# Patient Record
Sex: FEMALE | Race: WHITE | NOT HISPANIC OR LATINO | URBAN - METROPOLITAN AREA
[De-identification: names, ages, dates, MRNs, and addresses within clinical notes are randomized per-mention and may not be internally consistent; named-entity substitution may affect disease eponyms.]

---

## 2017-03-02 ENCOUNTER — INPATIENT (INPATIENT)
Facility: HOSPITAL | Age: 82
LOS: 4 days | Discharge: HOME | DRG: 814 | End: 2017-03-07
Attending: STUDENT IN AN ORGANIZED HEALTH CARE EDUCATION/TRAINING PROGRAM | Admitting: STUDENT IN AN ORGANIZED HEALTH CARE EDUCATION/TRAINING PROGRAM
Payer: MEDICARE

## 2017-03-02 VITALS
RESPIRATION RATE: 18 BRPM | HEART RATE: 79 BPM | SYSTOLIC BLOOD PRESSURE: 159 MMHG | DIASTOLIC BLOOD PRESSURE: 83 MMHG | TEMPERATURE: 98 F | OXYGEN SATURATION: 97 %

## 2017-03-02 DIAGNOSIS — G44.40 DRUG-INDUCED HEADACHE, NOT ELSEWHERE CLASSIFIED, NOT INTRACTABLE: ICD-10-CM

## 2017-03-02 DIAGNOSIS — N18.9 CHRONIC KIDNEY DISEASE, UNSPECIFIED: ICD-10-CM

## 2017-03-02 DIAGNOSIS — D72.1 EOSINOPHILIA: ICD-10-CM

## 2017-03-02 DIAGNOSIS — J43.9 EMPHYSEMA, UNSPECIFIED: ICD-10-CM

## 2017-03-02 DIAGNOSIS — Z79.1 LONG TERM (CURRENT) USE OF NON-STEROIDAL ANTI-INFLAMMATORIES (NSAID): ICD-10-CM

## 2017-03-02 DIAGNOSIS — E53.8 DEFICIENCY OF OTHER SPECIFIED B GROUP VITAMINS: ICD-10-CM

## 2017-03-02 DIAGNOSIS — Z87.891 PERSONAL HISTORY OF NICOTINE DEPENDENCE: ICD-10-CM

## 2017-03-02 DIAGNOSIS — I10 ESSENTIAL (PRIMARY) HYPERTENSION: ICD-10-CM

## 2017-03-02 DIAGNOSIS — T78.40XA ALLERGY, UNSPECIFIED, INITIAL ENCOUNTER: ICD-10-CM

## 2017-03-02 DIAGNOSIS — Z79.82 LONG TERM (CURRENT) USE OF ASPIRIN: ICD-10-CM

## 2017-03-02 DIAGNOSIS — J18.9 PNEUMONIA, UNSPECIFIED ORGANISM: ICD-10-CM

## 2017-03-02 DIAGNOSIS — J44.9 CHRONIC OBSTRUCTIVE PULMONARY DISEASE, UNSPECIFIED: ICD-10-CM

## 2017-03-02 DIAGNOSIS — E78.5 HYPERLIPIDEMIA, UNSPECIFIED: ICD-10-CM

## 2017-03-02 DIAGNOSIS — D68.59 OTHER PRIMARY THROMBOPHILIA: ICD-10-CM

## 2017-03-02 DIAGNOSIS — I12.9 HYPERTENSIVE CHRONIC KIDNEY DISEASE WITH STAGE 1 THROUGH STAGE 4 CHRONIC KIDNEY DISEASE, OR UNSPECIFIED CHRONIC KIDNEY DISEASE: ICD-10-CM

## 2017-03-02 DIAGNOSIS — R91.8 OTHER NONSPECIFIC ABNORMAL FINDING OF LUNG FIELD: ICD-10-CM

## 2017-03-02 LAB
ALBUMIN SERPL ELPH-MCNC: 3.5 G/DL — SIGNIFICANT CHANGE UP (ref 3.3–5)
ALP SERPL-CCNC: 169 U/L — HIGH (ref 40–120)
ALT FLD-CCNC: 16 U/L RC — SIGNIFICANT CHANGE UP (ref 10–45)
ANION GAP SERPL CALC-SCNC: 15 MMOL/L — SIGNIFICANT CHANGE UP (ref 5–17)
APTT BLD: 27 SEC — LOW (ref 27.5–37.4)
AST SERPL-CCNC: 15 U/L — SIGNIFICANT CHANGE UP (ref 10–40)
BASOPHILS # BLD AUTO: 0.1 K/UL — SIGNIFICANT CHANGE UP (ref 0–0.2)
BASOPHILS NFR BLD AUTO: 0.2 % — SIGNIFICANT CHANGE UP (ref 0–2)
BILIRUB SERPL-MCNC: 0.1 MG/DL — LOW (ref 0.2–1.2)
BUN SERPL-MCNC: 26 MG/DL — HIGH (ref 7–23)
CALCIUM SERPL-MCNC: 9.7 MG/DL — SIGNIFICANT CHANGE UP (ref 8.4–10.5)
CHLORIDE SERPL-SCNC: 106 MMOL/L — SIGNIFICANT CHANGE UP (ref 96–108)
CO2 SERPL-SCNC: 22 MMOL/L — SIGNIFICANT CHANGE UP (ref 22–31)
CREAT SERPL-MCNC: 1.59 MG/DL — HIGH (ref 0.5–1.3)
EOSINOPHIL # BLD AUTO: 29.9 K/UL — HIGH (ref 0–0.5)
EOSINOPHIL NFR BLD AUTO: 79.6 % — HIGH (ref 0–6)
GAS PNL BLDV: SIGNIFICANT CHANGE UP
GLUCOSE SERPL-MCNC: 101 MG/DL — HIGH (ref 70–99)
HCT VFR BLD CALC: 34.8 % — SIGNIFICANT CHANGE UP (ref 34.5–45)
HGB BLD-MCNC: 11.6 G/DL — SIGNIFICANT CHANGE UP (ref 11.5–15.5)
INR BLD: 0.98 RATIO — SIGNIFICANT CHANGE UP (ref 0.88–1.16)
LYMPHOCYTES # BLD AUTO: 1.7 K/UL — SIGNIFICANT CHANGE UP (ref 1–3.3)
LYMPHOCYTES # BLD AUTO: 4.4 % — LOW (ref 13–44)
MCHC RBC-ENTMCNC: 30.3 PG — SIGNIFICANT CHANGE UP (ref 27–34)
MCHC RBC-ENTMCNC: 33.2 GM/DL — SIGNIFICANT CHANGE UP (ref 32–36)
MCV RBC AUTO: 91.2 FL — SIGNIFICANT CHANGE UP (ref 80–100)
MONOCYTES # BLD AUTO: 0.8 K/UL — SIGNIFICANT CHANGE UP (ref 0–0.9)
MONOCYTES NFR BLD AUTO: 2.2 % — SIGNIFICANT CHANGE UP (ref 2–14)
NEUTROPHILS # BLD AUTO: 5.1 K/UL — SIGNIFICANT CHANGE UP (ref 1.8–7.4)
NEUTROPHILS NFR BLD AUTO: 13.6 % — LOW (ref 43–77)
PLATELET # BLD AUTO: 462 K/UL — HIGH (ref 150–400)
POTASSIUM SERPL-MCNC: 3.9 MMOL/L — SIGNIFICANT CHANGE UP (ref 3.5–5.3)
POTASSIUM SERPL-SCNC: 3.9 MMOL/L — SIGNIFICANT CHANGE UP (ref 3.5–5.3)
PROT SERPL-MCNC: 6.7 G/DL — SIGNIFICANT CHANGE UP (ref 6–8.3)
PROTHROM AB SERPL-ACNC: 10.7 SEC — SIGNIFICANT CHANGE UP (ref 10–13.1)
RBC # BLD: 3.82 M/UL — SIGNIFICANT CHANGE UP (ref 3.8–5.2)
RBC # FLD: 13.1 % — SIGNIFICANT CHANGE UP (ref 10.3–14.5)
SODIUM SERPL-SCNC: 143 MMOL/L — SIGNIFICANT CHANGE UP (ref 135–145)
WBC # BLD: 37.6 K/UL — HIGH (ref 3.8–10.5)
WBC # FLD AUTO: 37.6 K/UL — HIGH (ref 3.8–10.5)

## 2017-03-02 PROCEDURE — 93010 ELECTROCARDIOGRAM REPORT: CPT

## 2017-03-02 PROCEDURE — 99223 1ST HOSP IP/OBS HIGH 75: CPT

## 2017-03-02 PROCEDURE — 71020: CPT | Mod: 26

## 2017-03-02 PROCEDURE — 99285 EMERGENCY DEPT VISIT HI MDM: CPT | Mod: 25

## 2017-03-02 PROCEDURE — 88189 FLOWCYTOMETRY/READ 16 & >: CPT

## 2017-03-02 RX ORDER — AMLODIPINE BESYLATE 2.5 MG/1
10 TABLET ORAL DAILY
Qty: 0 | Refills: 0 | Status: DISCONTINUED | OUTPATIENT
Start: 2017-03-02 | End: 2017-03-07

## 2017-03-02 RX ORDER — ASPIRIN/CALCIUM CARB/MAGNESIUM 324 MG
0 TABLET ORAL
Qty: 0 | Refills: 0 | COMMUNITY

## 2017-03-02 RX ORDER — BUDESONIDE AND FORMOTEROL FUMARATE DIHYDRATE 160; 4.5 UG/1; UG/1
0 AEROSOL RESPIRATORY (INHALATION)
Qty: 0 | Refills: 0 | COMMUNITY

## 2017-03-02 RX ORDER — SODIUM CHLORIDE 9 MG/ML
1000 INJECTION INTRAMUSCULAR; INTRAVENOUS; SUBCUTANEOUS
Qty: 0 | Refills: 0 | Status: DISCONTINUED | OUTPATIENT
Start: 2017-03-02 | End: 2017-03-06

## 2017-03-02 RX ORDER — LOSARTAN POTASSIUM 100 MG/1
0 TABLET, FILM COATED ORAL
Qty: 0 | Refills: 0 | COMMUNITY

## 2017-03-02 RX ORDER — ASPIRIN/CALCIUM CARB/MAGNESIUM 324 MG
81 TABLET ORAL DAILY
Qty: 0 | Refills: 0 | Status: DISCONTINUED | OUTPATIENT
Start: 2017-03-02 | End: 2017-03-07

## 2017-03-02 RX ORDER — ATORVASTATIN CALCIUM 80 MG/1
20 TABLET, FILM COATED ORAL AT BEDTIME
Qty: 0 | Refills: 0 | Status: DISCONTINUED | OUTPATIENT
Start: 2017-03-02 | End: 2017-03-07

## 2017-03-02 RX ORDER — METOCLOPRAMIDE HCL 10 MG
10 TABLET ORAL EVERY 8 HOURS
Qty: 0 | Refills: 0 | Status: DISCONTINUED | OUTPATIENT
Start: 2017-03-02 | End: 2017-03-07

## 2017-03-02 RX ORDER — FLUTICASONE PROPIONATE AND SALMETEROL 50; 250 UG/1; UG/1
1 POWDER ORAL; RESPIRATORY (INHALATION)
Qty: 0 | Refills: 0 | Status: DISCONTINUED | OUTPATIENT
Start: 2017-03-02 | End: 2017-03-07

## 2017-03-02 RX ORDER — LOSARTAN POTASSIUM 100 MG/1
100 TABLET, FILM COATED ORAL DAILY
Qty: 0 | Refills: 0 | Status: DISCONTINUED | OUTPATIENT
Start: 2017-03-02 | End: 2017-03-07

## 2017-03-02 RX ORDER — HEPARIN SODIUM 5000 [USP'U]/ML
5000 INJECTION INTRAVENOUS; SUBCUTANEOUS EVERY 8 HOURS
Qty: 0 | Refills: 0 | Status: DISCONTINUED | OUTPATIENT
Start: 2017-03-02 | End: 2017-03-07

## 2017-03-02 NOTE — H&P ADULT. - PROBLEM SELECTOR PLAN 5
Appears to be at baseline and improved vs 2/22 labs where creatinine had been elevated.  No signs of TIARRA @ this time  Renally dose medications.

## 2017-03-02 NOTE — ED PROVIDER NOTE - PROGRESS NOTE DETAILS
spoke to dr henry for Dr. Margarita Taylor with WBC 23 with 56% eosinophils plt count of 595;  and spoke to Dr. 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 pt to be admitted to [facility] hospitalist -BEHZAD Bryant

## 2017-03-02 NOTE — ED ADULT NURSE NOTE - OBJECTIVE STATEMENT
82yr old female coming into ED sent in by MD for elevated WBC's. Patient denies having any symptoms; no chest pain, sob, n/v/d, fevers, chills, dizziness, abnormal bleeding or bruising. No recent travel or sick contacts. Patient states she went to her primary for an annual check up. Pt also states "I wouldn't come in if I wasn't told by my doctor, I feel fine". Patient awake and alert; moving all extremities; ambulatory without assistance. IV 20G right forearm. Family at bedside.

## 2017-03-02 NOTE — H&P ADULT. - PROBLEM SELECTOR PLAN 4
Pt with severe rebound headaches when not taking significant amounts of ibuprofen (takes 1600mg per day).  However, given CKD should attempt to discontinue or significantly reduce use of NSAIDs.  Will start on reglan PRN for when rebound headaches present.  Will also start on low dose naproxen PRN if reglan not able to control headaches-longer acting NSAID may help patient reduce and discontinue use of ibuprofen.  If headaches are absolutely intractable and not able to be controlled by other means may still need ibuprofen.  As creatinine is near baseline this can be ordered if absolutely necessary.

## 2017-03-02 NOTE — H&P ADULT. - NEGATIVE GASTROINTESTINAL SYMPTOMS
no melena/no diarrhea/no change in bowel habits/no hematochezia/no steatorrhea/no constipation/no vomiting/no nausea

## 2017-03-02 NOTE — H&P ADULT. - RS GEN PE MLT RESP DETAILS PC
clear to auscultation bilaterally/airway patent/breath sounds equal/good air movement/respirations non-labored

## 2017-03-02 NOTE — H&P ADULT. - LAB RESULTS AND INTERPRETATION
Labs from outpatient records obtained from Dr. Heath @ [facility]  On 2/22 Pt w/creatinine 1.8 and WBC 23K w/56% eosinophils  Previous labs with reportedly normal white count w/creatinine 1.6 during July 2016 checkup and also creatinine 1.6 previous to that.  Pt therefore with CKD and this creatinine elevation does not represent TIARRA and therefore very unlikely to representative of end organ damage related to eosinophilia. Labs here reviewed-severe eosinophilia.  Mildly elevated Alk phos.  Creatinine is elevated to 1.59, however labs from outpatient records obtained from Dr. Heath @ [facility] and these showed:  On 2/22 Pt w/creatinine 1.8 and WBC 23K w/56% eosinophils  Previous labs with reportedly normal white count w/creatinine 1.6 during July 2016 checkup and also creatinine 1.6 previous to that.  Pt therefore with CKD and this creatinine elevation does not represent TIARRA and therefore very unlikely to representative of end organ damage related to eosinophilia.

## 2017-03-02 NOTE — H&P ADULT. - NEUROLOGICAL COMMENTS
Chronic headaches for which patient has been taking 400mg of advil every 4 hours for 4 times per day for multiple years.  Patient is aware that these may be rebound headaches and has seen a neurologist in the past but could not tolerate bridging medications so went back to taking ibuprofen at high doses.

## 2017-03-02 NOTE — H&P ADULT. - SOURCE OF INFORMATION, PROFILE
Spoke with physician on call for pt's PMD Dr. Heath who provided outpatient labwork results./physician office/patient/family

## 2017-03-02 NOTE — H&P ADULT. - ASSESSMENT
82F w/pmhx of rebound headaches unable to quit ibuprofen, CKD, HTN, HLD, COPD now presents with severe eosinophilia of unclear cause in setting of her dog recently being diagnosed with severe leukocytosis.

## 2017-03-02 NOTE — H&P ADULT. - HISTORY OF PRESENT ILLNESS
82F w/pmhx of rebound headaches unable to quit ibuprofen, CKD, HTN, HLD, COPD now presents with 82F w/pmhx of rebound headaches unable to quit ibuprofen, CKD, HTN, HLD, COPD now presents with severe eosinophilia.  Patient has been asymptomatic-patient reports that she went for her normal annual checkup 1 week ago (per PMD's office this was on 2/22) and had labs drawn.  Today her PMD called her and was unable to reach her so her PMD called her daughter and told her to come to the [facility] ED immediately as she was very concerned about her "high white count". Of interest, patient's dog was diagnosed 5 days ago with a "very high white count" and has been undergoing treatment with antibiotics as well as prednisone without a clear diagnosis from their vet (who had been concerned about malignancy vs infection in the dog). The patient denies any abdominal pain, denies fevers/chills, denies constipation or diarrhea. She does report that one week ago she felt very itchy after buying a new brand of dryer sheets and had taken allegra and use hydrocortisone cream for relief but that this had resolved after rewashing her clothing and using a different brand of dryer sheets. No travel outside of the country over the last year, no travel to [place], no intake of undercooked/raw food.  No gum bleeding, no bruising.  Per patient and daughter their dog has not had any rashes or bleeding of gums or obvious bruising either which their vet had asked them to look out for.  No chest pain or SOB.    Further collateral was obtained from Dr. Heath who is the on-call physician for her PMD Dr. Margarita Taylor, who confirmed that her elevated creatinine is not an acute finding (patient was not previously aware that she had CKD or an elevated creatinine though she did recall possible proteinuria at some point).

## 2017-03-02 NOTE — H&P ADULT. - SKIN/BREAST COMMENTS
itching that was associated with redness where she scratched but no persistent rash, improved with use of OTC allegra and hydrocortisone cream as well as rewashing of clothes

## 2017-03-02 NOTE — ED PROVIDER NOTE - INTERPRETATION
normal sinus rhythm, Normal axis, Normal MA interval and QRS complex. There are no acute ischemic ST or T-wave changes.

## 2017-03-02 NOTE — H&P ADULT. - PROBLEM SELECTOR PLAN 1
Severe eosinophilia of unclear cause.  No signs of acute end organ damage-elevated creatinine is chronic based on outpatient labwork and most likely due to NSAID induced kidney damage/CKD.  Patient is also asymptomatic although did have bout of itchiness last week.  Eosinophilia is also worsening versus 2/22 labwork.  Discussed lab results with on call hematology fellow and no need for acute intervention overnight so can be seen by [facility] hematology in AM.  Will check:  Troponin and TTE to r/o cardiac involvement (though asymptomatic)  CXR to evaluate lungs  Flow cytometry  Strongyloides serologies  RUQ ultrasound given mild alk phos elevation though this is mild and w/o other LFT abnormalities  Serum tryptase, B12, IgE   Stool ova and parasites  UA though pt reports known proteinuria and labwork from outpatient office shows longstanding CKD.  Will need hematology consult ([facility]) in AM and review of peripheral smear by hematology. Severe eosinophilia of unclear cause.  No signs of acute end organ damage-elevated creatinine is chronic based on outpatient labwork and most likely due to NSAID induced kidney damage/CKD.  Patient is also asymptomatic although did have bout of itchiness last week.  Eosinophilia is also worsening versus 2/22 labwork.  Discussed lab results with on call hematology fellow and no need for acute intervention overnight so can be seen by [facility] hematology in AM.  Will check:  Troponin and TTE to r/o cardiac involvement (though asymptomatic)  CXR to evaluate lungs  Flow cytometry  Strongyloides serologies  RUQ ultrasound given mild alk phos elevation though this is mild and w/o other LFT abnormalities  Serum tryptase, B12, IgE   Stool ova and parasites  UA though pt reports known proteinuria and labwork from outpatient office shows longstanding CKD.  Will need hematology consult ([facility]) in AM and review of peripheral smear by hematology.  If no clear findings will likely also need CT Chest and CT A/P though will defer these for now pending evaluation by hematology.  Obtain further information about testing results for her dog as there is possibility of co-infection by parasite. Severe eosinophilia of unclear cause.  No signs of acute end organ damage-elevated creatinine is chronic based on outpatient labwork and most likely due to NSAID induced kidney damage/CKD.  Patient is also asymptomatic although did have bout of itchiness last week.  Eosinophilia is also worsening versus 2/22 labwork.  Discussed lab results with on call hematology fellow and no need for acute intervention overnight so can be seen by [facility] hematology in AM.  Will check:  Troponin and TTE to r/o cardiac involvement (though asymptomatic)  CXR to evaluate lungs  Will need flow cytometry but as this requires pathologist approval for panel so unable to send overnight-will need review by pathologist Dr. Ramos so this needs to addressed in AM by day team.  Strongyloides serologies  RUQ ultrasound given mild alk phos elevation though this is mild and w/o other LFT abnormalities  Serum tryptase, B12, IgE   Stool ova and parasites  UA though pt reports known proteinuria and labwork from outpatient office shows longstanding CKD.  Will need hematology consult ([facility]) in AM and review of peripheral smear by hematology.  If no clear findings will likely also need CT Chest and CT A/P though will defer these for now pending evaluation by hematology.  Obtain further information about testing results for her dog as there is possibility of co-infection by parasite.

## 2017-03-02 NOTE — H&P ADULT. - GENERAL GENITOURINARY SYMPTOMS
baseline nocturia 1-2x/night unchanged for many years-pt reports drinking a lot of liquids throughout day

## 2017-03-02 NOTE — ED ADULT NURSE NOTE - CHIEF COMPLAINT
The patient is a 82y Female complaining of The patient is a 82y Female complaining of abnormal labs.

## 2017-03-02 NOTE — ED PROVIDER NOTE - OBJECTIVE STATEMENT
82yof pmhx of HTN COPD sent in from Dr. Margarita Taylor for elevated WBC. pt reports following up this week for routine annual workup and called to come in for eval. No fever or chills no weight loss, no night sweats

## 2017-03-02 NOTE — H&P ADULT. - PROBLEM SELECTOR PLAN 3
Continue losartan and HCTZ given that serum creatinine is stable and improved vs 2/22 outpatient check.

## 2017-03-02 NOTE — H&P ADULT. - PMH
Chronic kidney disease, unspecified stage    COPD (chronic obstructive pulmonary disease)    Hyperlipidemia, unspecified hyperlipidemia type    Hypertension    Rebound headache

## 2017-03-02 NOTE — ED PROVIDER NOTE - MEDICAL DECISION MAKING DETAILS
81 yo female with reportedly very high white blood cell count noted at primary care office.  Patient denies easy bleeding, bruising, or other symptoms.  Nontoxic on exam.  Will repeat CBC, discuss with PCP and reassess, rule out hematologic malignancy.

## 2017-03-03 LAB
ANION GAP SERPL CALC-SCNC: 15 MMOL/L — SIGNIFICANT CHANGE UP (ref 5–17)
APPEARANCE UR: ABNORMAL
BILIRUB UR-MCNC: NEGATIVE — SIGNIFICANT CHANGE UP
BUN SERPL-MCNC: 26 MG/DL — HIGH (ref 7–23)
CALCIUM SERPL-MCNC: 9 MG/DL — SIGNIFICANT CHANGE UP (ref 8.4–10.5)
CHLORIDE SERPL-SCNC: 106 MMOL/L — SIGNIFICANT CHANGE UP (ref 96–108)
CO2 SERPL-SCNC: 22 MMOL/L — SIGNIFICANT CHANGE UP (ref 22–31)
COLOR SPEC: YELLOW — SIGNIFICANT CHANGE UP
CREAT SERPL-MCNC: 1.45 MG/DL — HIGH (ref 0.5–1.3)
DIFF PNL FLD: NEGATIVE — SIGNIFICANT CHANGE UP
GGT SERPL-CCNC: 13 U/L — SIGNIFICANT CHANGE UP (ref 8–40)
GLUCOSE SERPL-MCNC: 92 MG/DL — SIGNIFICANT CHANGE UP (ref 70–99)
GLUCOSE UR QL: NEGATIVE — SIGNIFICANT CHANGE UP
HCT VFR BLD CALC: 32.8 % — LOW (ref 34.5–45)
HGB BLD-MCNC: 11 G/DL — LOW (ref 11.5–15.5)
IGE SERPL-ACNC: 3239 IU/ML — HIGH (ref 0–100)
KETONES UR-MCNC: NEGATIVE — SIGNIFICANT CHANGE UP
LEUKOCYTE ESTERASE UR-ACNC: NEGATIVE — SIGNIFICANT CHANGE UP
MCHC RBC-ENTMCNC: 30.4 PG — SIGNIFICANT CHANGE UP (ref 27–34)
MCHC RBC-ENTMCNC: 33.4 GM/DL — SIGNIFICANT CHANGE UP (ref 32–36)
MCV RBC AUTO: 91 FL — SIGNIFICANT CHANGE UP (ref 80–100)
NITRITE UR-MCNC: NEGATIVE — SIGNIFICANT CHANGE UP
PH UR: 6 — SIGNIFICANT CHANGE UP (ref 4.8–8)
PLATELET # BLD AUTO: 435 K/UL — HIGH (ref 150–400)
POTASSIUM SERPL-MCNC: 3.3 MMOL/L — LOW (ref 3.5–5.3)
POTASSIUM SERPL-SCNC: 3.3 MMOL/L — LOW (ref 3.5–5.3)
PROT UR-MCNC: NEGATIVE — SIGNIFICANT CHANGE UP
RBC # BLD: 3.61 M/UL — LOW (ref 3.8–5.2)
RBC # FLD: 12.9 % — SIGNIFICANT CHANGE UP (ref 10.3–14.5)
RBC CASTS # UR COMP ASSIST: SIGNIFICANT CHANGE UP /HPF (ref 0–2)
SODIUM SERPL-SCNC: 143 MMOL/L — SIGNIFICANT CHANGE UP (ref 135–145)
SP GR SPEC: 1.02 — SIGNIFICANT CHANGE UP (ref 1.01–1.02)
TROPONIN T SERPL-MCNC: <0.01 NG/ML — SIGNIFICANT CHANGE UP (ref 0–0.06)
UROBILINOGEN FLD QL: NEGATIVE — SIGNIFICANT CHANGE UP
VIT B12 SERPL-MCNC: 207 PG/ML — LOW (ref 243–894)
WBC # BLD: 36.5 K/UL — HIGH (ref 3.8–10.5)
WBC # FLD AUTO: 36.5 K/UL — HIGH (ref 3.8–10.5)
WBC UR QL: SIGNIFICANT CHANGE UP /HPF (ref 0–5)

## 2017-03-03 PROCEDURE — 71250 CT THORAX DX C-: CPT | Mod: 26

## 2017-03-03 PROCEDURE — 74176 CT ABD & PELVIS W/O CONTRAST: CPT | Mod: 26

## 2017-03-03 PROCEDURE — 76705 ECHO EXAM OF ABDOMEN: CPT | Mod: 26,RT

## 2017-03-03 PROCEDURE — 99223 1ST HOSP IP/OBS HIGH 75: CPT

## 2017-03-03 RX ORDER — POTASSIUM CHLORIDE 20 MEQ
20 PACKET (EA) ORAL
Qty: 0 | Refills: 0 | Status: COMPLETED | OUTPATIENT
Start: 2017-03-03 | End: 2017-03-03

## 2017-03-03 RX ADMIN — ATORVASTATIN CALCIUM 20 MILLIGRAM(S): 80 TABLET, FILM COATED ORAL at 07:51

## 2017-03-03 RX ADMIN — HEPARIN SODIUM 5000 UNIT(S): 5000 INJECTION INTRAVENOUS; SUBCUTANEOUS at 14:51

## 2017-03-03 RX ADMIN — Medication 81 MILLIGRAM(S): at 12:24

## 2017-03-03 RX ADMIN — FLUTICASONE PROPIONATE AND SALMETEROL 1 DOSE(S): 50; 250 POWDER ORAL; RESPIRATORY (INHALATION) at 17:24

## 2017-03-03 RX ADMIN — FLUTICASONE PROPIONATE AND SALMETEROL 1 DOSE(S): 50; 250 POWDER ORAL; RESPIRATORY (INHALATION) at 07:54

## 2017-03-03 RX ADMIN — SODIUM CHLORIDE 50 MILLILITER(S): 9 INJECTION INTRAMUSCULAR; INTRAVENOUS; SUBCUTANEOUS at 22:48

## 2017-03-03 RX ADMIN — HEPARIN SODIUM 5000 UNIT(S): 5000 INJECTION INTRAVENOUS; SUBCUTANEOUS at 07:53

## 2017-03-03 RX ADMIN — Medication 20 MILLIEQUIVALENT(S): at 07:56

## 2017-03-03 RX ADMIN — Medication 20 MILLIEQUIVALENT(S): at 12:24

## 2017-03-03 RX ADMIN — LOSARTAN POTASSIUM 100 MILLIGRAM(S): 100 TABLET, FILM COATED ORAL at 07:48

## 2017-03-03 RX ADMIN — AMLODIPINE BESYLATE 10 MILLIGRAM(S): 2.5 TABLET ORAL at 07:48

## 2017-03-03 RX ADMIN — HEPARIN SODIUM 5000 UNIT(S): 5000 INJECTION INTRAVENOUS; SUBCUTANEOUS at 22:49

## 2017-03-03 RX ADMIN — Medication 20 MILLIEQUIVALENT(S): at 09:25

## 2017-03-04 LAB
ANION GAP SERPL CALC-SCNC: 14 MMOL/L — SIGNIFICANT CHANGE UP (ref 5–17)
BASOPHILS # BLD AUTO: 0 K/UL — SIGNIFICANT CHANGE UP (ref 0–0.2)
BASOPHILS NFR BLD AUTO: 0 % — SIGNIFICANT CHANGE UP (ref 0–2)
BUN SERPL-MCNC: 19 MG/DL — SIGNIFICANT CHANGE UP (ref 7–23)
CALCIUM SERPL-MCNC: 8.9 MG/DL — SIGNIFICANT CHANGE UP (ref 8.4–10.5)
CHLORIDE SERPL-SCNC: 106 MMOL/L — SIGNIFICANT CHANGE UP (ref 96–108)
CO2 SERPL-SCNC: 20 MMOL/L — LOW (ref 22–31)
CREAT SERPL-MCNC: 1.37 MG/DL — HIGH (ref 0.5–1.3)
EOSINOPHIL # BLD AUTO: 17.55 K/UL — HIGH (ref 0–0.5)
EOSINOPHIL NFR BLD AUTO: 72.7 % — HIGH (ref 0–6)
GLUCOSE SERPL-MCNC: 76 MG/DL — SIGNIFICANT CHANGE UP (ref 70–99)
HCT VFR BLD CALC: 30.6 % — LOW (ref 34.5–45)
HGB BLD-MCNC: 9.8 G/DL — LOW (ref 11.5–15.5)
HIV 1+2 AB+HIV1 P24 AG SERPL QL IA: SIGNIFICANT CHANGE UP
IGA FLD-MCNC: 291 MG/DL — SIGNIFICANT CHANGE UP (ref 68–378)
IGG FLD-MCNC: 595 MG/DL — LOW (ref 694–1618)
IGM SERPL-MCNC: 92 MG/DL — SIGNIFICANT CHANGE UP (ref 40–230)
LYMPHOCYTES # BLD AUTO: 1.06 K/UL — SIGNIFICANT CHANGE UP (ref 1–3.3)
LYMPHOCYTES # BLD AUTO: 4.4 % — LOW (ref 13–44)
MCHC RBC-ENTMCNC: 29.1 PG — SIGNIFICANT CHANGE UP (ref 27–34)
MCHC RBC-ENTMCNC: 32 GM/DL — SIGNIFICANT CHANGE UP (ref 32–36)
MCV RBC AUTO: 90.8 FL — SIGNIFICANT CHANGE UP (ref 80–100)
MONOCYTES # BLD AUTO: 0.43 K/UL — SIGNIFICANT CHANGE UP (ref 0–0.9)
MONOCYTES NFR BLD AUTO: 1.8 % — LOW (ref 2–14)
NEUTROPHILS # BLD AUTO: 4.88 K/UL — SIGNIFICANT CHANGE UP (ref 1.8–7.4)
NEUTROPHILS NFR BLD AUTO: 20.2 % — LOW (ref 43–77)
PLATELET # BLD AUTO: 371 K/UL — SIGNIFICANT CHANGE UP (ref 150–400)
POTASSIUM SERPL-MCNC: 3.7 MMOL/L — SIGNIFICANT CHANGE UP (ref 3.5–5.3)
POTASSIUM SERPL-SCNC: 3.7 MMOL/L — SIGNIFICANT CHANGE UP (ref 3.5–5.3)
RBC # BLD: 3.37 M/UL — LOW (ref 3.8–5.2)
RBC # FLD: 14.8 % — HIGH (ref 10.3–14.5)
SODIUM SERPL-SCNC: 140 MMOL/L — SIGNIFICANT CHANGE UP (ref 135–145)
TRYPTASE SERPL-MCNC: 11.3 NG/ML — SIGNIFICANT CHANGE UP
WBC # BLD: 24.14 K/UL — HIGH (ref 3.8–10.5)
WBC # FLD AUTO: 24.14 K/UL — HIGH (ref 3.8–10.5)

## 2017-03-04 PROCEDURE — 93306 TTE W/DOPPLER COMPLETE: CPT | Mod: 26

## 2017-03-04 PROCEDURE — 99233 SBSQ HOSP IP/OBS HIGH 50: CPT

## 2017-03-04 RX ORDER — PREGABALIN 225 MG/1
1000 CAPSULE ORAL DAILY
Qty: 0 | Refills: 0 | Status: DISCONTINUED | OUTPATIENT
Start: 2017-03-04 | End: 2017-03-07

## 2017-03-04 RX ADMIN — Medication 81 MILLIGRAM(S): at 12:12

## 2017-03-04 RX ADMIN — HEPARIN SODIUM 5000 UNIT(S): 5000 INJECTION INTRAVENOUS; SUBCUTANEOUS at 13:19

## 2017-03-04 RX ADMIN — AMLODIPINE BESYLATE 10 MILLIGRAM(S): 2.5 TABLET ORAL at 06:06

## 2017-03-04 RX ADMIN — LOSARTAN POTASSIUM 100 MILLIGRAM(S): 100 TABLET, FILM COATED ORAL at 06:06

## 2017-03-04 RX ADMIN — PREGABALIN 1000 MICROGRAM(S): 225 CAPSULE ORAL at 18:09

## 2017-03-04 RX ADMIN — HEPARIN SODIUM 5000 UNIT(S): 5000 INJECTION INTRAVENOUS; SUBCUTANEOUS at 21:18

## 2017-03-04 RX ADMIN — ATORVASTATIN CALCIUM 20 MILLIGRAM(S): 80 TABLET, FILM COATED ORAL at 21:18

## 2017-03-04 RX ADMIN — FLUTICASONE PROPIONATE AND SALMETEROL 1 DOSE(S): 50; 250 POWDER ORAL; RESPIRATORY (INHALATION) at 06:06

## 2017-03-04 RX ADMIN — HEPARIN SODIUM 5000 UNIT(S): 5000 INJECTION INTRAVENOUS; SUBCUTANEOUS at 06:06

## 2017-03-04 RX ADMIN — FLUTICASONE PROPIONATE AND SALMETEROL 1 DOSE(S): 50; 250 POWDER ORAL; RESPIRATORY (INHALATION) at 17:19

## 2017-03-05 LAB
ANION GAP SERPL CALC-SCNC: 15 MMOL/L — SIGNIFICANT CHANGE UP (ref 5–17)
BUN SERPL-MCNC: 21 MG/DL — SIGNIFICANT CHANGE UP (ref 7–23)
CALCIUM SERPL-MCNC: 9.1 MG/DL — SIGNIFICANT CHANGE UP (ref 8.4–10.5)
CHLORIDE SERPL-SCNC: 108 MMOL/L — SIGNIFICANT CHANGE UP (ref 96–108)
CO2 SERPL-SCNC: 19 MMOL/L — LOW (ref 22–31)
CREAT SERPL-MCNC: 1.38 MG/DL — HIGH (ref 0.5–1.3)
GLUCOSE SERPL-MCNC: 98 MG/DL — SIGNIFICANT CHANGE UP (ref 70–99)
HCT VFR BLD CALC: 30.5 % — LOW (ref 34.5–45)
HGB BLD-MCNC: 9.8 G/DL — LOW (ref 11.5–15.5)
MCHC RBC-ENTMCNC: 29.2 PG — SIGNIFICANT CHANGE UP (ref 27–34)
MCHC RBC-ENTMCNC: 32.1 GM/DL — SIGNIFICANT CHANGE UP (ref 32–36)
MCV RBC AUTO: 90.8 FL — SIGNIFICANT CHANGE UP (ref 80–100)
PLATELET # BLD AUTO: 384 K/UL — SIGNIFICANT CHANGE UP (ref 150–400)
POTASSIUM SERPL-MCNC: 3.8 MMOL/L — SIGNIFICANT CHANGE UP (ref 3.5–5.3)
POTASSIUM SERPL-SCNC: 3.8 MMOL/L — SIGNIFICANT CHANGE UP (ref 3.5–5.3)
RBC # BLD: 3.36 M/UL — LOW (ref 3.8–5.2)
RBC # FLD: 14.9 % — HIGH (ref 10.3–14.5)
SODIUM SERPL-SCNC: 142 MMOL/L — SIGNIFICANT CHANGE UP (ref 135–145)
WBC # BLD: 21.16 K/UL — HIGH (ref 3.8–10.5)
WBC # FLD AUTO: 21.16 K/UL — HIGH (ref 3.8–10.5)

## 2017-03-05 RX ADMIN — FLUTICASONE PROPIONATE AND SALMETEROL 1 DOSE(S): 50; 250 POWDER ORAL; RESPIRATORY (INHALATION) at 05:49

## 2017-03-05 RX ADMIN — LOSARTAN POTASSIUM 100 MILLIGRAM(S): 100 TABLET, FILM COATED ORAL at 05:48

## 2017-03-05 RX ADMIN — PREGABALIN 1000 MICROGRAM(S): 225 CAPSULE ORAL at 11:34

## 2017-03-05 RX ADMIN — Medication 250 MILLIGRAM(S): at 12:30

## 2017-03-05 RX ADMIN — SODIUM CHLORIDE 50 MILLILITER(S): 9 INJECTION INTRAMUSCULAR; INTRAVENOUS; SUBCUTANEOUS at 05:49

## 2017-03-05 RX ADMIN — HEPARIN SODIUM 5000 UNIT(S): 5000 INJECTION INTRAVENOUS; SUBCUTANEOUS at 21:07

## 2017-03-05 RX ADMIN — HEPARIN SODIUM 5000 UNIT(S): 5000 INJECTION INTRAVENOUS; SUBCUTANEOUS at 05:48

## 2017-03-05 RX ADMIN — AMLODIPINE BESYLATE 10 MILLIGRAM(S): 2.5 TABLET ORAL at 05:48

## 2017-03-05 RX ADMIN — Medication 250 MILLIGRAM(S): at 12:02

## 2017-03-05 RX ADMIN — HEPARIN SODIUM 5000 UNIT(S): 5000 INJECTION INTRAVENOUS; SUBCUTANEOUS at 14:44

## 2017-03-05 RX ADMIN — ATORVASTATIN CALCIUM 20 MILLIGRAM(S): 80 TABLET, FILM COATED ORAL at 21:07

## 2017-03-05 RX ADMIN — FLUTICASONE PROPIONATE AND SALMETEROL 1 DOSE(S): 50; 250 POWDER ORAL; RESPIRATORY (INHALATION) at 17:32

## 2017-03-05 RX ADMIN — Medication 81 MILLIGRAM(S): at 11:34

## 2017-03-06 LAB
ANION GAP SERPL CALC-SCNC: 13 MMOL/L — SIGNIFICANT CHANGE UP (ref 5–17)
BUN SERPL-MCNC: 20 MG/DL — SIGNIFICANT CHANGE UP (ref 7–23)
CALCIUM SERPL-MCNC: 8.7 MG/DL — SIGNIFICANT CHANGE UP (ref 8.4–10.5)
CHLORIDE SERPL-SCNC: 107 MMOL/L — SIGNIFICANT CHANGE UP (ref 96–108)
CO2 SERPL-SCNC: 19 MMOL/L — LOW (ref 22–31)
CREAT SERPL-MCNC: 1.27 MG/DL — SIGNIFICANT CHANGE UP (ref 0.5–1.3)
GLUCOSE SERPL-MCNC: 99 MG/DL — SIGNIFICANT CHANGE UP (ref 70–99)
HCT VFR BLD CALC: 31.6 % — LOW (ref 34.5–45)
HGB BLD-MCNC: 10.1 G/DL — LOW (ref 11.5–15.5)
MCHC RBC-ENTMCNC: 29.3 PG — SIGNIFICANT CHANGE UP (ref 27–34)
MCHC RBC-ENTMCNC: 32 GM/DL — SIGNIFICANT CHANGE UP (ref 32–36)
MCV RBC AUTO: 91.6 FL — SIGNIFICANT CHANGE UP (ref 80–100)
PLATELET # BLD AUTO: 269 K/UL — SIGNIFICANT CHANGE UP (ref 150–400)
POTASSIUM SERPL-MCNC: 4 MMOL/L — SIGNIFICANT CHANGE UP (ref 3.5–5.3)
POTASSIUM SERPL-SCNC: 4 MMOL/L — SIGNIFICANT CHANGE UP (ref 3.5–5.3)
RBC # BLD: 3.45 M/UL — LOW (ref 3.8–5.2)
RBC # FLD: 15.2 % — HIGH (ref 10.3–14.5)
SODIUM SERPL-SCNC: 139 MMOL/L — SIGNIFICANT CHANGE UP (ref 135–145)
STRONGYLOIDES AB SER-ACNC: NEGATIVE — SIGNIFICANT CHANGE UP
STRONGYLOIDES AB SER-ACNC: NEGATIVE — SIGNIFICANT CHANGE UP
WBC # BLD: 19.49 K/UL — HIGH (ref 3.8–10.5)
WBC # FLD AUTO: 19.49 K/UL — HIGH (ref 3.8–10.5)

## 2017-03-06 PROCEDURE — G0452: CPT | Mod: 26

## 2017-03-06 RX ADMIN — Medication 10 MILLIGRAM(S): at 19:13

## 2017-03-06 RX ADMIN — FLUTICASONE PROPIONATE AND SALMETEROL 1 DOSE(S): 50; 250 POWDER ORAL; RESPIRATORY (INHALATION) at 18:04

## 2017-03-06 RX ADMIN — HEPARIN SODIUM 5000 UNIT(S): 5000 INJECTION INTRAVENOUS; SUBCUTANEOUS at 05:07

## 2017-03-06 RX ADMIN — LOSARTAN POTASSIUM 100 MILLIGRAM(S): 100 TABLET, FILM COATED ORAL at 05:07

## 2017-03-06 RX ADMIN — ATORVASTATIN CALCIUM 20 MILLIGRAM(S): 80 TABLET, FILM COATED ORAL at 21:33

## 2017-03-06 RX ADMIN — HEPARIN SODIUM 5000 UNIT(S): 5000 INJECTION INTRAVENOUS; SUBCUTANEOUS at 16:00

## 2017-03-06 RX ADMIN — AMLODIPINE BESYLATE 10 MILLIGRAM(S): 2.5 TABLET ORAL at 05:07

## 2017-03-06 RX ADMIN — FLUTICASONE PROPIONATE AND SALMETEROL 1 DOSE(S): 50; 250 POWDER ORAL; RESPIRATORY (INHALATION) at 05:07

## 2017-03-06 RX ADMIN — HEPARIN SODIUM 5000 UNIT(S): 5000 INJECTION INTRAVENOUS; SUBCUTANEOUS at 21:33

## 2017-03-06 RX ADMIN — PREGABALIN 1000 MICROGRAM(S): 225 CAPSULE ORAL at 11:10

## 2017-03-06 RX ADMIN — Medication 81 MILLIGRAM(S): at 11:10

## 2017-03-06 NOTE — DISCHARGE NOTE ADULT - CARE PROVIDER_API CALL
Margarita Taylor), Internal Medicine  [address] [place] [address]  Phone: [phone]  Fax: [phone]    Ag Alvarado), Critical Care Medicine; Internal Medicine; [place] [address]  Phone: [phone]  Fax: [phone]

## 2017-03-06 NOTE — DISCHARGE NOTE ADULT - ADDITIONAL INSTRUCTIONS
You will need to follow up with your primary medical doctor, Dr. Taylor [phone], within one week of discharge - at this time, you will need to have your CBC checked to monitor your WBC, as well as your Vitamin B12 level.    You are being discharged on antibiotics, which you will take for 9 more days (stop after dose on 3/16/17).  You are also being discharged on a steroid taper, which you will need to take until completed.  You will need to follow up with your pulmonologist, Dr. Alvarado [phone], within 2 weeks of discharge.  At this time, he will evaluate the lung mass seen on your previous CT scan.

## 2017-03-06 NOTE — DISCHARGE NOTE ADULT - PATIENT PORTAL LINK FT
“You can access the [facility] Patient Portal, offered by [facility], by registering with the following website: [email or web address]”

## 2017-03-06 NOTE — DISCHARGE NOTE ADULT - CARE PLAN
Goal:	Eosinophilia Principal Discharge DX:	Eosinophilic leukocytosis  Goal:	WBC improved  Instructions for follow-up, activity and diet:	You will need to follow up with your primary medical doctor, Dr. Taylor [phone], within one week of discharge - at this time, you will need to have your CBC checked to monitor your WBC.  Secondary Diagnosis:	Pneumonia  Instructions for follow-up, activity and diet:	You are being discharged on antibiotics, which you will take for 9 more days (stop after dose on 3/16/17).  You are also being discharged on a steroid taper, which you will need to take until completed.  You will need to follow up with your pulmonologist, Dr. Alvarado [phone], within 2 weeks of discharge.  At this time, he will evaluate the lung mass seen on your previous CT scan.  Secondary Diagnosis:	Chronic kidney disease, unspecified stage  Instructions for follow-up, activity and diet:	Avoid taking (NSAIDs) - (ex: Ibuprofen, Advil, Celebrex, Naprosyn)  Avoid taking any nephrotoxic agents (can harm kidneys) - Intravenous contrast for diagnostic testing, combination cold medications.  Have all medications adjusted for your renal function by your Health Care Provider.  Blood pressure control is important.  Take all medication as prescribed.  Secondary Diagnosis:	Chronic obstructive pulmonary disease, unspecified COPD type  Instructions for follow-up, activity and diet:	Call your Health Care provider upon arrival home to make a follow up appointment within one week.  Take all inhalers as prescribed by your Health Care Provider.  Take steroids as prescribed by your Health Care Provider.  If your cough increases infrequency and severity and/or you have shortness of breath or increased shortness of breath call your Health Care Provider.  If you develop fever, chills, night sweats, malaise, and/or change in mental status call your Health care Provider.  Nutrition is very important.  Eat small frequent meals.  Increase your activity as tolerated.  Do not stay in bed all day  Secondary Diagnosis:	Essential hypertension  Instructions for follow-up, activity and diet:	Low salt diet  Activity as tolerated.  Take all medication as prescribed.  Follow up with your medical doctor for routine blood pressure monitoring at your next visit.  Notify your doctor if you have any of the following symptoms:   Dizziness, Lightheadedness, Blurry vision, Headache, Chest pain, Shortness of breath  Secondary Diagnosis:	Rebound headache  Instructions for follow-up, activity and diet:	You should take Tylenol as needed for headaches.  You SHOULD NOT take any NSAIDS (including Motrin, Advil, Ibuprofen).  Secondary Diagnosis:	Vitamin B12 deficiency  Instructions for follow-up, activity and diet:	You are being discharged on a multivitamin and you should take an over-the-counter B-12 vitamin daily.  Follow up with Dr. Taylor to have your B-12 level checked. Principal Discharge DX:	Eosinophilic leukocytosis  Goal:	WBC improved  Instructions for follow-up, activity and diet:	You will need to follow up with your primary medical doctor, Dr. Taylor [phone], within one week of discharge - at this time, you will need to have your CBC checked to monitor your WBC.  Secondary Diagnosis:	Pneumonia  Instructions for follow-up, activity and diet:	You are being discharged on antibiotics, which you will take for 9 more days (stop after dose on 3/16/17).  You are also being discharged on a steroid taper, which you will need to take until completed.  You will need to follow up with your pulmonologist, Dr. Alvarado [phone], within 2 weeks of discharge.  At this time, he will evaluate the lung mass seen on your previous CT scan.  Secondary Diagnosis:	Chronic kidney disease, unspecified stage  Instructions for follow-up, activity and diet:	Avoid taking (NSAIDs) - (ex: Ibuprofen, Advil, Celebrex, Naprosyn)  Avoid taking any nephrotoxic agents (can harm kidneys) - Intravenous contrast for diagnostic testing, combination cold medications.  Have all medications adjusted for your renal function by your Health Care Provider.  Blood pressure control is important.  Take all medication as prescribed.  Secondary Diagnosis:	Chronic obstructive pulmonary disease, unspecified COPD type  Instructions for follow-up, activity and diet:	Call your Health Care provider upon arrival home to make a follow up appointment within one week.  Take all inhalers as prescribed by your Health Care Provider.  Take steroids as prescribed by your Health Care Provider.  If your cough increases infrequency and severity and/or you have shortness of breath or increased shortness of breath call your Health Care Provider.  If you develop fever, chills, night sweats, malaise, and/or change in mental status call your Health care Provider.  Nutrition is very important.  Eat small frequent meals.  Increase your activity as tolerated.  Do not stay in bed all day  Secondary Diagnosis:	Essential hypertension  Instructions for follow-up, activity and diet:	Low salt diet  Activity as tolerated.  Take all medication as prescribed.  Follow up with your medical doctor for routine blood pressure monitoring at your next visit.  Notify your doctor if you have any of the following symptoms:   Dizziness, Lightheadedness, Blurry vision, Headache, Chest pain, Shortness of breath  Secondary Diagnosis:	Rebound headache  Instructions for follow-up, activity and diet:	You should take Tylenol as needed for headaches.  You SHOULD NOT take any NSAIDS (including Motrin, Advil, Ibuprofen).  Secondary Diagnosis:	Vitamin B12 deficiency  Instructions for follow-up, activity and diet:	You are being discharged on a multivitamin and you should take an over-the-counter B-12 vitamin daily.  Follow up with Dr. Taylor to have your B-12 level checked. Principal Discharge DX:	Eosinophilic leukocytosis  Goal:	WBC improved  Instructions for follow-up, activity and diet:	You will need to follow up with your primary medical doctor, Dr. Taylor [phone], within one week of discharge - at this time, you will need to have your CBC checked to monitor your WBC.  Secondary Diagnosis:	Pneumonia  Instructions for follow-up, activity and diet:	You are being discharged on antibiotics, which you will take for 9 more days (stop after dose on 3/16/17).  You are also being discharged on a steroid taper, which you will need to take until completed.  You will need to follow up with your pulmonologist, Dr. Alvarado [phone], within 2 weeks of discharge.  At this time, he will evaluate the lung mass seen on your previous CT scan.  Secondary Diagnosis:	Chronic kidney disease, unspecified stage  Instructions for follow-up, activity and diet:	Avoid taking (NSAIDs) - (ex: Ibuprofen, Advil, Celebrex, Naprosyn)  Avoid taking any nephrotoxic agents (can harm kidneys) - Intravenous contrast for diagnostic testing, combination cold medications.  Have all medications adjusted for your renal function by your Health Care Provider.  Blood pressure control is important.  Take all medication as prescribed.  Secondary Diagnosis:	Chronic obstructive pulmonary disease, unspecified COPD type  Instructions for follow-up, activity and diet:	Call your Health Care provider upon arrival home to make a follow up appointment within one week.  Take all inhalers as prescribed by your Health Care Provider.  Take steroids as prescribed by your Health Care Provider.  If your cough increases infrequency and severity and/or you have shortness of breath or increased shortness of breath call your Health Care Provider.  If you develop fever, chills, night sweats, malaise, and/or change in mental status call your Health care Provider.  Nutrition is very important.  Eat small frequent meals.  Increase your activity as tolerated.  Do not stay in bed all day  Secondary Diagnosis:	Essential hypertension  Instructions for follow-up, activity and diet:	Low salt diet  Activity as tolerated.  Take all medication as prescribed.  Follow up with your medical doctor for routine blood pressure monitoring at your next visit.  Notify your doctor if you have any of the following symptoms:   Dizziness, Lightheadedness, Blurry vision, Headache, Chest pain, Shortness of breath  Secondary Diagnosis:	Rebound headache  Instructions for follow-up, activity and diet:	You should take Tylenol as needed for headaches.  You SHOULD NOT take any NSAIDS (including Motrin, Advil, Ibuprofen).  Secondary Diagnosis:	Vitamin B12 deficiency  Instructions for follow-up, activity and diet:	You are being discharged on a multivitamin and you should take an over-the-counter B-12 vitamin daily.  Follow up with Dr. Taylor to have your B-12 level checked.

## 2017-03-06 NOTE — DISCHARGE NOTE ADULT - PLAN OF CARE
Eosinophilia You will need to follow up with your primary medical doctor, Dr. Taylor [phone], within one week of discharge - at this time, you will need to have your CBC checked to monitor your WBC. You are being discharged on antibiotics, which you will take for 9 more days (stop after dose on 3/16/17).  You are also being discharged on a steroid taper, which you will need to take until completed.  You will need to follow up with your pulmonologist, Dr. Alvarado [phone], within 2 weeks of discharge.  At this time, he will evaluate the lung mass seen on your previous CT scan. Avoid taking (NSAIDs) - (ex: Ibuprofen, Advil, Celebrex, Naprosyn)  Avoid taking any nephrotoxic agents (can harm kidneys) - Intravenous contrast for diagnostic testing, combination cold medications.  Have all medications adjusted for your renal function by your Health Care Provider.  Blood pressure control is important.  Take all medication as prescribed. Call your Health Care provider upon arrival home to make a follow up appointment within one week.  Take all inhalers as prescribed by your Health Care Provider.  Take steroids as prescribed by your Health Care Provider.  If your cough increases infrequency and severity and/or you have shortness of breath or increased shortness of breath call your Health Care Provider.  If you develop fever, chills, night sweats, malaise, and/or change in mental status call your Health care Provider.  Nutrition is very important.  Eat small frequent meals.  Increase your activity as tolerated.  Do not stay in bed all day Low salt diet  Activity as tolerated.  Take all medication as prescribed.  Follow up with your medical doctor for routine blood pressure monitoring at your next visit.  Notify your doctor if you have any of the following symptoms:   Dizziness, Lightheadedness, Blurry vision, Headache, Chest pain, Shortness of breath You should take Tylenol as needed for headaches.  You SHOULD NOT take any NSAIDS (including Motrin, Advil, Ibuprofen). You are being discharged on a multivitamin and you should take an over-the-counter B-12 vitamin daily.  Follow up with Dr. Taylor to have your B-12 level checked. WBC improved

## 2017-03-06 NOTE — DISCHARGE NOTE ADULT - MEDICATION SUMMARY - MEDICATIONS TO TAKE
I will START or STAY ON the medications listed below when I get home from the hospital:    predniSONE 10 mg oral tablet  -- 4 tab(s) by mouth once a day x 7 days  3 tab(s) by mouth once a day x 7 days  2 tab(s) by mouth once a day x 7 days  1 tab(s) by mouth once a day x 7 days  -- Indication: For Eosinophilic leukocytosis    acetaminophen 500 mg oral tablet  -- 1 tab(s) by mouth every 6 hours as needed for headache  -- This product contains acetaminophen.  Do not use  with any other product containing acetaminophen to prevent possible liver damage.    -- Indication: For Rebound headache    aspirin 81 mg oral delayed release tablet  -- 1 tab(s) by mouth once a day  -- Indication: For COronary Artery Disease    atorvastatin 20 mg oral tablet  -- 1 tab(s) by mouth once a day (at bedtime)  -- Indication: For Hyperlipidemia, unspecified hyperlipidemia type    losartan-hydroCHLOROthiazide 100mg-25mg oral tablet  -- 1 tab(s) by mouth once a day  -- Indication: For Essential hypertension    Symbicort 160 mcg-4.5 mcg/inh inhalation aerosol  -- 2 puff(s) inhaled 2 times a day  -- Indication: For COPD (chronic obstructive pulmonary disease)    amLODIPine 10 mg oral tablet  -- 1 tab(s) by mouth once a day  -- Indication: For Hypertension    levoFLOXacin 250 mg oral tablet  -- 1 tab(s) by mouth every 24 hours  start 3/8/17  -- Indication: For Eosinophilic leukocytosis    Vitamin B12 1000 mcg oral tablet  -- 1 tab(s) by mouth once a day  -- Indication: For Need for prophylactic- Supplement I will START or STAY ON the medications listed below when I get home from the hospital:    predniSONE 10 mg oral tablet  -- 4 tab(s) by mouth once a day x 7 days  3 tab(s) by mouth once a day x 7 days  2 tab(s) by mouth once a day x 7 days  1 tab(s) by mouth once a day x 7 days  -- Indication: For Eosinophilic leukocytosis    acetaminophen 500 mg oral tablet  -- 1 tab(s) by mouth every 6 hours as needed for headache  -- This product contains acetaminophen.  Do not use  with any other product containing acetaminophen to prevent possible liver damage.    -- Indication: For Rebound headache    aspirin 81 mg oral delayed release tablet  -- 1 tab(s) by mouth once a day  -- Indication: For COronary Artery Disease    atorvastatin 20 mg oral tablet  -- 1 tab(s) by mouth once a day (at bedtime)  -- Indication: For Hyperlipidemia, unspecified hyperlipidemia type    losartan-hydroCHLOROthiazide 100mg-25mg oral tablet  -- 1 tab(s) by mouth once a day  -- Indication: For Essential hypertension    Symbicort 160 mcg-4.5 mcg/inh inhalation aerosol  -- 2 puff(s) inhaled 2 times a day  -- Indication: For COPD (chronic obstructive pulmonary disease)    amLODIPine 10 mg oral tablet  -- 1 tab(s) by mouth once a day  -- Indication: For Hypertension    levoFLOXacin 250 mg oral tablet  -- 1 tab(s) by mouth every 24 hours  start 3/8/17  -- Indication: For Eosinophilic leukocytosis    multivitamin  -- 1 tab(s) by mouth once a day  -- Indication: For Supplement    Vitamin B12 1000 mcg oral tablet  -- 1 tab(s) by mouth once a day  -- Indication: For Need for prophylactic- Supplement

## 2017-03-06 NOTE — DISCHARGE NOTE ADULT - MEDICATION SUMMARY - MEDICATIONS TO STOP TAKING
I will STOP taking the medications listed below when I get home from the hospital:    Advil 200 mg oral tablet  -- 2 tab(s) by mouth every 6 hours, As Needed

## 2017-03-06 NOTE — DISCHARGE NOTE ADULT - HOSPITAL COURSE
***To Be Completed By Attending*** 82F w/pmhx of rebound headaches unable to quit ibuprofen, CKD, HTN, HLD, COPD now presents with severe eosinophilia.  Patient has been asymptomatic-patient reports that she went for her normal annual checkup 1 week ago (per PMD's office this was on 2/22) and had labs drawn.  Today her PMD called her and was unable to reach her so her PMD called her daughter and told her to come to the [facility] ED immediately as she was very concerned about her "high white count". Of interest, patient's dog was diagnosed 5 days ago with a "very high white count" and has been undergoing treatment with antibiotics as well as prednisone without a clear diagnosis from their vet (who had been concerned about malignancy vs infection in the dog). The patient denies any abdominal pain, denies fevers/chills, denies constipation or diarrhea. She does report that one week ago she felt very itchy after buying a new brand of dryer sheets and had taken allegra and use hydrocortisone cream for relief but that this had resolved after rewashing her clothing and using a different brand of dryer sheets. No travel outside of the country over the last year, no travel to mountain [place], no intake of undercooked/raw food.  No gum bleeding, no bruising.  Per patient and daughter their dog has not had any rashes or bleeding of gums or obvious bruising either which their vet had asked them to look out for.  No chest pain or SOB.    Patient admitted with wbc of 36 and eosinophilia. Hematology consulted and ran full blood panel w/u. Vitamin b12 deficiency was found and patient started on subq vitamin b12. WBC trended down steadily on its own without any intervetion. Hematology recommended no further w/u as likely allergic reaction or vit b12 related. Patient also with upper lung mass that was evaluated by pulmonary and suggested to have repeat CT chest in 1 month. Patient instructed to stay off NSAIDs as well. Patient discharged in stable condition.

## 2017-03-07 VITALS
HEART RATE: 79 BPM | SYSTOLIC BLOOD PRESSURE: 146 MMHG | DIASTOLIC BLOOD PRESSURE: 74 MMHG | TEMPERATURE: 99 F | RESPIRATION RATE: 18 BRPM | OXYGEN SATURATION: 96 %

## 2017-03-07 LAB
ANION GAP SERPL CALC-SCNC: 14 MMOL/L — SIGNIFICANT CHANGE UP (ref 5–17)
BUN SERPL-MCNC: 18 MG/DL — SIGNIFICANT CHANGE UP (ref 7–23)
CALCIUM SERPL-MCNC: 9 MG/DL — SIGNIFICANT CHANGE UP (ref 8.4–10.5)
CHLORIDE SERPL-SCNC: 101 MMOL/L — SIGNIFICANT CHANGE UP (ref 96–108)
CHROM ANALY INTERPHASE BLD FISH-IMP: SIGNIFICANT CHANGE UP
CO2 SERPL-SCNC: 21 MMOL/L — LOW (ref 22–31)
CREAT SERPL-MCNC: 1.31 MG/DL — HIGH (ref 0.5–1.3)
GLUCOSE SERPL-MCNC: 93 MG/DL — SIGNIFICANT CHANGE UP (ref 70–99)
HCT VFR BLD CALC: 31.8 % — LOW (ref 34.5–45)
HGB BLD-MCNC: 9.9 G/DL — LOW (ref 11.5–15.5)
JAK2 P.V617F BLD/T QL: SIGNIFICANT CHANGE UP
MCHC RBC-ENTMCNC: 28.4 PG — SIGNIFICANT CHANGE UP (ref 27–34)
MCHC RBC-ENTMCNC: 31.1 GM/DL — LOW (ref 32–36)
MCV RBC AUTO: 91.1 FL — SIGNIFICANT CHANGE UP (ref 80–100)
PLATELET # BLD AUTO: 444 K/UL — HIGH (ref 150–400)
POTASSIUM SERPL-MCNC: 4.1 MMOL/L — SIGNIFICANT CHANGE UP (ref 3.5–5.3)
POTASSIUM SERPL-SCNC: 4.1 MMOL/L — SIGNIFICANT CHANGE UP (ref 3.5–5.3)
RBC # BLD: 3.49 M/UL — LOW (ref 3.8–5.2)
RBC # FLD: 15.1 % — HIGH (ref 10.3–14.5)
SODIUM SERPL-SCNC: 136 MMOL/L — SIGNIFICANT CHANGE UP (ref 135–145)
WBC # BLD: 18.28 K/UL — HIGH (ref 3.8–10.5)
WBC # FLD AUTO: 18.28 K/UL — HIGH (ref 3.8–10.5)

## 2017-03-07 PROCEDURE — 80053 COMPREHEN METABOLIC PANEL: CPT

## 2017-03-07 PROCEDURE — 93306 TTE W/DOPPLER COMPLETE: CPT

## 2017-03-07 PROCEDURE — 82803 BLOOD GASES ANY COMBINATION: CPT

## 2017-03-07 PROCEDURE — 84132 ASSAY OF SERUM POTASSIUM: CPT

## 2017-03-07 PROCEDURE — 99232 SBSQ HOSP IP/OBS MODERATE 35: CPT | Mod: GC

## 2017-03-07 PROCEDURE — 71250 CT THORAX DX C-: CPT

## 2017-03-07 PROCEDURE — 81001 URINALYSIS AUTO W/SCOPE: CPT

## 2017-03-07 PROCEDURE — 86682 HELMINTH ANTIBODY: CPT

## 2017-03-07 PROCEDURE — 71046 X-RAY EXAM CHEST 2 VIEWS: CPT

## 2017-03-07 PROCEDURE — 74176 CT ABD & PELVIS W/O CONTRAST: CPT

## 2017-03-07 PROCEDURE — 76705 ECHO EXAM OF ABDOMEN: CPT

## 2017-03-07 PROCEDURE — 94640 AIRWAY INHALATION TREATMENT: CPT

## 2017-03-07 PROCEDURE — 88275 CYTOGENETICS 100-300: CPT

## 2017-03-07 PROCEDURE — 82330 ASSAY OF CALCIUM: CPT

## 2017-03-07 PROCEDURE — 88185 FLOWCYTOMETRY/TC ADD-ON: CPT

## 2017-03-07 PROCEDURE — 82784 ASSAY IGA/IGD/IGG/IGM EACH: CPT

## 2017-03-07 PROCEDURE — 85027 COMPLETE CBC AUTOMATED: CPT

## 2017-03-07 PROCEDURE — 87389 HIV-1 AG W/HIV-1&-2 AB AG IA: CPT

## 2017-03-07 PROCEDURE — 93005 ELECTROCARDIOGRAM TRACING: CPT

## 2017-03-07 PROCEDURE — 88237 TISSUE CULTURE BONE MARROW: CPT

## 2017-03-07 PROCEDURE — 82607 VITAMIN B-12: CPT

## 2017-03-07 PROCEDURE — 99285 EMERGENCY DEPT VISIT HI MDM: CPT | Mod: 25

## 2017-03-07 PROCEDURE — 82785 ASSAY OF IGE: CPT

## 2017-03-07 PROCEDURE — 85014 HEMATOCRIT: CPT

## 2017-03-07 PROCEDURE — 88184 FLOWCYTOMETRY/ TC 1 MARKER: CPT

## 2017-03-07 PROCEDURE — 85610 PROTHROMBIN TIME: CPT

## 2017-03-07 PROCEDURE — 81270 JAK2 GENE: CPT

## 2017-03-07 PROCEDURE — 80048 BASIC METABOLIC PNL TOTAL CA: CPT

## 2017-03-07 PROCEDURE — 85730 THROMBOPLASTIN TIME PARTIAL: CPT

## 2017-03-07 PROCEDURE — 84484 ASSAY OF TROPONIN QUANT: CPT

## 2017-03-07 PROCEDURE — 88271 CYTOGENETICS DNA PROBE: CPT

## 2017-03-07 PROCEDURE — 83520 IMMUNOASSAY QUANT NOS NONAB: CPT

## 2017-03-07 PROCEDURE — 82977 ASSAY OF GGT: CPT

## 2017-03-07 PROCEDURE — 82435 ASSAY OF BLOOD CHLORIDE: CPT

## 2017-03-07 PROCEDURE — 83605 ASSAY OF LACTIC ACID: CPT

## 2017-03-07 PROCEDURE — 82947 ASSAY GLUCOSE BLOOD QUANT: CPT

## 2017-03-07 PROCEDURE — 84295 ASSAY OF SERUM SODIUM: CPT

## 2017-03-07 RX ORDER — ACETAMINOPHEN 500 MG
1 TABLET ORAL
Qty: 120 | Refills: 0 | OUTPATIENT
Start: 2017-03-07 | End: 2017-04-06

## 2017-03-07 RX ORDER — IBUPROFEN 200 MG
2 TABLET ORAL
Qty: 0 | Refills: 0 | COMMUNITY

## 2017-03-07 RX ORDER — ATORVASTATIN CALCIUM 80 MG/1
1 TABLET, FILM COATED ORAL
Qty: 0 | Refills: 0 | COMMUNITY

## 2017-03-07 RX ORDER — PREGABALIN 225 MG/1
1 CAPSULE ORAL
Qty: 30 | Refills: 0 | OUTPATIENT
Start: 2017-03-07 | End: 2017-04-06

## 2017-03-07 RX ADMIN — PREGABALIN 1000 MICROGRAM(S): 225 CAPSULE ORAL at 12:57

## 2017-03-07 RX ADMIN — HEPARIN SODIUM 5000 UNIT(S): 5000 INJECTION INTRAVENOUS; SUBCUTANEOUS at 14:46

## 2017-03-07 RX ADMIN — Medication 250 MILLIGRAM(S): at 13:25

## 2017-03-07 RX ADMIN — Medication 81 MILLIGRAM(S): at 12:56

## 2017-03-07 RX ADMIN — Medication 250 MILLIGRAM(S): at 12:57

## 2017-03-07 RX ADMIN — FLUTICASONE PROPIONATE AND SALMETEROL 1 DOSE(S): 50; 250 POWDER ORAL; RESPIRATORY (INHALATION) at 05:05

## 2017-03-07 RX ADMIN — LOSARTAN POTASSIUM 100 MILLIGRAM(S): 100 TABLET, FILM COATED ORAL at 05:04

## 2017-03-07 RX ADMIN — Medication 10 MILLIGRAM(S): at 09:05

## 2017-03-07 RX ADMIN — AMLODIPINE BESYLATE 10 MILLIGRAM(S): 2.5 TABLET ORAL at 05:04

## 2017-03-07 RX ADMIN — Medication 40 MILLIGRAM(S): at 16:55

## 2017-03-07 RX ADMIN — HEPARIN SODIUM 5000 UNIT(S): 5000 INJECTION INTRAVENOUS; SUBCUTANEOUS at 05:04

## 2017-03-08 PROBLEM — J44.9 CHRONIC OBSTRUCTIVE PULMONARY DISEASE, UNSPECIFIED: Chronic | Status: ACTIVE | Noted: 2017-03-02

## 2017-03-08 PROBLEM — I10 ESSENTIAL (PRIMARY) HYPERTENSION: Chronic | Status: ACTIVE | Noted: 2017-03-02

## 2017-03-08 LAB — TM INTERPRETATION: SIGNIFICANT CHANGE UP

## 2017-03-09 LAB — MISCELLANEOUS TEST NAME: SIGNIFICANT CHANGE UP

## 2017-03-16 LAB — MISCELLANEOUS TEST NAME: SIGNIFICANT CHANGE UP

## 2017-03-24 ENCOUNTER — APPOINTMENT (OUTPATIENT)
Dept: HEMATOLOGY ONCOLOGY | Facility: CLINIC | Age: 82
End: 2017-03-24

## 2017-03-24 VITALS
HEART RATE: 67 BPM | OXYGEN SATURATION: 97 % | RESPIRATION RATE: 16 BRPM | WEIGHT: 163.14 LBS | HEIGHT: 60.24 IN | SYSTOLIC BLOOD PRESSURE: 116 MMHG | DIASTOLIC BLOOD PRESSURE: 62 MMHG | BODY MASS INDEX: 31.61 KG/M2 | TEMPERATURE: 97.6 F

## 2017-03-24 DIAGNOSIS — Z87.891 PERSONAL HISTORY OF NICOTINE DEPENDENCE: ICD-10-CM

## 2017-03-24 DIAGNOSIS — D72.1 EOSINOPHILIA: ICD-10-CM

## 2017-03-28 PROBLEM — Z87.891 FORMER SMOKER: Status: ACTIVE | Noted: 2017-03-28

## 2017-03-28 PROBLEM — D72.1 PERIPHERAL EOSINOPHILIA: Status: ACTIVE | Noted: 2017-03-28

## 2017-05-20 ENCOUNTER — INPATIENT (INPATIENT)
Facility: HOSPITAL | Age: 82
LOS: 10 days | Discharge: HOME | DRG: 871 | End: 2017-05-31
Attending: INTERNAL MEDICINE | Admitting: INTERNAL MEDICINE
Payer: MEDICARE

## 2017-05-20 VITALS
SYSTOLIC BLOOD PRESSURE: 110 MMHG | HEIGHT: 61 IN | TEMPERATURE: 98 F | WEIGHT: 160.06 LBS | RESPIRATION RATE: 22 BRPM | DIASTOLIC BLOOD PRESSURE: 53 MMHG | OXYGEN SATURATION: 96 % | HEART RATE: 81 BPM

## 2017-05-20 DIAGNOSIS — J18.9 PNEUMONIA, UNSPECIFIED ORGANISM: ICD-10-CM

## 2017-05-20 DIAGNOSIS — R73.9 HYPERGLYCEMIA, UNSPECIFIED: ICD-10-CM

## 2017-05-20 DIAGNOSIS — J44.9 CHRONIC OBSTRUCTIVE PULMONARY DISEASE, UNSPECIFIED: ICD-10-CM

## 2017-05-20 DIAGNOSIS — N18.4 CHRONIC KIDNEY DISEASE, STAGE 4 (SEVERE): ICD-10-CM

## 2017-05-20 DIAGNOSIS — R91.8 OTHER NONSPECIFIC ABNORMAL FINDING OF LUNG FIELD: ICD-10-CM

## 2017-05-20 DIAGNOSIS — J15.9 UNSPECIFIED BACTERIAL PNEUMONIA: ICD-10-CM

## 2017-05-20 LAB
ALBUMIN SERPL ELPH-MCNC: 3.3 G/DL — SIGNIFICANT CHANGE UP (ref 3.3–5)
ALP SERPL-CCNC: 176 U/L — HIGH (ref 40–120)
ALT FLD-CCNC: 32 U/L RC — SIGNIFICANT CHANGE UP (ref 10–45)
ANION GAP SERPL CALC-SCNC: 23 MMOL/L — HIGH (ref 5–17)
AST SERPL-CCNC: 23 U/L — SIGNIFICANT CHANGE UP (ref 10–40)
BASOPHILS # BLD AUTO: 0 K/UL — SIGNIFICANT CHANGE UP (ref 0–0.2)
BASOPHILS NFR BLD AUTO: 0 % — SIGNIFICANT CHANGE UP (ref 0–2)
BILIRUB SERPL-MCNC: 0.4 MG/DL — SIGNIFICANT CHANGE UP (ref 0.2–1.2)
BUN SERPL-MCNC: 51 MG/DL — HIGH (ref 7–23)
CALCIUM SERPL-MCNC: 9.4 MG/DL — SIGNIFICANT CHANGE UP (ref 8.4–10.5)
CHLORIDE SERPL-SCNC: 98 MMOL/L — SIGNIFICANT CHANGE UP (ref 96–108)
CO2 SERPL-SCNC: 15 MMOL/L — LOW (ref 22–31)
CREAT SERPL-MCNC: 1.74 MG/DL — HIGH (ref 0.5–1.3)
EOSINOPHIL # BLD AUTO: 0 K/UL — SIGNIFICANT CHANGE UP (ref 0–0.5)
EOSINOPHIL NFR BLD AUTO: 0.1 % — SIGNIFICANT CHANGE UP (ref 0–6)
GAS PNL BLDV: SIGNIFICANT CHANGE UP
GLUCOSE SERPL-MCNC: 232 MG/DL — HIGH (ref 70–99)
HCT VFR BLD CALC: 36.9 % — SIGNIFICANT CHANGE UP (ref 34.5–45)
HGB BLD-MCNC: 12.5 G/DL — SIGNIFICANT CHANGE UP (ref 11.5–15.5)
LACTATE SERPL-SCNC: 1.4 MMOL/L — SIGNIFICANT CHANGE UP (ref 0.7–2)
LYMPHOCYTES # BLD AUTO: 0.8 K/UL — LOW (ref 1–3.3)
LYMPHOCYTES # BLD AUTO: 4.3 % — LOW (ref 13–44)
MAGNESIUM SERPL-MCNC: 1.6 MG/DL — SIGNIFICANT CHANGE UP (ref 1.6–2.6)
MCHC RBC-ENTMCNC: 32.8 PG — SIGNIFICANT CHANGE UP (ref 27–34)
MCHC RBC-ENTMCNC: 33.9 GM/DL — SIGNIFICANT CHANGE UP (ref 32–36)
MCV RBC AUTO: 96.9 FL — SIGNIFICANT CHANGE UP (ref 80–100)
MONOCYTES # BLD AUTO: 0.3 K/UL — SIGNIFICANT CHANGE UP (ref 0–0.9)
MONOCYTES NFR BLD AUTO: 1.9 % — LOW (ref 2–14)
NEUTROPHILS # BLD AUTO: 16.6 K/UL — HIGH (ref 1.8–7.4)
NEUTROPHILS NFR BLD AUTO: 93.7 % — HIGH (ref 43–77)
PHOSPHATE SERPL-MCNC: 3.1 MG/DL — SIGNIFICANT CHANGE UP (ref 2.5–4.5)
PLATELET # BLD AUTO: 578 K/UL — HIGH (ref 150–400)
POTASSIUM SERPL-MCNC: 3 MMOL/L — LOW (ref 3.5–5.3)
POTASSIUM SERPL-SCNC: 3 MMOL/L — LOW (ref 3.5–5.3)
PROT SERPL-MCNC: 6.9 G/DL — SIGNIFICANT CHANGE UP (ref 6–8.3)
RBC # BLD: 3.81 M/UL — SIGNIFICANT CHANGE UP (ref 3.8–5.2)
RBC # FLD: 17.6 % — HIGH (ref 10.3–14.5)
SODIUM SERPL-SCNC: 136 MMOL/L — SIGNIFICANT CHANGE UP (ref 135–145)
WBC # BLD: 17.7 K/UL — HIGH (ref 3.8–10.5)
WBC # FLD AUTO: 17.7 K/UL — HIGH (ref 3.8–10.5)

## 2017-05-20 PROCEDURE — 99223 1ST HOSP IP/OBS HIGH 75: CPT

## 2017-05-20 PROCEDURE — 71250 CT THORAX DX C-: CPT | Mod: 26

## 2017-05-20 PROCEDURE — 93010 ELECTROCARDIOGRAM REPORT: CPT | Mod: GC

## 2017-05-20 PROCEDURE — 71010: CPT | Mod: 26

## 2017-05-20 PROCEDURE — 99285 EMERGENCY DEPT VISIT HI MDM: CPT | Mod: 25,GC

## 2017-05-20 RX ORDER — POTASSIUM CHLORIDE 20 MEQ
40 PACKET (EA) ORAL ONCE
Qty: 0 | Refills: 0 | Status: COMPLETED | OUTPATIENT
Start: 2017-05-20 | End: 2017-05-20

## 2017-05-20 RX ORDER — ASPIRIN/CALCIUM CARB/MAGNESIUM 324 MG
81 TABLET ORAL DAILY
Qty: 0 | Refills: 0 | Status: DISCONTINUED | OUTPATIENT
Start: 2017-05-20 | End: 2017-05-25

## 2017-05-20 RX ORDER — AMLODIPINE BESYLATE 2.5 MG/1
10 TABLET ORAL DAILY
Qty: 0 | Refills: 0 | Status: DISCONTINUED | OUTPATIENT
Start: 2017-05-20 | End: 2017-05-25

## 2017-05-20 RX ORDER — DEXTROSE 50 % IN WATER 50 %
25 SYRINGE (ML) INTRAVENOUS ONCE
Qty: 0 | Refills: 0 | Status: DISCONTINUED | OUTPATIENT
Start: 2017-05-20 | End: 2017-05-31

## 2017-05-20 RX ORDER — SODIUM CHLORIDE 9 MG/ML
1000 INJECTION, SOLUTION INTRAVENOUS
Qty: 0 | Refills: 0 | Status: DISCONTINUED | OUTPATIENT
Start: 2017-05-20 | End: 2017-05-31

## 2017-05-20 RX ORDER — ACETAMINOPHEN 500 MG
650 TABLET ORAL EVERY 6 HOURS
Qty: 0 | Refills: 0 | Status: DISCONTINUED | OUTPATIENT
Start: 2017-05-20 | End: 2017-05-31

## 2017-05-20 RX ORDER — DEXTROSE 50 % IN WATER 50 %
12.5 SYRINGE (ML) INTRAVENOUS ONCE
Qty: 0 | Refills: 0 | Status: DISCONTINUED | OUTPATIENT
Start: 2017-05-20 | End: 2017-05-31

## 2017-05-20 RX ORDER — INSULIN LISPRO 100/ML
VIAL (ML) SUBCUTANEOUS
Qty: 0 | Refills: 0 | Status: DISCONTINUED | OUTPATIENT
Start: 2017-05-20 | End: 2017-05-31

## 2017-05-20 RX ORDER — SODIUM CHLORIDE 9 MG/ML
1000 INJECTION INTRAMUSCULAR; INTRAVENOUS; SUBCUTANEOUS
Qty: 0 | Refills: 0 | Status: DISCONTINUED | OUTPATIENT
Start: 2017-05-20 | End: 2017-05-25

## 2017-05-20 RX ORDER — INSULIN GLARGINE 100 [IU]/ML
14 INJECTION, SOLUTION SUBCUTANEOUS AT BEDTIME
Qty: 0 | Refills: 0 | Status: DISCONTINUED | OUTPATIENT
Start: 2017-05-20 | End: 2017-05-23

## 2017-05-20 RX ORDER — SODIUM CHLORIDE 9 MG/ML
1000 INJECTION INTRAMUSCULAR; INTRAVENOUS; SUBCUTANEOUS ONCE
Qty: 0 | Refills: 0 | Status: COMPLETED | OUTPATIENT
Start: 2017-05-20 | End: 2017-05-20

## 2017-05-20 RX ORDER — GLUCAGON INJECTION, SOLUTION 0.5 MG/.1ML
1 INJECTION, SOLUTION SUBCUTANEOUS ONCE
Qty: 0 | Refills: 0 | Status: DISCONTINUED | OUTPATIENT
Start: 2017-05-20 | End: 2017-05-31

## 2017-05-20 RX ORDER — ACETAMINOPHEN 500 MG
2 TABLET ORAL
Qty: 120 | Refills: 0 | COMMUNITY

## 2017-05-20 RX ORDER — ATORVASTATIN CALCIUM 80 MG/1
20 TABLET, FILM COATED ORAL AT BEDTIME
Qty: 0 | Refills: 0 | Status: DISCONTINUED | OUTPATIENT
Start: 2017-05-20 | End: 2017-05-31

## 2017-05-20 RX ORDER — PIPERACILLIN AND TAZOBACTAM 4; .5 G/20ML; G/20ML
3.38 INJECTION, POWDER, LYOPHILIZED, FOR SOLUTION INTRAVENOUS ONCE
Qty: 0 | Refills: 0 | Status: COMPLETED | OUTPATIENT
Start: 2017-05-20 | End: 2017-05-20

## 2017-05-20 RX ORDER — INSULIN LISPRO 100/ML
VIAL (ML) SUBCUTANEOUS AT BEDTIME
Qty: 0 | Refills: 0 | Status: DISCONTINUED | OUTPATIENT
Start: 2017-05-20 | End: 2017-05-31

## 2017-05-20 RX ORDER — VANCOMYCIN HCL 1 G
1000 VIAL (EA) INTRAVENOUS EVERY 24 HOURS
Qty: 0 | Refills: 0 | Status: DISCONTINUED | OUTPATIENT
Start: 2017-05-21 | End: 2017-05-22

## 2017-05-20 RX ORDER — DEXTROSE 50 % IN WATER 50 %
1 SYRINGE (ML) INTRAVENOUS ONCE
Qty: 0 | Refills: 0 | Status: DISCONTINUED | OUTPATIENT
Start: 2017-05-20 | End: 2017-05-31

## 2017-05-20 RX ORDER — PIPERACILLIN AND TAZOBACTAM 4; .5 G/20ML; G/20ML
3.38 INJECTION, POWDER, LYOPHILIZED, FOR SOLUTION INTRAVENOUS EVERY 12 HOURS
Qty: 0 | Refills: 0 | Status: DISCONTINUED | OUTPATIENT
Start: 2017-05-20 | End: 2017-05-22

## 2017-05-20 RX ORDER — IPRATROPIUM/ALBUTEROL SULFATE 18-103MCG
3 AEROSOL WITH ADAPTER (GRAM) INHALATION EVERY 4 HOURS
Qty: 0 | Refills: 0 | Status: DISCONTINUED | OUTPATIENT
Start: 2017-05-20 | End: 2017-05-22

## 2017-05-20 RX ORDER — BUDESONIDE AND FORMOTEROL FUMARATE DIHYDRATE 160; 4.5 UG/1; UG/1
2 AEROSOL RESPIRATORY (INHALATION)
Qty: 0 | Refills: 0 | COMMUNITY

## 2017-05-20 RX ORDER — VANCOMYCIN HCL 1 G
1000 VIAL (EA) INTRAVENOUS ONCE
Qty: 0 | Refills: 0 | Status: COMPLETED | OUTPATIENT
Start: 2017-05-20 | End: 2017-05-20

## 2017-05-20 RX ORDER — HEPARIN SODIUM 5000 [USP'U]/ML
5000 INJECTION INTRAVENOUS; SUBCUTANEOUS EVERY 8 HOURS
Qty: 0 | Refills: 0 | Status: DISCONTINUED | OUTPATIENT
Start: 2017-05-20 | End: 2017-05-22

## 2017-05-20 RX ORDER — VANCOMYCIN HCL 1 G
VIAL (EA) INTRAVENOUS
Qty: 0 | Refills: 0 | Status: DISCONTINUED | OUTPATIENT
Start: 2017-05-20 | End: 2017-05-22

## 2017-05-20 RX ORDER — DOCUSATE SODIUM 100 MG
100 CAPSULE ORAL
Qty: 0 | Refills: 0 | Status: DISCONTINUED | OUTPATIENT
Start: 2017-05-20 | End: 2017-05-31

## 2017-05-20 RX ADMIN — SODIUM CHLORIDE 2000 MILLILITER(S): 9 INJECTION INTRAMUSCULAR; INTRAVENOUS; SUBCUTANEOUS at 15:37

## 2017-05-20 RX ADMIN — Medication 40 MILLIEQUIVALENT(S): at 16:12

## 2017-05-20 RX ADMIN — PIPERACILLIN AND TAZOBACTAM 200 GRAM(S): 4; .5 INJECTION, POWDER, LYOPHILIZED, FOR SOLUTION INTRAVENOUS at 15:37

## 2017-05-20 RX ADMIN — Medication 250 MILLIGRAM(S): at 23:02

## 2017-05-20 NOTE — ED PROVIDER NOTE - MEDICAL DECISION MAKING DETAILS
Andrew: 82 year old female with difficulty breathing worsening over the past few days.  Patient has history of lung nodule that is being followed up by pulm.  will get labs, cxr, ekg, pulm consult, reassess

## 2017-05-20 NOTE — ED PROVIDER NOTE - ATTENDING CONTRIBUTION TO CARE
I have seen and evaluated this patient with the resident.   I agree with the findings  unless other wise stated.  After my face to face bedside evaluation, I am notin year old female with sob. PE: att exam: patient awake alert NAD. LUNGS CTAB no wheeze no crackle. CARD RRR no m/r/g.  Abdomen soft NT ND no rebound no guarding no CVA tenderness. EXT WWP no edema no calf tenderness CV 2+DP/PT bilaterally. neuro A&Ox3 gait normal.  skin warm and dry no rash

## 2017-05-20 NOTE — H&P ADULT. - EKG AND INTERPRETATION
EKG tracing reviewed with NSR at 78 with premature supraventricular complexes with non-specific T wave changes.

## 2017-05-20 NOTE — H&P ADULT. - PROBLEM SELECTOR PLAN 1
See above.  ON IV Zosyn and will add IV Vancomycin.  blood cultures sent.  Pulmonary aware and will see patient in the AM--patient will need a definitive tissue diagnosis--will continue with history of reported Pseudomonas isolate.

## 2017-05-20 NOTE — H&P ADULT. - CONSTITUTIONAL COMMENTS
Elderly, chronically ill appearing F, mild diffuse sarcopenia. Elderly, chronically ill appearing F, mild diffuse sarcopenia.  Cushingoid.

## 2017-05-20 NOTE — H&P ADULT. - LAB RESULTS AND INTERPRETATION
WBC 17.7 with 94% N.  hgb 12.5.  Platelets of 578K.  K+ 3.0>>supplemented.  Random glucose of 232.  Cr 1.3 >>1.7 consistent with stage 4 chronic kidney disease.  Alb 3.0.  NO u/A.

## 2017-05-20 NOTE — H&P ADULT. - ASSESSMENT
NIGHT HOSPITALIST:  Presentation of patient with persistent non-resolving suspected pneumonia in the setting of reported Pseudomonas isolate--will have the DAY HOSPITALIST clarify outpatient work-up for a tissue diagnosis.  Spoke with pulmonary who will see patient in the AM.  Patient with steroid dependent COPD will HAVE to continue with steroids but will titrate to Prednisone 40 mg daily for now>>will HOLD maintenance inhalers and will provide around the clock Duoneb therapy.  Will continue with IV Zosyn but will add IV Vancomycin.  Patient with now chronic kidney disease stage 4>>will temporarily HOLD ARB hydrochlorothiazide combination.  Mild IVF but will follow Creatinine.  Will place on conservative Lantus at 0.2 units/kg/24H at night given suspected steroid induced hyperglycemia to minimize catabolic effects of hyperglycemia.  Reviewed with patient/ daughter and pulmonary to follow on the need for a definitive tissue diagnosis to exclude underlying neoplasm.

## 2017-05-20 NOTE — ED ADULT NURSE REASSESSMENT NOTE - NS ED NURSE REASSESS COMMENT FT1
Report received from change of shift RN Smiley. Patient resting comfortably, eating dinner tray. Daughter at bedside. Plan of care explained. RTM has already been clicked and awaiting bed assignment. Comfort and safety provided.

## 2017-05-20 NOTE — H&P ADULT. - GASTROINTESTINAL DETAILS
no rigidity/nontender/no masses palpable/bowel sounds normal/no distention/no organomegaly/no bruit/no guarding/no rebound tenderness/soft

## 2017-05-20 NOTE — H&P ADULT. - RADIOLOGY RESULTS AND INTERPRETATION
Chest radiograph reviewed with RIGHT upper lobe markings.  CTT chest with mild interval decrease in size of the RIGHT upper lobe masslike consolidation and complete resolution of surrounding ground glass opacities compared to 3/3/17 study.  several nodules LEFT lung suspected infectious in nature.

## 2017-05-20 NOTE — H&P ADULT. - HISTORY OF PRESENT ILLNESS
NIGHT HOSPITALIST:  Patient UNKNOWN to me previously, assigned to me at this point via the ER and by Dr. Barbour to admit this 83 y/o F--patient seen with daughter in attendance--patient with a history of essential HTN, chronic kidney disease stage 3 (?), COPD with an admission in 3/2017 at [facility] for reported eosinophilic predominance pneumonia with a RIGHT upper lobe mass with a reported Pseudomonas isolate--unclear from patient/ family outpatient workup but spoke with Dr. Walker, pulmonary covering for Dr. Alvarado, and with a non-diagnostic bronchoscopy and not consistent with TB and followed since March, 2017, with the patient self referring with daughter following several days of persistent dyspnea, with productive cough with scant yellow to white sputum and generalized weakness for several days.  Patient/ daughter do report persistence of symptoms since March of this year, and express frustration with such.  No reported sick contacts.  Patient essentially steroid dependent COPD.  Patient with anorexia and unspecified weight loss.   No chest pain/pressure.  No hemoptysis.  No HA, no focal weakness.  No abdominal pain, no red blood per rectum or melena.  NO back pain, no tearing back pain.  No hematuria, no dysuria.  Remaining review of systems not contributory.

## 2017-05-20 NOTE — H&P ADULT. - PROBLEM SELECTOR PLAN 5
See above.  ARB and thiazide  temporarily HELD for now.  K+ supplemented.  Mild IVF for presumed mild prerenal azotemia.

## 2017-05-20 NOTE — ED PROVIDER NOTE - PROGRESS NOTE DETAILS
Spoke with outpt pulmonologist. States that lesion was 4cm on xray on May 8th. DDx cavitary pna vs. eosinophilc pna. Recommend Abx, CT chest, and admission.

## 2017-05-20 NOTE — ED PROVIDER NOTE - OBJECTIVE STATEMENT
This an 82F with a history of COPD (not on home O2), CKD, HLD, HTN, and recent admission for leukocystosis, PNA and lung mass on chronic steroids who pr/w with shortness of breath. Pt states that her sob has worsened in past few days but has had this issuses for months now. Pt was here in March for abnormal labs and found to have lung mass which still has not been biopsied. Per daughter at bedside, pt is very weak, has new bowel and bladder incontinence, and is generally sick. Pt currently on 30mg of Prednisone and has been on multiple tapers in past few days. Pt denies fevers, chills, cp, n/v/d, recent travle or sick contacts. Pt states that chest hurts when she coughs.     PMD Dr. Bijal David: Dr. Darnell.

## 2017-05-20 NOTE — ED ADULT NURSE NOTE - OBJECTIVE STATEMENT
82y f pt c/o diff breathing; pt 82y f pt c/o diff breathing; pt dx with pna; pt has been on multiple rounds of PO antibiotics; daughter states pt getting weaker and weaker; diff ambulating; becoming intermittently incontinent; pt has chewed on her cuticles and through her skin around 4 fingernails; pt aox3; no resp distress; clear breath sounds; no chest pain; no abd pain;+ sob; + whitt; no n/v; + diarrhea; pt has hx of copd; and dx with lung mass; no operation or treatment due to pt weakened condition; pt in hospital in march; pt denies fever/chills; + cough/congestion with production; daughter at bedside

## 2017-05-20 NOTE — H&P ADULT. - RS GEN PE MLT RESP DETAILS PC
airway patent/no rhonchi/no intercostal retractions/no subcutaneous emphysema/respirations non-labored/no rales/breath sounds equal/good air movement

## 2017-05-21 LAB
ANION GAP SERPL CALC-SCNC: 21 MMOL/L — HIGH (ref 5–17)
APTT BLD: 23.6 SEC — LOW (ref 27.5–37.4)
BASE EXCESS BLDA CALC-SCNC: -5.6 MMOL/L — LOW (ref -2–2)
BASOPHILS # BLD AUTO: 0.02 K/UL — SIGNIFICANT CHANGE UP (ref 0–0.2)
BASOPHILS NFR BLD AUTO: 0.1 % — SIGNIFICANT CHANGE UP (ref 0–2)
BUN SERPL-MCNC: 46 MG/DL — HIGH (ref 7–23)
CALCIUM SERPL-MCNC: 8.9 MG/DL — SIGNIFICANT CHANGE UP (ref 8.4–10.5)
CHLORIDE SERPL-SCNC: 102 MMOL/L — SIGNIFICANT CHANGE UP (ref 96–108)
CO2 BLDA-SCNC: 17 MMOL/L — LOW (ref 22–30)
CO2 SERPL-SCNC: 13 MMOL/L — LOW (ref 22–31)
CREAT SERPL-MCNC: 1.57 MG/DL — HIGH (ref 0.5–1.3)
EOSINOPHIL # BLD AUTO: 0.04 K/UL — SIGNIFICANT CHANGE UP (ref 0–0.5)
EOSINOPHIL NFR BLD AUTO: 0.2 % — SIGNIFICANT CHANGE UP (ref 0–6)
GAS PNL BLDA: SIGNIFICANT CHANGE UP
GLUCOSE SERPL-MCNC: 120 MG/DL — HIGH (ref 70–99)
HBA1C BLD-MCNC: 7.1 % — HIGH (ref 4–5.6)
HCO3 BLDA-SCNC: 16 MMOL/L — LOW (ref 21–29)
HCT VFR BLD CALC: 33.9 % — LOW (ref 34.5–45)
HGB BLD-MCNC: 10.9 G/DL — LOW (ref 11.5–15.5)
IMM GRANULOCYTES NFR BLD AUTO: 1.8 % — HIGH (ref 0–1.5)
INR BLD: 0.98 RATIO — SIGNIFICANT CHANGE UP (ref 0.88–1.16)
LACTATE SERPL-SCNC: 1.1 MMOL/L — SIGNIFICANT CHANGE UP (ref 0.7–2)
LYMPHOCYTES # BLD AUTO: 1.74 K/UL — SIGNIFICANT CHANGE UP (ref 1–3.3)
LYMPHOCYTES # BLD AUTO: 10.7 % — LOW (ref 13–44)
MCHC RBC-ENTMCNC: 29.6 PG — SIGNIFICANT CHANGE UP (ref 27–34)
MCHC RBC-ENTMCNC: 32.2 GM/DL — SIGNIFICANT CHANGE UP (ref 32–36)
MCV RBC AUTO: 92.1 FL — SIGNIFICANT CHANGE UP (ref 80–100)
MONOCYTES # BLD AUTO: 1.62 K/UL — HIGH (ref 0–0.9)
MONOCYTES NFR BLD AUTO: 9.9 % — SIGNIFICANT CHANGE UP (ref 2–14)
NEUTROPHILS # BLD AUTO: 12.61 K/UL — HIGH (ref 1.8–7.4)
NEUTROPHILS NFR BLD AUTO: 77.3 % — HIGH (ref 43–77)
NRBC # BLD: 0 /100 WBCS — SIGNIFICANT CHANGE UP (ref 0–0)
PCO2 BLDA: 21 MMHG — LOW (ref 32–46)
PH BLDA: 7.49 — HIGH (ref 7.35–7.45)
PLATELET # BLD AUTO: 524 K/UL — HIGH (ref 150–400)
PO2 BLDA: 167 MMHG — HIGH (ref 74–108)
POTASSIUM SERPL-MCNC: 3.4 MMOL/L — LOW (ref 3.5–5.3)
POTASSIUM SERPL-SCNC: 3.4 MMOL/L — LOW (ref 3.5–5.3)
PROTHROM AB SERPL-ACNC: 10.7 SEC — SIGNIFICANT CHANGE UP (ref 9.8–12.7)
RBC # BLD: 3.68 M/UL — LOW (ref 3.8–5.2)
RBC # FLD: 20.1 % — HIGH (ref 10.3–14.5)
SAO2 % BLDA: 100 % — HIGH (ref 92–96)
SODIUM SERPL-SCNC: 136 MMOL/L — SIGNIFICANT CHANGE UP (ref 135–145)
WBC # BLD: 16.33 K/UL — HIGH (ref 3.8–10.5)
WBC # FLD AUTO: 16.33 K/UL — HIGH (ref 3.8–10.5)

## 2017-05-21 RX ORDER — POTASSIUM CHLORIDE 20 MEQ
20 PACKET (EA) ORAL ONCE
Qty: 0 | Refills: 0 | Status: COMPLETED | OUTPATIENT
Start: 2017-05-21 | End: 2017-05-21

## 2017-05-21 RX ORDER — MAGNESIUM OXIDE 400 MG ORAL TABLET 241.3 MG
400 TABLET ORAL ONCE
Qty: 0 | Refills: 0 | Status: COMPLETED | OUTPATIENT
Start: 2017-05-21 | End: 2017-05-21

## 2017-05-21 RX ORDER — ACETAMINOPHEN 500 MG
650 TABLET ORAL ONCE
Qty: 0 | Refills: 0 | Status: COMPLETED | OUTPATIENT
Start: 2017-05-21 | End: 2017-05-21

## 2017-05-21 RX ADMIN — Medication 40 MILLIGRAM(S): at 05:08

## 2017-05-21 RX ADMIN — HEPARIN SODIUM 5000 UNIT(S): 5000 INJECTION INTRAVENOUS; SUBCUTANEOUS at 05:08

## 2017-05-21 RX ADMIN — AMLODIPINE BESYLATE 10 MILLIGRAM(S): 2.5 TABLET ORAL at 05:08

## 2017-05-21 RX ADMIN — SODIUM CHLORIDE 70 MILLILITER(S): 9 INJECTION INTRAMUSCULAR; INTRAVENOUS; SUBCUTANEOUS at 02:15

## 2017-05-21 RX ADMIN — Medication 81 MILLIGRAM(S): at 15:12

## 2017-05-21 RX ADMIN — Medication 3 MILLILITER(S): at 15:12

## 2017-05-21 RX ADMIN — Medication 3 MILLILITER(S): at 18:13

## 2017-05-21 RX ADMIN — HEPARIN SODIUM 5000 UNIT(S): 5000 INJECTION INTRAVENOUS; SUBCUTANEOUS at 21:53

## 2017-05-21 RX ADMIN — Medication 650 MILLIGRAM(S): at 00:45

## 2017-05-21 RX ADMIN — INSULIN GLARGINE 14 UNIT(S): 100 INJECTION, SOLUTION SUBCUTANEOUS at 22:07

## 2017-05-21 RX ADMIN — Medication 3 MILLILITER(S): at 05:08

## 2017-05-21 RX ADMIN — Medication 20 MILLIEQUIVALENT(S): at 18:17

## 2017-05-21 RX ADMIN — PIPERACILLIN AND TAZOBACTAM 25 GRAM(S): 4; .5 INJECTION, POWDER, LYOPHILIZED, FOR SOLUTION INTRAVENOUS at 18:13

## 2017-05-21 RX ADMIN — ATORVASTATIN CALCIUM 20 MILLIGRAM(S): 80 TABLET, FILM COATED ORAL at 21:53

## 2017-05-21 RX ADMIN — Medication 3 MILLILITER(S): at 00:28

## 2017-05-21 RX ADMIN — HEPARIN SODIUM 5000 UNIT(S): 5000 INJECTION INTRAVENOUS; SUBCUTANEOUS at 15:12

## 2017-05-21 RX ADMIN — Medication 100 MILLIGRAM(S): at 05:08

## 2017-05-21 RX ADMIN — Medication 1: at 18:20

## 2017-05-21 RX ADMIN — PIPERACILLIN AND TAZOBACTAM 25 GRAM(S): 4; .5 INJECTION, POWDER, LYOPHILIZED, FOR SOLUTION INTRAVENOUS at 02:14

## 2017-05-21 RX ADMIN — SODIUM CHLORIDE 70 MILLILITER(S): 9 INJECTION INTRAMUSCULAR; INTRAVENOUS; SUBCUTANEOUS at 15:12

## 2017-05-21 RX ADMIN — Medication 3 MILLILITER(S): at 21:53

## 2017-05-21 RX ADMIN — MAGNESIUM OXIDE 400 MG ORAL TABLET 400 MILLIGRAM(S): 241.3 TABLET ORAL at 18:17

## 2017-05-21 RX ADMIN — Medication 650 MILLIGRAM(S): at 01:15

## 2017-05-21 RX ADMIN — Medication 3 MILLILITER(S): at 10:15

## 2017-05-21 RX ADMIN — Medication 250 MILLIGRAM(S): at 22:07

## 2017-05-22 LAB
ANION GAP SERPL CALC-SCNC: 17 MMOL/L — SIGNIFICANT CHANGE UP (ref 5–17)
APPEARANCE UR: CLEAR — SIGNIFICANT CHANGE UP
APTT BLD: 131.1 SEC — CRITICAL HIGH (ref 27.5–37.4)
BILIRUB UR-MCNC: NEGATIVE — SIGNIFICANT CHANGE UP
BUN SERPL-MCNC: 28 MG/DL — HIGH (ref 7–23)
CALCIUM SERPL-MCNC: 8.5 MG/DL — SIGNIFICANT CHANGE UP (ref 8.4–10.5)
CHLORIDE SERPL-SCNC: 105 MMOL/L — SIGNIFICANT CHANGE UP (ref 96–108)
CO2 SERPL-SCNC: 15 MMOL/L — LOW (ref 22–31)
COLOR SPEC: YELLOW — SIGNIFICANT CHANGE UP
COMMENT - URINE: SIGNIFICANT CHANGE UP
CREAT SERPL-MCNC: 1.22 MG/DL — SIGNIFICANT CHANGE UP (ref 0.5–1.3)
DIFF PNL FLD: NEGATIVE — SIGNIFICANT CHANGE UP
EPI CELLS # UR: SIGNIFICANT CHANGE UP /HPF
GLUCOSE SERPL-MCNC: 77 MG/DL — SIGNIFICANT CHANGE UP (ref 70–99)
GLUCOSE UR QL: NEGATIVE — SIGNIFICANT CHANGE UP
HCT VFR BLD CALC: 30.9 % — LOW (ref 34.5–45)
HGB BLD-MCNC: 9.6 G/DL — LOW (ref 11.5–15.5)
KETONES UR-MCNC: NEGATIVE — SIGNIFICANT CHANGE UP
LEUKOCYTE ESTERASE UR-ACNC: NEGATIVE — SIGNIFICANT CHANGE UP
MAGNESIUM SERPL-MCNC: 1.4 MG/DL — LOW (ref 1.6–2.6)
MCHC RBC-ENTMCNC: 29.1 PG — SIGNIFICANT CHANGE UP (ref 27–34)
MCHC RBC-ENTMCNC: 31.1 GM/DL — LOW (ref 32–36)
MCV RBC AUTO: 93.6 FL — SIGNIFICANT CHANGE UP (ref 80–100)
NITRITE UR-MCNC: NEGATIVE — SIGNIFICANT CHANGE UP
PH UR: 6.5 — SIGNIFICANT CHANGE UP (ref 5–8)
PLATELET # BLD AUTO: 500 K/UL — HIGH (ref 150–400)
POTASSIUM SERPL-MCNC: 3.1 MMOL/L — LOW (ref 3.5–5.3)
POTASSIUM SERPL-SCNC: 3.1 MMOL/L — LOW (ref 3.5–5.3)
PROT UR-MCNC: 30 MG/DL
RBC # BLD: 3.3 M/UL — LOW (ref 3.8–5.2)
RBC # FLD: 20 % — HIGH (ref 10.3–14.5)
RBC CASTS # UR COMP ASSIST: SIGNIFICANT CHANGE UP /HPF (ref 0–2)
SODIUM SERPL-SCNC: 137 MMOL/L — SIGNIFICANT CHANGE UP (ref 135–145)
SP GR SPEC: 1.02 — SIGNIFICANT CHANGE UP (ref 1.01–1.02)
T4 FREE SERPL-MCNC: 1.4 NG/DL — SIGNIFICANT CHANGE UP (ref 0.9–1.8)
TSH SERPL-MCNC: 1.22 UIU/ML — SIGNIFICANT CHANGE UP (ref 0.27–4.2)
UROBILINOGEN FLD QL: NEGATIVE — SIGNIFICANT CHANGE UP
WBC # BLD: 14.22 K/UL — HIGH (ref 3.8–10.5)
WBC # FLD AUTO: 14.22 K/UL — HIGH (ref 3.8–10.5)
WBC UR QL: SIGNIFICANT CHANGE UP /HPF (ref 0–5)

## 2017-05-22 PROCEDURE — 71020: CPT | Mod: 26

## 2017-05-22 PROCEDURE — 78582 LUNG VENTILAT&PERFUS IMAGING: CPT | Mod: 26

## 2017-05-22 PROCEDURE — 93970 EXTREMITY STUDY: CPT | Mod: 26

## 2017-05-22 RX ORDER — HEPARIN SODIUM 5000 [USP'U]/ML
6000 INJECTION INTRAVENOUS; SUBCUTANEOUS EVERY 6 HOURS
Qty: 0 | Refills: 0 | Status: DISCONTINUED | OUTPATIENT
Start: 2017-05-22 | End: 2017-05-25

## 2017-05-22 RX ORDER — BUDESONIDE, MICRONIZED 100 %
0.5 POWDER (GRAM) MISCELLANEOUS EVERY 12 HOURS
Qty: 0 | Refills: 0 | Status: DISCONTINUED | OUTPATIENT
Start: 2017-05-22 | End: 2017-05-31

## 2017-05-22 RX ORDER — HEPARIN SODIUM 5000 [USP'U]/ML
3000 INJECTION INTRAVENOUS; SUBCUTANEOUS EVERY 6 HOURS
Qty: 0 | Refills: 0 | Status: DISCONTINUED | OUTPATIENT
Start: 2017-05-22 | End: 2017-05-25

## 2017-05-22 RX ORDER — POTASSIUM CHLORIDE 20 MEQ
40 PACKET (EA) ORAL ONCE
Qty: 0 | Refills: 0 | Status: COMPLETED | OUTPATIENT
Start: 2017-05-22 | End: 2017-05-22

## 2017-05-22 RX ORDER — IPRATROPIUM/ALBUTEROL SULFATE 18-103MCG
3 AEROSOL WITH ADAPTER (GRAM) INHALATION EVERY 6 HOURS
Qty: 0 | Refills: 0 | Status: DISCONTINUED | OUTPATIENT
Start: 2017-05-22 | End: 2017-05-31

## 2017-05-22 RX ORDER — MAGNESIUM OXIDE 400 MG ORAL TABLET 241.3 MG
400 TABLET ORAL
Qty: 0 | Refills: 0 | Status: COMPLETED | OUTPATIENT
Start: 2017-05-22 | End: 2017-05-23

## 2017-05-22 RX ORDER — HEPARIN SODIUM 5000 [USP'U]/ML
INJECTION INTRAVENOUS; SUBCUTANEOUS
Qty: 25000 | Refills: 0 | Status: DISCONTINUED | OUTPATIENT
Start: 2017-05-22 | End: 2017-05-23

## 2017-05-22 RX ORDER — MAGNESIUM SULFATE 500 MG/ML
1 VIAL (ML) INJECTION ONCE
Qty: 0 | Refills: 0 | Status: COMPLETED | OUTPATIENT
Start: 2017-05-22 | End: 2017-05-22

## 2017-05-22 RX ADMIN — Medication 3 MILLILITER(S): at 03:20

## 2017-05-22 RX ADMIN — Medication 81 MILLIGRAM(S): at 12:05

## 2017-05-22 RX ADMIN — Medication 0.5 MILLIGRAM(S): at 19:00

## 2017-05-22 RX ADMIN — ATORVASTATIN CALCIUM 20 MILLIGRAM(S): 80 TABLET, FILM COATED ORAL at 21:09

## 2017-05-22 RX ADMIN — HEPARIN SODIUM 0 UNIT(S)/HR: 5000 INJECTION INTRAVENOUS; SUBCUTANEOUS at 19:31

## 2017-05-22 RX ADMIN — Medication 3 MILLILITER(S): at 23:11

## 2017-05-22 RX ADMIN — HEPARIN SODIUM 5000 UNIT(S): 5000 INJECTION INTRAVENOUS; SUBCUTANEOUS at 06:09

## 2017-05-22 RX ADMIN — Medication 3 MILLILITER(S): at 12:06

## 2017-05-22 RX ADMIN — HEPARIN SODIUM 1100 UNIT(S)/HR: 5000 INJECTION INTRAVENOUS; SUBCUTANEOUS at 20:37

## 2017-05-22 RX ADMIN — Medication 3 MILLILITER(S): at 06:07

## 2017-05-22 RX ADMIN — INSULIN GLARGINE 14 UNIT(S): 100 INJECTION, SOLUTION SUBCUTANEOUS at 22:03

## 2017-05-22 RX ADMIN — Medication 40 MILLIEQUIVALENT(S): at 12:05

## 2017-05-22 RX ADMIN — Medication 40 MILLIGRAM(S): at 06:07

## 2017-05-22 RX ADMIN — Medication 2: at 19:00

## 2017-05-22 RX ADMIN — AMLODIPINE BESYLATE 10 MILLIGRAM(S): 2.5 TABLET ORAL at 06:15

## 2017-05-22 RX ADMIN — PIPERACILLIN AND TAZOBACTAM 25 GRAM(S): 4; .5 INJECTION, POWDER, LYOPHILIZED, FOR SOLUTION INTRAVENOUS at 06:09

## 2017-05-22 RX ADMIN — HEPARIN SODIUM 1300 UNIT(S)/HR: 5000 INJECTION INTRAVENOUS; SUBCUTANEOUS at 12:04

## 2017-05-22 RX ADMIN — Medication 100 MILLIGRAM(S): at 06:09

## 2017-05-22 RX ADMIN — Medication 100 GRAM(S): at 21:09

## 2017-05-22 RX ADMIN — Medication 3 MILLILITER(S): at 18:59

## 2017-05-22 NOTE — DISCHARGE NOTE ADULT - CARE PROVIDER_API CALL
Margarita Taylor), Internal Medicine  [address] [place] [address]  Phone: [phone]  Fax: [phone]    Ag Alvarado), Critical Care Medicine; Internal Medicine; [place] [address]  Phone: [phone]  Fax: [phone]

## 2017-05-22 NOTE — DIETITIAN INITIAL EVALUATION ADULT. - ENERGY NEEDS
Height: 61 inches, Weight: 160 pounds  BMI: 30 kg/m2 IBW: 105 pounds (+/-10%), %IBW:152%  Pertinent Info: Per chart, 81 y/o female with CKD 3, and COPD, admitted with sepsis and PNA, lung mass r/o malignancy, steroid induced hyperglycemia, anorexia(?) and wt loss. No edema, no pressure ulcers noted at this time.

## 2017-05-22 NOTE — DISCHARGE NOTE ADULT - PATIENT PORTAL LINK FT
“You can access the Follow[employer] Patient Portal, offered by [facility], by registering with the following website: [email or web address]”

## 2017-05-22 NOTE — DIETITIAN INITIAL EVALUATION ADULT. - OTHER INFO
Nutrition consult received for assessment. Pt reports UBW of 179 pounds, started losing wt due to decreased po intakes, current dosing wt is 160 pounds. Pt reports good appetite and po intakes of meals, reports feeling 'hungry' but early satiety. No GI distress reported, +BM yesterday. Pt denies chewing/swallowing difficulties. NKFA. PTA takes vitamin B12.

## 2017-05-22 NOTE — DISCHARGE NOTE ADULT - MEDICATION SUMMARY - MEDICATIONS TO TAKE
I will START or STAY ON the medications listed below when I get home from the hospital:    predniSONE 20 mg oral tablet  -- 1 tab(s) by mouth once a day x one day (6/1/17)  On 6/2/17, you will taper to 10mg by mouth once a day x 4days, then   On 6/6/17, you will taper to 5mg by mouth once a day x 4 days, then discontinue.  -- Indication: For COPD (chronic obstructive pulmonary disease)    budesonide 0.5 mg/2 mL inhalation suspension  -- 0.5 milligram(s) inhaled every 12 hours  -- Indication: For COPD (chronic obstructive pulmonary disease)    acetaminophen 325 mg oral tablet  -- 2 tab(s) by mouth every 6 hours, As needed, For Temp greater than 38 C (100.4 F)  -- Indication: For Fever    acetaminophen 325 mg oral tablet  -- 2 tab(s) by mouth every 6 hours, As needed, Moderate Pain (4 - 6)  -- Indication: For Pain    enoxaparin  -- 70 milligram(s) subcutaneously every 12 hours  Bridge with Coumadin  -- Indication: For Pulmonary emboli    warfarin 5 mg oral tablet  -- 1 tab(s) by mouth once (at bedtime)  Your INR will need to be checked tomorrow (6/1/17) and your Coumadin dose should be adjusted accordingly  -- Indication: For Pulmonary emboli    insulin lispro 100 units/mL subcutaneous solution  --  subcutaneous once a day (at bedtime); 0 Unit(s) if Glucose 0 - 250  1 Unit(s) if Glucose 251 - 300  2 Unit(s) if Glucose 301 - 350  3 Unit(s) if Glucose 351 - 400  4 Unit(s) if Glucose Greater Than 400  -- Indication: For While on steroids    insulin lispro 100 units/mL subcutaneous solution  --  subcutaneous 3 times a day (before meals); 1 Unit(s) if Glucose 151 - 200  2 Unit(s) if Glucose 201 - 250  3 Unit(s) if Glucose 251 - 300  4 Unit(s) if Glucose 301 - 350  5 Unit(s) if Glucose 351 - 400  6 Unit(s) if Glucose Greater Than 400  -- Indication: For While on steroids    atorvastatin 20 mg oral tablet  -- 1 tab(s) by mouth once a day (at bedtime)  -- Indication: For Hyperlipidemia    losartan-hydroCHLOROthiazide 100mg-25mg oral tablet  -- 1 tab(s) by mouth once a day  -- Indication: For Hypertension    temazepam 15 mg oral capsule  -- 1 cap(s) by mouth once a day (at bedtime)  -- Indication: For Anxiety    albuterol-ipratropium 2.5 mg-0.5 mg/3 mL inhalation solution  -- 3 milliliter(s) inhaled every 6 hours  -- Indication: For COPD (chronic obstructive pulmonary disease)    docusate sodium 100 mg oral capsule  -- 1 cap(s) by mouth 2 times a day  -- Indication: For COnstipation    amoxicillin-clavulanate 875 mg-125 mg oral tablet  -- 1 tab(s) by mouth 2 times a day   You will need to take for a total of 21 days (through [facility] 15, 2017), then discontinue  -- Indication: For Pneumonia    pantoprazole 40 mg oral delayed release tablet  -- 1 tab(s) by mouth 2 times a day (before meals)  -- Indication: For Preventative measure    levoFLOXacin 250 mg oral tablet  -- 1 tab(s) by mouth every 24 hours  You will need to take for a total of 21 days (through [facility] 15, 2017) then discontinue  -- Indication: For Pneumonia    Multiple Vitamins oral tablet  -- 1 tab(s) by mouth once a day  -- Indication: For Supplement I will START or STAY ON the medications listed below when I get home from the hospital:    predniSONE 20 mg oral tablet  -- 1 tab(s) by mouth once a day x one day (6/1/17)  On 6/2/17, you will taper to 10mg by mouth once a day x 4days, then   On 6/6/17, you will taper to 5mg by mouth once a day x 4 days, then discontinue.  -- Indication: For COPD (chronic obstructive pulmonary disease)    budesonide 0.5 mg/2 mL inhalation suspension  -- 0.5 milligram(s) inhaled every 12 hours  -- Indication: For COPD (chronic obstructive pulmonary disease)    acetaminophen 325 mg oral tablet  -- 2 tab(s) by mouth every 6 hours, As needed, For Temp greater than 38 C (100.4 F)  -- Indication: For Fever    acetaminophen 325 mg oral tablet  -- 2 tab(s) by mouth every 6 hours, As needed, Moderate Pain (4 - 6)  -- Indication: For Pain    enoxaparin  -- 70 milligram(s) subcutaneously every 12 hours  Bridge with Coumadin  -- Indication: For Pulmonary emboli    warfarin 5 mg oral tablet  -- 1 tab(s) by mouth once (at bedtime)  Your INR will need to be checked tomorrow (6/1/17) and your Coumadin dose should be adjusted accordingly  -- Indication: For Pulmonary emboli    insulin lispro 100 units/mL subcutaneous solution  --  subcutaneous once a day (at bedtime); 0 Unit(s) if Glucose 0 - 250  1 Unit(s) if Glucose 251 - 300  2 Unit(s) if Glucose 301 - 350  3 Unit(s) if Glucose 351 - 400  4 Unit(s) if Glucose Greater Than 400  -- Indication: For While on steroids    insulin lispro 100 units/mL subcutaneous solution  --  subcutaneous 3 times a day (before meals); 1 Unit(s) if Glucose 151 - 200  2 Unit(s) if Glucose 201 - 250  3 Unit(s) if Glucose 251 - 300  4 Unit(s) if Glucose 301 - 350  5 Unit(s) if Glucose 351 - 400  6 Unit(s) if Glucose Greater Than 400  -- Indication: For While on steroids    atorvastatin 20 mg oral tablet  -- 1 tab(s) by mouth once a day (at bedtime)  -- Indication: For Hyperlipidemia    losartan-hydroCHLOROthiazide 100mg-25mg oral tablet  -- 1 tab(s) by mouth once a day  -- Indication: For Hypertension    temazepam 15 mg oral capsule  -- 1 cap(s) by mouth once a day (at bedtime)  -- Indication: For Anxiety    albuterol-ipratropium 2.5 mg-0.5 mg/3 mL inhalation solution  -- 3 milliliter(s) inhaled every 6 hours  -- Indication: For COPD (chronic obstructive pulmonary disease)    ferrous sulfate 325 mg (65 mg elemental iron) oral delayed release tablet  -- 1 tab(s) by mouth once a day  -- Indication: For Supplement    docusate sodium 100 mg oral capsule  -- 1 cap(s) by mouth 2 times a day  -- Indication: For COnstipation    amoxicillin-clavulanate 875 mg-125 mg oral tablet  -- 1 tab(s) by mouth 2 times a day   You will need to take for a total of 21 days (through [facility] 15, 2017), then discontinue  -- Indication: For Pneumonia    pantoprazole 40 mg oral delayed release tablet  -- 1 tab(s) by mouth 2 times a day (before meals)  -- Indication: For Preventative measure    levoFLOXacin 250 mg oral tablet  -- 1 tab(s) by mouth every 24 hours  You will need to take for a total of 21 days (through [facility] 15, 2017) then discontinue  -- Indication: For Pneumonia    Multiple Vitamins oral tablet  -- 1 tab(s) by mouth once a day  -- Indication: For Supplement

## 2017-05-22 NOTE — DIETITIAN INITIAL EVALUATION ADULT. - ADHERENCE
Pt with HgbA1c 7.1% 2/2 chronic steroid usage(?) did not follow any modified diet PTA and unaware of elevated blood glucose

## 2017-05-22 NOTE — DISCHARGE NOTE ADULT - MEDICATION SUMMARY - MEDICATIONS TO CHANGE
I will SWITCH the dose or number of times a day I take the medications listed below when I get home from the hospital:    predniSONE 10 mg oral tablet  -- 30 milligram(s) by mouth once a day

## 2017-05-22 NOTE — DISCHARGE NOTE ADULT - MEDICATION SUMMARY - MEDICATIONS TO STOP TAKING
I will STOP taking the medications listed below when I get home from the hospital:    aspirin 81 mg oral delayed release tablet  -- 1 tab(s) by mouth once a day    amLODIPine 10 mg oral tablet  -- 1 tab(s) by mouth once a day    Xopenex HFA 45 mcg/inh inhalation aerosol  -- 2 puff(s) inhaled every 4 hours, As Needed

## 2017-05-22 NOTE — DISCHARGE NOTE ADULT - NS AS DC FOLLOWUP STROKE INST
Pneumonia/Influenza vaccination ([facility] Pub Date: August 19, 2014)/Smoking Cessation Pneumonia/Smoking Cessation/Influenza vaccination (VIS Pub Date: August 19, 2014)/Coumadin/Warfarin Coumadin/Warfarin/Smoking Cessation/Pneumonia Smoking Cessation/Pneumonia

## 2017-05-22 NOTE — DIETITIAN INITIAL EVALUATION ADULT. - ORAL INTAKE PTA
Pt reports some decrease in appetite since about 6 months ago, states 'I'm always hungry but I feel full quickly'. Pt reports consuming rice and pasta, lots of chicken, and some vegetables. Pt drinks water and tea with sugar./fair

## 2017-05-22 NOTE — DISCHARGE NOTE ADULT - ADDITIONAL INSTRUCTIONS
Follow-up with your primary care physician Follow-up with your primary care physician    You are being discharged on a steroid taper:  You will take Prednisone 20mg orally once a day x one day (6/1/17)  On 6/2/17, you will taper to 10mg orally once a day x 4days, then   On 6/6/17, you will taper to 5mg orally once a day x 4 days, then discontinue.    You will need to follow up with your pulmonologist, Dr. Alvarado [phone] within 1-2 weeks of discharge - you will need to schedule a CAT scan of the chest in 6 weeks of discharge. You will need to have a CBC checked in two days (6/2/17) to monitor your Hgb/Hct/ WBC.    Follow-up with your primary care physician    You are being discharged on a steroid taper:  You will take Prednisone 20mg orally once a day x one day (6/1/17)  On 6/2/17, you will taper to 10mg orally once a day x 4days, then   On 6/6/17, you will taper to 5mg orally once a day x 4 days, then discontinue.    You will need to follow up with your pulmonologist, Dr. Alvarado [phone] within 1-2 weeks of discharge - you will need to schedule a CAT scan of the chest in 6 weeks of discharge.

## 2017-05-22 NOTE — DISCHARGE NOTE ADULT - CARE PLAN
Principal Discharge DX:	Pneumonia, bacterial  Goal:	Improved symptoms  Instructions for follow-up, activity and diet:	Pneumonia is a lung infection that can cause a fever, cough, and trouble breathing.  Continue all antibiotics as ordered until complete.  Nutrition is important, eat small frequent meals.  Get lots of rest and drink fluids.  Call your health care provider upon arrival home from hospital and make a follow up appointment for one week.  If your cough worsens, you develop fever greater than 101', you have shaking chills, a fast heartbeat, trouble breathing and/or feel your are breathing much faster than usual, call your healthcare provider.  Make sure you wash your hands frequently.  Secondary Diagnosis:	Pulmonary emboli  Instructions for follow-up, activity and diet:	Take your anticoagulation (lovenox, coumadin) as directed.  Follow up with your health care provider within one week. Call for appointment.  If you develop shortness of breath or if your shortness of breath worsens call your Health Care Provider or go to the Emergency Department.  Secondary Diagnosis:	Chronic kidney disease (CKD), stage 4 (severe)  Instructions for follow-up, activity and diet:	Avoid taking (NSAIDs) - (ex: Ibuprofen, Advil, Celebrex, Naprosyn)  Avoid taking any nephrotoxic agents (can harm kidneys) - Intravenous contrast for diagnostic testing, combination cold medications.  Have all medications adjusted for your renal function by your Health Care Provider.  Blood pressure control is important.  Take all medication as prescribed.  Secondary Diagnosis:	Lung mass  Instructions for follow-up, activity and diet:	Follow-up with your primary care physician.  Secondary Diagnosis:	Hyperglycemia  Instructions for follow-up, activity and diet:	HgA1C this admission 7.1.  Make sure you get your HgA1c checked every three months.  If you take oral diabetes medications, check your blood glucose two times a day.  If you take insulin, check your blood glucose before meals and at bedtime.  It's important not to skip any meals.  Keep a log of your blood glucose results and always take it with you to your doctor appointments.  Keep a list of your current medications including injectables and over the counter medications and bring this medication list with you to all your doctor appointments.  If you have not seen your ophthalmologist this year call for appointment.  Check your feet daily for redness, sores, or openings. Do not self treat. If no improvement in two days call your primary care physician for an appointment.  Low blood sugar (hypoglycemia) is a blood sugar below 70mg/dl. Check your blood sugar if you feel signs/symptoms of hypoglycemia. If your blood sugar is below 70 take 15 grams of carbohydrates (ex 4 oz of apple juice, 3-4 glucose tablets, or 4-6 oz of regular soda) wait 15 minutes and repeat blood sugar to make sure it comes up above 70.  If your blood sugar is above 70 and you are due for a meal, have a meal.  If you are not due for a meal have a snack.  This snack helps keeps your blood sugar at a safe range.  Secondary Diagnosis:	Hypertension  Instructions for follow-up, activity and diet:	Low salt diet  Activity as tolerated.  Take all medication as prescribed.  Follow up with your medical doctor for routine blood pressure monitoring at your next visit.  Notify your doctor if you have any of the following symptoms:   Dizziness, Lightheadedness, Blurry vision, Headache, Chest pain, Shortness of breath Principal Discharge DX:	Pneumonia, bacterial  Goal:	Improved symptoms  Instructions for follow-up, activity and diet:	You are being discharged on antibiotics, which you will need to take for a total of 21 days (through 6/15/17), and then discontinue.  Pneumonia is a lung infection that can cause a fever, cough, and trouble breathing.  Continue all antibiotics as ordered until complete.  Nutrition is important, eat small frequent meals.  Get lots of rest and drink fluids.  Call your health care provider upon arrival home from hospital and make a follow up appointment for one week.  If your cough worsens, you develop fever greater than 101', you have shaking chills, a fast heartbeat, trouble breathing and/or feel your are breathing much faster than usual, call your healthcare provider.  Make sure you wash your hands frequently.  Secondary Diagnosis:	Pulmonary emboli  Instructions for follow-up, activity and diet:	You are being bridged with Lovenox and Coumadin.  Tonight, you will take Coumadin 5mg orally.  You will need your INR checked tomorrow, and your Coumadin dose adjusted accordingly.  Stop taking Lovenox, once the INR is therapeutic (2-3) x 2 values.  Take your anticoagulation (lovenox, coumadin) as directed.  Follow up with your health care provider within one week. Call for appointment.  If you develop shortness of breath or if your shortness of breath worsens call your Health Care Provider or go to the Emergency Department.  Secondary Diagnosis:	Chronic kidney disease (CKD), stage 4 (severe)  Instructions for follow-up, activity and diet:	Avoid taking (NSAIDs) - (ex: Ibuprofen, Advil, Celebrex, Naprosyn)  Avoid taking any nephrotoxic agents (can harm kidneys) - Intravenous contrast for diagnostic testing, combination cold medications.  Have all medications adjusted for your renal function by your Health Care Provider.  Blood pressure control is important.  Take all medication as prescribed.  Secondary Diagnosis:	Lung mass  Instructions for follow-up, activity and diet:	You are being discharged on a steroid taper:  You will take Prednisone 20mg orally once a day x one day (6/1/17)  On 6/2/17, you will taper to 10mg orally once a day x 4days, then   On 6/6/17, you will taper to 5mg orally once a day x 4 days, then discontinue.    You will need to follow up with your pulmonologist, Dr. Alvarado [phone] within 1-2 weeks of discharge - you will need to schedule a CAT scan of the chest in 6 weeks of discharge.  Follow-up with your primary care physician.  Secondary Diagnosis:	Hyperglycemia  Instructions for follow-up, activity and diet:	HgA1C this admission 7.1.  Make sure you get your HgA1c checked every three months.  If you take oral diabetes medications, check your blood glucose two times a day.  If you take insulin, check your blood glucose before meals and at bedtime.  It's important not to skip any meals.  Keep a log of your blood glucose results and always take it with you to your doctor appointments.  Keep a list of your current medications including injectables and over the counter medications and bring this medication list with you to all your doctor appointments.  If you have not seen your ophthalmologist this year call for appointment.  Check your feet daily for redness, sores, or openings. Do not self treat. If no improvement in two days call your primary care physician for an appointment.  Low blood sugar (hypoglycemia) is a blood sugar below 70mg/dl. Check your blood sugar if you feel signs/symptoms of hypoglycemia. If your blood sugar is below 70 take 15 grams of carbohydrates (ex 4 oz of apple juice, 3-4 glucose tablets, or 4-6 oz of regular soda) wait 15 minutes and repeat blood sugar to make sure it comes up above 70.  If your blood sugar is above 70 and you are due for a meal, have a meal.  If you are not due for a meal have a snack.  This snack helps keeps your blood sugar at a safe range.  Secondary Diagnosis:	Hypertension  Instructions for follow-up, activity and diet:	Low salt diet  Activity as tolerated.  Take all medication as prescribed.  Follow up with your medical doctor for routine blood pressure monitoring at your next visit.  Notify your doctor if you have any of the following symptoms:   Dizziness, Lightheadedness, Blurry vision, Headache, Chest pain, Shortness of breath Principal Discharge DX:	Pneumonia, bacterial  Goal:	Improved symptoms  Instructions for follow-up, activity and diet:	You are being discharged on antibiotics, which you will need to take for a total of 21 days (through 6/15/17), and then discontinue.  Pneumonia is a lung infection that can cause a fever, cough, and trouble breathing.  Continue all antibiotics as ordered until complete.  Nutrition is important, eat small frequent meals.  Get lots of rest and drink fluids.  Call your health care provider upon arrival home from hospital and make a follow up appointment for one week.  If your cough worsens, you develop fever greater than 101', you have shaking chills, a fast heartbeat, trouble breathing and/or feel your are breathing much faster than usual, call your healthcare provider.  Make sure you wash your hands frequently.  Secondary Diagnosis:	Pulmonary emboli  Instructions for follow-up, activity and diet:	You are being bridged with Lovenox and Coumadin.  Tonight, you will take Coumadin 5mg orally.  You will need your INR checked tomorrow, and your Coumadin dose adjusted accordingly.  Stop taking Lovenox, once the INR is therapeutic (2-3) x 2 values.  Take your anticoagulation (lovenox, coumadin) as directed.  Follow up with your health care provider within one week. Call for appointment.  If you develop shortness of breath or if your shortness of breath worsens call your Health Care Provider or go to the Emergency Department.  Secondary Diagnosis:	Chronic kidney disease (CKD), stage 4 (severe)  Instructions for follow-up, activity and diet:	Avoid taking (NSAIDs) - (ex: Ibuprofen, Advil, Celebrex, Naprosyn)  Avoid taking any nephrotoxic agents (can harm kidneys) - Intravenous contrast for diagnostic testing, combination cold medications.  Have all medications adjusted for your renal function by your Health Care Provider.  Blood pressure control is important.  Take all medication as prescribed.  Secondary Diagnosis:	Lung mass  Instructions for follow-up, activity and diet:	You are being discharged on a steroid taper:  You will take Prednisone 20mg orally once a day x one day (6/1/17)  On 6/2/17, you will taper to 10mg orally once a day x 4days, then   On 6/6/17, you will taper to 5mg orally once a day x 4 days, then discontinue.    You will need to follow up with your pulmonologist, Dr. Alvarado [phone] within 1-2 weeks of discharge - you will need to schedule a CAT scan of the chest in 6 weeks of discharge.  Follow-up with your primary care physician.  Secondary Diagnosis:	Hyperglycemia  Instructions for follow-up, activity and diet:	HgA1C this admission 7.1.  Make sure you get your HgA1c checked every three months.  If you take oral diabetes medications, check your blood glucose two times a day.  If you take insulin, check your blood glucose before meals and at bedtime.  It's important not to skip any meals.  Keep a log of your blood glucose results and always take it with you to your doctor appointments.  Keep a list of your current medications including injectables and over the counter medications and bring this medication list with you to all your doctor appointments.  If you have not seen your ophthalmologist this year call for appointment.  Check your feet daily for redness, sores, or openings. Do not self treat. If no improvement in two days call your primary care physician for an appointment.  Low blood sugar (hypoglycemia) is a blood sugar below 70mg/dl. Check your blood sugar if you feel signs/symptoms of hypoglycemia. If your blood sugar is below 70 take 15 grams of carbohydrates (ex 4 oz of apple juice, 3-4 glucose tablets, or 4-6 oz of regular soda) wait 15 minutes and repeat blood sugar to make sure it comes up above 70.  If your blood sugar is above 70 and you are due for a meal, have a meal.  If you are not due for a meal have a snack.  This snack helps keeps your blood sugar at a safe range.  Secondary Diagnosis:	Hypertension  Instructions for follow-up, activity and diet:	Low salt diet  Activity as tolerated.  Take all medication as prescribed.  Follow up with your medical doctor for routine blood pressure monitoring at your next visit.  Notify your doctor if you have any of the following symptoms:   Dizziness, Lightheadedness, Blurry vision, Headache, Chest pain, Shortness of breath  Secondary Diagnosis:	Anemia  Instructions for follow-up, activity and diet:	You were started on iron supplementation.  You will need to have a CBC checked in two days (6/2/17) to monitor your Hgb/Hct/ WBC. Principal Discharge DX:	Pneumonia, bacterial  Goal:	Improved symptoms  Instructions for follow-up, activity and diet:	You are being discharged on antibiotics, which you will need to take for a total of 21 days (through 6/15/17), and then discontinue.  Pneumonia is a lung infection that can cause a fever, cough, and trouble breathing.  Continue all antibiotics as ordered until complete.  Nutrition is important, eat small frequent meals.  Get lots of rest and drink fluids.  Call your health care provider upon arrival home from hospital and make a follow up appointment for one week.  If your cough worsens, you develop fever greater than 101', you have shaking chills, a fast heartbeat, trouble breathing and/or feel your are breathing much faster than usual, call your healthcare provider.  Make sure you wash your hands frequently.  Secondary Diagnosis:	Pulmonary emboli  Instructions for follow-up, activity and diet:	You are being bridged with Lovenox and Coumadin.  Tonight, you will take Coumadin 5mg orally.  You will need your INR checked tomorrow, and your Coumadin dose adjusted accordingly.  Stop taking Lovenox, once the INR is therapeutic (2-3) x 2 values.  Take your anticoagulation (lovenox, coumadin) as directed.  Follow up with your health care provider within one week. Call for appointment.  If you develop shortness of breath or if your shortness of breath worsens call your Health Care Provider or go to the Emergency Department.  Secondary Diagnosis:	Chronic kidney disease (CKD), stage 4 (severe)  Instructions for follow-up, activity and diet:	Avoid taking (NSAIDs) - (ex: Ibuprofen, Advil, Celebrex, Naprosyn)  Avoid taking any nephrotoxic agents (can harm kidneys) - Intravenous contrast for diagnostic testing, combination cold medications.  Have all medications adjusted for your renal function by your Health Care Provider.  Blood pressure control is important.  Take all medication as prescribed.  Secondary Diagnosis:	Lung mass  Instructions for follow-up, activity and diet:	You are being discharged on a steroid taper:  You will take Prednisone 20mg orally once a day x one day (6/1/17)  On 6/2/17, you will taper to 10mg orally once a day x 4days, then   On 6/6/17, you will taper to 5mg orally once a day x 4 days, then discontinue.    You will need to follow up with your pulmonologist, Dr. Alvarado [phone] within 1-2 weeks of discharge - you will need to schedule a CAT scan of the chest in 6 weeks of discharge.  Follow-up with your primary care physician.  Secondary Diagnosis:	Hyperglycemia  Instructions for follow-up, activity and diet:	HgA1C this admission 7.1.  Make sure you get your HgA1c checked every three months.  If you take oral diabetes medications, check your blood glucose two times a day.  If you take insulin, check your blood glucose before meals and at bedtime.  It's important not to skip any meals.  Keep a log of your blood glucose results and always take it with you to your doctor appointments.  Keep a list of your current medications including injectables and over the counter medications and bring this medication list with you to all your doctor appointments.  If you have not seen your ophthalmologist this year call for appointment.  Check your feet daily for redness, sores, or openings. Do not self treat. If no improvement in two days call your primary care physician for an appointment.  Low blood sugar (hypoglycemia) is a blood sugar below 70mg/dl. Check your blood sugar if you feel signs/symptoms of hypoglycemia. If your blood sugar is below 70 take 15 grams of carbohydrates (ex 4 oz of apple juice, 3-4 glucose tablets, or 4-6 oz of regular soda) wait 15 minutes and repeat blood sugar to make sure it comes up above 70.  If your blood sugar is above 70 and you are due for a meal, have a meal.  If you are not due for a meal have a snack.  This snack helps keeps your blood sugar at a safe range.  Secondary Diagnosis:	Hypertension  Instructions for follow-up, activity and diet:	Low salt diet  Activity as tolerated.  Take all medication as prescribed.  Follow up with your medical doctor for routine blood pressure monitoring at your next visit.  Notify your doctor if you have any of the following symptoms:   Dizziness, Lightheadedness, Blurry vision, Headache, Chest pain, Shortness of breath  Secondary Diagnosis:	Anemia  Instructions for follow-up, activity and diet:	You were started on iron supplementation.  You will need to have a CBC checked in two days (6/2/17) to monitor your Hgb/Hct/ WBC. Principal Discharge DX:	Pneumonia, bacterial  Goal:	Improved symptoms  Instructions for follow-up, activity and diet:	You are being discharged on antibiotics, which you will need to take for a total of 21 days (through 6/15/17), and then discontinue.  Pneumonia is a lung infection that can cause a fever, cough, and trouble breathing.  Continue all antibiotics as ordered until complete.  Nutrition is important, eat small frequent meals.  Get lots of rest and drink fluids.  Call your health care provider upon arrival home from hospital and make a follow up appointment for one week.  If your cough worsens, you develop fever greater than 101', you have shaking chills, a fast heartbeat, trouble breathing and/or feel your are breathing much faster than usual, call your healthcare provider.  Make sure you wash your hands frequently.  Secondary Diagnosis:	Pulmonary emboli  Instructions for follow-up, activity and diet:	You are being bridged with Lovenox and Coumadin.  Tonight, you will take Coumadin 5mg orally.  You will need your INR checked tomorrow, and your Coumadin dose adjusted accordingly.  Stop taking Lovenox, once the INR is therapeutic (2-3) x 2 values.  Take your anticoagulation (lovenox, coumadin) as directed.  Follow up with your health care provider within one week. Call for appointment.  If you develop shortness of breath or if your shortness of breath worsens call your Health Care Provider or go to the Emergency Department.  Secondary Diagnosis:	Chronic kidney disease (CKD), stage 4 (severe)  Instructions for follow-up, activity and diet:	Avoid taking (NSAIDs) - (ex: Ibuprofen, Advil, Celebrex, Naprosyn)  Avoid taking any nephrotoxic agents (can harm kidneys) - Intravenous contrast for diagnostic testing, combination cold medications.  Have all medications adjusted for your renal function by your Health Care Provider.  Blood pressure control is important.  Take all medication as prescribed.  Secondary Diagnosis:	Lung mass  Instructions for follow-up, activity and diet:	You are being discharged on a steroid taper:  You will take Prednisone 20mg orally once a day x one day (6/1/17)  On 6/2/17, you will taper to 10mg orally once a day x 4days, then   On 6/6/17, you will taper to 5mg orally once a day x 4 days, then discontinue.    You will need to follow up with your pulmonologist, Dr. Alvarado [phone] within 1-2 weeks of discharge - you will need to schedule a CAT scan of the chest in 6 weeks of discharge.  Follow-up with your primary care physician.  Secondary Diagnosis:	Hyperglycemia  Instructions for follow-up, activity and diet:	HgA1C this admission 7.1.  Make sure you get your HgA1c checked every three months.  If you take oral diabetes medications, check your blood glucose two times a day.  If you take insulin, check your blood glucose before meals and at bedtime.  It's important not to skip any meals.  Keep a log of your blood glucose results and always take it with you to your doctor appointments.  Keep a list of your current medications including injectables and over the counter medications and bring this medication list with you to all your doctor appointments.  If you have not seen your ophthalmologist this year call for appointment.  Check your feet daily for redness, sores, or openings. Do not self treat. If no improvement in two days call your primary care physician for an appointment.  Low blood sugar (hypoglycemia) is a blood sugar below 70mg/dl. Check your blood sugar if you feel signs/symptoms of hypoglycemia. If your blood sugar is below 70 take 15 grams of carbohydrates (ex 4 oz of apple juice, 3-4 glucose tablets, or 4-6 oz of regular soda) wait 15 minutes and repeat blood sugar to make sure it comes up above 70.  If your blood sugar is above 70 and you are due for a meal, have a meal.  If you are not due for a meal have a snack.  This snack helps keeps your blood sugar at a safe range.  Secondary Diagnosis:	Hypertension  Instructions for follow-up, activity and diet:	Low salt diet  Activity as tolerated.  Take all medication as prescribed.  Follow up with your medical doctor for routine blood pressure monitoring at your next visit.  Notify your doctor if you have any of the following symptoms:   Dizziness, Lightheadedness, Blurry vision, Headache, Chest pain, Shortness of breath  Secondary Diagnosis:	Anemia  Instructions for follow-up, activity and diet:	You were started on iron supplementation.  You will need to have a CBC checked in two days (6/2/17) to monitor your Hgb/Hct/ WBC.

## 2017-05-22 NOTE — DIETITIAN INITIAL EVALUATION ADULT. - NS AS NUTRI INTERV ED CONTENT
Pt denies having diabetes and she was not aware of steroid induced hyperglycemia, discussed with NP. Discussed steroid affect on blood glucose, identified food sources of carbohydrates, encouraged pt to monitor portion sizes, consuming carbohydrates with protein and avoiding concentrated sweets.

## 2017-05-22 NOTE — DISCHARGE NOTE ADULT - SECONDARY DIAGNOSIS.
Pulmonary emboli Chronic kidney disease (CKD), stage 4 (severe) Lung mass Hyperglycemia Hypertension Anemia

## 2017-05-22 NOTE — DISCHARGE NOTE ADULT - PLAN OF CARE
Improved symptoms Pneumonia is a lung infection that can cause a fever, cough, and trouble breathing.  Continue all antibiotics as ordered until complete.  Nutrition is important, eat small frequent meals.  Get lots of rest and drink fluids.  Call your health care provider upon arrival home from hospital and make a follow up appointment for one week.  If your cough worsens, you develop fever greater than 101', you have shaking chills, a fast heartbeat, trouble breathing and/or feel your are breathing much faster than usual, call your healthcare provider.  Make sure you wash your hands frequently. Take your anticoagulation (lovenox, coumadin) as directed.  Follow up with your health care provider within one week. Call for appointment.  If you develop shortness of breath or if your shortness of breath worsens call your Health Care Provider or go to the Emergency Department. Avoid taking (NSAIDs) - (ex: Ibuprofen, Advil, Celebrex, Naprosyn)  Avoid taking any nephrotoxic agents (can harm kidneys) - Intravenous contrast for diagnostic testing, combination cold medications.  Have all medications adjusted for your renal function by your Health Care Provider.  Blood pressure control is important.  Take all medication as prescribed. Follow-up with your primary care physician. HgA1C this admission 7.1.  Make sure you get your HgA1c checked every three months.  If you take oral diabetes medications, check your blood glucose two times a day.  If you take insulin, check your blood glucose before meals and at bedtime.  It's important not to skip any meals.  Keep a log of your blood glucose results and always take it with you to your doctor appointments.  Keep a list of your current medications including injectables and over the counter medications and bring this medication list with you to all your doctor appointments.  If you have not seen your ophthalmologist this year call for appointment.  Check your feet daily for redness, sores, or openings. Do not self treat. If no improvement in two days call your primary care physician for an appointment.  Low blood sugar (hypoglycemia) is a blood sugar below 70mg/dl. Check your blood sugar if you feel signs/symptoms of hypoglycemia. If your blood sugar is below 70 take 15 grams of carbohydrates (ex 4 oz of apple juice, 3-4 glucose tablets, or 4-6 oz of regular soda) wait 15 minutes and repeat blood sugar to make sure it comes up above 70.  If your blood sugar is above 70 and you are due for a meal, have a meal.  If you are not due for a meal have a snack.  This snack helps keeps your blood sugar at a safe range. Low salt diet  Activity as tolerated.  Take all medication as prescribed.  Follow up with your medical doctor for routine blood pressure monitoring at your next visit.  Notify your doctor if you have any of the following symptoms:   Dizziness, Lightheadedness, Blurry vision, Headache, Chest pain, Shortness of breath You are being discharged on antibiotics, which you will need to take for a total of 21 days (through 6/15/17), and then discontinue.  Pneumonia is a lung infection that can cause a fever, cough, and trouble breathing.  Continue all antibiotics as ordered until complete.  Nutrition is important, eat small frequent meals.  Get lots of rest and drink fluids.  Call your health care provider upon arrival home from hospital and make a follow up appointment for one week.  If your cough worsens, you develop fever greater than 101', you have shaking chills, a fast heartbeat, trouble breathing and/or feel your are breathing much faster than usual, call your healthcare provider.  Make sure you wash your hands frequently. You are being bridged with Lovenox and Coumadin.  Tonight, you will take Coumadin 5mg orally.  You will need your INR checked tomorrow, and your Coumadin dose adjusted accordingly.  Stop taking Lovenox, once the INR is therapeutic (2-3) x 2 values.  Take your anticoagulation (lovenox, coumadin) as directed.  Follow up with your health care provider within one week. Call for appointment.  If you develop shortness of breath or if your shortness of breath worsens call your Health Care Provider or go to the Emergency Department. You are being discharged on a steroid taper:  You will take Prednisone 20mg orally once a day x one day (6/1/17)  On 6/2/17, you will taper to 10mg orally once a day x 4days, then   On 6/6/17, you will taper to 5mg orally once a day x 4 days, then discontinue.    You will need to follow up with your pulmonologist, Dr. Alvarado [phone] within 1-2 weeks of discharge - you will need to schedule a CAT scan of the chest in 6 weeks of discharge.  Follow-up with your primary care physician. You were started on iron supplementation.  You will need to have a CBC checked in two days (6/2/17) to monitor your Hgb/Hct/ WBC.

## 2017-05-22 NOTE — DISCHARGE NOTE ADULT - HOSPITAL COURSE
To be completed by attending. 5/25	Night np: s/p RRT for GIB. received 2 units prbc's protonix 80mg iv and follow 	up with protonix 40mg bid. Heparin drip dc'd patient will need IVC filter ct a/p. 	Received 150 meq of sodium bicarb ivp and bicarb drip  5/25	GI consult: clears, CBC q6, hold heparin, colonoscopy in am, no need for CT  5/26- pt npo for colonoscopy, repleted K IV, recheck post colonoscopy   colonoscopy showed diverticulosis  plan to resume heparin gtt at 0800 5/26 full ac and mon cbc q 12 (   )   5/28 - aptt has been elevated x 24 hours - discussed with Dr. Singh - therapeutic Lovenox 	started - f/u aptt at 0015 (  )  5/29 Coumadin bridging started .   5/30 C/w Coumadin bridge. Discharge planning to [facility] .Dischrge to rehab  If rehab can accept with  Lovenox bridge to coumadin.

## 2017-05-23 LAB
ANION GAP SERPL CALC-SCNC: 15 MMOL/L — SIGNIFICANT CHANGE UP (ref 5–17)
APTT BLD: 132.8 SEC — CRITICAL HIGH (ref 27.5–37.4)
APTT BLD: 135 SEC — CRITICAL HIGH (ref 27.5–37.4)
APTT BLD: 80.2 SEC — HIGH (ref 27.5–37.4)
BUN SERPL-MCNC: 26 MG/DL — HIGH (ref 7–23)
CALCIUM SERPL-MCNC: 8.7 MG/DL — SIGNIFICANT CHANGE UP (ref 8.4–10.5)
CHLORIDE SERPL-SCNC: 107 MMOL/L — SIGNIFICANT CHANGE UP (ref 96–108)
CO2 SERPL-SCNC: 19 MMOL/L — LOW (ref 22–31)
CREAT SERPL-MCNC: 1.05 MG/DL — SIGNIFICANT CHANGE UP (ref 0.5–1.3)
GLUCOSE SERPL-MCNC: 91 MG/DL — SIGNIFICANT CHANGE UP (ref 70–99)
GRAM STN FLD: SIGNIFICANT CHANGE UP
HCT VFR BLD CALC: 31.3 % — LOW (ref 34.5–45)
HGB BLD-MCNC: 10.3 G/DL — LOW (ref 11.5–15.5)
LACTATE SERPL-SCNC: <0.2 MMOL/L — LOW (ref 0.7–2)
LEGIONELLA AG UR QL: NEGATIVE — SIGNIFICANT CHANGE UP
MCHC RBC-ENTMCNC: 31.7 PG — SIGNIFICANT CHANGE UP (ref 27–34)
MCHC RBC-ENTMCNC: 32.9 GM/DL — SIGNIFICANT CHANGE UP (ref 32–36)
MCV RBC AUTO: 96.4 FL — SIGNIFICANT CHANGE UP (ref 80–100)
PLATELET # BLD AUTO: 467 K/UL — HIGH (ref 150–400)
POTASSIUM SERPL-MCNC: 4.1 MMOL/L — SIGNIFICANT CHANGE UP (ref 3.5–5.3)
POTASSIUM SERPL-SCNC: 4.1 MMOL/L — SIGNIFICANT CHANGE UP (ref 3.5–5.3)
RBC # BLD: 3.25 M/UL — LOW (ref 3.8–5.2)
RBC # FLD: 18.2 % — HIGH (ref 10.3–14.5)
SODIUM SERPL-SCNC: 141 MMOL/L — SIGNIFICANT CHANGE UP (ref 135–145)
SPECIMEN SOURCE: SIGNIFICANT CHANGE UP
WBC # BLD: 16.7 K/UL — HIGH (ref 3.8–10.5)
WBC # FLD AUTO: 16.7 K/UL — HIGH (ref 3.8–10.5)

## 2017-05-23 PROCEDURE — 71275 CT ANGIOGRAPHY CHEST: CPT | Mod: 26

## 2017-05-23 RX ORDER — ACETAMINOPHEN 500 MG
650 TABLET ORAL ONCE
Qty: 0 | Refills: 0 | Status: COMPLETED | OUTPATIENT
Start: 2017-05-23 | End: 2017-05-23

## 2017-05-23 RX ORDER — PIPERACILLIN AND TAZOBACTAM 4; .5 G/20ML; G/20ML
3.38 INJECTION, POWDER, LYOPHILIZED, FOR SOLUTION INTRAVENOUS EVERY 8 HOURS
Qty: 0 | Refills: 0 | Status: DISCONTINUED | OUTPATIENT
Start: 2017-05-23 | End: 2017-05-26

## 2017-05-23 RX ORDER — HEPARIN SODIUM 5000 [USP'U]/ML
700 INJECTION INTRAVENOUS; SUBCUTANEOUS
Qty: 25000 | Refills: 0 | Status: DISCONTINUED | OUTPATIENT
Start: 2017-05-23 | End: 2017-05-25

## 2017-05-23 RX ORDER — PIPERACILLIN AND TAZOBACTAM 4; .5 G/20ML; G/20ML
3.38 INJECTION, POWDER, LYOPHILIZED, FOR SOLUTION INTRAVENOUS ONCE
Qty: 0 | Refills: 0 | Status: COMPLETED | OUTPATIENT
Start: 2017-05-23 | End: 2017-05-23

## 2017-05-23 RX ORDER — INSULIN GLARGINE 100 [IU]/ML
10 INJECTION, SOLUTION SUBCUTANEOUS AT BEDTIME
Qty: 0 | Refills: 0 | Status: DISCONTINUED | OUTPATIENT
Start: 2017-05-23 | End: 2017-05-25

## 2017-05-23 RX ADMIN — Medication 3 MILLILITER(S): at 17:41

## 2017-05-23 RX ADMIN — PIPERACILLIN AND TAZOBACTAM 25 GRAM(S): 4; .5 INJECTION, POWDER, LYOPHILIZED, FOR SOLUTION INTRAVENOUS at 22:59

## 2017-05-23 RX ADMIN — Medication 0.5 MILLIGRAM(S): at 17:41

## 2017-05-23 RX ADMIN — Medication 3 MILLILITER(S): at 06:09

## 2017-05-23 RX ADMIN — Medication 100 MILLIGRAM(S): at 06:10

## 2017-05-23 RX ADMIN — HEPARIN SODIUM 900 UNIT(S)/HR: 5000 INJECTION INTRAVENOUS; SUBCUTANEOUS at 04:22

## 2017-05-23 RX ADMIN — Medication 1: at 17:40

## 2017-05-23 RX ADMIN — INSULIN GLARGINE 10 UNIT(S): 100 INJECTION, SOLUTION SUBCUTANEOUS at 23:00

## 2017-05-23 RX ADMIN — Medication 3 MILLILITER(S): at 23:00

## 2017-05-23 RX ADMIN — HEPARIN SODIUM 700 UNIT(S)/HR: 5000 INJECTION INTRAVENOUS; SUBCUTANEOUS at 13:28

## 2017-05-23 RX ADMIN — Medication 650 MILLIGRAM(S): at 23:21

## 2017-05-23 RX ADMIN — Medication 3 MILLILITER(S): at 13:31

## 2017-05-23 RX ADMIN — Medication 0.5 MILLIGRAM(S): at 06:09

## 2017-05-23 RX ADMIN — AMLODIPINE BESYLATE 10 MILLIGRAM(S): 2.5 TABLET ORAL at 06:10

## 2017-05-23 RX ADMIN — Medication 650 MILLIGRAM(S): at 23:50

## 2017-05-23 RX ADMIN — MAGNESIUM OXIDE 400 MG ORAL TABLET 400 MILLIGRAM(S): 241.3 TABLET ORAL at 13:30

## 2017-05-23 RX ADMIN — ATORVASTATIN CALCIUM 20 MILLIGRAM(S): 80 TABLET, FILM COATED ORAL at 23:00

## 2017-05-23 RX ADMIN — HEPARIN SODIUM 0 UNIT(S)/HR: 5000 INJECTION INTRAVENOUS; SUBCUTANEOUS at 03:03

## 2017-05-23 RX ADMIN — HEPARIN SODIUM 0 UNIT(S)/HR: 5000 INJECTION INTRAVENOUS; SUBCUTANEOUS at 12:18

## 2017-05-23 RX ADMIN — Medication 650 MILLIGRAM(S): at 03:54

## 2017-05-23 RX ADMIN — Medication 81 MILLIGRAM(S): at 13:31

## 2017-05-23 RX ADMIN — Medication 40 MILLIGRAM(S): at 06:10

## 2017-05-23 RX ADMIN — PIPERACILLIN AND TAZOBACTAM 200 GRAM(S): 4; .5 INJECTION, POWDER, LYOPHILIZED, FOR SOLUTION INTRAVENOUS at 13:31

## 2017-05-23 RX ADMIN — MAGNESIUM OXIDE 400 MG ORAL TABLET 400 MILLIGRAM(S): 241.3 TABLET ORAL at 17:41

## 2017-05-23 RX ADMIN — Medication 1: at 13:30

## 2017-05-23 RX ADMIN — HEPARIN SODIUM 700 UNIT(S)/HR: 5000 INJECTION INTRAVENOUS; SUBCUTANEOUS at 20:36

## 2017-05-23 RX ADMIN — Medication 650 MILLIGRAM(S): at 04:24

## 2017-05-24 LAB
ANION GAP SERPL CALC-SCNC: 16 MMOL/L — SIGNIFICANT CHANGE UP (ref 5–17)
APTT BLD: 75.6 SEC — HIGH (ref 27.5–37.4)
APTT BLD: 77.2 SEC — HIGH (ref 27.5–37.4)
BUN SERPL-MCNC: 19 MG/DL — SIGNIFICANT CHANGE UP (ref 7–23)
CALCIUM SERPL-MCNC: 8.4 MG/DL — SIGNIFICANT CHANGE UP (ref 8.4–10.5)
CHLORIDE SERPL-SCNC: 102 MMOL/L — SIGNIFICANT CHANGE UP (ref 96–108)
CO2 SERPL-SCNC: 16 MMOL/L — LOW (ref 22–31)
CREAT SERPL-MCNC: 1.04 MG/DL — SIGNIFICANT CHANGE UP (ref 0.5–1.3)
GLUCOSE SERPL-MCNC: 88 MG/DL — SIGNIFICANT CHANGE UP (ref 70–99)
HCT VFR BLD CALC: 28.2 % — LOW (ref 34.5–45)
HGB BLD-MCNC: 9.5 G/DL — LOW (ref 11.5–15.5)
INR BLD: 1 RATIO — SIGNIFICANT CHANGE UP (ref 0.88–1.16)
MCHC RBC-ENTMCNC: 32.3 PG — SIGNIFICANT CHANGE UP (ref 27–34)
MCHC RBC-ENTMCNC: 33.7 GM/DL — SIGNIFICANT CHANGE UP (ref 32–36)
MCV RBC AUTO: 95.9 FL — SIGNIFICANT CHANGE UP (ref 80–100)
PLATELET # BLD AUTO: 409 K/UL — HIGH (ref 150–400)
POTASSIUM SERPL-MCNC: 3.8 MMOL/L — SIGNIFICANT CHANGE UP (ref 3.5–5.3)
POTASSIUM SERPL-SCNC: 3.8 MMOL/L — SIGNIFICANT CHANGE UP (ref 3.5–5.3)
PROTHROM AB SERPL-ACNC: 10.8 SEC — SIGNIFICANT CHANGE UP (ref 9.8–12.7)
RBC # BLD: 2.94 M/UL — LOW (ref 3.8–5.2)
RBC # FLD: 18.3 % — HIGH (ref 10.3–14.5)
SODIUM SERPL-SCNC: 134 MMOL/L — LOW (ref 135–145)
WBC # BLD: 16.1 K/UL — HIGH (ref 3.8–10.5)
WBC # FLD AUTO: 16.1 K/UL — HIGH (ref 3.8–10.5)

## 2017-05-24 RX ORDER — TEMAZEPAM 15 MG/1
15 CAPSULE ORAL AT BEDTIME
Qty: 0 | Refills: 0 | Status: DISCONTINUED | OUTPATIENT
Start: 2017-05-24 | End: 2017-05-31

## 2017-05-24 RX ADMIN — Medication 3 MILLILITER(S): at 05:29

## 2017-05-24 RX ADMIN — PIPERACILLIN AND TAZOBACTAM 25 GRAM(S): 4; .5 INJECTION, POWDER, LYOPHILIZED, FOR SOLUTION INTRAVENOUS at 22:53

## 2017-05-24 RX ADMIN — Medication 3 MILLILITER(S): at 12:26

## 2017-05-24 RX ADMIN — PIPERACILLIN AND TAZOBACTAM 25 GRAM(S): 4; .5 INJECTION, POWDER, LYOPHILIZED, FOR SOLUTION INTRAVENOUS at 05:29

## 2017-05-24 RX ADMIN — ATORVASTATIN CALCIUM 20 MILLIGRAM(S): 80 TABLET, FILM COATED ORAL at 22:57

## 2017-05-24 RX ADMIN — INSULIN GLARGINE 10 UNIT(S): 100 INJECTION, SOLUTION SUBCUTANEOUS at 22:58

## 2017-05-24 RX ADMIN — HEPARIN SODIUM 700 UNIT(S)/HR: 5000 INJECTION INTRAVENOUS; SUBCUTANEOUS at 02:42

## 2017-05-24 RX ADMIN — Medication 81 MILLIGRAM(S): at 12:27

## 2017-05-24 RX ADMIN — Medication 3 MILLILITER(S): at 23:04

## 2017-05-24 RX ADMIN — AMLODIPINE BESYLATE 10 MILLIGRAM(S): 2.5 TABLET ORAL at 05:29

## 2017-05-24 RX ADMIN — HEPARIN SODIUM 700 UNIT(S)/HR: 5000 INJECTION INTRAVENOUS; SUBCUTANEOUS at 08:06

## 2017-05-24 RX ADMIN — Medication 0.5 MILLIGRAM(S): at 18:00

## 2017-05-24 RX ADMIN — Medication 0.5 MILLIGRAM(S): at 05:29

## 2017-05-24 RX ADMIN — Medication 100 MILLIGRAM(S): at 05:29

## 2017-05-24 RX ADMIN — Medication 40 MILLIGRAM(S): at 05:29

## 2017-05-24 RX ADMIN — PIPERACILLIN AND TAZOBACTAM 25 GRAM(S): 4; .5 INJECTION, POWDER, LYOPHILIZED, FOR SOLUTION INTRAVENOUS at 12:26

## 2017-05-24 RX ADMIN — TEMAZEPAM 15 MILLIGRAM(S): 15 CAPSULE ORAL at 22:57

## 2017-05-25 LAB
-  AMIKACIN: SIGNIFICANT CHANGE UP
-  AZTREONAM: SIGNIFICANT CHANGE UP
-  CEFEPIME: SIGNIFICANT CHANGE UP
-  CEFTAZIDIME: SIGNIFICANT CHANGE UP
-  CIPROFLOXACIN: SIGNIFICANT CHANGE UP
-  GENTAMICIN: SIGNIFICANT CHANGE UP
-  IMIPENEM: SIGNIFICANT CHANGE UP
-  LEVOFLOXACIN: SIGNIFICANT CHANGE UP
-  MEROPENEM: SIGNIFICANT CHANGE UP
-  PIPERACILLIN/TAZOBACTAM: SIGNIFICANT CHANGE UP
-  TOBRAMYCIN: SIGNIFICANT CHANGE UP
ALBUMIN SERPL ELPH-MCNC: 2.4 G/DL — LOW (ref 3.3–5)
ALBUMIN SERPL ELPH-MCNC: 2.5 G/DL — LOW (ref 3.3–5)
ALP SERPL-CCNC: 79 U/L — SIGNIFICANT CHANGE UP (ref 40–120)
ALP SERPL-CCNC: 80 U/L — SIGNIFICANT CHANGE UP (ref 40–120)
ALT FLD-CCNC: 41 U/L RC — SIGNIFICANT CHANGE UP (ref 10–45)
ALT FLD-CCNC: 42 U/L RC — SIGNIFICANT CHANGE UP (ref 10–45)
ANION GAP SERPL CALC-SCNC: 11 MMOL/L — SIGNIFICANT CHANGE UP (ref 5–17)
ANION GAP SERPL CALC-SCNC: 13 MMOL/L — SIGNIFICANT CHANGE UP (ref 5–17)
ANISOCYTOSIS BLD QL: SLIGHT — SIGNIFICANT CHANGE UP
APTT BLD: 85.7 SEC — HIGH (ref 27.5–37.4)
AST SERPL-CCNC: 31 U/L — SIGNIFICANT CHANGE UP (ref 10–40)
AST SERPL-CCNC: 33 U/L — SIGNIFICANT CHANGE UP (ref 10–40)
BASE EXCESS BLDA CALC-SCNC: -18.2 MMOL/L — LOW (ref -2–2)
BASE EXCESS BLDA CALC-SCNC: 5.1 MMOL/L — HIGH (ref -2–2)
BASE EXCESS BLDV CALC-SCNC: 6.3 MMOL/L — HIGH (ref -2–2)
BASOPHILS # BLD AUTO: 0 K/UL — SIGNIFICANT CHANGE UP (ref 0–0.2)
BASOPHILS NFR BLD AUTO: 0 % — SIGNIFICANT CHANGE UP (ref 0–2)
BILIRUB SERPL-MCNC: 0.2 MG/DL — SIGNIFICANT CHANGE UP (ref 0.2–1.2)
BILIRUB SERPL-MCNC: 0.6 MG/DL — SIGNIFICANT CHANGE UP (ref 0.2–1.2)
BUN SERPL-MCNC: 47 MG/DL — HIGH (ref 7–23)
BUN SERPL-MCNC: 47 MG/DL — HIGH (ref 7–23)
BURR CELLS BLD QL SMEAR: PRESENT — SIGNIFICANT CHANGE UP
CA-I SERPL-SCNC: 1.11 MMOL/L — LOW (ref 1.12–1.3)
CALCIUM SERPL-MCNC: 8.2 MG/DL — LOW (ref 8.4–10.5)
CALCIUM SERPL-MCNC: 8.3 MG/DL — LOW (ref 8.4–10.5)
CHLORIDE BLDV-SCNC: 104 MMOL/L — SIGNIFICANT CHANGE UP (ref 96–108)
CHLORIDE SERPL-SCNC: 104 MMOL/L — SIGNIFICANT CHANGE UP (ref 96–108)
CHLORIDE SERPL-SCNC: 104 MMOL/L — SIGNIFICANT CHANGE UP (ref 96–108)
CO2 BLDA-SCNC: 27 MMOL/L — SIGNIFICANT CHANGE UP (ref 22–30)
CO2 BLDA-SCNC: 8 MMOL/L — LOW (ref 22–30)
CO2 BLDV-SCNC: 30 MMOL/L — SIGNIFICANT CHANGE UP (ref 22–30)
CO2 SERPL-SCNC: 27 MMOL/L — SIGNIFICANT CHANGE UP (ref 22–31)
CO2 SERPL-SCNC: 30 MMOL/L — SIGNIFICANT CHANGE UP (ref 22–31)
CREAT SERPL-MCNC: 1.12 MG/DL — SIGNIFICANT CHANGE UP (ref 0.5–1.3)
CREAT SERPL-MCNC: 1.22 MG/DL — SIGNIFICANT CHANGE UP (ref 0.5–1.3)
CULTURE RESULTS: SIGNIFICANT CHANGE UP
DACRYOCYTES BLD QL SMEAR: SLIGHT — SIGNIFICANT CHANGE UP
EOSINOPHIL # BLD AUTO: 0 K/UL — SIGNIFICANT CHANGE UP (ref 0–0.5)
EOSINOPHIL NFR BLD AUTO: 0 % — SIGNIFICANT CHANGE UP (ref 0–6)
FUNGITELL: 192 PG/ML — HIGH (ref 0–59)
GAS PNL BLDA: SIGNIFICANT CHANGE UP
GAS PNL BLDA: SIGNIFICANT CHANGE UP
GAS PNL BLDV: 139 MMOL/L — SIGNIFICANT CHANGE UP (ref 136–145)
GAS PNL BLDV: SIGNIFICANT CHANGE UP
GAS PNL BLDV: SIGNIFICANT CHANGE UP
GLUCOSE BLDV-MCNC: 107 MG/DL — HIGH (ref 70–99)
GLUCOSE SERPL-MCNC: 111 MG/DL — HIGH (ref 70–99)
GLUCOSE SERPL-MCNC: 129 MG/DL — HIGH (ref 70–99)
HCO3 BLDA-SCNC: 26 MMOL/L — SIGNIFICANT CHANGE UP (ref 21–29)
HCO3 BLDA-SCNC: 7 MMOL/L — LOW (ref 21–29)
HCO3 BLDV-SCNC: 29 MMOL/L — SIGNIFICANT CHANGE UP (ref 21–29)
HCT VFR BLD CALC: 24.8 % — LOW (ref 34.5–45)
HCT VFR BLD CALC: 28 % — LOW (ref 34.5–45)
HCT VFR BLD CALC: 29.4 % — LOW (ref 34.5–45)
HCT VFR BLDA CALC: 27 % — LOW (ref 39–50)
HGB BLD CALC-MCNC: 8.6 G/DL — LOW (ref 11.5–15.5)
HGB BLD-MCNC: 7.9 G/DL — LOW (ref 11.5–15.5)
HGB BLD-MCNC: 9.6 G/DL — LOW (ref 11.5–15.5)
HGB BLD-MCNC: 9.9 G/DL — LOW (ref 11.5–15.5)
HOROWITZ INDEX BLDA+IHG-RTO: 32 — SIGNIFICANT CHANGE UP
HOROWITZ INDEX BLDA+IHG-RTO: 32 — SIGNIFICANT CHANGE UP
HYPOCHROMIA BLD QL: SLIGHT — SIGNIFICANT CHANGE UP
INR BLD: 1.03 RATIO — SIGNIFICANT CHANGE UP (ref 0.88–1.16)
LACTATE BLDV-MCNC: 3.7 MMOL/L — HIGH (ref 0.7–2)
LYMPHOCYTES # BLD AUTO: 1.3 K/UL — SIGNIFICANT CHANGE UP (ref 1–3.3)
LYMPHOCYTES # BLD AUTO: 3 % — LOW (ref 13–44)
MAGNESIUM SERPL-MCNC: 1.4 MG/DL — LOW (ref 1.6–2.6)
MCHC RBC-ENTMCNC: 31.1 PG — SIGNIFICANT CHANGE UP (ref 27–34)
MCHC RBC-ENTMCNC: 31.4 PG — SIGNIFICANT CHANGE UP (ref 27–34)
MCHC RBC-ENTMCNC: 31.4 PG — SIGNIFICANT CHANGE UP (ref 27–34)
MCHC RBC-ENTMCNC: 31.9 GM/DL — LOW (ref 32–36)
MCHC RBC-ENTMCNC: 33.6 GM/DL — SIGNIFICANT CHANGE UP (ref 32–36)
MCHC RBC-ENTMCNC: 34.3 GM/DL — SIGNIFICANT CHANGE UP (ref 32–36)
MCV RBC AUTO: 91.8 FL — SIGNIFICANT CHANGE UP (ref 80–100)
MCV RBC AUTO: 92.6 FL — SIGNIFICANT CHANGE UP (ref 80–100)
MCV RBC AUTO: 98.4 FL — SIGNIFICANT CHANGE UP (ref 80–100)
METHOD TYPE: SIGNIFICANT CHANGE UP
MONOCYTES # BLD AUTO: 1.9 K/UL — HIGH (ref 0–0.9)
MONOCYTES NFR BLD AUTO: 8 % — SIGNIFICANT CHANGE UP (ref 2–14)
MYELOCYTES NFR BLD: 1 % — HIGH (ref 0–0)
NEUTROPHILS # BLD AUTO: SIGNIFICANT CHANGE UP (ref 1.8–7.4)
NEUTROPHILS NFR BLD AUTO: 87 % — HIGH (ref 43–77)
NEUTS HYPERSEG # BLD: PRESENT — SIGNIFICANT CHANGE UP
NRBC # BLD: 1 /100 — HIGH (ref 0–0)
OB PNL STL: POSITIVE
ORGANISM # SPEC MICROSCOPIC CNT: SIGNIFICANT CHANGE UP
ORGANISM # SPEC MICROSCOPIC CNT: SIGNIFICANT CHANGE UP
OTHER CELLS CSF MANUAL: 11 ML/DL — LOW (ref 18–22)
OVALOCYTES BLD QL SMEAR: SLIGHT — SIGNIFICANT CHANGE UP
PCO2 BLDA: 16 MMHG — LOW (ref 32–46)
PCO2 BLDA: 28 MMHG — LOW (ref 32–46)
PCO2 BLDV: 31 MMHG — LOW (ref 35–50)
PH BLDA: 7.27 — LOW (ref 7.35–7.45)
PH BLDA: 7.58 — HIGH (ref 7.35–7.45)
PH BLDV: 7.57 — HIGH (ref 7.35–7.45)
PHOSPHATE SERPL-MCNC: 1.4 MG/DL — LOW (ref 2.5–4.5)
PLAT MORPH BLD: NORMAL — SIGNIFICANT CHANGE UP
PLATELET # BLD AUTO: 273 K/UL — SIGNIFICANT CHANGE UP (ref 150–400)
PLATELET # BLD AUTO: 277 K/UL — SIGNIFICANT CHANGE UP (ref 150–400)
PLATELET # BLD AUTO: 405 K/UL — HIGH (ref 150–400)
PO2 BLDA: 129 MMHG — HIGH (ref 74–108)
PO2 BLDA: 136 MMHG — HIGH (ref 74–108)
PO2 BLDV: 53 MMHG — HIGH (ref 25–45)
POIKILOCYTOSIS BLD QL AUTO: SLIGHT — SIGNIFICANT CHANGE UP
POLYCHROMASIA BLD QL SMEAR: SLIGHT — SIGNIFICANT CHANGE UP
POTASSIUM BLDV-SCNC: 3.5 MMOL/L — SIGNIFICANT CHANGE UP (ref 3.5–5)
POTASSIUM SERPL-MCNC: 3.5 MMOL/L — SIGNIFICANT CHANGE UP (ref 3.5–5.3)
POTASSIUM SERPL-MCNC: 3.8 MMOL/L — SIGNIFICANT CHANGE UP (ref 3.5–5.3)
POTASSIUM SERPL-SCNC: 3.5 MMOL/L — SIGNIFICANT CHANGE UP (ref 3.5–5.3)
POTASSIUM SERPL-SCNC: 3.8 MMOL/L — SIGNIFICANT CHANGE UP (ref 3.5–5.3)
PROMYELOCYTES # FLD: 1 % — HIGH (ref 0–0)
PROT SERPL-MCNC: 4.4 G/DL — LOW (ref 6–8.3)
PROT SERPL-MCNC: 4.5 G/DL — LOW (ref 6–8.3)
PROTHROM AB SERPL-ACNC: 11.2 SEC — SIGNIFICANT CHANGE UP (ref 9.8–12.7)
RBC # BLD: 2.52 M/UL — LOW (ref 3.8–5.2)
RBC # BLD: 3.05 M/UL — LOW (ref 3.8–5.2)
RBC # BLD: 3.17 M/UL — LOW (ref 3.8–5.2)
RBC # FLD: 17.8 % — HIGH (ref 10.3–14.5)
RBC # FLD: 17.8 % — HIGH (ref 10.3–14.5)
RBC # FLD: 17.9 % — HIGH (ref 10.3–14.5)
RBC BLD AUTO: ABNORMAL
SAO2 % BLDA: 100 % — HIGH (ref 92–96)
SAO2 % BLDA: 98 % — HIGH (ref 92–96)
SAO2 % BLDV: 91 % — HIGH (ref 67–88)
SCHISTOCYTES BLD QL AUTO: SLIGHT — SIGNIFICANT CHANGE UP
SODIUM SERPL-SCNC: 144 MMOL/L — SIGNIFICANT CHANGE UP (ref 135–145)
SODIUM SERPL-SCNC: 145 MMOL/L — SIGNIFICANT CHANGE UP (ref 135–145)
SPECIMEN SOURCE: SIGNIFICANT CHANGE UP
WBC # BLD: 15.4 K/UL — HIGH (ref 3.8–10.5)
WBC # BLD: 17.1 K/UL — HIGH (ref 3.8–10.5)
WBC # BLD: 23.3 K/UL — HIGH (ref 3.8–10.5)
WBC # FLD AUTO: 15.4 K/UL — HIGH (ref 3.8–10.5)
WBC # FLD AUTO: 17.1 K/UL — HIGH (ref 3.8–10.5)
WBC # FLD AUTO: 23.3 K/UL — HIGH (ref 3.8–10.5)

## 2017-05-25 PROCEDURE — 93010 ELECTROCARDIOGRAM REPORT: CPT

## 2017-05-25 PROCEDURE — 99222 1ST HOSP IP/OBS MODERATE 55: CPT | Mod: GC

## 2017-05-25 RX ORDER — VANCOMYCIN HCL 1 G
125 VIAL (EA) INTRAVENOUS EVERY 6 HOURS
Qty: 0 | Refills: 0 | Status: DISCONTINUED | OUTPATIENT
Start: 2017-05-25 | End: 2017-05-25

## 2017-05-25 RX ORDER — SODIUM BICARBONATE 1 MEQ/ML
0.16 SYRINGE (ML) INTRAVENOUS
Qty: 150 | Refills: 0 | Status: DISCONTINUED | OUTPATIENT
Start: 2017-05-25 | End: 2017-05-25

## 2017-05-25 RX ORDER — SODIUM CHLORIDE 9 MG/ML
1000 INJECTION INTRAMUSCULAR; INTRAVENOUS; SUBCUTANEOUS ONCE
Qty: 0 | Refills: 0 | Status: COMPLETED | OUTPATIENT
Start: 2017-05-25 | End: 2017-05-25

## 2017-05-25 RX ORDER — POTASSIUM PHOSPHATE, MONOBASIC POTASSIUM PHOSPHATE, DIBASIC 236; 224 MG/ML; MG/ML
15 INJECTION, SOLUTION INTRAVENOUS ONCE
Qty: 0 | Refills: 0 | Status: COMPLETED | OUTPATIENT
Start: 2017-05-25 | End: 2017-05-25

## 2017-05-25 RX ORDER — SODIUM BICARBONATE 1 MEQ/ML
50 SYRINGE (ML) INTRAVENOUS ONCE
Qty: 0 | Refills: 0 | Status: COMPLETED | OUTPATIENT
Start: 2017-05-25 | End: 2017-05-25

## 2017-05-25 RX ORDER — INSULIN GLARGINE 100 [IU]/ML
6 INJECTION, SOLUTION SUBCUTANEOUS AT BEDTIME
Qty: 0 | Refills: 0 | Status: DISCONTINUED | OUTPATIENT
Start: 2017-05-25 | End: 2017-05-27

## 2017-05-25 RX ORDER — PANTOPRAZOLE SODIUM 20 MG/1
80 TABLET, DELAYED RELEASE ORAL ONCE
Qty: 0 | Refills: 0 | Status: COMPLETED | OUTPATIENT
Start: 2017-05-25 | End: 2017-05-25

## 2017-05-25 RX ORDER — SOD SULF/SODIUM/NAHCO3/KCL/PEG
2000 SOLUTION, RECONSTITUTED, ORAL ORAL ONCE
Qty: 0 | Refills: 0 | Status: COMPLETED | OUTPATIENT
Start: 2017-05-25 | End: 2017-05-25

## 2017-05-25 RX ORDER — PANTOPRAZOLE SODIUM 20 MG/1
40 TABLET, DELAYED RELEASE ORAL
Qty: 0 | Refills: 0 | Status: DISCONTINUED | OUTPATIENT
Start: 2017-05-25 | End: 2017-05-30

## 2017-05-25 RX ORDER — MAGNESIUM SULFATE 500 MG/ML
1 VIAL (ML) INJECTION ONCE
Qty: 0 | Refills: 0 | Status: COMPLETED | OUTPATIENT
Start: 2017-05-25 | End: 2017-05-25

## 2017-05-25 RX ORDER — ONDANSETRON 8 MG/1
4 TABLET, FILM COATED ORAL ONCE
Qty: 0 | Refills: 0 | Status: COMPLETED | OUTPATIENT
Start: 2017-05-25 | End: 2017-05-25

## 2017-05-25 RX ORDER — SODIUM CHLORIDE 9 MG/ML
1000 INJECTION INTRAMUSCULAR; INTRAVENOUS; SUBCUTANEOUS
Qty: 0 | Refills: 0 | Status: DISCONTINUED | OUTPATIENT
Start: 2017-05-25 | End: 2017-05-31

## 2017-05-25 RX ADMIN — Medication 30 MILLIGRAM(S): at 12:51

## 2017-05-25 RX ADMIN — TEMAZEPAM 15 MILLIGRAM(S): 15 CAPSULE ORAL at 23:02

## 2017-05-25 RX ADMIN — SODIUM CHLORIDE 75 MILLILITER(S): 9 INJECTION INTRAMUSCULAR; INTRAVENOUS; SUBCUTANEOUS at 17:37

## 2017-05-25 RX ADMIN — Medication 3 MILLILITER(S): at 17:03

## 2017-05-25 RX ADMIN — Medication 3 MILLILITER(S): at 06:31

## 2017-05-25 RX ADMIN — Medication 100 GRAM(S): at 12:51

## 2017-05-25 RX ADMIN — Medication 3 MILLILITER(S): at 11:56

## 2017-05-25 RX ADMIN — PIPERACILLIN AND TAZOBACTAM 25 GRAM(S): 4; .5 INJECTION, POWDER, LYOPHILIZED, FOR SOLUTION INTRAVENOUS at 23:03

## 2017-05-25 RX ADMIN — Medication 0.5 MILLIGRAM(S): at 06:31

## 2017-05-25 RX ADMIN — Medication 0.5 MILLIGRAM(S): at 17:36

## 2017-05-25 RX ADMIN — PANTOPRAZOLE SODIUM 40 MILLIGRAM(S): 20 TABLET, DELAYED RELEASE ORAL at 17:12

## 2017-05-25 RX ADMIN — PIPERACILLIN AND TAZOBACTAM 25 GRAM(S): 4; .5 INJECTION, POWDER, LYOPHILIZED, FOR SOLUTION INTRAVENOUS at 08:35

## 2017-05-25 RX ADMIN — ATORVASTATIN CALCIUM 20 MILLIGRAM(S): 80 TABLET, FILM COATED ORAL at 23:02

## 2017-05-25 RX ADMIN — PANTOPRAZOLE SODIUM 80 MILLIGRAM(S): 20 TABLET, DELAYED RELEASE ORAL at 04:16

## 2017-05-25 RX ADMIN — INSULIN GLARGINE 6 UNIT(S): 100 INJECTION, SOLUTION SUBCUTANEOUS at 23:02

## 2017-05-25 RX ADMIN — Medication 50 MILLIEQUIVALENT(S): at 05:23

## 2017-05-25 RX ADMIN — Medication 3 MILLILITER(S): at 23:03

## 2017-05-25 RX ADMIN — SODIUM CHLORIDE 2000 MILLILITER(S): 9 INJECTION INTRAMUSCULAR; INTRAVENOUS; SUBCUTANEOUS at 03:40

## 2017-05-25 RX ADMIN — POTASSIUM PHOSPHATE, MONOBASIC POTASSIUM PHOSPHATE, DIBASIC 62.5 MILLIMOLE(S): 236; 224 INJECTION, SOLUTION INTRAVENOUS at 12:57

## 2017-05-25 RX ADMIN — Medication 2000 MILLILITER(S): at 19:15

## 2017-05-25 RX ADMIN — PIPERACILLIN AND TAZOBACTAM 25 GRAM(S): 4; .5 INJECTION, POWDER, LYOPHILIZED, FOR SOLUTION INTRAVENOUS at 16:59

## 2017-05-25 RX ADMIN — SODIUM CHLORIDE 75 MILLILITER(S): 9 INJECTION INTRAMUSCULAR; INTRAVENOUS; SUBCUTANEOUS at 08:36

## 2017-05-25 NOTE — PROVIDER CONTACT NOTE (OTHER) - SITUATION
pt vomited once .....denies any SOB or chest pain noted...np ordred zofran 4mg ivp ..pt refused ...pt stated that I am okay ..i dont want any medicine

## 2017-05-26 LAB
ANION GAP SERPL CALC-SCNC: 15 MMOL/L — SIGNIFICANT CHANGE UP (ref 5–17)
ANISOCYTOSIS BLD QL: SIGNIFICANT CHANGE UP
APTT BLD: 21.7 SEC — LOW (ref 27.5–37.4)
BASOPHILS # BLD AUTO: 0.02 K/UL — SIGNIFICANT CHANGE UP (ref 0–0.2)
BASOPHILS NFR BLD AUTO: 0.1 % — SIGNIFICANT CHANGE UP (ref 0–2)
BUN SERPL-MCNC: 26 MG/DL — HIGH (ref 7–23)
CALCIUM SERPL-MCNC: 7.7 MG/DL — LOW (ref 8.4–10.5)
CHLORIDE SERPL-SCNC: 108 MMOL/L — SIGNIFICANT CHANGE UP (ref 96–108)
CO2 SERPL-SCNC: 23 MMOL/L — SIGNIFICANT CHANGE UP (ref 22–31)
CREAT SERPL-MCNC: 1.09 MG/DL — SIGNIFICANT CHANGE UP (ref 0.5–1.3)
ELLIPTOCYTES BLD QL SMEAR: SLIGHT — SIGNIFICANT CHANGE UP
EOSINOPHIL # BLD AUTO: 0.01 K/UL — SIGNIFICANT CHANGE UP (ref 0–0.5)
EOSINOPHIL NFR BLD AUTO: 0.1 % — SIGNIFICANT CHANGE UP (ref 0–6)
GLUCOSE SERPL-MCNC: 89 MG/DL — SIGNIFICANT CHANGE UP (ref 70–99)
HCT VFR BLD CALC: 25.6 % — LOW (ref 34.5–45)
HCT VFR BLD CALC: 26.5 % — LOW (ref 34.5–45)
HCT VFR BLD CALC: 28.6 % — LOW (ref 34.5–45)
HGB BLD-MCNC: 8.3 G/DL — LOW (ref 11.5–15.5)
HGB BLD-MCNC: 9.1 G/DL — LOW (ref 11.5–15.5)
HGB BLD-MCNC: 9.7 G/DL — LOW (ref 11.5–15.5)
IMM GRANULOCYTES NFR BLD AUTO: 1.8 % — HIGH (ref 0–1.5)
INR BLD: 0.98 RATIO — SIGNIFICANT CHANGE UP (ref 0.88–1.16)
LACTATE SERPL-SCNC: 1.3 MMOL/L — SIGNIFICANT CHANGE UP (ref 0.7–2)
LYMPHOCYTES # BLD AUTO: 13.6 % — SIGNIFICANT CHANGE UP (ref 13–44)
LYMPHOCYTES # BLD AUTO: 2.17 K/UL — SIGNIFICANT CHANGE UP (ref 1–3.3)
MACROCYTES BLD QL: SLIGHT — SIGNIFICANT CHANGE UP
MAGNESIUM SERPL-MCNC: 1.6 MG/DL — SIGNIFICANT CHANGE UP (ref 1.6–2.6)
MANUAL SMEAR VERIFICATION: SIGNIFICANT CHANGE UP
MCHC RBC-ENTMCNC: 29.2 PG — SIGNIFICANT CHANGE UP (ref 27–34)
MCHC RBC-ENTMCNC: 31.4 PG — SIGNIFICANT CHANGE UP (ref 27–34)
MCHC RBC-ENTMCNC: 32.1 PG — SIGNIFICANT CHANGE UP (ref 27–34)
MCHC RBC-ENTMCNC: 32.4 GM/DL — SIGNIFICANT CHANGE UP (ref 32–36)
MCHC RBC-ENTMCNC: 33.8 GM/DL — SIGNIFICANT CHANGE UP (ref 32–36)
MCHC RBC-ENTMCNC: 34.2 GM/DL — SIGNIFICANT CHANGE UP (ref 32–36)
MCV RBC AUTO: 90.1 FL — SIGNIFICANT CHANGE UP (ref 80–100)
MCV RBC AUTO: 92.9 FL — SIGNIFICANT CHANGE UP (ref 80–100)
MCV RBC AUTO: 93.9 FL — SIGNIFICANT CHANGE UP (ref 80–100)
MICROCYTES BLD QL: SLIGHT — SIGNIFICANT CHANGE UP
MONOCYTES # BLD AUTO: 1.27 K/UL — HIGH (ref 0–0.9)
MONOCYTES NFR BLD AUTO: 8 % — SIGNIFICANT CHANGE UP (ref 2–14)
NEUTROPHILS # BLD AUTO: 12.17 K/UL — HIGH (ref 1.8–7.4)
NEUTROPHILS NFR BLD AUTO: 76.4 % — SIGNIFICANT CHANGE UP (ref 43–77)
PHOSPHATE SERPL-MCNC: 2.1 MG/DL — LOW (ref 2.5–4.5)
PLAT MORPH BLD: NORMAL — SIGNIFICANT CHANGE UP
PLATELET # BLD AUTO: 265 K/UL — SIGNIFICANT CHANGE UP (ref 150–400)
PLATELET # BLD AUTO: 274 K/UL — SIGNIFICANT CHANGE UP (ref 150–400)
PLATELET # BLD AUTO: 286 K/UL — SIGNIFICANT CHANGE UP (ref 150–400)
PLATELET COUNT - ESTIMATE: SIGNIFICANT CHANGE UP
POIKILOCYTOSIS BLD QL AUTO: SLIGHT — SIGNIFICANT CHANGE UP
POLYCHROMASIA BLD QL SMEAR: SLIGHT — SIGNIFICANT CHANGE UP
POTASSIUM SERPL-MCNC: 3.1 MMOL/L — LOW (ref 3.5–5.3)
POTASSIUM SERPL-SCNC: 3.1 MMOL/L — LOW (ref 3.5–5.3)
PROTHROM AB SERPL-ACNC: 11.1 SEC — SIGNIFICANT CHANGE UP (ref 10–13.1)
RBC # BLD: 2.82 M/UL — LOW (ref 3.8–5.2)
RBC # BLD: 2.84 M/UL — LOW (ref 3.8–5.2)
RBC # BLD: 3.08 M/UL — LOW (ref 3.8–5.2)
RBC # FLD: 18.2 % — HIGH (ref 10.3–14.5)
RBC # FLD: 18.2 % — HIGH (ref 10.3–14.5)
RBC # FLD: 21 % — HIGH (ref 10.3–14.5)
RBC BLD AUTO: ABNORMAL
SODIUM SERPL-SCNC: 146 MMOL/L — HIGH (ref 135–145)
WBC # BLD: 15.6 K/UL — HIGH (ref 3.8–10.5)
WBC # BLD: 15.92 K/UL — HIGH (ref 3.8–10.5)
WBC # BLD: 17.5 K/UL — HIGH (ref 3.8–10.5)
WBC # FLD AUTO: 15.6 K/UL — HIGH (ref 3.8–10.5)
WBC # FLD AUTO: 15.92 K/UL — HIGH (ref 3.8–10.5)
WBC # FLD AUTO: 17.5 K/UL — HIGH (ref 3.8–10.5)

## 2017-05-26 PROCEDURE — 93306 TTE W/DOPPLER COMPLETE: CPT | Mod: 26

## 2017-05-26 RX ORDER — ACETAMINOPHEN 500 MG
650 TABLET ORAL ONCE
Qty: 0 | Refills: 0 | Status: COMPLETED | OUTPATIENT
Start: 2017-05-26 | End: 2017-05-26

## 2017-05-26 RX ORDER — INSULIN GLARGINE 100 [IU]/ML
3 INJECTION, SOLUTION SUBCUTANEOUS ONCE
Qty: 0 | Refills: 0 | Status: COMPLETED | OUTPATIENT
Start: 2017-05-26 | End: 2017-05-26

## 2017-05-26 RX ORDER — POTASSIUM CHLORIDE 20 MEQ
10 PACKET (EA) ORAL ONCE
Qty: 0 | Refills: 0 | Status: COMPLETED | OUTPATIENT
Start: 2017-05-26 | End: 2017-05-26

## 2017-05-26 RX ADMIN — Medication 100 MILLIEQUIVALENT(S): at 10:22

## 2017-05-26 RX ADMIN — Medication 650 MILLIGRAM(S): at 20:55

## 2017-05-26 RX ADMIN — TEMAZEPAM 15 MILLIGRAM(S): 15 CAPSULE ORAL at 21:00

## 2017-05-26 RX ADMIN — Medication 0.5 MILLIGRAM(S): at 05:49

## 2017-05-26 RX ADMIN — Medication 3 MILLILITER(S): at 12:06

## 2017-05-26 RX ADMIN — PIPERACILLIN AND TAZOBACTAM 25 GRAM(S): 4; .5 INJECTION, POWDER, LYOPHILIZED, FOR SOLUTION INTRAVENOUS at 09:50

## 2017-05-26 RX ADMIN — PANTOPRAZOLE SODIUM 40 MILLIGRAM(S): 20 TABLET, DELAYED RELEASE ORAL at 05:49

## 2017-05-26 RX ADMIN — Medication 0.5 MILLIGRAM(S): at 19:06

## 2017-05-26 RX ADMIN — Medication 650 MILLIGRAM(S): at 21:55

## 2017-05-26 RX ADMIN — Medication 3 MILLILITER(S): at 23:42

## 2017-05-26 RX ADMIN — ATORVASTATIN CALCIUM 20 MILLIGRAM(S): 80 TABLET, FILM COATED ORAL at 21:00

## 2017-05-26 RX ADMIN — Medication 3 MILLILITER(S): at 05:49

## 2017-05-26 RX ADMIN — Medication 1 TABLET(S): at 19:07

## 2017-05-26 RX ADMIN — Medication 3 MILLILITER(S): at 19:06

## 2017-05-26 RX ADMIN — PANTOPRAZOLE SODIUM 40 MILLIGRAM(S): 20 TABLET, DELAYED RELEASE ORAL at 19:06

## 2017-05-26 RX ADMIN — Medication 1 TABLET(S): at 20:55

## 2017-05-26 RX ADMIN — INSULIN GLARGINE 3 UNIT(S): 100 INJECTION, SOLUTION SUBCUTANEOUS at 23:41

## 2017-05-26 NOTE — PHYSICAL THERAPY INITIAL EVALUATION ADULT - GAIT DEVIATIONS NOTED, PT EVAL
decreased stride length/decreased weight-shifting ability/decreased velocity of limb motion/decreased li

## 2017-05-26 NOTE — PHYSICAL THERAPY INITIAL EVALUATION ADULT - ADDITIONAL COMMENTS
PTA pt lived with daughter and grandkids in pvt home, 3 steps to enter, pt lives main floor only, independent ADLs, used rollator to ambulate household only (didn't go in community).

## 2017-05-26 NOTE — PHYSICAL THERAPY INITIAL EVALUATION ADULT - PERTINENT HX OF CURRENT PROBLEM, REHAB EVAL
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 5/22/17 started on heparin drip, +GI bleed and 5/25 code fusion d/t dizzy/diaphorectic/pale, received 2 uPRBCs. Heparin D/C and pt pend IVC filter and colonoscopy as per NP Braulio. 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 5/22/17 started on heparin drip, +GI bleed and 5/25 code fusion d/t dizzy/diaphorectic/pale, received 2 uPRBCs. Heparin D/C and pt pend IVC filter and colonoscopy as per NP Braulio 5/26/17. +colonoscopy diverticulosis, heparin restarted 5/27, PTT too high so changed to lovenox, CBC stable Hgb 7.3.

## 2017-05-26 NOTE — PHYSICAL THERAPY INITIAL EVALUATION ADULT - PRECAUTIONS/LIMITATIONS, REHAB EVAL
+CT chest Right upper lobe segmental and lower lobe subsegmental pulmonary  embolism. +cxray RUL opacity unchanged since 5/20 but dec since 3/2017. +TTE calcified mitral valve, minimal aortic regurg, EF 75%, stage 1 diastolic LV dysfunction. +pseudomonas

## 2017-05-27 LAB
APTT BLD: 22.8 SEC — LOW (ref 27.5–37.4)
APTT BLD: > 200 SEC (ref 27.5–37.4)
HCT VFR BLD CALC: 24.7 % — LOW (ref 34.5–45)
HCT VFR BLD CALC: 24.8 % — LOW (ref 34.5–45)
HGB BLD-MCNC: 7.8 G/DL — LOW (ref 11.5–15.5)
HGB BLD-MCNC: 8.3 G/DL — LOW (ref 11.5–15.5)
MANUAL SMEAR VERIFICATION: SIGNIFICANT CHANGE UP
MCHC RBC-ENTMCNC: 29.4 PG — SIGNIFICANT CHANGE UP (ref 27–34)
MCHC RBC-ENTMCNC: 31.6 GM/DL — LOW (ref 32–36)
MCHC RBC-ENTMCNC: 31.7 PG — SIGNIFICANT CHANGE UP (ref 27–34)
MCHC RBC-ENTMCNC: 33.5 GM/DL — SIGNIFICANT CHANGE UP (ref 32–36)
MCV RBC AUTO: 93.2 FL — SIGNIFICANT CHANGE UP (ref 80–100)
MCV RBC AUTO: 94.7 FL — SIGNIFICANT CHANGE UP (ref 80–100)
NRBC # BLD: 1 /100 WBCS — HIGH (ref 0–0)
PLAT MORPH BLD: SIGNIFICANT CHANGE UP
PLATELET # BLD AUTO: 238 K/UL — SIGNIFICANT CHANGE UP (ref 150–400)
PLATELET # BLD AUTO: 260 K/UL — SIGNIFICANT CHANGE UP (ref 150–400)
RBC # BLD: 2.62 M/UL — LOW (ref 3.8–5.2)
RBC # BLD: 2.65 M/UL — LOW (ref 3.8–5.2)
RBC # FLD: 18.2 % — HIGH (ref 10.3–14.5)
RBC # FLD: 21 % — HIGH (ref 10.3–14.5)
RBC BLD AUTO: SIGNIFICANT CHANGE UP
WBC # BLD: 11.9 K/UL — HIGH (ref 3.8–10.5)
WBC # BLD: 12.37 K/UL — HIGH (ref 3.8–10.5)
WBC # FLD AUTO: 11.9 K/UL — HIGH (ref 3.8–10.5)
WBC # FLD AUTO: 12.37 K/UL — HIGH (ref 3.8–10.5)

## 2017-05-27 RX ORDER — HEPARIN SODIUM 5000 [USP'U]/ML
INJECTION INTRAVENOUS; SUBCUTANEOUS
Qty: 25000 | Refills: 0 | Status: DISCONTINUED | OUTPATIENT
Start: 2017-05-27 | End: 2017-05-27

## 2017-05-27 RX ORDER — HEPARIN SODIUM 5000 [USP'U]/ML
6000 INJECTION INTRAVENOUS; SUBCUTANEOUS EVERY 6 HOURS
Qty: 0 | Refills: 0 | Status: DISCONTINUED | OUTPATIENT
Start: 2017-05-27 | End: 2017-05-28

## 2017-05-27 RX ORDER — HEPARIN SODIUM 5000 [USP'U]/ML
INJECTION INTRAVENOUS; SUBCUTANEOUS
Qty: 25000 | Refills: 0 | Status: DISCONTINUED | OUTPATIENT
Start: 2017-05-27 | End: 2017-05-28

## 2017-05-27 RX ORDER — ACETAMINOPHEN 500 MG
650 TABLET ORAL ONCE
Qty: 0 | Refills: 0 | Status: COMPLETED | OUTPATIENT
Start: 2017-05-27 | End: 2017-05-27

## 2017-05-27 RX ORDER — HEPARIN SODIUM 5000 [USP'U]/ML
6000 INJECTION INTRAVENOUS; SUBCUTANEOUS ONCE
Qty: 0 | Refills: 0 | Status: COMPLETED | OUTPATIENT
Start: 2017-05-27 | End: 2017-05-27

## 2017-05-27 RX ORDER — ACETAMINOPHEN 500 MG
650 TABLET ORAL EVERY 6 HOURS
Qty: 0 | Refills: 0 | Status: DISCONTINUED | OUTPATIENT
Start: 2017-05-27 | End: 2017-05-31

## 2017-05-27 RX ORDER — HEPARIN SODIUM 5000 [USP'U]/ML
6000 INJECTION INTRAVENOUS; SUBCUTANEOUS EVERY 6 HOURS
Qty: 0 | Refills: 0 | Status: DISCONTINUED | OUTPATIENT
Start: 2017-05-27 | End: 2017-05-27

## 2017-05-27 RX ORDER — HEPARIN SODIUM 5000 [USP'U]/ML
3000 INJECTION INTRAVENOUS; SUBCUTANEOUS EVERY 6 HOURS
Qty: 0 | Refills: 0 | Status: DISCONTINUED | OUTPATIENT
Start: 2017-05-27 | End: 2017-05-28

## 2017-05-27 RX ORDER — HEPARIN SODIUM 5000 [USP'U]/ML
3000 INJECTION INTRAVENOUS; SUBCUTANEOUS EVERY 6 HOURS
Qty: 0 | Refills: 0 | Status: DISCONTINUED | OUTPATIENT
Start: 2017-05-27 | End: 2017-05-27

## 2017-05-27 RX ADMIN — PANTOPRAZOLE SODIUM 40 MILLIGRAM(S): 20 TABLET, DELAYED RELEASE ORAL at 05:57

## 2017-05-27 RX ADMIN — Medication 30 MILLIGRAM(S): at 05:57

## 2017-05-27 RX ADMIN — Medication 3 MILLILITER(S): at 05:57

## 2017-05-27 RX ADMIN — Medication 650 MILLIGRAM(S): at 17:43

## 2017-05-27 RX ADMIN — HEPARIN SODIUM 6000 UNIT(S): 5000 INJECTION INTRAVENOUS; SUBCUTANEOUS at 09:42

## 2017-05-27 RX ADMIN — PANTOPRAZOLE SODIUM 40 MILLIGRAM(S): 20 TABLET, DELAYED RELEASE ORAL at 17:23

## 2017-05-27 RX ADMIN — Medication 100 MILLIGRAM(S): at 17:24

## 2017-05-27 RX ADMIN — Medication 1 TABLET(S): at 17:23

## 2017-05-27 RX ADMIN — HEPARIN SODIUM 1300 UNIT(S)/HR: 5000 INJECTION INTRAVENOUS; SUBCUTANEOUS at 09:30

## 2017-05-27 RX ADMIN — HEPARIN SODIUM 0 UNIT(S)/HR: 5000 INJECTION INTRAVENOUS; SUBCUTANEOUS at 16:16

## 2017-05-27 RX ADMIN — Medication 3 MILLILITER(S): at 12:03

## 2017-05-27 RX ADMIN — Medication 650 MILLIGRAM(S): at 18:13

## 2017-05-27 RX ADMIN — HEPARIN SODIUM 1300 UNIT(S)/HR: 5000 INJECTION INTRAVENOUS; SUBCUTANEOUS at 09:42

## 2017-05-27 RX ADMIN — Medication 650 MILLIGRAM(S): at 05:56

## 2017-05-27 RX ADMIN — Medication 650 MILLIGRAM(S): at 06:41

## 2017-05-27 RX ADMIN — HEPARIN SODIUM 1100 UNIT(S)/HR: 5000 INJECTION INTRAVENOUS; SUBCUTANEOUS at 17:18

## 2017-05-27 RX ADMIN — Medication 0.5 MILLIGRAM(S): at 17:23

## 2017-05-27 RX ADMIN — TEMAZEPAM 15 MILLIGRAM(S): 15 CAPSULE ORAL at 21:55

## 2017-05-27 RX ADMIN — Medication 1 TABLET(S): at 12:19

## 2017-05-27 RX ADMIN — Medication 1 TABLET(S): at 06:32

## 2017-05-27 RX ADMIN — ATORVASTATIN CALCIUM 20 MILLIGRAM(S): 80 TABLET, FILM COATED ORAL at 21:55

## 2017-05-27 RX ADMIN — Medication 3 MILLILITER(S): at 17:24

## 2017-05-27 RX ADMIN — Medication 0.5 MILLIGRAM(S): at 05:57

## 2017-05-28 LAB
ANION GAP SERPL CALC-SCNC: 16 MMOL/L — SIGNIFICANT CHANGE UP (ref 5–17)
APTT BLD: 100.5 SEC — HIGH (ref 27.5–37.4)
APTT BLD: 142.6 SEC — CRITICAL HIGH (ref 27.5–37.4)
APTT BLD: > 200 SEC (ref 27.5–37.4)
BUN SERPL-MCNC: 13 MG/DL — SIGNIFICANT CHANGE UP (ref 7–23)
CALCIUM SERPL-MCNC: 8.5 MG/DL — SIGNIFICANT CHANGE UP (ref 8.4–10.5)
CHLORIDE SERPL-SCNC: 100 MMOL/L — SIGNIFICANT CHANGE UP (ref 96–108)
CO2 SERPL-SCNC: 18 MMOL/L — LOW (ref 22–31)
CREAT SERPL-MCNC: 0.99 MG/DL — SIGNIFICANT CHANGE UP (ref 0.5–1.3)
GLUCOSE SERPL-MCNC: 120 MG/DL — HIGH (ref 70–99)
HCT VFR BLD CALC: 25.8 % — LOW (ref 34.5–45)
HGB BLD-MCNC: 8.3 G/DL — LOW (ref 11.5–15.5)
MCHC RBC-ENTMCNC: 29.5 PG — SIGNIFICANT CHANGE UP (ref 27–34)
MCHC RBC-ENTMCNC: 32.2 GM/DL — SIGNIFICANT CHANGE UP (ref 32–36)
MCV RBC AUTO: 91.8 FL — SIGNIFICANT CHANGE UP (ref 80–100)
PLATELET # BLD AUTO: 269 K/UL — SIGNIFICANT CHANGE UP (ref 150–400)
POTASSIUM SERPL-MCNC: 3.8 MMOL/L — SIGNIFICANT CHANGE UP (ref 3.5–5.3)
POTASSIUM SERPL-SCNC: 3.8 MMOL/L — SIGNIFICANT CHANGE UP (ref 3.5–5.3)
RBC # BLD: 2.81 M/UL — LOW (ref 3.8–5.2)
RBC # FLD: 21 % — HIGH (ref 10.3–14.5)
SODIUM SERPL-SCNC: 134 MMOL/L — LOW (ref 135–145)
WBC # BLD: 14.54 K/UL — HIGH (ref 3.8–10.5)
WBC # FLD AUTO: 14.54 K/UL — HIGH (ref 3.8–10.5)

## 2017-05-28 PROCEDURE — 93010 ELECTROCARDIOGRAM REPORT: CPT

## 2017-05-28 RX ORDER — ENOXAPARIN SODIUM 100 MG/ML
70 INJECTION SUBCUTANEOUS EVERY 12 HOURS
Qty: 0 | Refills: 0 | Status: DISCONTINUED | OUTPATIENT
Start: 2017-05-28 | End: 2017-05-31

## 2017-05-28 RX ADMIN — Medication 1 TABLET(S): at 05:54

## 2017-05-28 RX ADMIN — Medication 3 MILLILITER(S): at 05:54

## 2017-05-28 RX ADMIN — Medication 0.5 MILLIGRAM(S): at 05:54

## 2017-05-28 RX ADMIN — HEPARIN SODIUM 700 UNIT(S)/HR: 5000 INJECTION INTRAVENOUS; SUBCUTANEOUS at 10:15

## 2017-05-28 RX ADMIN — Medication 3 MILLILITER(S): at 23:57

## 2017-05-28 RX ADMIN — Medication 100 MILLIGRAM(S): at 05:54

## 2017-05-28 RX ADMIN — Medication 0.5 MILLIGRAM(S): at 17:19

## 2017-05-28 RX ADMIN — HEPARIN SODIUM 900 UNIT(S)/HR: 5000 INJECTION INTRAVENOUS; SUBCUTANEOUS at 03:04

## 2017-05-28 RX ADMIN — Medication 1: at 12:25

## 2017-05-28 RX ADMIN — PANTOPRAZOLE SODIUM 40 MILLIGRAM(S): 20 TABLET, DELAYED RELEASE ORAL at 17:19

## 2017-05-28 RX ADMIN — HEPARIN SODIUM 0 UNIT(S)/HR: 5000 INJECTION INTRAVENOUS; SUBCUTANEOUS at 01:56

## 2017-05-28 RX ADMIN — HEPARIN SODIUM 500 UNIT(S)/HR: 5000 INJECTION INTRAVENOUS; SUBCUTANEOUS at 17:55

## 2017-05-28 RX ADMIN — TEMAZEPAM 15 MILLIGRAM(S): 15 CAPSULE ORAL at 21:33

## 2017-05-28 RX ADMIN — ATORVASTATIN CALCIUM 20 MILLIGRAM(S): 80 TABLET, FILM COATED ORAL at 21:33

## 2017-05-28 RX ADMIN — HEPARIN SODIUM 0 UNIT(S)/HR: 5000 INJECTION INTRAVENOUS; SUBCUTANEOUS at 16:52

## 2017-05-28 RX ADMIN — PANTOPRAZOLE SODIUM 40 MILLIGRAM(S): 20 TABLET, DELAYED RELEASE ORAL at 05:54

## 2017-05-28 RX ADMIN — Medication 1 TABLET(S): at 17:19

## 2017-05-28 RX ADMIN — Medication 30 MILLIGRAM(S): at 05:54

## 2017-05-28 RX ADMIN — ENOXAPARIN SODIUM 70 MILLIGRAM(S): 100 INJECTION SUBCUTANEOUS at 19:38

## 2017-05-28 RX ADMIN — Medication 650 MILLIGRAM(S): at 07:54

## 2017-05-28 RX ADMIN — Medication 650 MILLIGRAM(S): at 08:24

## 2017-05-28 RX ADMIN — Medication 1 TABLET(S): at 12:07

## 2017-05-28 RX ADMIN — Medication 2: at 17:17

## 2017-05-28 RX ADMIN — Medication 3 MILLILITER(S): at 00:28

## 2017-05-28 RX ADMIN — Medication 3 MILLILITER(S): at 12:07

## 2017-05-28 RX ADMIN — Medication 650 MILLIGRAM(S): at 00:26

## 2017-05-28 RX ADMIN — Medication 3 MILLILITER(S): at 17:19

## 2017-05-28 RX ADMIN — Medication 650 MILLIGRAM(S): at 01:00

## 2017-05-28 NOTE — PROVIDER CONTACT NOTE (CRITICAL VALUE NOTIFICATION) - ACTION/TREATMENT ORDERED:
Follow heparin nomogram: stop heparin for 1 hour, resume at 9 cc/hr. No new orders at this time. Continue to monitor for bleeding.

## 2017-05-28 NOTE — PROVIDER CONTACT NOTE (CRITICAL VALUE NOTIFICATION) - PERSON GIVING RESULT:
Lab - Jay HEADLEY
lab roger villa
titi markham
tod wing
critical lab/ Dimitry Ortega
Lab-Jessica Gonzalez

## 2017-05-29 LAB
ANION GAP SERPL CALC-SCNC: 16 MMOL/L — SIGNIFICANT CHANGE UP (ref 5–17)
APTT BLD: 43.3 SEC — HIGH (ref 27.5–37.4)
BUN SERPL-MCNC: 13 MG/DL — SIGNIFICANT CHANGE UP (ref 7–23)
CALCIUM SERPL-MCNC: 8.3 MG/DL — LOW (ref 8.4–10.5)
CHLORIDE SERPL-SCNC: 105 MMOL/L — SIGNIFICANT CHANGE UP (ref 96–108)
CO2 SERPL-SCNC: 19 MMOL/L — LOW (ref 22–31)
CREAT SERPL-MCNC: 1.11 MG/DL — SIGNIFICANT CHANGE UP (ref 0.5–1.3)
GLUCOSE SERPL-MCNC: 74 MG/DL — SIGNIFICANT CHANGE UP (ref 70–99)
HCT VFR BLD CALC: 24.1 % — LOW (ref 34.5–45)
HGB BLD-MCNC: 7.6 G/DL — LOW (ref 11.5–15.5)
INR BLD: 1.21 RATIO — HIGH (ref 0.88–1.16)
MCHC RBC-ENTMCNC: 29.9 PG — SIGNIFICANT CHANGE UP (ref 27–34)
MCHC RBC-ENTMCNC: 31.5 GM/DL — LOW (ref 32–36)
MCV RBC AUTO: 94.9 FL — SIGNIFICANT CHANGE UP (ref 80–100)
PLATELET # BLD AUTO: 271 K/UL — SIGNIFICANT CHANGE UP (ref 150–400)
POTASSIUM SERPL-MCNC: 4.1 MMOL/L — SIGNIFICANT CHANGE UP (ref 3.5–5.3)
POTASSIUM SERPL-SCNC: 4.1 MMOL/L — SIGNIFICANT CHANGE UP (ref 3.5–5.3)
PROTHROM AB SERPL-ACNC: 13.1 SEC — HIGH (ref 9.8–12.7)
RBC # BLD: 2.54 M/UL — LOW (ref 3.8–5.2)
RBC # FLD: 21.1 % — HIGH (ref 10.3–14.5)
SODIUM SERPL-SCNC: 140 MMOL/L — SIGNIFICANT CHANGE UP (ref 135–145)
WBC # BLD: 10.77 K/UL — HIGH (ref 3.8–10.5)
WBC # FLD AUTO: 10.77 K/UL — HIGH (ref 3.8–10.5)

## 2017-05-29 RX ORDER — WARFARIN SODIUM 2.5 MG/1
5 TABLET ORAL ONCE
Qty: 0 | Refills: 0 | Status: COMPLETED | OUTPATIENT
Start: 2017-05-29 | End: 2017-05-29

## 2017-05-29 RX ADMIN — Medication 650 MILLIGRAM(S): at 00:40

## 2017-05-29 RX ADMIN — Medication 650 MILLIGRAM(S): at 06:29

## 2017-05-29 RX ADMIN — Medication 3 MILLILITER(S): at 23:02

## 2017-05-29 RX ADMIN — Medication 20 MILLIGRAM(S): at 17:49

## 2017-05-29 RX ADMIN — Medication 0.5 MILLIGRAM(S): at 06:29

## 2017-05-29 RX ADMIN — Medication 3 MILLILITER(S): at 17:49

## 2017-05-29 RX ADMIN — Medication 3 MILLILITER(S): at 06:29

## 2017-05-29 RX ADMIN — PANTOPRAZOLE SODIUM 40 MILLIGRAM(S): 20 TABLET, DELAYED RELEASE ORAL at 17:49

## 2017-05-29 RX ADMIN — Medication 0.5 MILLIGRAM(S): at 17:49

## 2017-05-29 RX ADMIN — PANTOPRAZOLE SODIUM 40 MILLIGRAM(S): 20 TABLET, DELAYED RELEASE ORAL at 06:30

## 2017-05-29 RX ADMIN — Medication 1 TABLET(S): at 17:49

## 2017-05-29 RX ADMIN — ATORVASTATIN CALCIUM 20 MILLIGRAM(S): 80 TABLET, FILM COATED ORAL at 22:20

## 2017-05-29 RX ADMIN — Medication 650 MILLIGRAM(S): at 20:30

## 2017-05-29 RX ADMIN — Medication 30 MILLIGRAM(S): at 06:28

## 2017-05-29 RX ADMIN — Medication 1: at 17:47

## 2017-05-29 RX ADMIN — ENOXAPARIN SODIUM 70 MILLIGRAM(S): 100 INJECTION SUBCUTANEOUS at 06:29

## 2017-05-29 RX ADMIN — Medication 1: at 12:52

## 2017-05-29 RX ADMIN — Medication 650 MILLIGRAM(S): at 06:59

## 2017-05-29 RX ADMIN — Medication 1 TABLET(S): at 06:28

## 2017-05-29 RX ADMIN — Medication 650 MILLIGRAM(S): at 20:00

## 2017-05-29 RX ADMIN — Medication 3 MILLILITER(S): at 11:23

## 2017-05-29 RX ADMIN — TEMAZEPAM 15 MILLIGRAM(S): 15 CAPSULE ORAL at 22:21

## 2017-05-29 RX ADMIN — Medication 1 TABLET(S): at 11:23

## 2017-05-29 RX ADMIN — WARFARIN SODIUM 5 MILLIGRAM(S): 2.5 TABLET ORAL at 22:21

## 2017-05-29 RX ADMIN — ENOXAPARIN SODIUM 70 MILLIGRAM(S): 100 INJECTION SUBCUTANEOUS at 17:53

## 2017-05-29 RX ADMIN — Medication 650 MILLIGRAM(S): at 01:10

## 2017-05-29 RX ADMIN — Medication 100 MILLIGRAM(S): at 06:28

## 2017-05-30 LAB
ANION GAP SERPL CALC-SCNC: 16 MMOL/L — SIGNIFICANT CHANGE UP (ref 5–17)
BUN SERPL-MCNC: 15 MG/DL — SIGNIFICANT CHANGE UP (ref 7–23)
CALCIUM SERPL-MCNC: 8.5 MG/DL — SIGNIFICANT CHANGE UP (ref 8.4–10.5)
CHLORIDE SERPL-SCNC: 104 MMOL/L — SIGNIFICANT CHANGE UP (ref 96–108)
CO2 SERPL-SCNC: 17 MMOL/L — LOW (ref 22–31)
CREAT SERPL-MCNC: 1.08 MG/DL — SIGNIFICANT CHANGE UP (ref 0.5–1.3)
GLUCOSE SERPL-MCNC: 131 MG/DL — HIGH (ref 70–99)
HCT VFR BLD CALC: 25.6 % — LOW (ref 34.5–45)
HGB BLD-MCNC: 8.1 G/DL — LOW (ref 11.5–15.5)
INR BLD: 1.07 RATIO — SIGNIFICANT CHANGE UP (ref 0.88–1.16)
MCHC RBC-ENTMCNC: 29.9 PG — SIGNIFICANT CHANGE UP (ref 27–34)
MCHC RBC-ENTMCNC: 31.6 GM/DL — LOW (ref 32–36)
MCV RBC AUTO: 94.5 FL — SIGNIFICANT CHANGE UP (ref 80–100)
PLATELET # BLD AUTO: 285 K/UL — SIGNIFICANT CHANGE UP (ref 150–400)
POTASSIUM SERPL-MCNC: 4.5 MMOL/L — SIGNIFICANT CHANGE UP (ref 3.5–5.3)
POTASSIUM SERPL-SCNC: 4.5 MMOL/L — SIGNIFICANT CHANGE UP (ref 3.5–5.3)
PROTHROM AB SERPL-ACNC: 12.1 SEC — SIGNIFICANT CHANGE UP (ref 10–13.1)
RBC # BLD: 2.71 M/UL — LOW (ref 3.8–5.2)
RBC # FLD: 21 % — HIGH (ref 10.3–14.5)
SODIUM SERPL-SCNC: 137 MMOL/L — SIGNIFICANT CHANGE UP (ref 135–145)
WBC # BLD: 11.35 K/UL — HIGH (ref 3.8–10.5)
WBC # FLD AUTO: 11.35 K/UL — HIGH (ref 3.8–10.5)

## 2017-05-30 RX ORDER — ACETAMINOPHEN 500 MG
2 TABLET ORAL
Qty: 0 | Refills: 0 | COMMUNITY
Start: 2017-05-30

## 2017-05-30 RX ORDER — WARFARIN SODIUM 2.5 MG/1
5 TABLET ORAL ONCE
Qty: 0 | Refills: 0 | Status: COMPLETED | OUTPATIENT
Start: 2017-05-30 | End: 2017-05-30

## 2017-05-30 RX ORDER — DOCUSATE SODIUM 100 MG
1 CAPSULE ORAL
Qty: 0 | Refills: 0 | COMMUNITY
Start: 2017-05-30

## 2017-05-30 RX ORDER — DOCUSATE SODIUM 100 MG
1 CAPSULE ORAL
Qty: 0 | Refills: 0 | COMMUNITY

## 2017-05-30 RX ORDER — PANTOPRAZOLE SODIUM 20 MG/1
40 TABLET, DELAYED RELEASE ORAL
Qty: 0 | Refills: 0 | Status: DISCONTINUED | OUTPATIENT
Start: 2017-05-30 | End: 2017-05-31

## 2017-05-30 RX ADMIN — PANTOPRAZOLE SODIUM 40 MILLIGRAM(S): 20 TABLET, DELAYED RELEASE ORAL at 17:08

## 2017-05-30 RX ADMIN — ENOXAPARIN SODIUM 70 MILLIGRAM(S): 100 INJECTION SUBCUTANEOUS at 06:32

## 2017-05-30 RX ADMIN — PANTOPRAZOLE SODIUM 40 MILLIGRAM(S): 20 TABLET, DELAYED RELEASE ORAL at 06:36

## 2017-05-30 RX ADMIN — Medication 3 MILLILITER(S): at 23:11

## 2017-05-30 RX ADMIN — Medication 0.5 MILLIGRAM(S): at 17:07

## 2017-05-30 RX ADMIN — TEMAZEPAM 15 MILLIGRAM(S): 15 CAPSULE ORAL at 21:42

## 2017-05-30 RX ADMIN — Medication 1 TABLET(S): at 12:24

## 2017-05-30 RX ADMIN — Medication 3 MILLILITER(S): at 17:05

## 2017-05-30 RX ADMIN — Medication 1 TABLET(S): at 06:33

## 2017-05-30 RX ADMIN — WARFARIN SODIUM 5 MILLIGRAM(S): 2.5 TABLET ORAL at 21:42

## 2017-05-30 RX ADMIN — ENOXAPARIN SODIUM 70 MILLIGRAM(S): 100 INJECTION SUBCUTANEOUS at 17:06

## 2017-05-30 RX ADMIN — Medication 20 MILLIGRAM(S): at 06:36

## 2017-05-30 RX ADMIN — Medication 0.5 MILLIGRAM(S): at 06:32

## 2017-05-30 RX ADMIN — ATORVASTATIN CALCIUM 20 MILLIGRAM(S): 80 TABLET, FILM COATED ORAL at 21:44

## 2017-05-30 RX ADMIN — Medication 3 MILLILITER(S): at 06:32

## 2017-05-30 RX ADMIN — Medication 3 MILLILITER(S): at 12:24

## 2017-05-30 RX ADMIN — Medication 1 TABLET(S): at 17:05

## 2017-05-30 RX ADMIN — Medication 650 MILLIGRAM(S): at 22:28

## 2017-05-30 RX ADMIN — Medication 100 MILLIGRAM(S): at 17:06

## 2017-05-30 RX ADMIN — Medication 650 MILLIGRAM(S): at 23:15

## 2017-05-31 VITALS
TEMPERATURE: 98 F | SYSTOLIC BLOOD PRESSURE: 133 MMHG | DIASTOLIC BLOOD PRESSURE: 78 MMHG | OXYGEN SATURATION: 97 % | HEART RATE: 96 BPM | RESPIRATION RATE: 17 BRPM

## 2017-05-31 LAB
ANION GAP SERPL CALC-SCNC: 11 MMOL/L — SIGNIFICANT CHANGE UP (ref 5–17)
BUN SERPL-MCNC: 16 MG/DL — SIGNIFICANT CHANGE UP (ref 7–23)
CALCIUM SERPL-MCNC: 8.2 MG/DL — LOW (ref 8.4–10.5)
CHLORIDE SERPL-SCNC: 104 MMOL/L — SIGNIFICANT CHANGE UP (ref 96–108)
CO2 SERPL-SCNC: 22 MMOL/L — SIGNIFICANT CHANGE UP (ref 22–31)
CREAT SERPL-MCNC: 1.1 MG/DL — SIGNIFICANT CHANGE UP (ref 0.5–1.3)
GLUCOSE SERPL-MCNC: 83 MG/DL — SIGNIFICANT CHANGE UP (ref 70–99)
HCT VFR BLD CALC: 24.2 % — LOW (ref 34.5–45)
HGB BLD-MCNC: 7.7 G/DL — LOW (ref 11.5–15.5)
INR BLD: 1.37 RATIO — HIGH (ref 0.88–1.16)
MCHC RBC-ENTMCNC: 29.8 PG — SIGNIFICANT CHANGE UP (ref 27–34)
MCHC RBC-ENTMCNC: 31.8 GM/DL — LOW (ref 32–36)
MCV RBC AUTO: 93.8 FL — SIGNIFICANT CHANGE UP (ref 80–100)
NRBC # BLD: 1 /100 WBCS — HIGH (ref 0–0)
PLATELET # BLD AUTO: 304 K/UL — SIGNIFICANT CHANGE UP (ref 150–400)
POTASSIUM SERPL-MCNC: 4 MMOL/L — SIGNIFICANT CHANGE UP (ref 3.5–5.3)
POTASSIUM SERPL-SCNC: 4 MMOL/L — SIGNIFICANT CHANGE UP (ref 3.5–5.3)
PROTHROM AB SERPL-ACNC: 15.6 SEC — HIGH (ref 10–13.1)
RBC # BLD: 2.58 M/UL — LOW (ref 3.8–5.2)
RBC # FLD: 21.2 % — HIGH (ref 10.3–14.5)
SODIUM SERPL-SCNC: 137 MMOL/L — SIGNIFICANT CHANGE UP (ref 135–145)
T CANIS AB FLD-ACNC: SIGNIFICANT CHANGE UP
WBC # BLD: 13.23 K/UL — HIGH (ref 3.8–10.5)
WBC # FLD AUTO: 13.23 K/UL — HIGH (ref 3.8–10.5)

## 2017-05-31 PROCEDURE — 81001 URINALYSIS AUTO W/SCOPE: CPT

## 2017-05-31 PROCEDURE — 85014 HEMATOCRIT: CPT

## 2017-05-31 PROCEDURE — 71275 CT ANGIOGRAPHY CHEST: CPT

## 2017-05-31 PROCEDURE — 97161 PT EVAL LOW COMPLEX 20 MIN: CPT

## 2017-05-31 PROCEDURE — 82330 ASSAY OF CALCIUM: CPT

## 2017-05-31 PROCEDURE — A9567: CPT

## 2017-05-31 PROCEDURE — 86682 HELMINTH ANTIBODY: CPT

## 2017-05-31 PROCEDURE — 78582 LUNG VENTILAT&PERFUS IMAGING: CPT

## 2017-05-31 PROCEDURE — 99285 EMERGENCY DEPT VISIT HI MDM: CPT | Mod: 25

## 2017-05-31 PROCEDURE — 93970 EXTREMITY STUDY: CPT

## 2017-05-31 PROCEDURE — 82947 ASSAY GLUCOSE BLOOD QUANT: CPT

## 2017-05-31 PROCEDURE — 86901 BLOOD TYPING SEROLOGIC RH(D): CPT

## 2017-05-31 PROCEDURE — 82803 BLOOD GASES ANY COMBINATION: CPT

## 2017-05-31 PROCEDURE — 83605 ASSAY OF LACTIC ACID: CPT

## 2017-05-31 PROCEDURE — C8929: CPT

## 2017-05-31 PROCEDURE — 71046 X-RAY EXAM CHEST 2 VIEWS: CPT

## 2017-05-31 PROCEDURE — 94640 AIRWAY INHALATION TREATMENT: CPT

## 2017-05-31 PROCEDURE — 96374 THER/PROPH/DIAG INJ IV PUSH: CPT

## 2017-05-31 PROCEDURE — 84132 ASSAY OF SERUM POTASSIUM: CPT

## 2017-05-31 PROCEDURE — 85027 COMPLETE CBC AUTOMATED: CPT

## 2017-05-31 PROCEDURE — 84439 ASSAY OF FREE THYROXINE: CPT

## 2017-05-31 PROCEDURE — 83036 HEMOGLOBIN GLYCOSYLATED A1C: CPT

## 2017-05-31 PROCEDURE — 82435 ASSAY OF BLOOD CHLORIDE: CPT

## 2017-05-31 PROCEDURE — 71250 CT THORAX DX C-: CPT

## 2017-05-31 PROCEDURE — 84100 ASSAY OF PHOSPHORUS: CPT

## 2017-05-31 PROCEDURE — 86900 BLOOD TYPING SEROLOGIC ABO: CPT

## 2017-05-31 PROCEDURE — 82565 ASSAY OF CREATININE: CPT

## 2017-05-31 PROCEDURE — 82272 OCCULT BLD FECES 1-3 TESTS: CPT

## 2017-05-31 PROCEDURE — P9037: CPT

## 2017-05-31 PROCEDURE — 83735 ASSAY OF MAGNESIUM: CPT

## 2017-05-31 PROCEDURE — 87040 BLOOD CULTURE FOR BACTERIA: CPT

## 2017-05-31 PROCEDURE — 85610 PROTHROMBIN TIME: CPT

## 2017-05-31 PROCEDURE — 87186 SC STD MICRODIL/AGAR DIL: CPT

## 2017-05-31 PROCEDURE — 84295 ASSAY OF SERUM SODIUM: CPT

## 2017-05-31 PROCEDURE — A9540: CPT

## 2017-05-31 PROCEDURE — 86850 RBC ANTIBODY SCREEN: CPT

## 2017-05-31 PROCEDURE — 80048 BASIC METABOLIC PNL TOTAL CA: CPT

## 2017-05-31 PROCEDURE — 80053 COMPREHEN METABOLIC PANEL: CPT

## 2017-05-31 PROCEDURE — 86923 COMPATIBILITY TEST ELECTRIC: CPT

## 2017-05-31 PROCEDURE — 87449 NOS EACH ORGANISM AG IA: CPT

## 2017-05-31 PROCEDURE — 71045 X-RAY EXAM CHEST 1 VIEW: CPT

## 2017-05-31 PROCEDURE — 93005 ELECTROCARDIOGRAM TRACING: CPT

## 2017-05-31 PROCEDURE — 87070 CULTURE OTHR SPECIMN AEROBIC: CPT

## 2017-05-31 PROCEDURE — 85730 THROMBOPLASTIN TIME PARTIAL: CPT

## 2017-05-31 PROCEDURE — 94660 CPAP INITIATION&MGMT: CPT

## 2017-05-31 PROCEDURE — 84443 ASSAY THYROID STIM HORMONE: CPT

## 2017-05-31 PROCEDURE — P9016: CPT

## 2017-05-31 PROCEDURE — P9017: CPT

## 2017-05-31 PROCEDURE — 78579 LUNG VENTILATION IMAGING: CPT

## 2017-05-31 PROCEDURE — 36600 WITHDRAWAL OF ARTERIAL BLOOD: CPT

## 2017-05-31 PROCEDURE — 36430 TRANSFUSION BLD/BLD COMPNT: CPT

## 2017-05-31 RX ORDER — IPRATROPIUM/ALBUTEROL SULFATE 18-103MCG
3 AEROSOL WITH ADAPTER (GRAM) INHALATION
Qty: 0 | Refills: 0 | COMMUNITY
Start: 2017-05-31

## 2017-05-31 RX ORDER — IPRATROPIUM/ALBUTEROL SULFATE 18-103MCG
3 AEROSOL WITH ADAPTER (GRAM) INHALATION
Qty: 0 | Refills: 0 | COMMUNITY

## 2017-05-31 RX ORDER — BUDESONIDE, MICRONIZED 100 %
0.5 POWDER (GRAM) MISCELLANEOUS
Qty: 0 | Refills: 0 | COMMUNITY
Start: 2017-05-31

## 2017-05-31 RX ORDER — WARFARIN SODIUM 2.5 MG/1
5 TABLET ORAL ONCE
Qty: 0 | Refills: 0 | Status: DISCONTINUED | OUTPATIENT
Start: 2017-05-31 | End: 2017-05-31

## 2017-05-31 RX ORDER — INSULIN LISPRO 100/ML
0 VIAL (ML) SUBCUTANEOUS
Qty: 0 | Refills: 0 | COMMUNITY
Start: 2017-05-31

## 2017-05-31 RX ORDER — AMLODIPINE BESYLATE 2.5 MG/1
1 TABLET ORAL
Qty: 0 | Refills: 0 | COMMUNITY

## 2017-05-31 RX ORDER — TEMAZEPAM 15 MG/1
1 CAPSULE ORAL
Qty: 0 | Refills: 0 | COMMUNITY
Start: 2017-05-31

## 2017-05-31 RX ORDER — ENOXAPARIN SODIUM 100 MG/ML
70 INJECTION SUBCUTANEOUS
Qty: 0 | Refills: 0 | COMMUNITY
Start: 2017-05-31

## 2017-05-31 RX ORDER — ASPIRIN/CALCIUM CARB/MAGNESIUM 324 MG
1 TABLET ORAL
Qty: 0 | Refills: 0 | COMMUNITY

## 2017-05-31 RX ORDER — LEVALBUTEROL 1.25 MG/.5ML
2 SOLUTION, CONCENTRATE RESPIRATORY (INHALATION)
Qty: 0 | Refills: 0 | COMMUNITY

## 2017-05-31 RX ORDER — WARFARIN SODIUM 2.5 MG/1
1 TABLET ORAL
Qty: 0 | Refills: 0 | COMMUNITY
Start: 2017-05-31

## 2017-05-31 RX ORDER — PANTOPRAZOLE SODIUM 20 MG/1
1 TABLET, DELAYED RELEASE ORAL
Qty: 0 | Refills: 0 | COMMUNITY
Start: 2017-05-31

## 2017-05-31 RX ADMIN — Medication 1 TABLET(S): at 11:27

## 2017-05-31 RX ADMIN — Medication 1 TABLET(S): at 05:43

## 2017-05-31 RX ADMIN — Medication 650 MILLIGRAM(S): at 06:12

## 2017-05-31 RX ADMIN — PANTOPRAZOLE SODIUM 40 MILLIGRAM(S): 20 TABLET, DELAYED RELEASE ORAL at 05:44

## 2017-05-31 RX ADMIN — Medication 20 MILLIGRAM(S): at 05:43

## 2017-05-31 RX ADMIN — Medication 3 MILLILITER(S): at 05:43

## 2017-05-31 RX ADMIN — Medication 0.5 MILLIGRAM(S): at 05:43

## 2017-05-31 RX ADMIN — Medication 650 MILLIGRAM(S): at 05:42

## 2017-05-31 RX ADMIN — ENOXAPARIN SODIUM 70 MILLIGRAM(S): 100 INJECTION SUBCUTANEOUS at 05:45

## 2017-06-01 LAB — GALACTOMANNAN AG SERPL-ACNC: <0.5 INDEX — SIGNIFICANT CHANGE UP

## 2017-06-04 ENCOUNTER — INPATIENT (INPATIENT)
Facility: HOSPITAL | Age: 82
LOS: 8 days | Discharge: NURSING, REHAB OR LONG-TERM CARE | DRG: 377 | End: 2017-06-13
Attending: STUDENT IN AN ORGANIZED HEALTH CARE EDUCATION/TRAINING PROGRAM | Admitting: STUDENT IN AN ORGANIZED HEALTH CARE EDUCATION/TRAINING PROGRAM
Payer: MEDICARE

## 2017-06-04 VITALS — RESPIRATION RATE: 16 BRPM | HEART RATE: 109 BPM | DIASTOLIC BLOOD PRESSURE: 45 MMHG | SYSTOLIC BLOOD PRESSURE: 86 MMHG

## 2017-06-04 DIAGNOSIS — I10 ESSENTIAL (PRIMARY) HYPERTENSION: ICD-10-CM

## 2017-06-04 DIAGNOSIS — K92.0 HEMATEMESIS: ICD-10-CM

## 2017-06-04 DIAGNOSIS — E78.5 HYPERLIPIDEMIA, UNSPECIFIED: ICD-10-CM

## 2017-06-04 DIAGNOSIS — J44.9 CHRONIC OBSTRUCTIVE PULMONARY DISEASE, UNSPECIFIED: ICD-10-CM

## 2017-06-04 DIAGNOSIS — E87.2 ACIDOSIS: ICD-10-CM

## 2017-06-04 DIAGNOSIS — R91.8 OTHER NONSPECIFIC ABNORMAL FINDING OF LUNG FIELD: ICD-10-CM

## 2017-06-04 DIAGNOSIS — N17.9 ACUTE KIDNEY FAILURE, UNSPECIFIED: ICD-10-CM

## 2017-06-04 DIAGNOSIS — K92.2 GASTROINTESTINAL HEMORRHAGE, UNSPECIFIED: ICD-10-CM

## 2017-06-04 DIAGNOSIS — I27.82 CHRONIC PULMONARY EMBOLISM: ICD-10-CM

## 2017-06-04 DIAGNOSIS — I26.99 OTHER PULMONARY EMBOLISM WITHOUT ACUTE COR PULMONALE: ICD-10-CM

## 2017-06-04 DIAGNOSIS — Z29.9 ENCOUNTER FOR PROPHYLACTIC MEASURES, UNSPECIFIED: ICD-10-CM

## 2017-06-04 LAB
ALBUMIN SERPL ELPH-MCNC: 2.5 G/DL — LOW (ref 3.3–5)
ALP SERPL-CCNC: 52 U/L — SIGNIFICANT CHANGE UP (ref 40–120)
ALT FLD-CCNC: 40 U/L RC — SIGNIFICANT CHANGE UP (ref 10–45)
ANION GAP SERPL CALC-SCNC: 17 MMOL/L — SIGNIFICANT CHANGE UP (ref 5–17)
APTT BLD: 23.5 SEC — LOW (ref 27.5–37.4)
APTT BLD: 30.7 SEC — SIGNIFICANT CHANGE UP (ref 27.5–37.4)
AST SERPL-CCNC: 25 U/L — SIGNIFICANT CHANGE UP (ref 10–40)
BASOPHILS # BLD AUTO: 0 K/UL — SIGNIFICANT CHANGE UP (ref 0–0.2)
BASOPHILS NFR BLD AUTO: 0 % — SIGNIFICANT CHANGE UP (ref 0–2)
BILIRUB SERPL-MCNC: 0.6 MG/DL — SIGNIFICANT CHANGE UP (ref 0.2–1.2)
BLD GP AB SCN SERPL QL: NEGATIVE — SIGNIFICANT CHANGE UP
BUN SERPL-MCNC: 33 MG/DL — HIGH (ref 7–23)
CALCIUM SERPL-MCNC: 7.2 MG/DL — LOW (ref 8.4–10.5)
CHLORIDE SERPL-SCNC: 108 MMOL/L — SIGNIFICANT CHANGE UP (ref 96–108)
CK SERPL-CCNC: 21 U/L — LOW (ref 25–170)
CO2 SERPL-SCNC: 12 MMOL/L — LOW (ref 22–31)
CREAT SERPL-MCNC: 1.09 MG/DL — SIGNIFICANT CHANGE UP (ref 0.5–1.3)
EOSINOPHIL # BLD AUTO: 0.2 K/UL — SIGNIFICANT CHANGE UP (ref 0–0.5)
EOSINOPHIL NFR BLD AUTO: 1.2 % — SIGNIFICANT CHANGE UP (ref 0–6)
GAS PNL BLDV: SIGNIFICANT CHANGE UP
GLUCOSE SERPL-MCNC: 90 MG/DL — SIGNIFICANT CHANGE UP (ref 70–99)
HCT VFR BLD CALC: 19.5 % — CRITICAL LOW (ref 34.5–45)
HCT VFR BLD CALC: 31.6 % — LOW (ref 34.5–45)
HGB BLD-MCNC: 10.9 G/DL — LOW (ref 11.5–15.5)
HGB BLD-MCNC: 6.6 G/DL — CRITICAL LOW (ref 11.5–15.5)
INR BLD: 1.11 RATIO — SIGNIFICANT CHANGE UP (ref 0.88–1.16)
INR BLD: 1.35 RATIO — HIGH (ref 0.88–1.16)
INR BLD: 2.99 RATIO — HIGH (ref 0.88–1.16)
LYMPHOCYTES # BLD AUTO: 1.8 K/UL — SIGNIFICANT CHANGE UP (ref 1–3.3)
LYMPHOCYTES # BLD AUTO: 12 % — LOW (ref 13–44)
MAGNESIUM SERPL-MCNC: 1.5 MG/DL — LOW (ref 1.6–2.6)
MCHC RBC-ENTMCNC: 30.8 PG — SIGNIFICANT CHANGE UP (ref 27–34)
MCHC RBC-ENTMCNC: 33.2 PG — SIGNIFICANT CHANGE UP (ref 27–34)
MCHC RBC-ENTMCNC: 33.7 GM/DL — SIGNIFICANT CHANGE UP (ref 32–36)
MCHC RBC-ENTMCNC: 34.4 GM/DL — SIGNIFICANT CHANGE UP (ref 32–36)
MCV RBC AUTO: 89.6 FL — SIGNIFICANT CHANGE UP (ref 80–100)
MCV RBC AUTO: 98.5 FL — SIGNIFICANT CHANGE UP (ref 80–100)
MONOCYTES # BLD AUTO: 1 K/UL — HIGH (ref 0–0.9)
MONOCYTES NFR BLD AUTO: 6.8 % — SIGNIFICANT CHANGE UP (ref 2–14)
NEUTROPHILS # BLD AUTO: 11.8 K/UL — HIGH (ref 1.8–7.4)
NEUTROPHILS NFR BLD AUTO: 80 % — HIGH (ref 43–77)
NT-PROBNP SERPL-SCNC: 533 PG/ML — HIGH (ref 0–300)
PHOSPHATE SERPL-MCNC: 2.9 MG/DL — SIGNIFICANT CHANGE UP (ref 2.5–4.5)
PLATELET # BLD AUTO: 211 K/UL — SIGNIFICANT CHANGE UP (ref 150–400)
PLATELET # BLD AUTO: 334 K/UL — SIGNIFICANT CHANGE UP (ref 150–400)
POTASSIUM SERPL-MCNC: 3.7 MMOL/L — SIGNIFICANT CHANGE UP (ref 3.5–5.3)
POTASSIUM SERPL-SCNC: 3.7 MMOL/L — SIGNIFICANT CHANGE UP (ref 3.5–5.3)
PROT SERPL-MCNC: 4.3 G/DL — LOW (ref 6–8.3)
PROTHROM AB SERPL-ACNC: 12.1 SEC — SIGNIFICANT CHANGE UP (ref 9.8–12.7)
PROTHROM AB SERPL-ACNC: 14.8 SEC — HIGH (ref 9.8–12.7)
PROTHROM AB SERPL-ACNC: 33.3 SEC — HIGH (ref 9.8–12.7)
RBC # BLD: 1.97 M/UL — LOW (ref 3.8–5.2)
RBC # BLD: 3.53 M/UL — LOW (ref 3.8–5.2)
RBC # FLD: 16.3 % — HIGH (ref 10.3–14.5)
RBC # FLD: 19 % — HIGH (ref 10.3–14.5)
RH IG SCN BLD-IMP: NEGATIVE — SIGNIFICANT CHANGE UP
SODIUM SERPL-SCNC: 137 MMOL/L — SIGNIFICANT CHANGE UP (ref 135–145)
TROPONIN T SERPL-MCNC: 0.04 NG/ML — SIGNIFICANT CHANGE UP (ref 0–0.06)
WBC # BLD: 14.7 K/UL — HIGH (ref 3.8–10.5)
WBC # BLD: 15.3 K/UL — HIGH (ref 3.8–10.5)
WBC # FLD AUTO: 14.7 K/UL — HIGH (ref 3.8–10.5)
WBC # FLD AUTO: 15.3 K/UL — HIGH (ref 3.8–10.5)

## 2017-06-04 PROCEDURE — 99291 CRITICAL CARE FIRST HOUR: CPT | Mod: GC

## 2017-06-04 PROCEDURE — 71010: CPT | Mod: 26

## 2017-06-04 PROCEDURE — 99291 CRITICAL CARE FIRST HOUR: CPT

## 2017-06-04 RX ORDER — INSULIN LISPRO 100/ML
VIAL (ML) SUBCUTANEOUS
Qty: 0 | Refills: 0 | Status: DISCONTINUED | OUTPATIENT
Start: 2017-06-04 | End: 2017-06-05

## 2017-06-04 RX ORDER — PANTOPRAZOLE SODIUM 20 MG/1
8 TABLET, DELAYED RELEASE ORAL
Qty: 80 | Refills: 0 | Status: DISCONTINUED | OUTPATIENT
Start: 2017-06-04 | End: 2017-06-11

## 2017-06-04 RX ORDER — BUDESONIDE, MICRONIZED 100 %
1 POWDER (GRAM) MISCELLANEOUS
Qty: 0 | Refills: 0 | Status: DISCONTINUED | OUTPATIENT
Start: 2017-06-04 | End: 2017-06-05

## 2017-06-04 RX ORDER — SODIUM CHLORIDE 9 MG/ML
1000 INJECTION INTRAMUSCULAR; INTRAVENOUS; SUBCUTANEOUS
Qty: 0 | Refills: 0 | Status: DISCONTINUED | OUTPATIENT
Start: 2017-06-04 | End: 2017-06-04

## 2017-06-04 RX ORDER — PANTOPRAZOLE SODIUM 20 MG/1
80 TABLET, DELAYED RELEASE ORAL ONCE
Qty: 0 | Refills: 0 | Status: COMPLETED | OUTPATIENT
Start: 2017-06-04 | End: 2017-06-04

## 2017-06-04 RX ORDER — IPRATROPIUM/ALBUTEROL SULFATE 18-103MCG
3 AEROSOL WITH ADAPTER (GRAM) INHALATION EVERY 6 HOURS
Qty: 0 | Refills: 0 | Status: DISCONTINUED | OUTPATIENT
Start: 2017-06-04 | End: 2017-06-06

## 2017-06-04 RX ORDER — SODIUM CHLORIDE 9 MG/ML
1000 INJECTION, SOLUTION INTRAVENOUS
Qty: 0 | Refills: 0 | Status: DISCONTINUED | OUTPATIENT
Start: 2017-06-04 | End: 2017-06-04

## 2017-06-04 RX ORDER — PHYTONADIONE (VIT K1) 5 MG
10 TABLET ORAL ONCE
Qty: 0 | Refills: 0 | Status: COMPLETED | OUTPATIENT
Start: 2017-06-04 | End: 2017-06-04

## 2017-06-04 RX ORDER — SODIUM CHLORIDE 9 MG/ML
1000 INJECTION, SOLUTION INTRAVENOUS
Qty: 0 | Refills: 0 | Status: DISCONTINUED | OUTPATIENT
Start: 2017-06-04 | End: 2017-06-05

## 2017-06-04 RX ORDER — HYDROMORPHONE HYDROCHLORIDE 2 MG/ML
0.5 INJECTION INTRAMUSCULAR; INTRAVENOUS; SUBCUTANEOUS EVERY 4 HOURS
Qty: 0 | Refills: 0 | Status: DISCONTINUED | OUTPATIENT
Start: 2017-06-04 | End: 2017-06-08

## 2017-06-04 RX ORDER — PROTHROMBIN COMPLEX CONCENTRATE (HUMAN) 25.5; 16.5; 24; 22; 22; 26 [IU]/ML; [IU]/ML; [IU]/ML; [IU]/ML; [IU]/ML; [IU]/ML
1500 POWDER, FOR SOLUTION INTRAVENOUS ONCE
Qty: 1500 | Refills: 0 | Status: COMPLETED | OUTPATIENT
Start: 2017-06-04 | End: 2017-06-04

## 2017-06-04 RX ADMIN — SODIUM CHLORIDE 100 MILLILITER(S): 9 INJECTION INTRAMUSCULAR; INTRAVENOUS; SUBCUTANEOUS at 21:24

## 2017-06-04 RX ADMIN — Medication 8 MILLIGRAM(S): at 20:25

## 2017-06-04 RX ADMIN — PROTHROMBIN COMPLEX CONCENTRATE (HUMAN) 400 INTERNATIONAL UNIT(S): 25.5; 16.5; 24; 22; 22; 26 POWDER, FOR SOLUTION INTRAVENOUS at 15:30

## 2017-06-04 RX ADMIN — SODIUM CHLORIDE 100 MILLILITER(S): 9 INJECTION, SOLUTION INTRAVENOUS at 22:31

## 2017-06-04 RX ADMIN — PANTOPRAZOLE SODIUM 10 MG/HR: 20 TABLET, DELAYED RELEASE ORAL at 20:28

## 2017-06-04 RX ADMIN — PANTOPRAZOLE SODIUM 80 MILLIGRAM(S): 20 TABLET, DELAYED RELEASE ORAL at 15:30

## 2017-06-04 RX ADMIN — Medication 102 MILLIGRAM(S): at 15:30

## 2017-06-04 RX ADMIN — PANTOPRAZOLE SODIUM 10 MG/HR: 20 TABLET, DELAYED RELEASE ORAL at 18:31

## 2017-06-04 NOTE — ED PROVIDER NOTE - ATTENDING CONTRIBUTION TO CARE
****ATTENDING**** 83yo f hx HTN, CKD, COPD, recent RUL opacity PNA vs mass, recent RUL segmental and RLL subsegmental PE on coumadin presents from [facility] from hemoptysis v hematemesis since last night. Patient BIB EMS hypotensive, tachycardic. Patient pale appearing, moderate to severe distress, initial BP SBP 60s. Lungs cta b/l, +S1S2 tachycardic, Abd soft nd/nt +BS.   Patient resuscitated with IVF, PRBC, Check labs, including TS, Xray chest, [facility] Pul[facility] and GI. Patient w recent GI bleed on AC during hospitalization. Closely monitor patient.

## 2017-06-04 NOTE — ED ADULT NURSE REASSESSMENT NOTE - NS ED NURSE REASSESS COMMENT FT1
Pt became hypotensive and c/o being cold. MDs aware and at bedside. MTP in place - PRBCs x2 - NS x2L infusing via pressure bags. MICU at bedside for eval. Pt denies pain/discomfort but c/o SOB - B/L lungs CTA, resp even, unlabored. No resp distress noted. Safety maintained. Will continue to monitor.

## 2017-06-04 NOTE — ED PROVIDER NOTE - PROGRESS NOTE DETAILS
repeat INR s/p Kcentra 1500 units is 1.35.  Pending repeat lactate     - JADE Mccoy MD BP holding at SBP 110s/70s.  Discussed with MICU, awaiting final decision from MICU attending.  GI evaluated patient with recommendations provided.    Alyx Mccoy MD Lactate downtrended to 1.2    J Nadine MAZARIEGOS

## 2017-06-04 NOTE — H&P ADULT. - RS GEN PE MLT RESP DETAILS PC
airway patent/no intercostal retractions/no rhonchi/no rales/breath sounds equal/respirations non-labored/no subcutaneous emphysema/good air movement

## 2017-06-04 NOTE — H&P ADULT. - PROBLEM SELECTOR PLAN 2
TIARRA likely prerenal, monitor UOP, c/u IVF, check AM BMP TIARRA likely prerenal, monitor UOP; IVF, check AM BMP

## 2017-06-04 NOTE — ED ADULT NURSE REASSESSMENT NOTE - NS ED NURSE REASSESS COMMENT FT1
Pt is resting comfortably in bed. NAD noted. VSS. Protonix infusing @ 8mg/hr as ordered, no adverse reaction noted. Safety maintained. Plan of care discussed w/ pt and family. Will continue to monitor.

## 2017-06-04 NOTE — H&P ADULT. - PROBLEM SELECTOR PLAN 1
UGIB 2/2 AC for PE  Q6H CBC  GI c/s, endscopy in AM, no bronch until stable  NPO  Avoid PO meds as able  Transfuse for hgb<7  Check type and screen every 3 days UGIB 2/2 AC for PE with associated acute posthemorrhagic anemia and hypotension related to blood loss:  Q6H CBC  GI c/s, endscopy in AM, no bronch until stable  NPO  Avoid PO meds as able  Transfuse for hgb<8 or for symptomatic bleeding   Check type and screen every 3 days

## 2017-06-04 NOTE — H&P ADULT. - HISTORY OF PRESENT ILLNESS
82F w/ hx HTN, CKD, COPD p/w UGIB.    Patient was here May 20 for PNA, received abx.  Course c/b PE, started on hep gtt and coumadin, c/b GIB.  Unable to complete colonoscopy 2/2 diverticulosis and no endoscopy done.  AC held, GIB resolved, dc'd on coumadin to physical rehab.    Patient had episode of hematemesis at rehab today, potentially associated with presyncope as she does not recall event well.  She returned by EMS.    VS 86/45 improved to 118/103, < RR 16, O2 sat 100, afebrile.    She received 4 units pRBC and bolused with IVF.    Labs w/ WBC 14.7, H/H 6.6/19.5, plt 33.  BMP w/ cr 1.48 from 1.1, INR 2.99.    Received Kcentra, phytonadione, massive transfusion protocol.  INR improved to 1.35.  No further hematemesis.    Patient admitted to MICU to monitor, endoscopy in AM. 82F w/ hx HTN, CKD, COPD p/w UGIB.    Patient was here May 20 for PNA, received abx.  Course c/b PE, started on hep gtt and coumadin, c/b GIB.  Unable to complete colonoscopy 2/2 diverticulosis and no endoscopy done.  AC held, GIB resolved, dc'd on coumadin to physical rehab.    Patient had episode of hematemesis at rehab today, potentially associated with presyncope as she does not recall event well.  She returned by EMS.    VS 86/45 improved to 118/103, < RR 16, O2 sat 100, afebrile.    She received 4 units pRBC and bolused with 4L IVF.    Labs w/ WBC 14.7, H/H 6.6/19.5, plt 33.  BMP w/ cr 1.48 from 1.1, INR 2.99.    Received Kcentra, phytonadione, blood and fluids as above.  INR improved to 1.35.  No further hematemesis.    Patient admitted to MICU to monitor, endoscopy in AM.

## 2017-06-04 NOTE — ED PROVIDER NOTE - OBJECTIVE STATEMENT
Patient UNKNOWN to me previously, assigned to me at this point via the ER and by Dr. 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 Walker, pulmonary covering for Dr. Alvarado, and with a non-diagnostic bronchoscopy and not consistent with TB and followed since March, 2017, with the patient self referring with daughter following several days of persistent dyspnea, with productive cough with scant yellow to white sputum and generalized weakness for several days.  Patient/ daughter do report persistence of symptoms since March of this year, and express frustration with such.  No reported sick contacts.  Patient essentially steroid dependent COPD.  Patient with anorexia and unspecified weight loss.   No chest pain/pressure.  No hemoptysis.  No HA, no focal weakness.  No abdominal pain, no red blood per rectum or melena.  NO back pain, no tearing back pain.  No hematuria, no dysuria.  Remaining review of systems not contributory. 82F hx HTN, CKD, COPD, prior PE currently on coumadin recent RUL PNA with mass v abscess? presents from [facility] from hemoptysis v hematemesis with hypotension, tachycardia.  Coffee-ground in appearance.  Nausea, now resolved.  Pt developed hematochezia during hospitalization s/p colonoscopy with non-bleeding diverticula.       Patient UNKNOWN to me previously, assigned to me at this point via the ER and by Dr. 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 Walker, pulmonary covering for Dr. Alvarado, and with a non-diagnostic bronchoscopy and not consistent with TB and followed since March, 2017, with the patient self referring with daughter following several days of persistent dyspnea, with productive cough with scant yellow to white sputum and generalized weakness for several days.  Patient/ daughter do report persistence of symptoms since March of this year, and express frustration with such.  No reported sick contacts.  Patient essentially steroid dependent COPD.  Patient with anorexia and unspecified weight loss.   No chest pain/pressure.  No hemoptysis.  No HA, no focal weakness.  No abdominal pain, no red blood per rectum or melena.  NO back pain, no tearing back pain.  No hematuria, no dysuria.  Remaining review of systems not contributory. 82F hx HTN, CKD, COPD, recent RUL PNA with mass v abscess? course c/b PE on coumadin presents from [facility] from hemoptysis v hematemesis with hypotension, tachycardia.  Coffee-ground in appearance.  Nausea, now resolved.  Pt developed hematochezia during hospitalization s/p colonoscopy with non-bleeding diverticula.       Patient UNKNOWN to me previously, assigned to me at this point via the ER and by Dr. 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 Walker, pulmonary covering for Dr. Alvarado, and with a non-diagnostic bronchoscopy and not consistent with TB and followed since March, 2017, with the patient self referring with daughter following several days of persistent dyspnea, with productive cough with scant yellow to white sputum and generalized weakness for several days.  Patient/ daughter do report persistence of symptoms since March of this year, and express frustration with such.  No reported sick contacts.  Patient essentially steroid dependent COPD.  Patient with anorexia and unspecified weight loss.   No chest pain/pressure.  No hemoptysis.  No HA, no focal weakness.  No abdominal pain, no red blood per rectum or melena.  NO back pain, no tearing back pain.  No hematuria, no dysuria.  Remaining review of systems not contributory. 82F hx HTN, CKD, COPD, recent RUL PNA with 2.6cm nodule course c/b RUL segmental and RLL subsegmental PE on coumadin presents from [facility] from hemoptysis v hematemesis with hypotension, tachycardia.  Coffee-ground in appearance.  Nausea, now resolved.  Pt developed hematochezia during hospitalization after initial A/C s/p colonoscopy with non-bleeding diverticula (evaluation prematurely discontinued due to inability to advance scope).  Denies chest pain, sob, melena/hematochezia.     [facility]  GI - [facility]

## 2017-06-04 NOTE — H&P ADULT. - PROBLEM SELECTOR PLAN 3
Hold AC 2/2 GIB, monitor for resp distress, full code  Check duplex of LE b/l  May need IVC filter if DVT of LE found

## 2017-06-04 NOTE — ED PROVIDER NOTE - CRITICAL CARE PROVIDED
conducted a detailed discussion of DNR status/consultation with other physicians/consult w/ pt's family directly relating to pts condition/documentation/additional history taking/interpretation of diagnostic studies/direct patient care (not related to procedure)

## 2017-06-04 NOTE — H&P ADULT. - GASTROINTESTINAL DETAILS
no rigidity/soft/no masses palpable/no distention/no rebound tenderness/nontender/no organomegaly/no guarding/bowel sounds normal/no bruit

## 2017-06-04 NOTE — ED PROVIDER NOTE - CONSTITUTIONAL, MLM
normal... awake, alert, oriented to person, place, time/situation and in no apparent distress ill-appearing with pallor, awake, alert, oriented to person, place, time/situation and in no apparent distress ill-appearing with pallor, awake, alert, oriented to person, place, time/situation and in moderate to severe distress

## 2017-06-04 NOTE — H&P ADULT. - FAMILY HISTORY
Father  Still living? Unknown  Family history of COPD (chronic obstructive pulmonary disease), Age at diagnosis: Age Unknown

## 2017-06-04 NOTE — ED ADULT NURSE NOTE - PMH
Chronic kidney disease, unspecified stage    COPD (chronic obstructive pulmonary disease)    Hyperlipidemia, unspecified hyperlipidemia type    Hypertension    Lung abscess    PE (pulmonary thromboembolism)    Rebound headache

## 2017-06-04 NOTE — H&P ADULT. - ASSESSMENT
82F w/ hx HTN, CKD, COPD p/w UGIB 2/2 AC for PE on last visit c/b ongoing PNA tx, TAIRRA on CKD, therapeutic INR s/p reversal, massive tranfusion protocol. 82F w/ hx HTN, CKD, COPD p/w UGIB 2/2 AC for PE on last visit c/b ongoing PNA tx and  TIARRA on CKD who has now undergone  INR reversal with Kcentra and large volume saline and blood product resuscitation.

## 2017-06-04 NOTE — ED ADULT NURSE NOTE - OBJECTIVE STATEMENT
83yo female pt AxOx3 BIBA from [facility] c/o hematemesis. Pt was at rehab for PE/R lung abscess and recently started on Coumadin. Pt denies CP/SOB/bloody stool. Pt c/o weakness and is pale in appearance, slightly lethargic. Pt is hypotensive and tachycardic. B/L lungs clear, diminished, unlabored, No resp distress noted. Abd soft/NT/ND/+BSx4. NICHOLSON. Gross Neuro intact. CM in place - SinusTach. MDs at bedside for eval. Blanchable sacral redness noted, skin intact. #18G RAC, #20G L hand, labs drawn and sent. Safety maintained. Family at bedside.

## 2017-06-04 NOTE — ED PROVIDER NOTE - SKIN, MLM
Skin normal color for race, warm, dry and intact. No evidence of rash. Pallor, warm, dry and intact. No evidence of rash.

## 2017-06-04 NOTE — ED ADULT NURSE REASSESSMENT NOTE - NS ED NURSE REASSESS COMMENT FT1
MTP ordered as per Filemon MAZARIEGOS - pt receiving PRBC x2 via Level I rapid infuser & pressure bags. No adverse reactions noted. Pt is AxOx3. Family at bedside. Safety maintained.

## 2017-06-04 NOTE — H&P ADULT. - PROBLEM SELECTOR PLAN 4
Lung mass believed 2/2 abscess 2/2 pseudomonas  Pulm c/s  Potential bronch in near future per bronch  Continue abx therapy Lung mass believed 2/2 abscess 2/2 pseudomonas  Appreciate pulm c/s  Potential bronch in near future per bronch  Continue abx therapy

## 2017-06-04 NOTE — ED PROVIDER NOTE - MEDICAL DECISION MAKING DETAILS
82F hx RUL PNA, nodule, recent PE on coumadin presents with hemoptysis v coffee-ground hematemesis presents from rehab with hypotension, tachycardia.  Protecting airway on initial eval.  2 units emergent uncrossed pRBC ordered immediately.  Pt responsive to fluid bolus under pressure back and initial 2 units.  will obtain stat cxr, basic blood including pt/ptt, type and screen, cx.  Kcentra for coumadin reversal, Vit K, Protonix.  MICU consult.  Will contact GI - [facility] and Pulm - Dr. Wil Quiroga.  reassess

## 2017-06-04 NOTE — ED ADULT NURSE REASSESSMENT NOTE - NS ED NURSE REASSESS COMMENT FT1
received report from REN helms. patient resting comfortably in bed in no acute distress. Protonix 80mg running at 10cc/hr infusing. patient denies pain. VSS. waiting for bed in MICU. patient AAOx4. diminished lung sounds bilaterally. cough present, no sputum noted.

## 2017-06-05 LAB
ALBUMIN SERPL ELPH-MCNC: 2.4 G/DL — LOW (ref 3.3–5)
ALP SERPL-CCNC: 49 U/L — SIGNIFICANT CHANGE UP (ref 40–120)
ALT FLD-CCNC: 38 U/L RC — SIGNIFICANT CHANGE UP (ref 10–45)
ANION GAP SERPL CALC-SCNC: 16 MMOL/L — SIGNIFICANT CHANGE UP (ref 5–17)
APTT BLD: 162.9 SEC — CRITICAL HIGH (ref 27.5–37.4)
AST SERPL-CCNC: 28 U/L — SIGNIFICANT CHANGE UP (ref 10–40)
BASE EXCESS BLDV CALC-SCNC: -7.7 MMOL/L — LOW (ref -2–2)
BASOPHILS # BLD AUTO: 0 K/UL — SIGNIFICANT CHANGE UP (ref 0–0.2)
BASOPHILS NFR BLD AUTO: 0 % — SIGNIFICANT CHANGE UP (ref 0–2)
BILIRUB SERPL-MCNC: 0.4 MG/DL — SIGNIFICANT CHANGE UP (ref 0.2–1.2)
BUN SERPL-MCNC: 32 MG/DL — HIGH (ref 7–23)
CA-I SERPL-SCNC: 1.12 MMOL/L — SIGNIFICANT CHANGE UP (ref 1.12–1.3)
CALCIUM SERPL-MCNC: 7.2 MG/DL — LOW (ref 8.4–10.5)
CHLORIDE BLDV-SCNC: 110 MMOL/L — HIGH (ref 96–108)
CHLORIDE SERPL-SCNC: 108 MMOL/L — SIGNIFICANT CHANGE UP (ref 96–108)
CO2 BLDV-SCNC: 15 MMOL/L — LOW (ref 22–30)
CO2 SERPL-SCNC: 12 MMOL/L — LOW (ref 22–31)
CREAT SERPL-MCNC: 1.04 MG/DL — SIGNIFICANT CHANGE UP (ref 0.5–1.3)
EOSINOPHIL # BLD AUTO: 0 K/UL — SIGNIFICANT CHANGE UP (ref 0–0.5)
EOSINOPHIL NFR BLD AUTO: 0.2 % — SIGNIFICANT CHANGE UP (ref 0–6)
GAS PNL BLDV: 130 MMOL/L — LOW (ref 136–145)
GAS PNL BLDV: SIGNIFICANT CHANGE UP
GAS PNL BLDV: SIGNIFICANT CHANGE UP
GLUCOSE BLDV-MCNC: 98 MG/DL — SIGNIFICANT CHANGE UP (ref 70–99)
GLUCOSE SERPL-MCNC: 77 MG/DL — SIGNIFICANT CHANGE UP (ref 70–99)
HCO3 BLDV-SCNC: 14 MMOL/L — LOW (ref 21–29)
HCT VFR BLD CALC: 29.1 % — LOW (ref 34.5–45)
HCT VFR BLD CALC: 29.4 % — LOW (ref 34.5–45)
HCT VFR BLD CALC: 30.2 % — LOW (ref 34.5–45)
HCT VFR BLDA CALC: 31 % — LOW (ref 39–50)
HGB BLD CALC-MCNC: 10 G/DL — LOW (ref 11.5–15.5)
HGB BLD-MCNC: 10 G/DL — LOW (ref 11.5–15.5)
HGB BLD-MCNC: 10.1 G/DL — LOW (ref 11.5–15.5)
HGB BLD-MCNC: 10.3 G/DL — LOW (ref 11.5–15.5)
INR BLD: 1.02 RATIO — SIGNIFICANT CHANGE UP (ref 0.88–1.16)
LACTATE BLDV-MCNC: 0.6 MMOL/L — LOW (ref 0.7–2)
LYMPHOCYTES # BLD AUTO: 0.8 K/UL — LOW (ref 1–3.3)
LYMPHOCYTES # BLD AUTO: 5.8 % — LOW (ref 13–44)
MAGNESIUM SERPL-MCNC: 1.4 MG/DL — LOW (ref 1.6–2.6)
MCHC RBC-ENTMCNC: 29.8 PG — SIGNIFICANT CHANGE UP (ref 27–34)
MCHC RBC-ENTMCNC: 30.3 PG — SIGNIFICANT CHANGE UP (ref 27–34)
MCHC RBC-ENTMCNC: 31.2 PG — SIGNIFICANT CHANGE UP (ref 27–34)
MCHC RBC-ENTMCNC: 33.9 GM/DL — SIGNIFICANT CHANGE UP (ref 32–36)
MCHC RBC-ENTMCNC: 34.1 GM/DL — SIGNIFICANT CHANGE UP (ref 32–36)
MCHC RBC-ENTMCNC: 34.8 GM/DL — SIGNIFICANT CHANGE UP (ref 32–36)
MCV RBC AUTO: 87.7 FL — SIGNIFICANT CHANGE UP (ref 80–100)
MCV RBC AUTO: 88.9 FL — SIGNIFICANT CHANGE UP (ref 80–100)
MCV RBC AUTO: 89.6 FL — SIGNIFICANT CHANGE UP (ref 80–100)
MONOCYTES # BLD AUTO: 0.8 K/UL — SIGNIFICANT CHANGE UP (ref 0–0.9)
MONOCYTES NFR BLD AUTO: 5.7 % — SIGNIFICANT CHANGE UP (ref 2–14)
NEUTROPHILS # BLD AUTO: 12.1 K/UL — HIGH (ref 1.8–7.4)
NEUTROPHILS NFR BLD AUTO: 88.3 % — HIGH (ref 43–77)
OTHER CELLS CSF MANUAL: 14 ML/DL — LOW (ref 18–22)
PCO2 BLDV: 21 MMHG — LOW (ref 35–50)
PH BLDV: 7.46 — HIGH (ref 7.35–7.45)
PHOSPHATE SERPL-MCNC: 2.8 MG/DL — SIGNIFICANT CHANGE UP (ref 2.5–4.5)
PLATELET # BLD AUTO: 181 K/UL — SIGNIFICANT CHANGE UP (ref 150–400)
PLATELET # BLD AUTO: 203 K/UL — SIGNIFICANT CHANGE UP (ref 150–400)
PLATELET # BLD AUTO: 220 K/UL — SIGNIFICANT CHANGE UP (ref 150–400)
PO2 BLDV: 118 MMHG — HIGH (ref 25–45)
POTASSIUM BLDV-SCNC: 2.9 MMOL/L — CRITICAL LOW (ref 3.5–5)
POTASSIUM SERPL-MCNC: 3.8 MMOL/L — SIGNIFICANT CHANGE UP (ref 3.5–5.3)
POTASSIUM SERPL-SCNC: 3.8 MMOL/L — SIGNIFICANT CHANGE UP (ref 3.5–5.3)
PROT SERPL-MCNC: 4.2 G/DL — LOW (ref 6–8.3)
PROTHROM AB SERPL-ACNC: 11.1 SEC — SIGNIFICANT CHANGE UP (ref 9.8–12.7)
RBC # BLD: 3.24 M/UL — LOW (ref 3.8–5.2)
RBC # BLD: 3.35 M/UL — LOW (ref 3.8–5.2)
RBC # BLD: 3.4 M/UL — LOW (ref 3.8–5.2)
RBC # FLD: 16.5 % — HIGH (ref 10.3–14.5)
RBC # FLD: 16.7 % — HIGH (ref 10.3–14.5)
RBC # FLD: 17.1 % — HIGH (ref 10.3–14.5)
SAO2 % BLDV: 100 % — HIGH (ref 67–88)
SODIUM SERPL-SCNC: 136 MMOL/L — SIGNIFICANT CHANGE UP (ref 135–145)
WBC # BLD: 12.4 K/UL — HIGH (ref 3.8–10.5)
WBC # BLD: 12.4 K/UL — HIGH (ref 3.8–10.5)
WBC # BLD: 13.7 K/UL — HIGH (ref 3.8–10.5)
WBC # FLD AUTO: 12.4 K/UL — HIGH (ref 3.8–10.5)
WBC # FLD AUTO: 12.4 K/UL — HIGH (ref 3.8–10.5)
WBC # FLD AUTO: 13.7 K/UL — HIGH (ref 3.8–10.5)

## 2017-06-05 PROCEDURE — 43255 EGD CONTROL BLEEDING ANY: CPT

## 2017-06-05 PROCEDURE — 99291 CRITICAL CARE FIRST HOUR: CPT

## 2017-06-05 RX ORDER — BUDESONIDE, MICRONIZED 100 %
2 POWDER (GRAM) MISCELLANEOUS
Qty: 0 | Refills: 0 | Status: DISCONTINUED | OUTPATIENT
Start: 2017-06-05 | End: 2017-06-06

## 2017-06-05 RX ORDER — ACETAMINOPHEN 500 MG
650 TABLET ORAL EVERY 6 HOURS
Qty: 0 | Refills: 0 | Status: COMPLETED | OUTPATIENT
Start: 2017-06-05 | End: 2017-06-05

## 2017-06-05 RX ORDER — MAGNESIUM SULFATE 500 MG/ML
2 VIAL (ML) INJECTION ONCE
Qty: 0 | Refills: 0 | Status: COMPLETED | OUTPATIENT
Start: 2017-06-05 | End: 2017-06-05

## 2017-06-05 RX ORDER — INSULIN LISPRO 100/ML
VIAL (ML) SUBCUTANEOUS
Qty: 0 | Refills: 0 | Status: DISCONTINUED | OUTPATIENT
Start: 2017-06-05 | End: 2017-06-07

## 2017-06-05 RX ORDER — PIPERACILLIN AND TAZOBACTAM 4; .5 G/20ML; G/20ML
3.38 INJECTION, POWDER, LYOPHILIZED, FOR SOLUTION INTRAVENOUS EVERY 8 HOURS
Qty: 0 | Refills: 0 | Status: DISCONTINUED | OUTPATIENT
Start: 2017-06-05 | End: 2017-06-13

## 2017-06-05 RX ORDER — HEPARIN SODIUM 5000 [USP'U]/ML
1000 INJECTION INTRAVENOUS; SUBCUTANEOUS
Qty: 25000 | Refills: 0 | Status: DISCONTINUED | OUTPATIENT
Start: 2017-06-05 | End: 2017-06-05

## 2017-06-05 RX ORDER — INSULIN LISPRO 100/ML
VIAL (ML) SUBCUTANEOUS AT BEDTIME
Qty: 0 | Refills: 0 | Status: DISCONTINUED | OUTPATIENT
Start: 2017-06-05 | End: 2017-06-13

## 2017-06-05 RX ORDER — HEPARIN SODIUM 5000 [USP'U]/ML
700 INJECTION INTRAVENOUS; SUBCUTANEOUS
Qty: 25000 | Refills: 0 | Status: DISCONTINUED | OUTPATIENT
Start: 2017-06-05 | End: 2017-06-06

## 2017-06-05 RX ORDER — ACETAMINOPHEN 500 MG
1000 TABLET ORAL ONCE
Qty: 0 | Refills: 0 | Status: COMPLETED | OUTPATIENT
Start: 2017-06-05 | End: 2017-06-05

## 2017-06-05 RX ORDER — POTASSIUM CHLORIDE 20 MEQ
10 PACKET (EA) ORAL DAILY
Qty: 0 | Refills: 0 | Status: DISCONTINUED | OUTPATIENT
Start: 2017-06-05 | End: 2017-06-05

## 2017-06-05 RX ORDER — ACETAMINOPHEN 500 MG
650 TABLET ORAL ONCE
Qty: 0 | Refills: 0 | Status: COMPLETED | OUTPATIENT
Start: 2017-06-05 | End: 2017-06-05

## 2017-06-05 RX ORDER — HEPARIN SODIUM 5000 [USP'U]/ML
7 INJECTION INTRAVENOUS; SUBCUTANEOUS
Qty: 25000 | Refills: 0 | Status: DISCONTINUED | OUTPATIENT
Start: 2017-06-05 | End: 2017-06-05

## 2017-06-05 RX ORDER — POTASSIUM CHLORIDE 20 MEQ
40 PACKET (EA) ORAL
Qty: 0 | Refills: 0 | Status: COMPLETED | OUTPATIENT
Start: 2017-06-05 | End: 2017-06-05

## 2017-06-05 RX ORDER — INSULIN LISPRO 100/ML
VIAL (ML) SUBCUTANEOUS EVERY 6 HOURS
Qty: 0 | Refills: 0 | Status: DISCONTINUED | OUTPATIENT
Start: 2017-06-05 | End: 2017-06-05

## 2017-06-05 RX ORDER — POTASSIUM CHLORIDE 20 MEQ
10 PACKET (EA) ORAL ONCE
Qty: 0 | Refills: 0 | Status: COMPLETED | OUTPATIENT
Start: 2017-06-05 | End: 2017-06-05

## 2017-06-05 RX ORDER — PIPERACILLIN AND TAZOBACTAM 4; .5 G/20ML; G/20ML
3.38 INJECTION, POWDER, LYOPHILIZED, FOR SOLUTION INTRAVENOUS ONCE
Qty: 0 | Refills: 0 | Status: COMPLETED | OUTPATIENT
Start: 2017-06-05 | End: 2017-06-05

## 2017-06-05 RX ORDER — SODIUM CHLORIDE 9 MG/ML
1000 INJECTION, SOLUTION INTRAVENOUS
Qty: 0 | Refills: 0 | Status: DISCONTINUED | OUTPATIENT
Start: 2017-06-05 | End: 2017-06-06

## 2017-06-05 RX ADMIN — HEPARIN SODIUM 10 UNIT(S)/HR: 5000 INJECTION INTRAVENOUS; SUBCUTANEOUS at 15:32

## 2017-06-05 RX ADMIN — Medication 50 GRAM(S): at 04:00

## 2017-06-05 RX ADMIN — Medication 400 MILLIGRAM(S): at 03:35

## 2017-06-05 RX ADMIN — PIPERACILLIN AND TAZOBACTAM 25 GRAM(S): 4; .5 INJECTION, POWDER, LYOPHILIZED, FOR SOLUTION INTRAVENOUS at 05:00

## 2017-06-05 RX ADMIN — Medication 400 MILLIGRAM(S): at 11:00

## 2017-06-05 RX ADMIN — Medication 650 MILLIGRAM(S): at 21:56

## 2017-06-05 RX ADMIN — PIPERACILLIN AND TAZOBACTAM 25 GRAM(S): 4; .5 INJECTION, POWDER, LYOPHILIZED, FOR SOLUTION INTRAVENOUS at 21:56

## 2017-06-05 RX ADMIN — Medication 2: at 17:15

## 2017-06-05 RX ADMIN — Medication 1000 MILLIGRAM(S): at 03:50

## 2017-06-05 RX ADMIN — PIPERACILLIN AND TAZOBACTAM 200 GRAM(S): 4; .5 INJECTION, POWDER, LYOPHILIZED, FOR SOLUTION INTRAVENOUS at 02:23

## 2017-06-05 RX ADMIN — Medication 40 MILLIEQUIVALENT(S): at 17:15

## 2017-06-05 RX ADMIN — SODIUM CHLORIDE 100 MILLILITER(S): 9 INJECTION, SOLUTION INTRAVENOUS at 03:41

## 2017-06-05 RX ADMIN — Medication 8 MILLIGRAM(S): at 05:00

## 2017-06-05 RX ADMIN — PANTOPRAZOLE SODIUM 10 MG/HR: 20 TABLET, DELAYED RELEASE ORAL at 14:25

## 2017-06-05 RX ADMIN — Medication 1000 MILLIGRAM(S): at 11:30

## 2017-06-05 RX ADMIN — Medication 650 MILLIGRAM(S): at 16:41

## 2017-06-05 RX ADMIN — Medication 1 PUFF(S): at 17:29

## 2017-06-05 RX ADMIN — Medication 650 MILLIGRAM(S): at 17:11

## 2017-06-05 RX ADMIN — Medication 650 MILLIGRAM(S): at 23:00

## 2017-06-05 RX ADMIN — Medication 100 MILLIEQUIVALENT(S): at 11:30

## 2017-06-05 RX ADMIN — Medication 1 PUFF(S): at 05:36

## 2017-06-05 RX ADMIN — PIPERACILLIN AND TAZOBACTAM 25 GRAM(S): 4; .5 INJECTION, POWDER, LYOPHILIZED, FOR SOLUTION INTRAVENOUS at 14:25

## 2017-06-05 RX ADMIN — Medication 40 MILLIEQUIVALENT(S): at 15:33

## 2017-06-05 NOTE — PROVIDER CONTACT NOTE (CRITICAL VALUE NOTIFICATION) - ACTION/TREATMENT ORDERED:
potassium 10meg/100cc started on the way to endoscopy   catarina ESPOSITO in endo aware of result as well. pt without cardiac arrythmias

## 2017-06-06 LAB
ALBUMIN SERPL ELPH-MCNC: 2.6 G/DL — LOW (ref 3.3–5)
ALP SERPL-CCNC: 57 U/L — SIGNIFICANT CHANGE UP (ref 40–120)
ALT FLD-CCNC: 38 U/L RC — SIGNIFICANT CHANGE UP (ref 10–45)
ANION GAP SERPL CALC-SCNC: 15 MMOL/L — SIGNIFICANT CHANGE UP (ref 5–17)
APTT BLD: 107.9 SEC — HIGH (ref 27.5–37.4)
APTT BLD: 136 SEC — CRITICAL HIGH (ref 27.5–37.4)
APTT BLD: 36 SEC — SIGNIFICANT CHANGE UP (ref 27.5–37.4)
AST SERPL-CCNC: 23 U/L — SIGNIFICANT CHANGE UP (ref 10–40)
BILIRUB SERPL-MCNC: 0.4 MG/DL — SIGNIFICANT CHANGE UP (ref 0.2–1.2)
BUN SERPL-MCNC: 18 MG/DL — SIGNIFICANT CHANGE UP (ref 7–23)
CALCIUM SERPL-MCNC: 7.9 MG/DL — LOW (ref 8.4–10.5)
CHLORIDE SERPL-SCNC: 106 MMOL/L — SIGNIFICANT CHANGE UP (ref 96–108)
CO2 SERPL-SCNC: 17 MMOL/L — LOW (ref 22–31)
CREAT SERPL-MCNC: 0.9 MG/DL — SIGNIFICANT CHANGE UP (ref 0.5–1.3)
GLUCOSE SERPL-MCNC: 94 MG/DL — SIGNIFICANT CHANGE UP (ref 70–99)
HCT VFR BLD CALC: 30.5 % — LOW (ref 34.5–45)
HCT VFR BLD CALC: 31.1 % — LOW (ref 34.5–45)
HGB BLD-MCNC: 10.1 G/DL — LOW (ref 11.5–15.5)
HGB BLD-MCNC: 10.3 G/DL — LOW (ref 11.5–15.5)
INR BLD: 1.1 RATIO — SIGNIFICANT CHANGE UP (ref 0.88–1.16)
INR BLD: 1.11 RATIO — SIGNIFICANT CHANGE UP (ref 0.88–1.16)
MAGNESIUM SERPL-MCNC: 1.6 MG/DL — SIGNIFICANT CHANGE UP (ref 1.6–2.6)
MCHC RBC-ENTMCNC: 29.5 PG — SIGNIFICANT CHANGE UP (ref 27–34)
MCHC RBC-ENTMCNC: 30.1 PG — SIGNIFICANT CHANGE UP (ref 27–34)
MCHC RBC-ENTMCNC: 33 GM/DL — SIGNIFICANT CHANGE UP (ref 32–36)
MCHC RBC-ENTMCNC: 33.1 GM/DL — SIGNIFICANT CHANGE UP (ref 32–36)
MCV RBC AUTO: 89.2 FL — SIGNIFICANT CHANGE UP (ref 80–100)
MCV RBC AUTO: 90.9 FL — SIGNIFICANT CHANGE UP (ref 80–100)
PHOSPHATE SERPL-MCNC: 1.3 MG/DL — LOW (ref 2.5–4.5)
PLATELET # BLD AUTO: 233 K/UL — SIGNIFICANT CHANGE UP (ref 150–400)
PLATELET # BLD AUTO: 256 K/UL — SIGNIFICANT CHANGE UP (ref 150–400)
POTASSIUM SERPL-MCNC: 3.7 MMOL/L — SIGNIFICANT CHANGE UP (ref 3.5–5.3)
POTASSIUM SERPL-SCNC: 3.7 MMOL/L — SIGNIFICANT CHANGE UP (ref 3.5–5.3)
PROT SERPL-MCNC: 4.5 G/DL — LOW (ref 6–8.3)
PROTHROM AB SERPL-ACNC: 11.9 SEC — SIGNIFICANT CHANGE UP (ref 9.8–12.7)
PROTHROM AB SERPL-ACNC: 12.1 SEC — SIGNIFICANT CHANGE UP (ref 9.8–12.7)
RBC # BLD: 3.42 M/UL — LOW (ref 3.8–5.2)
RBC # BLD: 3.43 M/UL — LOW (ref 3.8–5.2)
RBC # FLD: 17.2 % — HIGH (ref 10.3–14.5)
RBC # FLD: 17.7 % — HIGH (ref 10.3–14.5)
SODIUM SERPL-SCNC: 138 MMOL/L — SIGNIFICANT CHANGE UP (ref 135–145)
WBC # BLD: 10 K/UL — SIGNIFICANT CHANGE UP (ref 3.8–10.5)
WBC # BLD: 13.2 K/UL — HIGH (ref 3.8–10.5)
WBC # FLD AUTO: 10 K/UL — SIGNIFICANT CHANGE UP (ref 3.8–10.5)
WBC # FLD AUTO: 13.2 K/UL — HIGH (ref 3.8–10.5)

## 2017-06-06 PROCEDURE — 93970 EXTREMITY STUDY: CPT | Mod: 26

## 2017-06-06 PROCEDURE — 99233 SBSQ HOSP IP/OBS HIGH 50: CPT | Mod: GC

## 2017-06-06 RX ORDER — ACETAMINOPHEN 500 MG
650 TABLET ORAL ONCE
Qty: 0 | Refills: 0 | Status: COMPLETED | OUTPATIENT
Start: 2017-06-06 | End: 2017-06-06

## 2017-06-06 RX ORDER — HEPARIN SODIUM 5000 [USP'U]/ML
5000 INJECTION INTRAVENOUS; SUBCUTANEOUS EVERY 12 HOURS
Qty: 0 | Refills: 0 | Status: DISCONTINUED | OUTPATIENT
Start: 2017-06-06 | End: 2017-06-07

## 2017-06-06 RX ORDER — ACETAMINOPHEN 500 MG
650 TABLET ORAL EVERY 6 HOURS
Qty: 0 | Refills: 0 | Status: DISCONTINUED | OUTPATIENT
Start: 2017-06-06 | End: 2017-06-13

## 2017-06-06 RX ADMIN — PANTOPRAZOLE SODIUM 10 MG/HR: 20 TABLET, DELAYED RELEASE ORAL at 01:12

## 2017-06-06 RX ADMIN — Medication 2 PUFF(S): at 18:06

## 2017-06-06 RX ADMIN — Medication 650 MILLIGRAM(S): at 13:00

## 2017-06-06 RX ADMIN — Medication 2 PUFF(S): at 05:39

## 2017-06-06 RX ADMIN — PIPERACILLIN AND TAZOBACTAM 25 GRAM(S): 4; .5 INJECTION, POWDER, LYOPHILIZED, FOR SOLUTION INTRAVENOUS at 13:05

## 2017-06-06 RX ADMIN — PIPERACILLIN AND TAZOBACTAM 25 GRAM(S): 4; .5 INJECTION, POWDER, LYOPHILIZED, FOR SOLUTION INTRAVENOUS at 22:24

## 2017-06-06 RX ADMIN — HEPARIN SODIUM 4 UNIT(S)/HR: 5000 INJECTION INTRAVENOUS; SUBCUTANEOUS at 10:06

## 2017-06-06 RX ADMIN — Medication 650 MILLIGRAM(S): at 02:00

## 2017-06-06 RX ADMIN — Medication 650 MILLIGRAM(S): at 22:23

## 2017-06-06 RX ADMIN — Medication 650 MILLIGRAM(S): at 03:00

## 2017-06-06 RX ADMIN — Medication 8 MILLIGRAM(S): at 05:34

## 2017-06-06 RX ADMIN — PANTOPRAZOLE SODIUM 10 MG/HR: 20 TABLET, DELAYED RELEASE ORAL at 10:50

## 2017-06-06 RX ADMIN — Medication 650 MILLIGRAM(S): at 23:23

## 2017-06-06 RX ADMIN — PIPERACILLIN AND TAZOBACTAM 25 GRAM(S): 4; .5 INJECTION, POWDER, LYOPHILIZED, FOR SOLUTION INTRAVENOUS at 05:37

## 2017-06-06 RX ADMIN — HEPARIN SODIUM 7 UNIT(S)/HR: 5000 INJECTION INTRAVENOUS; SUBCUTANEOUS at 01:10

## 2017-06-07 ENCOUNTER — RESULT REVIEW (OUTPATIENT)
Age: 82
End: 2017-06-07

## 2017-06-07 DIAGNOSIS — I26.99 OTHER PULMONARY EMBOLISM WITHOUT ACUTE COR PULMONALE: ICD-10-CM

## 2017-06-07 LAB
ALBUMIN SERPL ELPH-MCNC: 2.4 G/DL — LOW (ref 3.3–5)
ALP SERPL-CCNC: 51 U/L — SIGNIFICANT CHANGE UP (ref 40–120)
ALT FLD-CCNC: 35 U/L RC — SIGNIFICANT CHANGE UP (ref 10–45)
ANION GAP SERPL CALC-SCNC: 12 MMOL/L — SIGNIFICANT CHANGE UP (ref 5–17)
ANION GAP SERPL CALC-SCNC: 13 MMOL/L — SIGNIFICANT CHANGE UP (ref 5–17)
ANION GAP SERPL CALC-SCNC: 14 MMOL/L — SIGNIFICANT CHANGE UP (ref 5–17)
APTT BLD: 25.6 SEC — LOW (ref 27.5–37.4)
AST SERPL-CCNC: 18 U/L — SIGNIFICANT CHANGE UP (ref 10–40)
BILIRUB SERPL-MCNC: 0.3 MG/DL — SIGNIFICANT CHANGE UP (ref 0.2–1.2)
BLD GP AB SCN SERPL QL: NEGATIVE — SIGNIFICANT CHANGE UP
BUN SERPL-MCNC: 27 MG/DL — HIGH (ref 7–23)
BUN SERPL-MCNC: 32 MG/DL — HIGH (ref 7–23)
BUN SERPL-MCNC: 34 MG/DL — HIGH (ref 7–23)
CA-I BLDA-SCNC: 1.15 MMOL/L — SIGNIFICANT CHANGE UP (ref 1.12–1.3)
CALCIUM SERPL-MCNC: 7.3 MG/DL — LOW (ref 8.4–10.5)
CALCIUM SERPL-MCNC: 7.4 MG/DL — LOW (ref 8.4–10.5)
CALCIUM SERPL-MCNC: 7.8 MG/DL — LOW (ref 8.4–10.5)
CHLORIDE SERPL-SCNC: 109 MMOL/L — HIGH (ref 96–108)
CHLORIDE SERPL-SCNC: 110 MMOL/L — HIGH (ref 96–108)
CHLORIDE SERPL-SCNC: 111 MMOL/L — HIGH (ref 96–108)
CO2 SERPL-SCNC: 13 MMOL/L — LOW (ref 22–31)
CO2 SERPL-SCNC: 15 MMOL/L — LOW (ref 22–31)
CO2 SERPL-SCNC: 16 MMOL/L — LOW (ref 22–31)
CREAT SERPL-MCNC: 0.88 MG/DL — SIGNIFICANT CHANGE UP (ref 0.5–1.3)
CREAT SERPL-MCNC: 0.9 MG/DL — SIGNIFICANT CHANGE UP (ref 0.5–1.3)
CREAT SERPL-MCNC: 0.97 MG/DL — SIGNIFICANT CHANGE UP (ref 0.5–1.3)
GLUCOSE SERPL-MCNC: 104 MG/DL — HIGH (ref 70–99)
GLUCOSE SERPL-MCNC: 117 MG/DL — HIGH (ref 70–99)
GLUCOSE SERPL-MCNC: 83 MG/DL — SIGNIFICANT CHANGE UP (ref 70–99)
HCT VFR BLD CALC: 24.9 % — LOW (ref 34.5–45)
HCT VFR BLD CALC: 28.1 % — LOW (ref 34.5–45)
HCT VFR BLD CALC: 28.7 % — LOW (ref 34.5–45)
HGB BLD-MCNC: 10.2 G/DL — LOW (ref 11.5–15.5)
HGB BLD-MCNC: 8.2 G/DL — LOW (ref 11.5–15.5)
HGB BLD-MCNC: 9.4 G/DL — LOW (ref 11.5–15.5)
INR BLD: 1.23 RATIO — HIGH (ref 0.88–1.16)
MAGNESIUM SERPL-MCNC: 1.3 MG/DL — LOW (ref 1.6–2.6)
MAGNESIUM SERPL-MCNC: 1.4 MG/DL — LOW (ref 1.6–2.6)
MAGNESIUM SERPL-MCNC: 1.6 MG/DL — SIGNIFICANT CHANGE UP (ref 1.6–2.6)
MCHC RBC-ENTMCNC: 30 PG — SIGNIFICANT CHANGE UP (ref 27–34)
MCHC RBC-ENTMCNC: 30.7 PG — SIGNIFICANT CHANGE UP (ref 27–34)
MCHC RBC-ENTMCNC: 31.4 PG — SIGNIFICANT CHANGE UP (ref 27–34)
MCHC RBC-ENTMCNC: 32.9 GM/DL — SIGNIFICANT CHANGE UP (ref 32–36)
MCHC RBC-ENTMCNC: 33.6 GM/DL — SIGNIFICANT CHANGE UP (ref 32–36)
MCHC RBC-ENTMCNC: 35.5 GM/DL — SIGNIFICANT CHANGE UP (ref 32–36)
MCV RBC AUTO: 88.4 FL — SIGNIFICANT CHANGE UP (ref 80–100)
MCV RBC AUTO: 91.2 FL — SIGNIFICANT CHANGE UP (ref 80–100)
MCV RBC AUTO: 91.4 FL — SIGNIFICANT CHANGE UP (ref 80–100)
OB PNL STL: POSITIVE
PHOSPHATE SERPL-MCNC: 1.6 MG/DL — LOW (ref 2.5–4.5)
PHOSPHATE SERPL-MCNC: 1.9 MG/DL — LOW (ref 2.5–4.5)
PHOSPHATE SERPL-MCNC: 2.2 MG/DL — LOW (ref 2.5–4.5)
PLATELET # BLD AUTO: 176 K/UL — SIGNIFICANT CHANGE UP (ref 150–400)
PLATELET # BLD AUTO: 198 K/UL — SIGNIFICANT CHANGE UP (ref 150–400)
PLATELET # BLD AUTO: 257 K/UL — SIGNIFICANT CHANGE UP (ref 150–400)
POTASSIUM SERPL-MCNC: 3.7 MMOL/L — SIGNIFICANT CHANGE UP (ref 3.5–5.3)
POTASSIUM SERPL-MCNC: 3.8 MMOL/L — SIGNIFICANT CHANGE UP (ref 3.5–5.3)
POTASSIUM SERPL-MCNC: 4.1 MMOL/L — SIGNIFICANT CHANGE UP (ref 3.5–5.3)
POTASSIUM SERPL-SCNC: 3.7 MMOL/L — SIGNIFICANT CHANGE UP (ref 3.5–5.3)
POTASSIUM SERPL-SCNC: 3.8 MMOL/L — SIGNIFICANT CHANGE UP (ref 3.5–5.3)
POTASSIUM SERPL-SCNC: 4.1 MMOL/L — SIGNIFICANT CHANGE UP (ref 3.5–5.3)
PROT SERPL-MCNC: 4 G/DL — LOW (ref 6–8.3)
PROTHROM AB SERPL-ACNC: 14 SEC — HIGH (ref 10–13.1)
RBC # BLD: 2.73 M/UL — LOW (ref 3.8–5.2)
RBC # BLD: 3.07 M/UL — LOW (ref 3.8–5.2)
RBC # BLD: 3.24 M/UL — LOW (ref 3.8–5.2)
RBC # FLD: 15.9 % — HIGH (ref 10.3–14.5)
RBC # FLD: 15.9 % — HIGH (ref 10.3–14.5)
RBC # FLD: 17.5 % — HIGH (ref 10.3–14.5)
RH IG SCN BLD-IMP: NEGATIVE — SIGNIFICANT CHANGE UP
SODIUM SERPL-SCNC: 137 MMOL/L — SIGNIFICANT CHANGE UP (ref 135–145)
SODIUM SERPL-SCNC: 138 MMOL/L — SIGNIFICANT CHANGE UP (ref 135–145)
SODIUM SERPL-SCNC: 138 MMOL/L — SIGNIFICANT CHANGE UP (ref 135–145)
WBC # BLD: 10.3 K/UL — SIGNIFICANT CHANGE UP (ref 3.8–10.5)
WBC # BLD: 12.3 K/UL — HIGH (ref 3.8–10.5)
WBC # BLD: 12.9 K/UL — HIGH (ref 3.8–10.5)
WBC # FLD AUTO: 10.3 K/UL — SIGNIFICANT CHANGE UP (ref 3.8–10.5)
WBC # FLD AUTO: 12.3 K/UL — HIGH (ref 3.8–10.5)
WBC # FLD AUTO: 12.9 K/UL — HIGH (ref 3.8–10.5)

## 2017-06-07 PROCEDURE — 93010 ELECTROCARDIOGRAM REPORT: CPT | Mod: 77

## 2017-06-07 PROCEDURE — 93010 ELECTROCARDIOGRAM REPORT: CPT

## 2017-06-07 PROCEDURE — 99291 CRITICAL CARE FIRST HOUR: CPT

## 2017-06-07 RX ORDER — MAGNESIUM SULFATE 500 MG/ML
2 VIAL (ML) INJECTION ONCE
Qty: 0 | Refills: 0 | Status: COMPLETED | OUTPATIENT
Start: 2017-06-07 | End: 2017-06-07

## 2017-06-07 RX ORDER — SODIUM CHLORIDE 9 MG/ML
1000 INJECTION INTRAMUSCULAR; INTRAVENOUS; SUBCUTANEOUS ONCE
Qty: 0 | Refills: 0 | Status: COMPLETED | OUTPATIENT
Start: 2017-06-07 | End: 2017-06-07

## 2017-06-07 RX ORDER — POTASSIUM PHOSPHATE, MONOBASIC POTASSIUM PHOSPHATE, DIBASIC 236; 224 MG/ML; MG/ML
15 INJECTION, SOLUTION INTRAVENOUS ONCE
Qty: 0 | Refills: 0 | Status: COMPLETED | OUTPATIENT
Start: 2017-06-07 | End: 2017-06-07

## 2017-06-07 RX ORDER — CALCIUM GLUCONATE 100 MG/ML
1 VIAL (ML) INTRAVENOUS ONCE
Qty: 0 | Refills: 0 | Status: DISCONTINUED | OUTPATIENT
Start: 2017-06-07 | End: 2017-06-07

## 2017-06-07 RX ORDER — MAGNESIUM SULFATE 500 MG/ML
1 VIAL (ML) INJECTION ONCE
Qty: 0 | Refills: 0 | Status: COMPLETED | OUTPATIENT
Start: 2017-06-07 | End: 2017-06-07

## 2017-06-07 RX ORDER — MAGNESIUM SULFATE 500 MG/ML
2 VIAL (ML) INJECTION ONCE
Qty: 0 | Refills: 0 | Status: DISCONTINUED | OUTPATIENT
Start: 2017-06-07 | End: 2017-06-07

## 2017-06-07 RX ORDER — INSULIN LISPRO 100/ML
VIAL (ML) SUBCUTANEOUS EVERY 6 HOURS
Qty: 0 | Refills: 0 | Status: DISCONTINUED | OUTPATIENT
Start: 2017-06-07 | End: 2017-06-08

## 2017-06-07 RX ORDER — ONDANSETRON 8 MG/1
8 TABLET, FILM COATED ORAL EVERY 8 HOURS
Qty: 0 | Refills: 0 | Status: COMPLETED | OUTPATIENT
Start: 2017-06-07 | End: 2017-06-07

## 2017-06-07 RX ADMIN — PIPERACILLIN AND TAZOBACTAM 25 GRAM(S): 4; .5 INJECTION, POWDER, LYOPHILIZED, FOR SOLUTION INTRAVENOUS at 21:30

## 2017-06-07 RX ADMIN — SODIUM CHLORIDE 2000 MILLILITER(S): 9 INJECTION INTRAMUSCULAR; INTRAVENOUS; SUBCUTANEOUS at 06:20

## 2017-06-07 RX ADMIN — ONDANSETRON 8 MILLIGRAM(S): 8 TABLET, FILM COATED ORAL at 07:59

## 2017-06-07 RX ADMIN — Medication 100 GRAM(S): at 13:30

## 2017-06-07 RX ADMIN — PIPERACILLIN AND TAZOBACTAM 25 GRAM(S): 4; .5 INJECTION, POWDER, LYOPHILIZED, FOR SOLUTION INTRAVENOUS at 16:16

## 2017-06-07 RX ADMIN — PANTOPRAZOLE SODIUM 10 MG/HR: 20 TABLET, DELAYED RELEASE ORAL at 21:30

## 2017-06-07 RX ADMIN — PIPERACILLIN AND TAZOBACTAM 25 GRAM(S): 4; .5 INJECTION, POWDER, LYOPHILIZED, FOR SOLUTION INTRAVENOUS at 05:54

## 2017-06-07 RX ADMIN — POTASSIUM PHOSPHATE, MONOBASIC POTASSIUM PHOSPHATE, DIBASIC 62.5 MILLIMOLE(S): 236; 224 INJECTION, SOLUTION INTRAVENOUS at 21:32

## 2017-06-07 RX ADMIN — HYDROMORPHONE HYDROCHLORIDE 0.5 MILLIGRAM(S): 2 INJECTION INTRAMUSCULAR; INTRAVENOUS; SUBCUTANEOUS at 23:20

## 2017-06-07 RX ADMIN — Medication 50 GRAM(S): at 21:32

## 2017-06-07 RX ADMIN — Medication 650 MILLIGRAM(S): at 21:30

## 2017-06-07 RX ADMIN — HYDROMORPHONE HYDROCHLORIDE 0.5 MILLIGRAM(S): 2 INJECTION INTRAMUSCULAR; INTRAVENOUS; SUBCUTANEOUS at 23:35

## 2017-06-07 RX ADMIN — POTASSIUM PHOSPHATE, MONOBASIC POTASSIUM PHOSPHATE, DIBASIC 62.5 MILLIMOLE(S): 236; 224 INJECTION, SOLUTION INTRAVENOUS at 12:10

## 2017-06-07 NOTE — CONSULT NOTE ADULT - PROBLEM SELECTOR RECOMMENDATION 2
- Pt experienced episode of hemoptysis over night in the setting of AC  - Currently active bleeding, PTT w/in normal limits  - Pt to get 1u pRBC  - Pt hypotensive and requires monitoring in the MICU

## 2017-06-07 NOTE — CONSULT NOTE ADULT - PROBLEM SELECTOR RECOMMENDATION 9
- Anticoagulation contraindicated at this time, predominantly subsegmental PE per recent CHEST guidelines do not need AC for first time w/out known risk factors for procoaguable state. In addition no LE DVT  - Hemodynamically stable, no active bleeding, PTT w/in normal limits - Anticoagulation contraindicated at this time, predominantly subsegmental PE per recent CHEST guidelines do not need AC for first time w/out known risk factors for procoaguable state. In addition no LE DVT

## 2017-06-07 NOTE — PROGRESS NOTE ADULT - SUBJECTIVE AND OBJECTIVE BOX
CHIEF COMPLAINT: GI Bleeding    Interval Events: O/N pt developed sinus tachycardia with PACs (as per 12-lead EKGs). Electrolytes were repleted and she received 2U pRBCs. Pt remained asymptomatic despite tachycardia with no further episodes of bleeding following RRT. Daughter at bedside, all questions answered.     REVIEW OF SYSTEMS:  Constitutional: [ ] fevers [ ] chills [ ] weight loss [ ] weight gain  HEENT: [ ] dry eyes [ ] eye irritation [ ] postnasal drip [ ] nasal congestion  CV: [ ] chest pain [ ] orthopnea [ ] palpitations [ ] murmur  Resp: [ ] cough [ ] shortness of breath [ ] dyspnea [ ] wheezing [ ] sputum [ ] hemoptysis  GI: [ ] nausea [ ] vomiting [ ] diarrhea [ ] constipation [ ] abd pain [ ] dysphagia   : [ ] dysuria [ ] nocturia [ ] hematuria [ ] increased urinary frequency  Musculoskeletal: [ ] back pain [ ] myalgias [ ] arthralgias [ ] fracture  Skin: [ ] rash [ ] itch  Neurological: [ ] headache [ ] dizziness [ ] syncope [ ] weakness [ ] numbness  Psychiatric: [ ] anxiety [ ] depression  Endocrine: [ ] diabetes [ ] thyroid problem  Hematologic/Lymphatic: [ ] anemia [ ] bleeding problem  Allergic/Immunologic: [ ] itchy eyes [ ] nasal discharge [ ] hives [ ] angioedema  [x] All other systems negative  [ ] Unable to assess ROS because ________    OBJECTIVE:  ICU Vital Signs Last 24 Hrs  T(C): 36.6, Max: 36.6 (06-06 @ 16:00)  T(F): 97.9, Max: 97.9 (06-06 @ 16:00)  HR: 103 (78 - 135)  BP: 132/82 (109/49 - 153/67)  BP(mean): 103 (82 - 119)  ABP: --  ABP(mean): --  RR: 41 (18 - 55)  SpO2: 100% (98% - 100%)      I & Os for 24h ending 06-07 @ 07:00  =============================================  IN: 294 ml / OUT: 950 ml / NET: -656 ml    I & Os for current day (as of 06-07 @ 15:39)  =============================================  IN: 907.5 ml / OUT: 0 ml / NET: 907.5 ml    CAPILLARY BLOOD GLUCOSE  104 (07 Jun 2017 12:00)      PHYSICAL EXAM:  General: NAD  HEENT: NCAT, HANNAH, EOMI, MOM  Lymph Nodes: wnl  Neck: wnl  Respiratory: CTA b/l   Cardiovascular: Tachycardic. S1/S1, (-) murmurs/gallops/rubs  Abdomen: wnl  Extremities: wnl  Skin: wnl  Neurological: A&O x3, no new focal deficits  Psychiatry: wn    LINES: 2 PIV    [facility] MEDICATIONS:  MEDICATIONS  (STANDING):  pantoprazole Infusion 8mG/Hr IV Continuous <Continuous>  piperacillin/tazobactam IVPB. 3.375Gram(s) IV Intermittent every 8 hours  insulin lispro (HumaLOG) corrective regimen sliding scale  SubCutaneous at bedtime  heparin  Injectable 5000Unit(s) SubCutaneous every 12 hours  insulin lispro (HumaLOG) corrective regimen sliding scale  SubCutaneous every 6 hours  calcium gluconate IVPB 1Gram(s) IV Intermittent once    MEDICATIONS  (PRN):  HYDROmorphone  Injectable 0.5milliGRAM(s) IV Push every 4 hours PRN Severe Pain (7 - 10)  acetaminophen   Tablet 650milliGRAM(s) Oral every 6 hours PRN For Temp greater than 38 C (100.4 F)      LABS:                        9.4    12.3  )-----------( 198      ( 07 Jun 2017 12:11 )             28.1     Hgb Trend: 9.4<--, 8.2<--, 10.3<--, 10.1<--, 10.1<--  06-07    137  |  109<H>  |  34<H>  ----------------------------<  104<H>  3.8   |  15<L>  |  0.90    Ca    7.3<L>      07 Jun 2017 12:11  Phos  1.9     06-07  Mg     1.3     06-07    TPro  4.0<L>  /  Alb  2.4<L>  /  TBili  0.3  /  DBili  x   /  AST  18  /  ALT  35  /  AlkPhos  51  06-07    Creatinine Trend: 0.90<--, 0.97<--, 0.90<--, 1.04<--, 1.09<--, 1.48<--  PT/INR - ( 07 Jun 2017 07:18 )   PT: 14.0 sec;   INR: 1.23 ratio         PTT - ( 07 Jun 2017 07:18 )  PTT:25.6 sec      Venous Blood Gas:  06-05 @ 22:23  7.32/34/32/17/57  VBG Lactate: 3.1      MICROBIOLOGY:     RADIOLOGY:  [x] Reviewed and interpreted by me    EKG: Personally reviewed     Assessment and Recommendation:   · Assessment		  Pt is an 82F with PMHx severe COPD (c/b recurrent pseudomonas air space infection), CKD Stage III, sleep apnea, HTN, and recent admission 5/20-5/31 for SOB 2/2 newly diagnosed PE c/b hematochezia 2/2 GIB in setting of recent initiation of A/C presenting to [facility] on 6/4/17 for recurrent active UGIB. On admission pt was admitted to MICU on massive transfusion protocol and received 4 pRBCs. She was started on protonix drip. EGD performed in MICU, which showed a very large cratered gastric ulcer without stigmata of bleeding, friable gastric mucosa, and an oozing duodenal ulcer. She was treated with bipolar cautery and duodenal ulcer was clipped. Pt tolerated procedure well, full A/C with heparin drip was restarted on 6/5/17. She remained HD stable and was transferred to medicine floors. On 6/7/17 an RRT was called for repeat UGIB with ~50cc blood loss c/b sinus tachycardia with PACs. She was transfused 2U pRBC and transferred back to MICU for repeat EGD and careful monitoring.     1) Neuro: No new focal deficits. PT consult once medically stable. Encourage mobilization with OOB to chair.  2) CV: Pt with intermittent tachycardia likely 2/2 acute blood loss anemia. TTE performed last month with Stage I CHFpEF. Transfuse pRBCs and monitor on telemetry. CBC q4hr. HD monitoring as per ICU protocol. Aggressive electrolyte repletion, avoid BB or CCB. If pt develops AFib and reversible etiology r/o'ed, would recommend Digoxin as initial control agent.  3) Resp: Pt with severe COPD with previous concern for air space infection with Pseudomonas sputum colonization. Will c/w empiric Abx with Zosyn IV given high risk of aspiration. Aspiration precautions in place. O2 supplementation with NC for O2 sat >91%. Further c/b right upper lobe segmental and lower lobe subsegmental pulmonary embolism (5/23) started on heparin drip with life-threatening GIB x3 occasions. Will have to re-address A/C following medical stabilization with pt and daughter.   4) GI: Pt with recurrent hematochezia in the setting of known cratered gastric ulcer, friable gastric mucosa, and an oozing duodenal ulcer recently cauterized and clipped. Plan for repeat EGD today. c/w PPI drip. GI following.   5) Renal: wnl   6) Endo: wnl  7) ID/Heme: Plan as above  8) DVT ppx: SCDs

## 2017-06-07 NOTE — CONSULT NOTE ADULT - SUBJECTIVE AND OBJECTIVE BOX
CHIEF COMPLAINT: GI Bleeding    Interval Events: O/N pt developed sinus tachycardia with PACs (as per 12-lead EKGs). Electrolytes were repleted and she received 2U pRBCs. Pt remained asymptomatic despite tachycardia with no further episodes of bleeding following RRT. Daughter at bedside, all questions answered.     REVIEW OF SYSTEMS:  Constitutional: [ ] fevers [ ] chills [ ] weight loss [ ] weight gain  HEENT: [ ] dry eyes [ ] eye irritation [ ] postnasal drip [ ] nasal congestion  CV: [ ] chest pain [ ] orthopnea [ ] palpitations [ ] murmur  Resp: [ ] cough [ ] shortness of breath [ ] dyspnea [ ] wheezing [ ] sputum [ ] hemoptysis  GI: [ ] nausea [ ] vomiting [ ] diarrhea [ ] constipation [ ] abd pain [ ] dysphagia   : [ ] dysuria [ ] nocturia [ ] hematuria [ ] increased urinary frequency  Musculoskeletal: [ ] back pain [ ] myalgias [ ] arthralgias [ ] fracture  Skin: [ ] rash [ ] itch  Neurological: [ ] headache [ ] dizziness [ ] syncope [ ] weakness [ ] numbness  Psychiatric: [ ] anxiety [ ] depression  Endocrine: [ ] diabetes [ ] thyroid problem  Hematologic/Lymphatic: [ ] anemia [ ] bleeding problem  Allergic/Immunologic: [ ] itchy eyes [ ] nasal discharge [ ] hives [ ] angioedema  [x] All other systems negative  [ ] Unable to assess ROS because ________    OBJECTIVE:  ICU Vital Signs Last 24 Hrs  T(C): 36.6, Max: 36.6 (06-06 @ 16:00)  T(F): 97.9, Max: 97.9 (06-06 @ 16:00)  HR: 103 (78 - 135)  BP: 132/82 (109/49 - 153/67)  BP(mean): 103 (82 - 119)  ABP: --  ABP(mean): --  RR: 41 (18 - 55)  SpO2: 100% (98% - 100%)      I & Os for 24h ending 06-07 @ 07:00  =============================================  IN: 294 ml / OUT: 950 ml / NET: -656 ml    I & Os for current day (as of 06-07 @ 15:39)  =============================================  IN: 907.5 ml / OUT: 0 ml / NET: 907.5 ml    CAPILLARY BLOOD GLUCOSE  104 (07 Jun 2017 12:00)      PHYSICAL EXAM:  General: NAD  HEENT: NCAT, HANNAH, EOMI, MOM  Lymph Nodes: wnl  Neck: wnl  Respiratory: CTA b/l   Cardiovascular: Tachycardic. S1/S1, (-) murmurs/gallops/rubs  Abdomen: wnl  Extremities: wnl  Skin: wnl  Neurological: A&O x3, no new focal deficits  Psychiatry: wn    LINES: 2 PIV    [facility] MEDICATIONS:  MEDICATIONS  (STANDING):  pantoprazole Infusion 8mG/Hr IV Continuous <Continuous>  piperacillin/tazobactam IVPB. 3.375Gram(s) IV Intermittent every 8 hours  insulin lispro (HumaLOG) corrective regimen sliding scale  SubCutaneous at bedtime  heparin  Injectable 5000Unit(s) SubCutaneous every 12 hours  insulin lispro (HumaLOG) corrective regimen sliding scale  SubCutaneous every 6 hours  calcium gluconate IVPB 1Gram(s) IV Intermittent once    MEDICATIONS  (PRN):  HYDROmorphone  Injectable 0.5milliGRAM(s) IV Push every 4 hours PRN Severe Pain (7 - 10)  acetaminophen   Tablet 650milliGRAM(s) Oral every 6 hours PRN For Temp greater than 38 C (100.4 F)      LABS:                        9.4    12.3  )-----------( 198      ( 07 Jun 2017 12:11 )             28.1     Hgb Trend: 9.4<--, 8.2<--, 10.3<--, 10.1<--, 10.1<--  06-07    137  |  109<H>  |  34<H>  ----------------------------<  104<H>  3.8   |  15<L>  |  0.90    Ca    7.3<L>      07 Jun 2017 12:11  Phos  1.9     06-07  Mg     1.3     06-07    TPro  4.0<L>  /  Alb  2.4<L>  /  TBili  0.3  /  DBili  x   /  AST  18  /  ALT  35  /  AlkPhos  51  06-07    Creatinine Trend: 0.90<--, 0.97<--, 0.90<--, 1.04<--, 1.09<--, 1.48<--  PT/INR - ( 07 Jun 2017 07:18 )   PT: 14.0 sec;   INR: 1.23 ratio         PTT - ( 07 Jun 2017 07:18 )  PTT:25.6 sec      Venous Blood Gas:  06-05 @ 22:23  7.32/34/32/17/57  VBG Lactate: 3.1      MICROBIOLOGY:     RADIOLOGY:  [x] Reviewed and interpreted by me    EKG: Personally reviewed

## 2017-06-07 NOTE — CONSULT NOTE ADULT - SUBJECTIVE AND OBJECTIVE BOX
HPI: 82F w/ hx HTN, CKD, COPD p/w UGIB 2/2 AC for PE on last visit c/b ongoing PNA tx, TIARRA on CKD, therapeutic INR s/p reversal, massive tranfusion protocol. Pt had EGD which showed multiple ulcers with one oozing ulcer requiring cauterization and clip. Risks and benefits of anticoagulation discussed and pt restarted. Pt transferred to the medical floor for further management. Over night pt had episode of hematemesis with loss of approximately 50ccs. Pt maintained pressures with fluids. Labs sent     PAST MEDICAL & SURGICAL HISTORY:  Lung abscess  PE (pulmonary thromboembolism)  Rebound headache  Chronic kidney disease, unspecified stage  Hyperlipidemia, unspecified hyperlipidemia type  COPD (chronic obstructive pulmonary disease)  Hypertension  No significant past surgical history      FAMILY HISTORY:  Family history of COPD (chronic obstructive pulmonary disease) (Father)  No pertinent family history in first degree relatives      Allergies    No Known Allergies    Intolerances        HOME MEDICATIONS:    REVIEW OF SYSTEMS:  Constitutional: no lightheadedness, no fevers  HEENT: no blurry vision  CV: no CP, no palpitations  Resp: No SOB  GI: no nausea/vomiting  : no urinary changes  Skin: no rashes  Neurological: no HA or dizziness  Hematologic/Lymphatic: hematemesis overnight      OBJECTIVE:  ICU Vital Signs Last 24 Hrs  T(C): 36.4, Max: 36.6 (06-06 @ 16:00)  T(F): 97.5, Max: 97.9 (06-06 @ 16:00)  HR: 78 (67 - 100)  BP: 135/69 (110/55 - 153/67)  BP(mean): 96 (77 - 108)  ABP: --  ABP(mean): --  RR: 18 (15 - 32)  SpO2: 99% (98% - 100%)        I & Os for current day (as of 06-07 @ 07:25)  =============================================  IN: 294 ml / OUT: 950 ml / NET: -656 ml    CAPILLARY BLOOD GLUCOSE  84 (06 Jun 2017 22:07)      PHYSICAL EXAM:  General: NAD  HEENT: No conjunctival pallor  Neck:   Respiratory: CTAB  Cardiovascular: RRR  Abdomen: Soft, NT/ND, nl BS  Extremities: atraumatic  Skin: no rashes  Neurological: AOx3  Psychiatry: normal affect      HOSPITAL MEDICATIONS:  MEDICATIONS  (STANDING):  pantoprazole Infusion 8mG/Hr IV Continuous <Continuous>  piperacillin/tazobactam IVPB. 3.375Gram(s) IV Intermittent every 8 hours  insulin lispro (HumaLOG) corrective regimen sliding scale  SubCutaneous three times a day before meals  insulin lispro (HumaLOG) corrective regimen sliding scale  SubCutaneous at bedtime  heparin  Injectable 5000Unit(s) SubCutaneous every 12 hours  sodium chloride 0.9% Bolus 1000milliLiter(s) IV Bolus once    MEDICATIONS  (PRN):  HYDROmorphone  Injectable 0.5milliGRAM(s) IV Push every 4 hours PRN Severe Pain (7 - 10)  acetaminophen   Tablet 650milliGRAM(s) Oral every 6 hours PRN For Temp greater than 38 C (100.4 F)      LABS:                        8.2    10.3  )-----------( 257      ( 07 Jun 2017 06:27 )             24.9     Hgb Trend: 8.2<--, 10.3<--, 10.1<--, 10.1<--, 10.0<--  06-07    138  |  110<H>  |  27<H>  ----------------------------<  117<H>  4.1   |  16<L>  |  0.97    Ca    7.8<L>      07 Jun 2017 06:27  Phos  1.6     06-07  Mg     1.6     06-06    TPro  4.0<L>  /  Alb  2.4<L>  /  TBili  0.3  /  DBili  x   /  AST  18  /  ALT  35  /  AlkPhos  51  06-07    Creatinine Trend: 0.97<--, 0.90<--, 1.04<--, 1.09<--, 1.48<--, 1.10<--  PT/INR - ( 06 Jun 2017 16:43 )   PT: 12.1 sec;   INR: 1.11 ratio         PTT - ( 06 Jun 2017 19:27 )  PTT:36.0 sec      Venous Blood Gas:  06-05 @ 22:23  7.32/34/32/17/57  VBG Lactate: 3.1  Venous Blood Gas:  06-05 @ 11:02  7.46/21/118/14/100  VBG Lactate: 0.6      MICROBIOLOGY:     RADIOLOGY:  [X] Reviewed and interpreted by danna HOLLIDAY dopplers: No evidence of deep vein thrombosis of either lower extremity. HPI: 82F w/ hx HTN, CKD, COPD p/w UGIB 2/2 AC for PE on last visit c/b ongoing PNA tx, TIARRA on CKD, therapeutic INR s/p reversal, massive tranfusion protocol. Pt had EGD which showed multiple ulcers with one oozing ulcer requiring cauterization and clip. Risks and benefits of anticoagulation discussed and pt restarted. Pt transferred to the medical floor for further management. Over night pt had episode of hematemesis with loss of approximately 50ccs. Pt maintained pressures with fluids. Labs sent     PAST MEDICAL & SURGICAL HISTORY:  Lung abscess  PE (pulmonary thromboembolism)  Rebound headache  Chronic kidney disease, unspecified stage  Hyperlipidemia, unspecified hyperlipidemia type  COPD (chronic obstructive pulmonary disease)  Hypertension  No significant past surgical history      FAMILY HISTORY:  Family history of COPD (chronic obstructive pulmonary disease) (Father)  No pertinent family history in first degree relatives      Allergies    No Known Allergies    Intolerances        HOME MEDICATIONS:    REVIEW OF SYSTEMS:  Constitutional: no lightheadedness, no fevers  HEENT: no blurry vision  CV: no CP, no palpitations  Resp: No SOB  GI: no nausea/vomiting  : no urinary changes  Skin: no rashes  Neurological: no HA or dizziness  Hematologic/Lymphatic: hematemesis overnight      OBJECTIVE:  ICU Vital Signs Last 24 Hrs  T(C): 36.4, Max: 36.6 (06-06 @ 16:00)  T(F): 97.5, Max: 97.9 (06-06 @ 16:00)  HR: 78 (67 - 100)  BP: 135/69 (110/55 - 153/67)  BP(mean): 96 (77 - 108)  ABP: --  ABP(mean): --  RR: 18 (15 - 32)  SpO2: 99% (98% - 100%)        I & Os for current day (as of 06-07 @ 07:25)  =============================================  IN: 294 ml / OUT: 950 ml / NET: -656 ml    CAPILLARY BLOOD GLUCOSE  84 (06 Jun 2017 22:07)      PHYSICAL EXAM:  General: NAD  HEENT: conjunctival pallor  Respiratory: CTAB  Cardiovascular: RRR  Abdomen: Soft, NT/ND, nl BS  Extremities: atraumatic  Skin: no rashes  Neurological: AOx3  Psychiatry: normal affect      HOSPITAL MEDICATIONS:  MEDICATIONS  (STANDING):  pantoprazole Infusion 8mG/Hr IV Continuous <Continuous>  piperacillin/tazobactam IVPB. 3.375Gram(s) IV Intermittent every 8 hours  insulin lispro (HumaLOG) corrective regimen sliding scale  SubCutaneous three times a day before meals  insulin lispro (HumaLOG) corrective regimen sliding scale  SubCutaneous at bedtime  heparin  Injectable 5000Unit(s) SubCutaneous every 12 hours  sodium chloride 0.9% Bolus 1000milliLiter(s) IV Bolus once    MEDICATIONS  (PRN):  HYDROmorphone  Injectable 0.5milliGRAM(s) IV Push every 4 hours PRN Severe Pain (7 - 10)  acetaminophen   Tablet 650milliGRAM(s) Oral every 6 hours PRN For Temp greater than 38 C (100.4 F)      LABS:                        8.2    10.3  )-----------( 257      ( 07 Jun 2017 06:27 )             24.9     Hgb Trend: 8.2<--, 10.3<--, 10.1<--, 10.1<--, 10.0<--  06-07    138  |  110<H>  |  27<H>  ----------------------------<  117<H>  4.1   |  16<L>  |  0.97    Ca    7.8<L>      07 Jun 2017 06:27  Phos  1.6     06-07  Mg     1.6     06-06    TPro  4.0<L>  /  Alb  2.4<L>  /  TBili  0.3  /  DBili  x   /  AST  18  /  ALT  35  /  AlkPhos  51  06-07    Creatinine Trend: 0.97<--, 0.90<--, 1.04<--, 1.09<--, 1.48<--, 1.10<--  PT/INR - ( 06 Jun 2017 16:43 )   PT: 12.1 sec;   INR: 1.11 ratio         PTT - ( 06 Jun 2017 19:27 )  PTT:36.0 sec      Venous Blood Gas:  06-05 @ 22:23  7.32/34/32/17/57  VBG Lactate: 3.1  Venous Blood Gas:  06-05 @ 11:02  7.46/21/118/14/100  VBG Lactate: 0.6      MICROBIOLOGY:     RADIOLOGY:  [X] Reviewed and interpreted by danna HOLLIDAY dopplers: No evidence of deep vein thrombosis of either lower extremity.

## 2017-06-07 NOTE — PROGRESS NOTE ADULT - SUBJECTIVE AND OBJECTIVE BOX
[facility] - [facility]   PROGRESS NOTE    CHIEF COMPLAINT: Shock    INTERVAL HISTORY: Transferred to the floor - heparin gtt stopped by myself ~9pm - later, patient developed a small amount of blood tinged sputum after coughing; this AM had a bout of hematemesis associated with tachycardia without hypotension. Now with melena with some BRBPR. Weak and frail.     REVIEW OF SYSTEMS:  Constitutional: [ ] fevers [ ] chills [ ] weight loss [ ] weight gain  HEENT: [ ] dry eyes [ ] eye irritation [ ] postnasal drip [ ] nasal congestion  CV: [ ] chest pain [ ] orthopnea [ ] palpitations [ ] murmur  Resp: [ ] cough [ ] shortness of breath [ ] dyspnea [ ] wheezing [ ] sputum [ ] hemoptysis  GI: [ ] nausea [ ] vomiting [ ] diarrhea [ ] constipation [ ] abd pain [ ] dysphagia   : [ ] dysuria [ ] nocturia [ ] hematuria [ ] increased urinary frequency  Musculoskeletal: [ ] back pain [ ] myalgias [ ] arthralgias [ ] fracture  Skin: [ ] rash [ ] itch  Neurological: [ ] headache [ ] dizziness [ ] syncope [x ] weakness [ ] numbness  Psychiatric: [ ] anxiety [ ] depression  Endocrine: [ ] diabetes [ ] thyroid problem  Hematologic/Lymphatic: [ ] anemia [ ] bleeding problem  Allergic/Immunologic: [ ] itchy eyes [ ] nasal discharge [ ] hives [ ] angioedema  [ ] All other systems negative  [ ] Unable to assess ROS because ________      MEDICATIONS:     Pulmonary "      Anti-microbials:  piperacillin/tazobactam IVPB. 3.375Gram(s) IV Intermittent every 8 hours      Cardiovascular:      Other:  pantoprazole Infusion 8mG/Hr IV Continuous <Continuous>  HYDROmorphone  Injectable 0.5milliGRAM(s) IV Push every 4 hours PRN  insulin lispro (HumaLOG) corrective regimen sliding scale  SubCutaneous at bedtime  acetaminophen   Tablet 650milliGRAM(s) Oral every 6 hours PRN  heparin  Injectable 5000Unit(s) SubCutaneous every 12 hours  insulin lispro (HumaLOG) corrective regimen sliding scale  SubCutaneous every 6 hours  calcium gluconate IVPB 1Gram(s) IV Intermittent once        OBJECTIVE:      ICU Vital Signs Last 24 Hrs  T(C): 36.6, Max: 36.6 (06 @ 16:00)  T(F): 97.9, Max: 97.9 ( @ 16:00)  HR: 93 (77 - 135)  BP: 145/63 (109/49 - 153/67)  BP(mean): 91 (82 - 119)  ABP: --  ABP(mean): --  RR: 25 (17 - 55)  SpO2: 100% (98% - 100%)      I&O's Detail  I & Os for 24h ending 2017 07:00  =============================================  IN:    IV PiggyBack: 150 ml    pantoprazole Infusion: 110 ml    heparin Infusion: 34 ml    Total IN: 294 ml  ---------------------------------------------  OUT:    Voided: 950 ml    Total OUT: 950 ml  ---------------------------------------------  Total NET: -656 ml    I & Os for current day (as of 2017 14:40)  =============================================  IN:    Packed Red Blood Cells: 640 ml    IV PiggyBack: 62.5 ml    pantoprazole Infusion: 60 ml    Total IN: 762.5 ml  ---------------------------------------------  OUT:    Total OUT: 0 ml  ---------------------------------------------  Total NET: 762.5 ml      Daily     Daily Weight in k.2 (2017 09:20)    CAPILLARY BLOOD GLUCOSE  104 (2017 12:00)      PHYSICAL EXAM:         General: Weak and frail; 	  HEENT:   Atraumatic. Normocephalic. Anicteric. Normal oral mucosa, PERRL, EOMI  Neck: Supple, trachea midline; thyroid without enlargement/tenderness/nodules; no carotid bruit; no JVD	  Cardiovascular: Irregular rate and rhythm, S1 S2 tachycardic. No murmurs, rubs or gallops  Respiratory: Respirations unlabored; Clear to auscultation and percussion bilaterally  Abdomen: Soft, non-tender, non-distended. No organomegaly. No masses. Normal bowel sounds	  Extremities: Cool to touch. No clubbing or cyanosis. No pedal edema.  Pulses: Decreased peripheral pulses all extremities	  Skin: Ashen. No rashes or lesions. No ecchymoses, No cyanosis, warm to touch  Lymph Nodes: Cervical, supraclavicular and axillary nodes normal  Neurological: Motor and sensory examination equal and normal. A and O x 3  Psychiatry: Appropriate mood and affect.      LABS:                        9.4    12.3  )-----------( 198      ( 2017 12:11 )             28.1     06-07    137  |  109<H>  |  34<H>  ----------------------------<  104<H>  3.8   |  15<L>  |  0.90    Ca    7.3<L>      2017 12:11  Phos  1.9     06-  Mg     1.3     06-07    TPro  4.0<L>  /  Alb  2.4<L>  /  TBili  0.3  /  DBili  x   /  AST  18  /  ALT  35  /  AlkPhos  51  -07    PT/INR - ( 2017 07:18 )   PT: 14.0 sec;   INR: 1.23 ratio         PTT - ( 2017 07:18 )  PTT:25.6 sec    Venous Blood Gas:   @ 22:23  7.32/34/32/17/57  VBG Lactate: 3.1      Serum Pro-Bnp: Serum Pro-Brain Natriuretic Peptide: 533 pg/mL ( @ 15:00)    [facility]  ____________________________________________________________________________________________________  Patient Name: Mackenzie Herrmann                     MRN: [id number]  Account Number: [id number]                     YOB: 1935  Room: Endoscopy Room 1                           Gender: Female  Attending MD: Anrdee Enamorado,                        Procedure Date No Time: 2017  ____________________________________________________________________________________________________     Procedure:           Upper GI endoscopy  Indications:         Hematemesis  Providers:           Andree Enamorado MD  Medicines:           Monitored Anesthesia Care  Complications:       No immediate complications.  ____________________________________________________________________________________________________  Procedure:           After obtaining informed consent, the endoscope was passed under direct                        vision. Throughout the procedure, the patient's blood pressure, pulse, and                        oxygen saturations were monitored continuously. The Endoscope was introduced                        through the mouth, and advanced to the second part of duodenum. The upper GI                        endoscopy was accomplished without difficulty. The patient tolerated the                        procedure well.                                                                                                        Findings:       No gross lesions were noted in the entire esophagus.       The Z-line was regular and was found 40 cm from the incisors.       One very large non-bleeding cratered gastric ulcer with no stigmata of bleeding was found in        the gastric antrum. The lesion was atleast 20 mm in largest dimension.       Localized friable mucosa with contact bleeding was found in the gastric fundus. Coagulation        for hemostasis using bipolar probe was successful. Estimated blood loss was minimal.       One oozing cratered duodenal ulcer was found in the first part of the duodenum. Coagulation        for hemostasis using bipolar probe was successful. For hemostasis, one hemostatic clip was        successfully placed (MR conditional). There was no bleeding at the end of the procedure.                                                                                                        Impression:          - No gross lesions in esophagus.                       - Z-line regular, 40 cm from the incisors.                       - One very large cratered gastric ulcer with no stigmata of bleeding.                       - Friable gastric mucosa. Treated with bipolar cautery.                       - One oozing duodenal ulcer. Treated with bipolar cautery. Clip (MR                        conditional) was placed.                       - No specimens collected.  Recommendation:      - Return patient to ICU for ongoing care.                       - Clear liquid diet today                       - Continue PPI gtt                       - Will discuss risks vs. benefits anticoagulation with MICU team                                                                                                        Attending Participation:       I personally performed the entire procedure.                                                                                                          ____________  Andree Enamorado,   2017 12:36:38 PM  Number of Addenda: 0    Note Initiated On: 2017 11:47 AM      MICROBIOLOGY:     RADIOLOGY:  [ ] Reviewed and interpreted by me

## 2017-06-07 NOTE — PROGRESS NOTE ADULT - ASSESSMENT
h/o right upper lobe opacity felt to represent infected air space infection (severe COPD/emphysema) given decrease in size with antibiotic therapy and pseudomonas isolation from sputum on more than one occasion.    eosinophilia resolved on steroids    pulmonary embolism in the setting of prolonged immobility - heparin therapy complicated by life threatening UGI bleeding on two occasions - recent EGD as above    ---------------------------------------------------------------------------------------------------------  Oxygen supplementation to keep saturation greater than 92%  EGD today with intervention on ulcer disease as indicated  ICU monitoring - follow H/H q6h and hemodynamics continuously  PPI infusion  On empiric zosyn for possibility of aspiration pneumonia  DVT prophylaxis  NO FURTHER ANTI-COAGULATION -consider placement of an IVC filter although there is no evidence of lower extremity DVT at this time    d/w daughter last night at length last night by phone and today at bedside    Will follow with you.    Wil Quiroga MD, [facility] - [phone]

## 2017-06-07 NOTE — PROGRESS NOTE ADULT - SUBJECTIVE AND OBJECTIVE BOX
Referring Physician:    Procedure: EGD    Moderate sedation [ ]      Sedation by Anesthesia [X ]    Indication for Procedure: Gastrointestinal Bleed    Pertinent History: 83 yo female    PAST MEDICAL & SURGICAL HISTORY:  Lung abscess  PE (pulmonary thromboembolism)  Rebound headache  Chronic kidney disease, unspecified stage  Hyperlipidemia, unspecified hyperlipidemia type  COPD (chronic obstructive pulmonary disease)  Hypertension  No significant past surgical history      PMH of Gastroparesis [ ]  Gastric Surgery [ ]  Gastric Outlet Obstruction [ ] None    Allergies:    No Known Allergies    Intolerances: None        Latex allergy [ ] yes [ x] no    Medications:MEDICATIONS  (STANDING):  pantoprazole Infusion 8mG/Hr IV Continuous <Continuous>  piperacillin/tazobactam IVPB. 3.375Gram(s) IV Intermittent every 8 hours  insulin lispro (HumaLOG) corrective regimen sliding scale  SubCutaneous at bedtime  heparin  Injectable 5000Unit(s) SubCutaneous every 12 hours  insulin lispro (HumaLOG) corrective regimen sliding scale  SubCutaneous every 6 hours  calcium gluconate IVPB 1Gram(s) IV Intermittent once    MEDICATIONS  (PRN):  HYDROmorphone  Injectable 0.5milliGRAM(s) IV Push every 4 hours PRN Severe Pain (7 - 10)  acetaminophen   Tablet 650milliGRAM(s) Oral every 6 hours PRN For Temp greater than 38 C (100.4 F)      Smoking: [ ] yes  [ x] no    AICD/PPM: [ ] yes   [x ] no    Pertinent lab data:                        9.4    12.3  )-----------( 198      ( 2017 12:11 )             28.1     06-07    137  |  109<H>  |  34<H>  ----------------------------<  104<H>  3.8   |  15<L>  |  0.90    Ca    7.3<L>      2017 12:11  Phos  1.9     06-  Mg     1.3     06-07    TPro  4.0<L>  /  Alb  2.4<L>  /  TBili  0.3  /  DBili  x   /  AST  18  /  ALT  35  /  AlkPhos  51  06-07    PT/INR - ( 2017 07:18 )   PT: 14.0 sec;   INR: 1.23 ratio         PTT - ( 2017 07:18 )  PTT:25.6 sec          RADIOLOGY RESULTS:    Physical Examination:     Daily Weight in k.2 (2017 09:20)  Vital Signs Last 24 Hrs  T(C): 36.6, Max: 36.6 ( @ 16:00)  T(F): 97.9, Max: 97.9 ( @ 16:00)  HR: 93 (77 - 135)  BP: 145/63 (109/49 - 153/67)  BP(mean): 91 (82 - 119)  RR: 25 (17 - 55)  SpO2: 100% (98% - 100%)  BP:  132/82      HR:                  SPO2:               Temperature:    Constitutional: NAD  HEENT: PERRLA, EOMI,     Neck:  No JVD  Respiratory: CTAB/L  Cardiovascular: S1 and S2  Gastrointestinal: BS+, soft, NT/ND  Extremities: No peripheral edema  Neurological: A/O x 3, no focal deficits  Psychiatric: Normal mood, normal affect  : No Chavez  Skin: No rashes    Comments:    ASA Class: I [ ]  II [ ]  III [ ]  IV [ ]    The patient is a suitable candidate for the planned procedure unless box checked [ ]  No, explain: Referring Physician:    Procedure: EGD    Moderate sedation [ ]      Sedation by Anesthesia [X ]    Indication for Procedure: Gastrointestinal Bleed    Pertinent History: 83 yo female PMH below with pulmonary embolism was started on coumadin p/w hematemesis, pre-syncope    PAST MEDICAL & SURGICAL HISTORY:  Lung abscess  PE (pulmonary thromboembolism)  Rebound headache  Chronic kidney disease, unspecified stage  Hyperlipidemia, unspecified hyperlipidemia type  COPD (chronic obstructive pulmonary disease)  Hypertension  No significant past surgical history      PMH of Gastroparesis [ ]  Gastric Surgery [ ]  Gastric Outlet Obstruction [ ] None    Allergies:    No Known Allergies    Intolerances: None    Latex allergy [ ] yes [ x] no    Medications:MEDICATIONS  (STANDING):  pantoprazole Infusion 8mG/Hr IV Continuous <Continuous>  piperacillin/tazobactam IVPB. 3.375Gram(s) IV Intermittent every 8 hours  insulin lispro (HumaLOG) corrective regimen sliding scale  SubCutaneous at bedtime  heparin  Injectable 5000Unit(s) SubCutaneous every 12 hours  insulin lispro (HumaLOG) corrective regimen sliding scale  SubCutaneous every 6 hours  calcium gluconate IVPB 1Gram(s) IV Intermittent once    MEDICATIONS  (PRN):  HYDROmorphone  Injectable 0.5milliGRAM(s) IV Push every 4 hours PRN Severe Pain (7 - 10)  acetaminophen   Tablet 650milliGRAM(s) Oral every 6 hours PRN For Temp greater than 38 C (100.4 F)      Smoking: [ ] yes  [ x] no    AICD/PPM: [ ] yes   [x ] no    Pertinent lab data:                        9.4    12.3  )-----------( 198      ( 2017 12:11 )             28.1     -07    137  |  109<H>  |  34<H>  ----------------------------<  104<H>  3.8   |  15<L>  |  0.90    Ca    7.3<L>      2017 12:11  Phos  1.9     06-  Mg     1.3     -    TPro  4.0<L>  /  Alb  2.4<L>  /  TBili  0.3  /  DBili  x   /  AST  18  /  ALT  35  /  AlkPhos  51  06-07    PT/INR - ( 2017 07:18 )   PT: 14.0 sec;   INR: 1.23 ratio         PTT - ( 2017 07:18 )  PTT:25.6 sec          RADIOLOGY RESULTS:    Physical Examination:     Daily Weight in k.2 (2017 09:20)  Vital Signs Last 24 Hrs  T(C): 36.6, Max: 36.6 ( @ 16:00)  T(F): 97.9, Max: 97.9 ( @ 16:00)  HR: 93 (77 - 135)  BP: 145/63 (109/49 - 153/67)  BP(mean): 91 (82 - 119)  RR: 25 (17 - 55)  SpO2: 100% (98% - 100%)  BP:  132/82      HR:  90   SPO2:100%    Temperature:    Constitutional: NAD  HEENT: PERRLA, EOMI,     Neck:  No JVD  Respiratory: diminished breath sounds  Cardiovascular: S1 and S2  Gastrointestinal: BS+, soft, NT/ND  Extremities: No peripheral edema  Neurological: A/O x 3, no focal deficits  Psychiatric: Normal mood, normal affect  : No Chavez  Skin: No rashes    Comments:    ASA Class: I [ ]  II [ ]  III [ ]  IV [ ]    The patient is a suitable candidate for the planned procedure unless box checked [ ]  No, explain: Referring Physician:    Procedure: EGD    Moderate sedation [ ]      Sedation by Anesthesia [X ]    Indication for Procedure: Gastrointestinal Bleed    Pertinent History: 83 yo female PMH below with pulmonary embolism was started on coumadin p/w hematemesis, pre-syncope    PAST MEDICAL & SURGICAL HISTORY:  Lung abscess  PE (pulmonary thromboembolism)  Rebound headache  Chronic kidney disease, unspecified stage  Hyperlipidemia, unspecified hyperlipidemia type  COPD (chronic obstructive pulmonary disease)  Hypertension  No significant past surgical history      PMH of Gastroparesis [ ]  Gastric Surgery [ ]  Gastric Outlet Obstruction [ ] None    Allergies:    No Known Allergies    Intolerances: None    Latex allergy [ ] yes [ x] no    Medications:MEDICATIONS  (STANDING):  pantoprazole Infusion 8mG/Hr IV Continuous <Continuous>  piperacillin/tazobactam IVPB. 3.375Gram(s) IV Intermittent every 8 hours  insulin lispro (HumaLOG) corrective regimen sliding scale  SubCutaneous at bedtime  heparin  Injectable 5000Unit(s) SubCutaneous every 12 hours  insulin lispro (HumaLOG) corrective regimen sliding scale  SubCutaneous every 6 hours  calcium gluconate IVPB 1Gram(s) IV Intermittent once    MEDICATIONS  (PRN):  HYDROmorphone  Injectable 0.5milliGRAM(s) IV Push every 4 hours PRN Severe Pain (7 - 10)  acetaminophen   Tablet 650milliGRAM(s) Oral every 6 hours PRN For Temp greater than 38 C (100.4 F)      Smoking: [ ] yes  [ x] no    AICD/PPM: [ ] yes   [x ] no    Pertinent lab data:                        9.4    12.3  )-----------( 198      ( 2017 12:11 )             28.1     -07    137  |  109<H>  |  34<H>  ----------------------------<  104<H>  3.8   |  15<L>  |  0.90    Ca    7.3<L>      2017 12:11  Phos  1.9     06-  Mg     1.3     -    TPro  4.0<L>  /  Alb  2.4<L>  /  TBili  0.3  /  DBili  x   /  AST  18  /  ALT  35  /  AlkPhos  51  06-07    PT/INR - ( 2017 07:18 )   PT: 14.0 sec;   INR: 1.23 ratio         PTT - ( 2017 07:18 )  PTT:25.6 sec          RADIOLOGY RESULTS:    Physical Examination:     Daily Weight in k.2 (2017 09:20)  Vital Signs Last 24 Hrs  T(C): 36.6, Max: 36.6 ( @ 16:00)  T(F): 97.9, Max: 97.9 ( @ 16:00)  HR: 93 (77 - 135)  BP: 145/63 (109/49 - 153/67)  BP(mean): 91 (82 - 119)  RR: 25 (17 - 55)  SpO2: 100% (98% - 100%)  BP:  132/82      HR:  90   SPO2:100%    Temperature:    Constitutional: NAD  HEENT: PERRLA, EOMI,     Neck:  No JVD  Respiratory: diminished breath sounds  Cardiovascular: S1 and S2  Gastrointestinal: BS+, soft, NT/ND  Extremities: No peripheral edema  Neurological: A/O x 3, no focal deficits  Psychiatric: Normal mood, normal affect  : No Chavez  Skin: No rashes    Comments:    ASA Class: I [ ]  II [ ]  III [x ]  IV [ ]    The patient is a suitable candidate for the planned procedure unless box checked [ ]  No, explain:

## 2017-06-07 NOTE — CONSULT NOTE ADULT - ASSESSMENT
Pt is an 82F with PMHx severe COPD (c/b recurrent pseudomonas air space infection), CKD Stage III, sleep apnea, HTN, and recent admission 5/20-5/31 for SOB 2/2 newly diagnosed PE c/b hematochezia 2/2 GIB in setting of recent initiation of A/C presenting to [facility] on 6/4/17 for recurrent active UGIB. On admission pt was admitted to MICU on massive transfusion protocol and received 4 pRBCs. She was started on protonix drip. EGD performed in MICU, which showed a very large cratered gastric ulcer without stigmata of bleeding, friable gastric mucosa, and an oozing duodenal ulcer. She was treated with bipolar cautery and duodenal ulcer was clipped. Pt tolerated procedure well, full A/C with heparin drip was restarted on 6/5/17. She remained HD stable and was transferred to medicine floors. On 6/7/17 an RRT was called for repeat UGIB with ~50cc blood loss c/b sinus tachycardia with PACs. She was transfused 2U pRBC and transferred back to MICU for repeat EGD and careful monitoring.     1) Neuro: No new focal deficits. PT consult once medically stable. Encourage mobilization with OOB to chair.  2) CV: Pt with intermittent tachycardia likely 2/2 acute blood loss anemia. TTE performed last month with Stage I CHFpEF. Transfuse pRBCs and monitor on telemetry. CBC q4hr. HD monitoring as per ICU protocol. Aggressive electrolyte repletion, avoid BB or CCB. If pt develops AFib and reversible etiology r/o'ed, would recommend Digoxin as initial control agent.  3) Resp: Pt with severe COPD with previous concern for air space infection with Pseudomonas sputum colonization. Will c/w empiric Abx with Zosyn IV given high risk of aspiration. Aspiration precautions in place. O2 supplementation with NC for O2 sat >91%. Further c/b right upper lobe segmental and lower lobe subsegmental pulmonary embolism (5/23) started on heparin drip with life-threatening GIB x3 occasions. Will have to re-address A/C following medical stabilization with pt and daughter.   4) GI: Pt with recurrent hematochezia in the setting of known cratered gastric ulcer, friable gastric mucosa, and an oozing duodenal ulcer recently cauterized and clipped. Plan for repeat EGD today. c/w PPI drip. GI following.   5) Renal: wnl   6) Endo: wnl  7) ID/Heme: Plan as above  8) DVT ppx: SCDs

## 2017-06-07 NOTE — PROGRESS NOTE ADULT - ASSESSMENT
Patient with known gastric and duodenal ulcer which was treated with cautery and clip Monday 6/5. At that time, decision was made in conjunction with MICU team to proceed with anticoagulation given cardiopulmonary risk from untreated PE. Small volume hematemesis melena this AM while patient was off Hep gtt (since last evening)  Continue PPI gtt  NPO  Awaiting 2nd unit PRBC  Awaiting Cardiology input re: new onset A Fib  Possible EGD when patient clinically stable and cleared from cardiac standpoint.  Serial CBC q 6 hours

## 2017-06-07 NOTE — PROGRESS NOTE ADULT - SUBJECTIVE AND OBJECTIVE BOX
O/N event noted.  Heparin gtt was discontinued last evening. Patient continued on PPI gtt. Episode of small volume hematemesis this AM at 6AM. ~ 50 cc of blood.  +melena. Now s/p 1 U PRBC. Hemodynamically stable with the exception of tachycardia. Noted to be in new onset A Fib.      Review of Systems:    General:  No wt loss, fevers, chills, night sweats,fatigue,   CV:  No pain, palpitatioins, hypo/hypertension  Resp:  +dyspnea,no cough, , wheezing  GI:  No pain, No diarrhea, No constipatiion, No weight loss, No fever, No pruritis, No rectal bleeding, No tarry stools, No dysphagia,  :  No pain, bleeding, incontinence, nocturia  Muscle:  No pain,   Neuro:  No weakness, tingling, +memory problems  Endocrine:  No polyuria, polydypsia, cold/heat intolerance  Heme:  No petechiae, ecchymosis, easy bruisability  Skin:  No rash, tattoos, scars,       Vital Signs Last 24 Hrs  T(C): 36.6, Max: 36.6 (06-06 @ 16:00)  T(F): 97.9, Max: 97.9 (06-06 @ 16:00)  HR: 103 (75 - 135)  BP: 145/99 (109/49 - 153/67)  BP(mean): 119 (82 - 119)  RR: 48 (16 - 55)  SpO2: 100% (98% - 100%)    PHYSICAL EXAM:    Constitutional: NAD, well-developed, appears pale  Neck: No LAD, supple  Respiratory: +bs  Cardiovascular: S1 and S2, irregular  Gastrointestinal: BS+, soft, NT/ND, neg HSM,  Vascular: + peripheral pulses  Neurological: A/O x 3, no focal deficits  Psychiatric: Normal mood, normal affect  Skin: No rashes    MEDICATIONS  (STANDING):  pantoprazole Infusion 8mG/Hr IV Continuous <Continuous>  piperacillin/tazobactam IVPB. 3.375Gram(s) IV Intermittent every 8 hours  insulin lispro (HumaLOG) corrective regimen sliding scale  SubCutaneous three times a day before meals  insulin lispro (HumaLOG) corrective regimen sliding scale  SubCutaneous at bedtime  heparin  Injectable 5000Unit(s) SubCutaneous every 12 hours    MEDICATIONS  (PRN):  HYDROmorphone  Injectable 0.5milliGRAM(s) IV Push every 4 hours PRN Severe Pain (7 - 10)  acetaminophen   Tablet 650milliGRAM(s) Oral every 6 hours PRN For Temp greater than 38 C (100.4 F)      Allergies    No Known Allergies    Intolerances        LABS:                        8.2    10.3  )-----------( 257      ( 07 Jun 2017 06:27 )             24.9     06-07    138  |  110<H>  |  27<H>  ----------------------------<  117<H>  4.1   |  16<L>  |  0.97    Ca    7.8<L>      07 Jun 2017 06:27  Phos  1.6     06-07  Mg     1.6     06-06    TPro  4.0<L>  /  Alb  2.4<L>  /  TBili  0.3  /  DBili  x   /  AST  18  /  ALT  35  /  AlkPhos  51  06-07    PT/INR - ( 07 Jun 2017 07:18 )   PT: 14.0 sec;   INR: 1.23 ratio         PTT - ( 07 Jun 2017 07:18 )  PTT:25.6 sec    LIVER FUNCTIONS - ( 07 Jun 2017 06:27 )  Alb: 2.4 g/dL / Pro: 4.0 g/dL / ALK PHOS: 51 U/L / ALT: 35 U/L RC / AST: 18 U/L / GGT: x             RADIOLOGY & ADDITIONAL TESTS:

## 2017-06-08 LAB
ANION GAP SERPL CALC-SCNC: 15 MMOL/L — SIGNIFICANT CHANGE UP (ref 5–17)
APTT BLD: 23.4 SEC — LOW (ref 27.5–37.4)
BUN SERPL-MCNC: 26 MG/DL — HIGH (ref 7–23)
CALCIUM SERPL-MCNC: 7.5 MG/DL — LOW (ref 8.4–10.5)
CHLORIDE SERPL-SCNC: 110 MMOL/L — HIGH (ref 96–108)
CO2 SERPL-SCNC: 14 MMOL/L — LOW (ref 22–31)
CREAT SERPL-MCNC: 0.93 MG/DL — SIGNIFICANT CHANGE UP (ref 0.5–1.3)
GAS PNL BLDA: SIGNIFICANT CHANGE UP
GLUCOSE SERPL-MCNC: 79 MG/DL — SIGNIFICANT CHANGE UP (ref 70–99)
HCT VFR BLD CALC: 28.3 % — LOW (ref 34.5–45)
HCT VFR BLD CALC: 28.4 % — LOW (ref 34.5–45)
HGB BLD-MCNC: 9.9 G/DL — LOW (ref 11.5–15.5)
HGB BLD-MCNC: 9.9 G/DL — LOW (ref 11.5–15.5)
INR BLD: 1.07 RATIO — SIGNIFICANT CHANGE UP (ref 0.88–1.16)
MAGNESIUM SERPL-MCNC: 1.8 MG/DL — SIGNIFICANT CHANGE UP (ref 1.6–2.6)
MCHC RBC-ENTMCNC: 31.1 PG — SIGNIFICANT CHANGE UP (ref 27–34)
MCHC RBC-ENTMCNC: 31.2 PG — SIGNIFICANT CHANGE UP (ref 27–34)
MCHC RBC-ENTMCNC: 34.9 GM/DL — SIGNIFICANT CHANGE UP (ref 32–36)
MCHC RBC-ENTMCNC: 35 GM/DL — SIGNIFICANT CHANGE UP (ref 32–36)
MCV RBC AUTO: 89 FL — SIGNIFICANT CHANGE UP (ref 80–100)
MCV RBC AUTO: 89.6 FL — SIGNIFICANT CHANGE UP (ref 80–100)
PHOSPHATE SERPL-MCNC: 2.9 MG/DL — SIGNIFICANT CHANGE UP (ref 2.5–4.5)
PLATELET # BLD AUTO: 177 K/UL — SIGNIFICANT CHANGE UP (ref 150–400)
PLATELET # BLD AUTO: 183 K/UL — SIGNIFICANT CHANGE UP (ref 150–400)
POTASSIUM SERPL-MCNC: 3.7 MMOL/L — SIGNIFICANT CHANGE UP (ref 3.5–5.3)
POTASSIUM SERPL-SCNC: 3.7 MMOL/L — SIGNIFICANT CHANGE UP (ref 3.5–5.3)
PROTHROM AB SERPL-ACNC: 11.6 SEC — SIGNIFICANT CHANGE UP (ref 9.8–12.7)
RBC # BLD: 3.17 M/UL — LOW (ref 3.8–5.2)
RBC # BLD: 3.19 M/UL — LOW (ref 3.8–5.2)
RBC # FLD: 16.2 % — HIGH (ref 10.3–14.5)
RBC # FLD: 16.3 % — HIGH (ref 10.3–14.5)
SODIUM SERPL-SCNC: 139 MMOL/L — SIGNIFICANT CHANGE UP (ref 135–145)
WBC # BLD: 10.9 K/UL — HIGH (ref 3.8–10.5)
WBC # BLD: 11.9 K/UL — HIGH (ref 3.8–10.5)
WBC # FLD AUTO: 10.9 K/UL — HIGH (ref 3.8–10.5)
WBC # FLD AUTO: 11.9 K/UL — HIGH (ref 3.8–10.5)

## 2017-06-08 PROCEDURE — 93010 ELECTROCARDIOGRAM REPORT: CPT

## 2017-06-08 PROCEDURE — 99233 SBSQ HOSP IP/OBS HIGH 50: CPT | Mod: GC

## 2017-06-08 RX ORDER — INSULIN LISPRO 100/ML
VIAL (ML) SUBCUTANEOUS
Qty: 0 | Refills: 0 | Status: DISCONTINUED | OUTPATIENT
Start: 2017-06-08 | End: 2017-06-13

## 2017-06-08 RX ORDER — ACETAMINOPHEN 500 MG
1000 TABLET ORAL ONCE
Qty: 0 | Refills: 0 | Status: COMPLETED | OUTPATIENT
Start: 2017-06-08 | End: 2017-06-08

## 2017-06-08 RX ORDER — TEMAZEPAM 15 MG/1
15 CAPSULE ORAL AT BEDTIME
Qty: 0 | Refills: 0 | Status: DISCONTINUED | OUTPATIENT
Start: 2017-06-08 | End: 2017-06-13

## 2017-06-08 RX ORDER — POTASSIUM PHOSPHATE, MONOBASIC POTASSIUM PHOSPHATE, DIBASIC 236; 224 MG/ML; MG/ML
15 INJECTION, SOLUTION INTRAVENOUS ONCE
Qty: 0 | Refills: 0 | Status: COMPLETED | OUTPATIENT
Start: 2017-06-08 | End: 2017-06-08

## 2017-06-08 RX ORDER — ACETAMINOPHEN 500 MG
650 TABLET ORAL EVERY 6 HOURS
Qty: 0 | Refills: 0 | Status: DISCONTINUED | OUTPATIENT
Start: 2017-06-08 | End: 2017-06-13

## 2017-06-08 RX ADMIN — PIPERACILLIN AND TAZOBACTAM 25 GRAM(S): 4; .5 INJECTION, POWDER, LYOPHILIZED, FOR SOLUTION INTRAVENOUS at 13:01

## 2017-06-08 RX ADMIN — HYDROMORPHONE HYDROCHLORIDE 0.5 MILLIGRAM(S): 2 INJECTION INTRAMUSCULAR; INTRAVENOUS; SUBCUTANEOUS at 04:12

## 2017-06-08 RX ADMIN — PIPERACILLIN AND TAZOBACTAM 25 GRAM(S): 4; .5 INJECTION, POWDER, LYOPHILIZED, FOR SOLUTION INTRAVENOUS at 22:22

## 2017-06-08 RX ADMIN — Medication 650 MILLIGRAM(S): at 16:00

## 2017-06-08 RX ADMIN — Medication 400 MILLIGRAM(S): at 13:01

## 2017-06-08 RX ADMIN — PANTOPRAZOLE SODIUM 10 MG/HR: 20 TABLET, DELAYED RELEASE ORAL at 18:11

## 2017-06-08 RX ADMIN — PANTOPRAZOLE SODIUM 10 MG/HR: 20 TABLET, DELAYED RELEASE ORAL at 12:46

## 2017-06-08 RX ADMIN — Medication 650 MILLIGRAM(S): at 16:30

## 2017-06-08 RX ADMIN — Medication 650 MILLIGRAM(S): at 22:22

## 2017-06-08 RX ADMIN — Medication 650 MILLIGRAM(S): at 22:40

## 2017-06-08 RX ADMIN — Medication 1000 MILLIGRAM(S): at 13:16

## 2017-06-08 RX ADMIN — HYDROMORPHONE HYDROCHLORIDE 0.5 MILLIGRAM(S): 2 INJECTION INTRAMUSCULAR; INTRAVENOUS; SUBCUTANEOUS at 03:57

## 2017-06-08 RX ADMIN — POTASSIUM PHOSPHATE, MONOBASIC POTASSIUM PHOSPHATE, DIBASIC 62.5 MILLIMOLE(S): 236; 224 INJECTION, SOLUTION INTRAVENOUS at 05:49

## 2017-06-08 RX ADMIN — PIPERACILLIN AND TAZOBACTAM 25 GRAM(S): 4; .5 INJECTION, POWDER, LYOPHILIZED, FOR SOLUTION INTRAVENOUS at 05:49

## 2017-06-08 NOTE — PROGRESS NOTE ADULT - SUBJECTIVE AND OBJECTIVE BOX
INTERVAL HPI/OVERNIGHT EVENTS:  Reported small melanic stool this AM but no BM since.  No N/V or abdominal pain.    MEDICATIONS  (STANDING):  pantoprazole Infusion 8mG/Hr IV Continuous <Continuous>  piperacillin/tazobactam IVPB. 3.375Gram(s) IV Intermittent every 8 hours  insulin lispro (HumaLOG) corrective regimen sliding scale  SubCutaneous at bedtime  insulin lispro (HumaLOG) corrective regimen sliding scale  SubCutaneous three times a day before meals    MEDICATIONS  (PRN):  HYDROmorphone  Injectable 0.5milliGRAM(s) IV Push every 4 hours PRN Severe Pain (7 - 10)  acetaminophen   Tablet 650milliGRAM(s) Oral every 6 hours PRN For Temp greater than 38 C (100.4 F)  temazepam 15milliGRAM(s) Oral at bedtime PRN Insomnia      Allergies    No Known Allergies                Vital Signs Last 24 Hrs  T(C): 36.4, Max: 37.2 (06-07 @ 19:00)  T(F): 97.6, Max: 99 (06-07 @ 19:00)  HR: 77 (75 - 124)  BP: 127/58 (112/66 - 165/72)  BP(mean): 84 (79 - 115)  RR: 19 (14 - 36)  SpO2: 100% (100% - 100%)    PHYSICAL EXAM:    Constitutional: NAD, well-developed  HEENT: anicteric  Respiratory: CTA and P  Cardiovascular: S1 and S2, RRR, no M  Gastrointestinal: abdomen soft; NTND; NABS;   Neurological: A/O x 3, no focal deficits  Psychiatric: Normal mood, normal affect  Skin: No rashes        LABS:                        9.9    10.9  )-----------( 183      ( 08 Jun 2017 12:52 )             28.4     Hemoglobin: 9.9 g/dL (06-08 @ 12:52)  Hemoglobin: 9.9 g/dL (06-08 @ 03:58)  Hemoglobin: 10.2 g/dL (06-07 @ 20:41)  Hemoglobin: 9.4 g/dL (06-07 @ 12:11)  Hemoglobin: 8.2 g/dL (06-07 @ 06:27)  Hemoglobin: 10.3 g/dL (06-06 @ 16:43)  Hemoglobin: 10.1 g/dL (06-06 @ 06:38)  Hemoglobin: 10.1 g/dL (06-05 @ 22:44)      06-08    139  |  110<H>  |  26<H>  ----------------------------<  79  3.7   |  14<L>  |  0.93    Ca    7.5<L>      08 Jun 2017 03:58  Phos  2.9     06-08  Mg     1.8     06-08    TPro  4.0<L>  /  Alb  2.4<L>  /  TBili  0.3  /  DBili  x   /  AST  18  /  ALT  35  /  AlkPhos  51  06-07    Bilirubin Total, Serum: 0.3 mg/dL (06-07 @ 06:27)  Bilirubin Total, Serum: 0.4 mg/dL (06-06 @ 06:38)      Aspartate Aminotransferase (AST/SGOT): 18 U/L (06-07 @ 06:27)  Aspartate Aminotransferase (AST/SGOT): 23 U/L (06-06 @ 06:38)    Alanine Aminotransferase (ALT/SGPT): 35 U/L RC (06-07 @ 06:27)  Alanine Aminotransferase (ALT/SGPT): 38 U/L RC (06-06 @ 06:38)          RADIOLOGY & ADDITIONAL TESTS:    EGD 6/7/17: Impression:          - No gross lesions in esophagus.                       - Coffee grounds seen in the gastric body and antrum.                       - Non-bleeding gastric fundus ulcer with adherent clot. Treated with bipolar                        cautery.                       - One very large non-bleeding gastric ulcer again noted in the gastric                        antrum. The ulcer bed was carefully examined. A slightly protuberant red spot                  toshia seen at the inferior border of the ulcer bed. This area was cautiously                        cauterized using biopolar cautery at low wattage (10W)                       - One non-bleeding duodenal ulcer again noted in the first portion of the                        duodenum. Hemoclip in place. No stigmata of bleeding noted.                       - Biopsies were taken with a cold forceps for Helicobacter pylori testing.

## 2017-06-08 NOTE — DIETITIAN INITIAL EVALUATION ADULT. - PROBLEM SELECTOR PLAN 4
Lung mass believed 2/2 abscess 2/2 pseudomonas  Appreciate pulm c/s  Potential bronch in near future per bronch  Continue abx therapy

## 2017-06-08 NOTE — PHYSICAL THERAPY INITIAL EVALUATION ADULT - ADDITIONAL COMMENTS
Hospital course:  s/p RRT and transferred to MICU Hospital course:  s/p RRT and transferred to MICU for upper gib 1 unit of prbc ordered Hospital course:  s/p RRT and transferred to MICU for upper gib 1 unit of prbc ordered on 6/7 Pt lives with dtr and 2 granddtrs in a private house with 4 COLEMAN. Pt was ind with all ADLs prior to admission. Pt reports at rehab she was doing well and amb with a RW.

## 2017-06-08 NOTE — DIETITIAN INITIAL EVALUATION ADULT. - PROBLEM SELECTOR PLAN 1
UGIB 2/2 AC for PE with associated acute posthemorrhagic anemia and hypotension related to blood loss:  Q6H CBC  GI c/s, endscopy in AM, no bronch until stable  NPO  Avoid PO meds as able  Transfuse for hgb<8 or for symptomatic bleeding   Check type and screen every 3 days

## 2017-06-08 NOTE — DIETITIAN INITIAL EVALUATION ADULT. - OTHER INFO
Pt seen for: length of stay.   Adm dx:  GI bleed, S/P EGD, gastric ulcer cauterized   GI issues: denies N/V  Last BM: had black BM today     Food allergies: NKFA   Vit/supplement PTA: none

## 2017-06-08 NOTE — PROGRESS NOTE ADULT - SUBJECTIVE AND OBJECTIVE BOX
[facility]   PROGRESS NOTE    CHIEF COMPLAINT: UGI bleed/shock    INTERVAL HISTORY: s/p EGD for intervention on large gastric ulcer; weak and frail; no respiratory complaints; no further hematemesis; remains hemodynamically stable    REVIEW OF SYSTEMS:  Constitutional: [ ] fevers [ ] chills [ ] weight loss [ ] weight gain  HEENT: [ ] dry eyes [ ] eye irritation [ ] postnasal drip [ ] nasal congestion  CV: [ ] chest pain [ ] orthopnea [ ] palpitations [ ] murmur  Resp: [ ] cough [ ] shortness of breath [ ] dyspnea [ ] wheezing [ ] sputum [ ] hemoptysis  GI: [ ] nausea [ ] vomiting [ ] diarrhea [ ] constipation [ ] abd pain [ ] dysphagia   : [ ] dysuria [ ] nocturia [ ] hematuria [ ] increased urinary frequency  Musculoskeletal: [ ] back pain [ ] myalgias [ ] arthralgias [ ] fracture  Skin: [ ] rash [ ] itch  Neurological: [ ] headache [ ] dizziness [ ] syncope [ ] weakness [ ] numbness  Psychiatric: [ ] anxiety [ ] depression  Endocrine: [ ] diabetes [ ] thyroid problem  Hematologic/Lymphatic: [ ] anemia [ ] bleeding problem  Allergic/Immunologic: [ ] itchy eyes [ ] nasal discharge [ ] hives [ ] angioedema  [ ] All other systems negative  [ ] Unable to assess ROS because ________      MEDICATIONS:     Pulmonary "      Anti-microbials:  piperacillin/tazobactam IVPB. 3.375Gram(s) IV Intermittent every 8 hours      Cardiovascular:      Other:  pantoprazole Infusion 8mG/Hr IV Continuous <Continuous>  HYDROmorphone  Injectable 0.5milliGRAM(s) IV Push every 4 hours PRN  insulin lispro (HumaLOG) corrective regimen sliding scale  SubCutaneous at bedtime  acetaminophen   Tablet 650milliGRAM(s) Oral every 6 hours PRN  insulin lispro (HumaLOG) corrective regimen sliding scale  SubCutaneous every 6 hours        OBJECTIVE:      ICU Vital Signs Last 24 Hrs  T(C): 36.4, Max: 37.2 (06-07 @ 19:00)  T(F): 97.6, Max: 99 (06-07 @ 19:00)  HR: 79 (75 - 135)  BP: 143/61 (112/66 - 165/72)  BP(mean): 88 (79 - 119)  ABP: --  ABP(mean): --  RR: 21 (14 - 55)  SpO2: 100% (100% - 100%)          I&O's Detail  I & Os for 24h ending 2017 07:00  =============================================  IN:    Packed Red Blood Cells: 640 ml    Solution: 475 ml    IV PiggyBack: 300 ml    pantoprazole Infusion: 240 ml    Solution: 225 ml    Total IN: 1880 ml  ---------------------------------------------  OUT:    Total OUT: 0 ml  ---------------------------------------------  Total NET: 1880 ml    I & Os for current day (as of 2017 08:53)  =============================================  IN:    Solution: 62.5 ml    Solution: 25 ml    pantoprazole Infusion: 10 ml    Total IN: 97.5 ml  ---------------------------------------------  OUT:    Total OUT: 0 ml  ---------------------------------------------  Total NET: 97.5 ml      Daily     Daily Weight in k.9 (2017 01:36)    CAPILLARY BLOOD GLUCOSE  80 (2017 05:00)      PHYSICAL EXAM:         General: Awake, alert, cooperative, no distress, appears stated age 	  HEENT:   Atraumatic. Normocephalic. Anicteric. Normal oral mucosa, PERRL, EOMI  Neck: Supple, trachea midline; thyroid without enlargement/tenderness/nodules; no carotid bruit; no JVD	  Cardiovascular: Regular rate and rhythm, S1 S2 normal. No murmurs, rubs or gallops  Respiratory: Respirations unlabored; Clear to auscultation and percussion bilaterally  Abdomen: Soft, non-tender, non-distended. No organomegaly. No masses. Normal bowel sounds	  Extremities: Warm to touch. No clubbing or cyanosis. No pedal edema. Bilateral SCDS  Pulses: Decreased peripheral pulses all extremities	  Skin: Normal skin color. No rashes or lesions. No ecchymoses, No cyanosis, warm to touch  Lymph Nodes: Cervical, supraclavicular and axillary nodes normal  Neurological: Motor and sensory examination equal and normal. A and O x 3  Psychiatry: Depressed      LABS:                        9.9    11.9  )-----------( 177      ( 2017 03:58 )             28.3     06-08    139  |  110<H>  |  26<H>  ----------------------------<  79  3.7   |  14<L>  |  0.93    Ca    7.5<L>      2017 03:58  Phos  2.9     06-08  Mg     1.8     06-08    TPro  4.0<L>  /  Alb  2.4<L>  /  TBili  0.3  /  DBili  x   /  AST  18  /  ALT  35  /  AlkPhos  51  06-07    PT/INR - ( 2017 03:58 )   PT: 11.6 sec;   INR: 1.07 ratio         PTT - ( 2017 03:58 )  PTT:23.4 sec      ABG - ( 2017 04:07 )  pH: 7.44  /  pCO2: 23    /  pO2: 149   / HCO3: 15    / Base Excess: -7.1  /  SaO2: 100               ProCalcitonin:   Serum Pro-Bnp: Serum Pro-Brain Natriuretic Peptide: 533 pg/mL ( @ 15:00)        MICROBIOLOGY:     RADIOLOGY:  [ ] Reviewed and interpreted by me    EGD: - No gross lesions in esophagus.           - Coffee grounds seen in the gastric body and antrum.           - Non-bleeding gastric fundus ulcer with adherent clot. Treated with bipolar cautery.           - One very large non-bleeding gastric ulcer again noted in the gastric antrum. The ulcer bed was carefully examined. A slightly protuberant red spot toshia seen at the inferior border of the ulcer bed. This area was cautiously cauterized using biopolar cautery at low wattage (10W)            - One non-bleeding duodenal ulcer again noted in the first portion of the duodenum. Hemoclip in place. No stigmata          of bleeding noted.            - Biopsies were taken with a cold forceps for Helicobacter pylori testing. [facility]   PROGRESS NOTE    CHIEF COMPLAINT: UGI bleed/shock    INTERVAL HISTORY: s/p EGD for intervention on large gastric ulcer; weak and frail; no respiratory complaints; no further hematemesis; remains hemodynamically stable    REVIEW OF SYSTEMS:  Constitutional: [ ] fevers [ ] chills [ ] weight loss [ ] weight gain  HEENT: [ ] dry eyes [ ] eye irritation [ ] postnasal drip [ ] nasal congestion  CV: [ ] chest pain [ ] orthopnea [ ] palpitations [ ] murmur  Resp: [ ] cough [ ] shortness of breath [ ] dyspnea [ ] wheezing [ ] sputum [ ] hemoptysis  GI: [ ] nausea [ ] vomiting [ ] diarrhea [ ] constipation [ ] abd pain [ ] dysphagia   : [ ] dysuria [ ] nocturia [ ] hematuria [ ] increased urinary frequency  Musculoskeletal: [ ] back pain [ ] myalgias [ ] arthralgias [ ] fracture  Skin: [ ] rash [ ] itch  Neurological: [ ] headache [ ] dizziness [ ] syncope [ ] weakness [ ] numbness  Psychiatric: [ ] anxiety [ ] depression  Endocrine: [ ] diabetes [ ] thyroid problem  Hematologic/Lymphatic: [ ] anemia [ ] bleeding problem  Allergic/Immunologic: [ ] itchy eyes [ ] nasal discharge [ ] hives [ ] angioedema  [ ] All other systems negative  [ ] Unable to assess ROS because ________      MEDICATIONS:     Pulmonary "      Anti-microbials:  piperacillin/tazobactam IVPB. 3.375Gram(s) IV Intermittent every 8 hours      Cardiovascular:      Other:  pantoprazole Infusion 8mG/Hr IV Continuous <Continuous>  HYDROmorphone  Injectable 0.5milliGRAM(s) IV Push every 4 hours PRN  insulin lispro (HumaLOG) corrective regimen sliding scale  SubCutaneous at bedtime  acetaminophen   Tablet 650milliGRAM(s) Oral every 6 hours PRN  insulin lispro (HumaLOG) corrective regimen sliding scale  SubCutaneous every 6 hours        OBJECTIVE:      ICU Vital Signs Last 24 Hrs  T(C): 36.4, Max: 37.2 (06-07 @ 19:00)  T(F): 97.6, Max: 99 (06-07 @ 19:00)  HR: 79 (75 - 135)  BP: 143/61 (112/66 - 165/72)  BP(mean): 88 (79 - 119)  ABP: --  ABP(mean): --  RR: 21 (14 - 55)  SpO2: 100% (100% - 100%)          I&O's Detail  I & Os for 24h ending 2017 07:00  =============================================  IN:    Packed Red Blood Cells: 640 ml    Solution: 475 ml    IV PiggyBack: 300 ml    pantoprazole Infusion: 240 ml    Solution: 225 ml    Total IN: 1880 ml  ---------------------------------------------  OUT:    Total OUT: 0 ml  ---------------------------------------------  Total NET: 1880 ml    I & Os for current day (as of 2017 08:53)  =============================================  IN:    Solution: 62.5 ml    Solution: 25 ml    pantoprazole Infusion: 10 ml    Total IN: 97.5 ml  ---------------------------------------------  OUT:    Total OUT: 0 ml  ---------------------------------------------  Total NET: 97.5 ml      Daily     Daily Weight in k.9 (2017 01:36)    CAPILLARY BLOOD GLUCOSE  80 (2017 05:00)      PHYSICAL EXAM:         General: Awake, alert, cooperative, no distress, appears stated age 	  HEENT:   Atraumatic. Normocephalic. Anicteric. Normal oral mucosa, PERRL, EOMI  Neck: Supple, trachea midline; thyroid without enlargement/tenderness/nodules; no carotid bruit; no JVD	  Cardiovascular: Regular rate and rhythm, S1 S2 normal. No murmurs, rubs or gallops  Respiratory: Respirations unlabored; Clear to auscultation and percussion bilaterally  Abdomen: Soft, non-tender, non-distended. No organomegaly. No masses. Normal bowel sounds	  Extremities: Warm to touch. No clubbing or cyanosis. No pedal edema. Bilateral SCDS  Pulses: Decreased peripheral pulses all extremities	  Skin: Normal skin color. No rashes or lesions. No ecchymoses, No cyanosis, warm to touch  Lymph Nodes: Cervical, supraclavicular and axillary nodes normal  Neurological: Motor and sensory examination equal and normal. A and O x 3  Psychiatry: Depressed      LABS:                        9.9    11.9  )-----------( 177      ( 2017 03:58 )             28.3     06-08    139  |  110<H>  |  26<H>  ----------------------------<  79  3.7   |  14<L>  |  0.93    Ca    7.5<L>      2017 03:58  Phos  2.9     06-08  Mg     1.8     06-08    TPro  4.0<L>  /  Alb  2.4<L>  /  TBili  0.3  /  DBili  x   /  AST  18  /  ALT  35  /  AlkPhos  51  06-07    PT/INR - ( 2017 03:58 )   PT: 11.6 sec;   INR: 1.07 ratio         PTT - ( 2017 03:58 )  PTT:23.4 sec      ABG - ( 2017 04:07 )  pH: 7.44  /  pCO2: 23    /  pO2: 149   / HCO3: 15    / Base Excess: -7.1  /  SaO2: 100               ProCalcitonin:   Serum Pro-Bnp: Serum Pro-Brain Natriuretic Peptide: 533 pg/mL ( @ 15:00)        MICROBIOLOGY:     RADIOLOGY:  [ ] Reviewed and interpreted by me    EGD:  [facility]  ____________________________________________________________________________________________________  Patient Name: Mackenzie Herrmann                     MRN: [id number]  Account Number: [id number]                     YOB: 1935  Room: [facility] 5                                     Gender: Female  Attending MD: Andree Enamorado,                        Procedure Date No Time: 2017  ____________________________________________________________________________________________________     Procedure:           Upper GI endoscopy  Indications:         Hematemesis  Providers:           Andree Enamorado MD  Medicines:           Monitored Anesthesia Care  Complications:       No immediate complications.  ____________________________________________________________________________________________________  Procedure:           Pre-Anesthesia Assessment:                       - The heart rate, respiratory rate, oxygen saturations, blood pressure,     adequacy of pulmonary ventilation, and response to care were monitored                        throughout the procedure.                       After obtaining informed consent, the endoscope was passed under direct                        vision. Throughout the procedure, the patient's blood pressure, pulse, and                        oxygen saturations were monitored continuously. The Endoscope was introduced                        through the mouth, and advanced to the second part of duodenum. The upper GI                        endoscopy was accomplished without difficulty. The patient tolerated the                        procedure well.                                                                                                        Findings:       No gross lesions were noted in the entire esophagus.       Dark coffee grounds were seen in the gastric body and antrum.       One non-bleeding cratered gastric ulcer with old adherent clot was found in the gastric        fundus. The lesion was 6 mm in largest dimension. Coagulation for hemostasis using bipolar        probe was successful. No bleeding was encountered upon cautery of the ulcer. Estimated blood        loss: none.       One very large non-bleeding cratered gastric ulcer was found in the gastric antrum. The        lesion was 20 mm in largest dimension. The ulcer bed was carefully examined. A slightly        protuberant red spot was seen at the inferior border of the ulcer bed. This protuberant spot        was cautiously cauterized using bipolar cautery at 10Watts. Coagulation for hemostasis using        bipolar probe was successful. Estimated blood loss: none.       One previously treated non-bleeding cratered duodenal ulcer was found in the first part of    the duodenum. A hemoclip was seen in place.       Biopsies were taken with a cold forceps in the gastric antrum for Helicobacter pylori        testing. Estimated blood loss was minimal.                                         Impression:          - No gross lesions in esophagus.                       - Coffee grounds seen in the gastric body and antrum.                       - Non-bleeding gastric fundus ulcer with adherent clot. Treated with bipolar                        cautery.                       - One very large non-bleeding gastric ulcer again noted in the gastric                        antrum. The ulcer bed was carefully examined. A slightly protuberant red spot                  toshia seen at the inferior border of the ulcer bed. This area was cautiously                        cauterized using biopolar cautery at low wattage (10W)                       - One non-bleeding duodenal ulcer again noted in the first portion of the                        duodenum. Hemoclip in place. No stigmata of bleeding noted.                       - Biopsies were taken with a cold forceps for Helicobacter pylori testing.  Recommendation:      - Observe patient in GI recovery unit.                       - NPO today.                       - Continue PPI gtt                       - Monitor H/H q 8 hours today                                                                                                        Attending Participation:       I personally performed the entire procedure.                                                                                                          ____________  Andree Enamorado,   2017 4:19:14 PM  Number of Addenda: 0

## 2017-06-08 NOTE — CHART NOTE - NSCHARTNOTEFT_GEN_A_CORE
MICU Transfer Note    Pt is an 82 yof PMH COPD, HTN, CKD III, recent admission for psudomonal PNA w/possible RUL abscess c/b PE on Lovenox/Coumadin bridge who presented w/hematemesis at rehab. At NS, A/C was held and pt received transfusion protocol and protonix drip. Endoscopy demonstrated two large ulcers, one of which was bleeding, s/p clip and thermocoagulation. Pt was restarted on heparin gtt and transferred to the floors. On 6/7/17 an RRT was called b/c pt had an episode of hematemesis. She received 1u PRBC and was transferred back to the MICU. Pt underwent repeat EGD which showed two non-bleeding gastric ulcers, which were cauterized. Pt had an episode of melena O/N but Hb remained stable. Per GI, c/w Protonix gtt and can start CLD. Pt is stable for transfer to floors.     TO DO:  -Trend Hb daily. C/w Protonix gtt. Confirm with GI before advancing diet further.  -Per Pulm, repeat CTPA to evaluate if PE still present  -C/w Zosyn for PNA MICU Transfer Note    Pt is an 82 yof PMH COPD, HTN, CKD III, recent admission for psudomonal PNA w/possible RUL abscess c/b PE on Lovenox/Coumadin bridge who presented w/hematemesis at rehab. At NS, A/C was held and pt received transfusion protocol and protonix drip. Endoscopy demonstrated two large ulcers, one of which was bleeding, s/p clip and thermocoagulation. Pt was restarted on heparin gtt and transferred to the floors. On 6/7/17 an RRT was called b/c pt had an episode of hematemesis. She received 1u PRBC and was transferred back to the MICU. Pt underwent repeat EGD which showed two non-bleeding gastric ulcers, which were cauterized. Pt had an episode of melena O/N but Hb remained stable. Per GI, c/w Protonix gtt and can start CLD. Pt is stable for transfer to floors. Pt accepted by Dr. Villar.    TO DO:  -Trend Hb daily. C/w Protonix gtt. Confirm with GI before advancing diet further.  -Per Pulm, repeat CTPA to evaluate if PE still present  -C/w Zosyn for PNA

## 2017-06-08 NOTE — PHYSICAL THERAPY INITIAL EVALUATION ADULT - GAIT DEVIATIONS NOTED, PT EVAL
decreased stride length/decreased velocity of limb motion/decreased step length/decreased weight-shifting ability/decreased li

## 2017-06-08 NOTE — PROGRESS NOTE ADULT - ASSESSMENT
Doing well after repeat EGD yesterday with additional cautery of suspected visible vessel in deep gastric ulcer and duodenal ulcer.  Small melanic BM this morning but Hgb remains stable and no evidence of recurrent/acute bleeding.  1. Continue to follow Hgb daily.  2. Continue PPI drip for 48 hours.  3. May advance to full liquid diet.  4. Add carafate 1 g twice a day (must be taken 1 hour apart from other oral medications).  4. Family declining anticoagulation at this time.

## 2017-06-08 NOTE — PHYSICAL THERAPY INITIAL EVALUATION ADULT - PLANNED THERAPY INTERVENTIONS, PT EVAL
strengthening/gait training/transfer training/ROM/balance training/bed mobility training/stair negotiation

## 2017-06-08 NOTE — PROGRESS NOTE ADULT - SUBJECTIVE AND OBJECTIVE BOX
CHIEF COMPLAINT:    Interval Events:    REVIEW OF SYSTEMS:  Constitutional:   Eyes:  ENT:  CV:  Resp:  GI:  :  MSK:  Integumentary:  Neurological:  Psychiatric:  Endocrine:  Hematologic/Lymphatic:  Allergic/Immunologic:  [ ] All other systems negative  [ ] Unable to assess ROS because ________    OBJECTIVE:  ICU Vital Signs Last 24 Hrs  T(C): 36.4, Max: 37.2 (06-07 @ 19:00)  T(F): 97.6, Max: 99 (06-07 @ 19:00)  HR: 75 (75 - 135)  BP: 127/109 (112/66 - 165/72)  BP(mean): 115 (79 - 119)  ABP: --  ABP(mean): --  RR: 22 (14 - 55)  SpO2: 100% (100% - 100%)        I & Os for current day (as of 06-08 @ 07:29)  =============================================  IN: 1880 ml / OUT: 0 ml / NET: 1880 ml    CAPILLARY BLOOD GLUCOSE  80 (08 Jun 2017 05:00)      PHYSICAL EXAM:  General:   HEENT:   Lymph Nodes:  Neck:   Respiratory:   Cardiovascular:   Abdomen:   Extremities:   Skin:   Neurological:  Psychiatry:    HOSPITAL MEDICATIONS:  MEDICATIONS  (STANDING):  pantoprazole Infusion 8mG/Hr IV Continuous <Continuous>  piperacillin/tazobactam IVPB. 3.375Gram(s) IV Intermittent every 8 hours  insulin lispro (HumaLOG) corrective regimen sliding scale  SubCutaneous at bedtime  insulin lispro (HumaLOG) corrective regimen sliding scale  SubCutaneous every 6 hours    MEDICATIONS  (PRN):  HYDROmorphone  Injectable 0.5milliGRAM(s) IV Push every 4 hours PRN Severe Pain (7 - 10)  acetaminophen   Tablet 650milliGRAM(s) Oral every 6 hours PRN For Temp greater than 38 C (100.4 F)      LABS:                        9.9    11.9  )-----------( 177      ( 08 Jun 2017 03:58 )             28.3     06-08    139  |  110<H>  |  26<H>  ----------------------------<  79  3.7   |  14<L>  |  0.93    Ca    7.5<L>      08 Jun 2017 03:58  Phos  2.9     06-08  Mg     1.8     06-08    TPro  4.0<L>  /  Alb  2.4<L>  /  TBili  0.3  /  DBili  x   /  AST  18  /  ALT  35  /  AlkPhos  51  06-07    PT/INR - ( 08 Jun 2017 03:58 )   PT: 11.6 sec;   INR: 1.07 ratio         PTT - ( 08 Jun 2017 03:58 )  PTT:23.4 sec    Arterial Blood Gas:  06-08 @ 04:07  7.44/23/149/15/100/-7.1  ABG lactate: --        MICROBIOLOGY:     RADIOLOGY:  [ ] Reviewed and interpreted by me    EKG: CHIEF COMPLAINT: GIB    Interval Events: No O/N events    REVIEW OF SYSTEMS:  Constitutional:   Eyes:  ENT:  CV:  Resp:  GI:  :  MSK:  Integumentary:  Neurological:  Psychiatric:  Endocrine:  Hematologic/Lymphatic:  Allergic/Immunologic:  [ ] All other systems negative  [ ] Unable to assess ROS because ________    OBJECTIVE:  ICU Vital Signs Last 24 Hrs  T(C): 36.4, Max: 37.2 (06-07 @ 19:00)  T(F): 97.6, Max: 99 (06-07 @ 19:00)  HR: 75 (75 - 135)  BP: 127/109 (112/66 - 165/72)  BP(mean): 115 (79 - 119)  ABP: --  ABP(mean): --  RR: 22 (14 - 55)  SpO2: 100% (100% - 100%)        I & Os for current day (as of 06-08 @ 07:29)  =============================================  IN: 1880 ml / OUT: 0 ml / NET: 1880 ml    CAPILLARY BLOOD GLUCOSE  80 (08 Jun 2017 05:00)      PHYSICAL EXAM:  General:   HEENT:   Lymph Nodes:  Neck:   Respiratory:   Cardiovascular:   Abdomen:   Extremities:   Skin:   Neurological:  Psychiatry:    HOSPITAL MEDICATIONS:  MEDICATIONS  (STANDING):  pantoprazole Infusion 8mG/Hr IV Continuous <Continuous>  piperacillin/tazobactam IVPB. 3.375Gram(s) IV Intermittent every 8 hours  insulin lispro (HumaLOG) corrective regimen sliding scale  SubCutaneous at bedtime  insulin lispro (HumaLOG) corrective regimen sliding scale  SubCutaneous every 6 hours    MEDICATIONS  (PRN):  HYDROmorphone  Injectable 0.5milliGRAM(s) IV Push every 4 hours PRN Severe Pain (7 - 10)  acetaminophen   Tablet 650milliGRAM(s) Oral every 6 hours PRN For Temp greater than 38 C (100.4 F)      LABS:                        9.9    11.9  )-----------( 177      ( 08 Jun 2017 03:58 )             28.3     06-08    139  |  110<H>  |  26<H>  ----------------------------<  79  3.7   |  14<L>  |  0.93    Ca    7.5<L>      08 Jun 2017 03:58  Phos  2.9     06-08  Mg     1.8     06-08    TPro  4.0<L>  /  Alb  2.4<L>  /  TBili  0.3  /  DBili  x   /  AST  18  /  ALT  35  /  AlkPhos  51  06-07    PT/INR - ( 08 Jun 2017 03:58 )   PT: 11.6 sec;   INR: 1.07 ratio         PTT - ( 08 Jun 2017 03:58 )  PTT:23.4 sec    Arterial Blood Gas:  06-08 @ 04:07  7.44/23/149/15/100/-7.1  ABG lactate: --        MICROBIOLOGY:     RADIOLOGY:  [ ] Reviewed and interpreted by me    EKG:    EGD: CHIEF COMPLAINT: GIB    Interval Events: No O/N events    REVIEW OF SYSTEMS:  Constitutional:   Eyes:  ENT:  CV:  Resp:  GI:  :  MSK:  Integumentary:  Neurological:  Psychiatric:  Endocrine:  Hematologic/Lymphatic:  Allergic/Immunologic:  [ ] All other systems negative  [ ] Unable to assess ROS because ________    OBJECTIVE:  ICU Vital Signs Last 24 Hrs  T(C): 36.4, Max: 37.2 (06-07 @ 19:00)  T(F): 97.6, Max: 99 (06-07 @ 19:00)  HR: 75 (75 - 135)  BP: 127/109 (112/66 - 165/72)  BP(mean): 115 (79 - 119)  ABP: --  ABP(mean): --  RR: 22 (14 - 55)  SpO2: 100% (100% - 100%)        I & Os for current day (as of 06-08 @ 07:29)  =============================================  IN: 1880 ml / OUT: 0 ml / NET: 1880 ml    CAPILLARY BLOOD GLUCOSE  80 (08 Jun 2017 05:00)      PHYSICAL EXAM:  General:   HEENT:   Lymph Nodes:  Neck:   Respiratory:   Cardiovascular:   Abdomen:   Extremities:   Skin:   Neurological:  Psychiatry:    HOSPITAL MEDICATIONS:  MEDICATIONS  (STANDING):  pantoprazole Infusion 8mG/Hr IV Continuous <Continuous>  piperacillin/tazobactam IVPB. 3.375Gram(s) IV Intermittent every 8 hours  insulin lispro (HumaLOG) corrective regimen sliding scale  SubCutaneous at bedtime  insulin lispro (HumaLOG) corrective regimen sliding scale  SubCutaneous every 6 hours    MEDICATIONS  (PRN):  HYDROmorphone  Injectable 0.5milliGRAM(s) IV Push every 4 hours PRN Severe Pain (7 - 10)  acetaminophen   Tablet 650milliGRAM(s) Oral every 6 hours PRN For Temp greater than 38 C (100.4 F)      LABS:                        9.9    11.9  )-----------( 177      ( 08 Jun 2017 03:58 )             28.3     06-08    139  |  110<H>  |  26<H>  ----------------------------<  79  3.7   |  14<L>  |  0.93    Ca    7.5<L>      08 Jun 2017 03:58  Phos  2.9     06-08  Mg     1.8     06-08    TPro  4.0<L>  /  Alb  2.4<L>  /  TBili  0.3  /  DBili  x   /  AST  18  /  ALT  35  /  AlkPhos  51  06-07    PT/INR - ( 08 Jun 2017 03:58 )   PT: 11.6 sec;   INR: 1.07 ratio         PTT - ( 08 Jun 2017 03:58 )  PTT:23.4 sec    Arterial Blood Gas:  06-08 @ 04:07  7.44/23/149/15/100/-7.1  ABG lactate: --        MICROBIOLOGY:     RADIOLOGY:  [ ] Reviewed and interpreted by me    EKG:     EGD: CHIEF COMPLAINT: GIB    Interval Events: No O/N events    REVIEW OF SYSTEMS:  Constitutional:   Eyes:  ENT:  CV:  Resp:  GI:  :  MSK:  Integumentary:  Neurological:  Psychiatric:  Endocrine:  Hematologic/Lymphatic:  Allergic/Immunologic:  [ ] All other systems negative  [ ] Unable to assess ROS because ________    OBJECTIVE:  ICU Vital Signs Last 24 Hrs  T(C): 36.4, Max: 37.2 (06-07 @ 19:00)  T(F): 97.6, Max: 99 (06-07 @ 19:00)  HR: 75 (75 - 135)  BP: 127/109 (112/66 - 165/72)  BP(mean): 115 (79 - 119)  ABP: --  ABP(mean): --  RR: 22 (14 - 55)  SpO2: 100% (100% - 100%)        I & Os for current day (as of 06-08 @ 07:29)  =============================================  IN: 1880 ml / OUT: 0 ml / NET: 1880 ml    CAPILLARY BLOOD GLUCOSE  80 (08 Jun 2017 05:00)      PHYSICAL EXAM:  General:   HEENT:   Lymph Nodes:  Neck:   Respiratory:   Cardiovascular:   Abdomen:   Extremities:   Skin:   Neurological:  Psychiatry:    HOSPITAL MEDICATIONS:  MEDICATIONS  (STANDING):  pantoprazole Infusion 8mG/Hr IV Continuous <Continuous>  piperacillin/tazobactam IVPB. 3.375Gram(s) IV Intermittent every 8 hours  insulin lispro (HumaLOG) corrective regimen sliding scale  SubCutaneous at bedtime  insulin lispro (HumaLOG) corrective regimen sliding scale  SubCutaneous every 6 hours    MEDICATIONS  (PRN):  HYDROmorphone  Injectable 0.5milliGRAM(s) IV Push every 4 hours PRN Severe Pain (7 - 10)  acetaminophen   Tablet 650milliGRAM(s) Oral every 6 hours PRN For Temp greater than 38 C (100.4 F)      LABS:                        9.9    11.9  )-----------( 177      ( 08 Jun 2017 03:58 )             28.3     06-08    139  |  110<H>  |  26<H>  ----------------------------<  79  3.7   |  14<L>  |  0.93    Ca    7.5<L>      08 Jun 2017 03:58  Phos  2.9     06-08  Mg     1.8     06-08    TPro  4.0<L>  /  Alb  2.4<L>  /  TBili  0.3  /  DBili  x   /  AST  18  /  ALT  35  /  AlkPhos  51  06-07    PT/INR - ( 08 Jun 2017 03:58 )   PT: 11.6 sec;   INR: 1.07 ratio         PTT - ( 08 Jun 2017 03:58 )  PTT:23.4 sec    Arterial Blood Gas:  06-08 @ 04:07  7.44/23/149/15/100/-7.1  ABG lactate: --        MICROBIOLOGY:     RADIOLOGY:  [ ] Reviewed and interpreted by me    EKG:     EGD: - No gross lesions in esophagus.           - Coffee grounds seen in the gastric body and antrum.           - Non-bleeding gastric fundus ulcer with adherent clot. Treated with bipolar cautery.           - One very large non-bleeding gastric ulcer again noted in the gastric antrum. The ulcer bed was carefully examined. A slightly protuberant red spot toshia seen at the inferior border of the ulcer bed. This area was cautiously cauterized using biopolar cautery at low wattage (10W)            - One non-bleeding duodenal ulcer again noted in the first portion of the duodenum. Hemoclip in place. No stigmata          of bleeding noted.            - Biopsies were taken with a cold forceps for Helicobacter pylori testing. CHIEF COMPLAINT: GIB    Interval Events:  Pt had one melanotic BM O/N but Hb stable.     REVIEW OF SYSTEMS:  Constitutional: No fevers.  CV: No CP.  Resp: No SOB.  GI: No N/V/D or abdominal pain.  Psychiatric: Tired.  [x] All other systems negative      OBJECTIVE:  ICU Vital Signs Last 24 Hrs  T(C): 36.4, Max: 37.2 (06-07 @ 19:00)  T(F): 97.6, Max: 99 (06-07 @ 19:00)  HR: 75 (75 - 135)  BP: 127/109 (112/66 - 165/72)  BP(mean): 115 (79 - 119)  ABP: --  ABP(mean): --  RR: 22 (14 - 55)  SpO2: 100% (100% - 100%)        I & Os for current day (as of 06-08 @ 07:29)  =============================================  IN: 1880 ml / OUT: 0 ml / NET: 1880 ml    CAPILLARY BLOOD GLUCOSE  80 (08 Jun 2017 05:00)      PHYSICAL EXAM:  General: NAD.   Respiratory: CTAB.   Cardiovascular: RRR.  Abdomen: S/NT/ND. +BS.   Extremities: No LE edema B/L.  Psychiatry: Normal affect.    HOSPITAL MEDICATIONS:  MEDICATIONS  (STANDING):  pantoprazole Infusion 8mG/Hr IV Continuous <Continuous>  piperacillin/tazobactam IVPB. 3.375Gram(s) IV Intermittent every 8 hours  insulin lispro (HumaLOG) corrective regimen sliding scale  SubCutaneous at bedtime  insulin lispro (HumaLOG) corrective regimen sliding scale  SubCutaneous every 6 hours    MEDICATIONS  (PRN):  HYDROmorphone  Injectable 0.5milliGRAM(s) IV Push every 4 hours PRN Severe Pain (7 - 10)  acetaminophen   Tablet 650milliGRAM(s) Oral every 6 hours PRN For Temp greater than 38 C (100.4 F)      LABS:                        9.9    11.9  )-----------( 177      ( 08 Jun 2017 03:58 )             28.3     06-08    139  |  110<H>  |  26<H>  ----------------------------<  79  3.7   |  14<L>  |  0.93    Ca    7.5<L>      08 Jun 2017 03:58  Phos  2.9     06-08  Mg     1.8     06-08    TPro  4.0<L>  /  Alb  2.4<L>  /  TBili  0.3  /  DBili  x   /  AST  18  /  ALT  35  /  AlkPhos  51  06-07    PT/INR - ( 08 Jun 2017 03:58 )   PT: 11.6 sec;   INR: 1.07 ratio         PTT - ( 08 Jun 2017 03:58 )  PTT:23.4 sec    Arterial Blood Gas:  06-08 @ 04:07  7.44/23/149/15/100/-7.1  ABG lactate: --        MICROBIOLOGY:     RADIOLOGY:  [ ] Reviewed and interpreted by me    EKG:     EGD: - No gross lesions in esophagus.           - Coffee grounds seen in the gastric body and antrum.           - Non-bleeding gastric fundus ulcer with adherent clot. Treated with bipolar cautery.           - One very large non-bleeding gastric ulcer again noted in the gastric antrum. The ulcer bed was carefully examined. A slightly protuberant red spot was seen at the inferior border of the ulcer bed. This area was cautiously cauterized using biopolar cautery at low wattage (10W)            - One non-bleeding duodenal ulcer again noted in the first portion of the duodenum. Hemoclip in place. No stigmata          of bleeding noted.            - Biopsies were taken with a cold forceps for Helicobacter pylori testing.

## 2017-06-08 NOTE — PROGRESS NOTE ADULT - ASSESSMENT
ASSESSMENT    h/o right upper lobe opacity felt to represent infected air space infection (severe COPD/emphysema) given decrease in size with antibiotic therapy and pseudomonas isolation from sputum on more than one occasion.    eosinophilia resolved on steroids    pulmonary embolism in the setting of prolonged immobility - heparin therapy complicated by life threatening UGI bleeding on two occasions - recent EGD as above    ---------------------------------------------------------------------------------------------------------  Oxygen supplementation to keep saturation greater than 92%  ICU monitoring - follow H/H q6h and hemodynamics continuously  PPI infusion  On empiric zosyn for possibility of aspiration pneumonia  Mechanical DVT prophylaxis  NO FURTHER ANTI-COAGULATION - would place an IVC filter although there is no evidence of lower extremity DVT at this time given likelihood of prolonged immobility given recent critical illness    d/w daughter at bedside    Will follow with you.    Wil Quiroga MD, [facility] - [phone]

## 2017-06-08 NOTE — PHYSICAL THERAPY INITIAL EVALUATION ADULT - PERTINENT HX OF CURRENT PROBLEM, REHAB EVAL
Pt is a 83 y/o female admitted to [facility] on 6/4/17 p/w UGIB.Patient had episode of hematemesis at rehab today, potentially associated with presyncope as she does not recall event well.  She returned by EMS.

## 2017-06-08 NOTE — PHYSICAL THERAPY INITIAL EVALUATION ADULT - CRITERIA FOR SKILLED THERAPEUTIC INTERVENTIONS
rehab potential/functional limitations in following categories/impairments found/risk reduction/prevention

## 2017-06-08 NOTE — DIETITIAN INITIAL EVALUATION ADULT. - ENERGY NEEDS
Ht: 61"   Wt:159   BMI: 30.1 kg/m2   IBW:105  (+/-10%)  151 % IBW  Edema: 1+ generalized     Skin: no pressure injuries

## 2017-06-08 NOTE — PROGRESS NOTE ADULT - ASSESSMENT
82F w/ hx HTN, CKD, COPD p/w UGIB 2/2 AC for PE. 82F w/ hx HTN, CKD, COPD p/w UGIB 2/2 AC for PE.    1. Neuro - No issues.  2. CV - No issues.  3. Pulm - Pt and family declining A/C for PE 2/2 GIB.  -Continue to monitor  4. GI - EGD yesterday demonstrated two non-bleeding gastric ulcers s/p cautery.  -Trend Hb daily  -Will d/w GI re: whether to c/w protonix gtt and if can ADAT   5. ID - Stable.  -C/w Zosyn for PNA  6. DVT Ppx: C/I 2/2 GIB.

## 2017-06-09 LAB
ANION GAP SERPL CALC-SCNC: 12 MMOL/L — SIGNIFICANT CHANGE UP (ref 5–17)
BUN SERPL-MCNC: 13 MG/DL — SIGNIFICANT CHANGE UP (ref 7–23)
CALCIUM SERPL-MCNC: 7.4 MG/DL — LOW (ref 8.4–10.5)
CHLORIDE SERPL-SCNC: 111 MMOL/L — HIGH (ref 96–108)
CO2 SERPL-SCNC: 17 MMOL/L — LOW (ref 22–31)
CREAT SERPL-MCNC: 0.78 MG/DL — SIGNIFICANT CHANGE UP (ref 0.5–1.3)
CULTURE RESULTS: SIGNIFICANT CHANGE UP
CULTURE RESULTS: SIGNIFICANT CHANGE UP
GLUCOSE SERPL-MCNC: 77 MG/DL — SIGNIFICANT CHANGE UP (ref 70–99)
HCT VFR BLD CALC: 27 % — LOW (ref 34.5–45)
HCT VFR BLD CALC: 27.8 % — LOW (ref 34.5–45)
HGB BLD-MCNC: 8.9 G/DL — LOW (ref 11.5–15.5)
HGB BLD-MCNC: 9.7 G/DL — LOW (ref 11.5–15.5)
MAGNESIUM SERPL-MCNC: 1.6 MG/DL — SIGNIFICANT CHANGE UP (ref 1.6–2.6)
MCHC RBC-ENTMCNC: 29.5 PG — SIGNIFICANT CHANGE UP (ref 27–34)
MCHC RBC-ENTMCNC: 32 PG — SIGNIFICANT CHANGE UP (ref 27–34)
MCHC RBC-ENTMCNC: 33 GM/DL — SIGNIFICANT CHANGE UP (ref 32–36)
MCHC RBC-ENTMCNC: 35.1 GM/DL — SIGNIFICANT CHANGE UP (ref 32–36)
MCV RBC AUTO: 89.4 FL — SIGNIFICANT CHANGE UP (ref 80–100)
MCV RBC AUTO: 91.1 FL — SIGNIFICANT CHANGE UP (ref 80–100)
PHOSPHATE SERPL-MCNC: 2.6 MG/DL — SIGNIFICANT CHANGE UP (ref 2.5–4.5)
PLATELET # BLD AUTO: 181 K/UL — SIGNIFICANT CHANGE UP (ref 150–400)
PLATELET # BLD AUTO: 194 K/UL — SIGNIFICANT CHANGE UP (ref 150–400)
POTASSIUM SERPL-MCNC: 4.1 MMOL/L — SIGNIFICANT CHANGE UP (ref 3.5–5.3)
POTASSIUM SERPL-SCNC: 4.1 MMOL/L — SIGNIFICANT CHANGE UP (ref 3.5–5.3)
RBC # BLD: 3.02 M/UL — LOW (ref 3.8–5.2)
RBC # BLD: 3.05 M/UL — LOW (ref 3.8–5.2)
RBC # FLD: 17.3 % — HIGH (ref 10.3–14.5)
RBC # FLD: 19.8 % — HIGH (ref 10.3–14.5)
SODIUM SERPL-SCNC: 140 MMOL/L — SIGNIFICANT CHANGE UP (ref 135–145)
SPECIMEN SOURCE: SIGNIFICANT CHANGE UP
SPECIMEN SOURCE: SIGNIFICANT CHANGE UP
WBC # BLD: 10.8 K/UL — HIGH (ref 3.8–10.5)
WBC # BLD: 9.08 K/UL — SIGNIFICANT CHANGE UP (ref 3.8–10.5)
WBC # FLD AUTO: 10.8 K/UL — HIGH (ref 3.8–10.5)
WBC # FLD AUTO: 9.08 K/UL — SIGNIFICANT CHANGE UP (ref 3.8–10.5)

## 2017-06-09 PROCEDURE — 71275 CT ANGIOGRAPHY CHEST: CPT | Mod: 26

## 2017-06-09 RX ADMIN — Medication 650 MILLIGRAM(S): at 18:50

## 2017-06-09 RX ADMIN — PIPERACILLIN AND TAZOBACTAM 25 GRAM(S): 4; .5 INJECTION, POWDER, LYOPHILIZED, FOR SOLUTION INTRAVENOUS at 13:40

## 2017-06-09 RX ADMIN — Medication 650 MILLIGRAM(S): at 11:55

## 2017-06-09 RX ADMIN — Medication 650 MILLIGRAM(S): at 05:47

## 2017-06-09 RX ADMIN — PANTOPRAZOLE SODIUM 10 MG/HR: 20 TABLET, DELAYED RELEASE ORAL at 14:45

## 2017-06-09 RX ADMIN — Medication 650 MILLIGRAM(S): at 06:15

## 2017-06-09 RX ADMIN — Medication 650 MILLIGRAM(S): at 18:19

## 2017-06-09 RX ADMIN — PIPERACILLIN AND TAZOBACTAM 25 GRAM(S): 4; .5 INJECTION, POWDER, LYOPHILIZED, FOR SOLUTION INTRAVENOUS at 23:18

## 2017-06-09 RX ADMIN — PIPERACILLIN AND TAZOBACTAM 25 GRAM(S): 4; .5 INJECTION, POWDER, LYOPHILIZED, FOR SOLUTION INTRAVENOUS at 05:47

## 2017-06-09 RX ADMIN — TEMAZEPAM 15 MILLIGRAM(S): 15 CAPSULE ORAL at 00:03

## 2017-06-09 RX ADMIN — Medication 650 MILLIGRAM(S): at 11:24

## 2017-06-09 RX ADMIN — TEMAZEPAM 15 MILLIGRAM(S): 15 CAPSULE ORAL at 23:40

## 2017-06-09 RX ADMIN — Medication 650 MILLIGRAM(S): at 23:40

## 2017-06-09 NOTE — DISCHARGE NOTE ADULT - MEDICATION SUMMARY - MEDICATIONS TO STOP TAKING
I will STOP taking the medications listed below when I get home from the hospital:    losartan-hydroCHLOROthiazide 100mg-25mg oral tablet  -- 1 tab(s) by mouth once a day    predniSONE 20 mg oral tablet  -- 1 tab(s) by mouth once a day x one day (6/1/17)  On 6/2/17, you will taper to 10mg by mouth once a day x 4days, then   On 6/6/17, you will taper to 5mg by mouth once a day x 4 days, then discontinue.    amoxicillin-clavulanate 875 mg-125 mg oral tablet  -- 1 tab(s) by mouth 2 times a day   You will need to take for a total of 21 days (through [facility] 15, 2017), then discontinue    levoFLOXacin 250 mg oral tablet  -- 1 tab(s) by mouth every 24 hours  You will need to take for a total of 21 days (through [employer] 15, 2017) then discontinue    enoxaparin  -- 70 milligram(s) subcutaneously every 12 hours  Bridge with Coumadin    warfarin 5 mg oral tablet  -- 1 tab(s) by mouth once (at bedtime)  Your INR will need to be checked tomorrow (6/1/17) and your Coumadin dose should be adjusted accordingly

## 2017-06-09 NOTE — DISCHARGE NOTE ADULT - CARE PROVIDERS DIRECT ADDRESSES
[email or web address],[email or web address]

## 2017-06-09 NOTE — DISCHARGE NOTE ADULT - SECONDARY DIAGNOSIS.
Anemia Chronic obstructive pulmonary disease, unspecified COPD type Essential hypertension PE (pulmonary thromboembolism) Upper GI bleed

## 2017-06-09 NOTE — DISCHARGE NOTE ADULT - MEDICATION SUMMARY - MEDICATIONS TO TAKE
I will START or STAY ON the medications listed below when I get home from the hospital:    budesonide 0.5 mg/2 mL inhalation suspension  -- 0.5 milligram(s) inhaled every 12 hours  -- Indication: For Chronic obstructive pulmonary disease, unspecified COPD type    acetaminophen 325 mg oral tablet  -- 2 tab(s) by mouth every 6 hours, As needed, For Temp greater than 38 C (100.4 F)  -- Indication: For temp    acetaminophen 325 mg oral tablet  -- 2 tab(s) by mouth every 6 hours, As needed, Moderate Pain (4 - 6)  -- Indication: For Pain    losartan 25 mg oral tablet  -- 1 tab(s) by mouth once a day  -- Indication: For Htn    insulin lispro 100 units/mL subcutaneous solution  --  subcutaneous once a day (at bedtime); 0 Unit(s) if Glucose 0 - 250  1 Unit(s) if Glucose 251 - 300  2 Unit(s) if Glucose 301 - 350  3 Unit(s) if Glucose 351 - 400  4 Unit(s) if Glucose Greater Than 400  -- Indication: For diabetes    insulin lispro 100 units/mL subcutaneous solution  --  subcutaneous 3 times a day (before meals); 1 Unit(s) if Glucose 151 - 200  2 Unit(s) if Glucose 201 - 250  3 Unit(s) if Glucose 251 - 300  4 Unit(s) if Glucose 301 - 350  5 Unit(s) if Glucose 351 - 400  6 Unit(s) if Glucose Greater Than 400  -- Indication: For diabetes    atorvastatin 20 mg oral tablet  -- 1 tab(s) by mouth once a day (at bedtime)  -- Indication: For Hld    temazepam 15 mg oral capsule  -- 1 cap(s) by mouth once a day (at bedtime)  -- Indication: For insomnia    albuterol-ipratropium 2.5 mg-0.5 mg/3 mL inhalation solution  -- 3 milliliter(s) inhaled every 6 hours, As Needed  -- Indication: For Chronic obstructive pulmonary disease, unspecified COPD type    ferrous sulfate 325 mg (65 mg elemental iron) oral delayed release tablet  -- 1 tab(s) by mouth once a day  -- Indication: For supplement    docusate sodium 100 mg oral capsule  -- 1 cap(s) by mouth 2 times a day  -- Indication: For Constipation    pantoprazole 40 mg oral delayed release tablet  -- 1 tab(s) by mouth 2 times a day (before meals)  -- Indication: For gib    Multiple Vitamins oral tablet  -- 1 tab(s) by mouth once a day  -- Indication: For supplement

## 2017-06-09 NOTE — DISCHARGE NOTE ADULT - CARE PLAN
Principal Discharge DX:	Hematemesis  Goal:	resolved  Instructions for follow-up, activity and diet:	There are 2 common types of GI Bleed, Upper GI Bleed and Lower GI Bleed.  Upper GI Bleed affects the esophagus, stomach, and first part of the small intestine. Lower GI Bleed affects the colon and rectum.  Upper GI Bleed signs and symptoms to notify your Health Care Provider are vomiting blood, or coffee ground vomitus, and bowel movements that look like black tar.  Lower GI Bleed signs and symptoms to notify your health care provider are bright red bloody bowel movements.   Take your medications as prescribed by your Gastroenterologist.  If you have had an Endoscopy or Colonoscopy, follow up with your Gastroenterologist for Pathology results.  Avoid NSAIDs unless your Health Care Provider tells you that it is ok (Aspirin, Ibuprofen, Advil, Motrin, Aleve).  Follow up with your Gastroenterologist within 1-2 weeks of discharge.  Secondary Diagnosis:	Anemia  Instructions for follow-up, activity and diet:	monitor hgb/hct as directed by PMD  return to the ED for any excessive bleeding, vomiting blood or bloody BM's  Secondary Diagnosis:	Chronic obstructive pulmonary disease, unspecified COPD type  Instructions for follow-up, activity and diet:	Call your Health Care provider upon arrival home to make a follow up appointment within one week.  Take all inhalers as prescribed by your Health Care Provider.  Take steroids as prescribed by your Health Care Provider.  If your cough increases infrequency and severity and/or you have shortness of breath or increased shortness of breath call your Health Care Provider.  If you develop fever, chills, night sweats, malaise, and/or change in mental status call your Health care Provider.  Nutrition is very important.  Eat small frequent meals.  Increase your activity as tolerated.  Do not stay in bed all day  Secondary Diagnosis:	Essential hypertension  Instructions for follow-up, activity and diet:	Follow up with your medical doctor to establish long term blood pressure treatment goals.  Secondary Diagnosis:	PE (pulmonary thromboembolism)  Instructions for follow-up, activity and diet:	Take your anticoagulation (lovenox, coumadin) as directed.  Follow up with your health care provider within one week. Call for appointment.  If you develop shortness of breath or if your shortness of breath worsens call your Health Care Provider or go to the Emergency Department.  Secondary Diagnosis:	Upper GI bleed  Instructions for follow-up, activity and diet:	There are 2 common types of GI Bleed, Upper GI Bleed and Lower GI Bleed.  Upper GI Bleed affects the esophagus, stomach, and first part of the small intestine. Lower GI Bleed affects the colon and rectum.  Upper GI Bleed signs and symptoms to notify your Health Care Provider are vomiting blood, or coffee ground vomitus, and bowel movements that look like black tar.  Lower GI Bleed signs and symptoms to notify your health care provider are bright red bloody bowel movements.   Take your medications as prescribed by your Gastroenterologist.  If you have had an Endoscopy or Colonoscopy, follow up with your Gastroenterologist for Pathology results.  Avoid NSAIDs unless your Health Care Provider tells you that it is ok (Aspirin, Ibuprofen, Advil, Motrin, Aleve).  Follow up with your Gastroenterologist within 1-2 weeks of discharge.

## 2017-06-09 NOTE — PROGRESS NOTE ADULT - ASSESSMENT
82 year old woman with pulmonary embolism on anticoagulation with recurrent GI bleed from possible duodenal ulcer, ucerated bleeding diverticulum and is stable  melena may reflect resolving GI bleed. However hgb trended down from yesterday  hemodynamically stable currently  recommend repeat hgb this afternoon to confirm stability  if hemoglobin stable can advance diet  will continue to follow  call with questions  office number [phone]  continue protonix gtt for another 24 hours and if hgb stable can change to twice daily tomorrow  patient understands risks of no anticoagulation and does not want anticoagulation at this time even with pulmonary embolism

## 2017-06-09 NOTE — PROGRESS NOTE ADULT - SUBJECTIVE AND OBJECTIVE BOX
[facility] - [facility]     PROGRESS NOTE    CHIEF COMPLAINT: shock/UGI bleed    INTERVAL HISTORY: no further hematemesis/hemoptysis; melena; hematocrit stable and hemodynamically stable; no respiratory complaints    REVIEW OF SYSTEMS:  Constitutional: Weak and frail  HEENT: Within normal limits  CV: Within normal limits  Resp: As above  GI: Melena  : Within normal limits  Musculoskeletal: Within normal limits  Skin: Within normal limits  Neurological: Within normal limits  Psychiatric: Within normal limits  Endocrine: Within normal limits  Hematologic/Lymphatic: Within normal limits  Allergic/Immunologic: Within normal limits    MEDICATIONS:     Pulmonary "      Anti-microbials:  piperacillin/tazobactam IVPB. 3.375Gram(s) IV Intermittent every 8 hours      Cardiovascular:      Other:  pantoprazole Infusion 8mG/Hr IV Continuous <Continuous>  HYDROmorphone  Injectable 0.5milliGRAM(s) IV Push every 4 hours PRN  insulin lispro (HumaLOG) corrective regimen sliding scale  SubCutaneous at bedtime  acetaminophen   Tablet 650milliGRAM(s) Oral every 6 hours PRN  temazepam 15milliGRAM(s) Oral at bedtime PRN  insulin lispro (HumaLOG) corrective regimen sliding scale  SubCutaneous three times a day before meals  acetaminophen   Tablet. 650milliGRAM(s) Oral every 6 hours PRN        OBJECTIVE:        I&O's Detail  I & Os for 24h ending 2017 07:00  =============================================  IN:    Oral Fluid: 640 ml    IV PiggyBack: 300 ml    pantoprazole Infusion: 250 ml    Solution: 150 ml    Solution: 125 ml    Total IN: 1465 ml  ---------------------------------------------  OUT:    Voided: 500 ml    Total OUT: 500 ml  ---------------------------------------------  Total NET: 965 ml    I & Os for current day (as of 2017 13:36)  =============================================  IN:    Oral Fluid: 360 ml    Total IN: 360 ml  ---------------------------------------------  OUT:    Total OUT: 0 ml  ---------------------------------------------  Total NET: 360 ml      Daily     Daily Weight in k.9 (2017 09:44)    CAPILLARY BLOOD GLUCOSE  89 (2017 12:22)  85 (2017 08:13)  94 (2017 21:46)  118 (2017 18:00)      PHYSICAL EXAM:       ICU Vital Signs Last 24 Hrs  T(C): 36.4, Max: 37 (06-08 @ 21:24)  T(F): 97.6, Max: 98.6 (06-08 @ 21:24)  HR: 76 (75 - 89)  BP: 119/71 (119/71 - 150/78)  BP(mean): 86 (82 - 105)  ABP: --  ABP(mean): --  RR: 17 (17 - 91)  SpO2: 96% (96% - 100%)     General: Awake, alert, cooperative, no distress, appears stated age 	  HEENT:   Atraumatic. Normocephalic. Anicteric. Normal oral mucosa, PERRL, EOMI  Neck: Supple, trachea midline; thyroid without enlargement/tenderness/nodules; no carotid bruit; no JVD	  Cardiovascular: Regular rate and rhythm, S1 S2 normal. No murmurs, rubs or gallops  Respiratory: Respirations unlabored; Clear to auscultation and percussion bilaterally  Abdomen: Soft, non-tender, non-distended. No organomegaly. No masses. Normal bowel sounds	  Extremities: Warm to touch. No clubbing or cyanosis. No pedal edema. Bilateral SCDS  Pulses: Decreased peripheral pulses all extremities	  Skin: Normal skin color. No rashes or lesions. No ecchymoses, No cyanosis, warm to touch  Lymph Nodes: Cervical, supraclavicular and axillary nodes normal  Neurological: Motor and sensory examination equal and normal. A and O x 3  Psychiatry: WNL      LABS:                        8.9    9.08  )-----------( 181      ( 2017 08:40 )             27.0     06    140  |  111<H>  |  13  ----------------------------<  77  4.1   |  17<L>  |  0.78    Ca    7.4<L>      2017 08:42  Phos  2.6       Mg     1.6           PT/INR - ( 2017 03:58 )   PT: 11.6 sec;   INR: 1.07 ratio         PTT - ( 2017 03:58 )  PTT:23.4 sec      ABG - ( 2017 04:07 )  pH: 7.44  /  pCO2: 23    /  pO2: 149   / HCO3: 15    / Base Excess: -7.1  /  SaO2: 100                 Serum Pro-Brain Natriuretic Peptide: 533 pg/mL ( @ 15:00)      MICROBIOLOGY:     RADIOLOGY:  [ ] Reviewed and interpreted by me    EXAM:  DUPLEX SCAN EXT VEINS LOWER BI                            PROCEDURE DATE:  2017        INTERPRETATION:  History:Lung cancer, history of PE, short of breath,   lower extremity swelling, rule out DVT.    There is edema within the superficial soft tissues of both lower   extremities.    Both common femoral, superficial femoral and popliteal veins are patent   and compressible without evidence of thrombus.    The calf veins were not diagnostically imaged..    IMPRESSION: No evidence of deep vein thrombosis of either lower extremity.    ---------------------------------------------------------------------------------------------------------      EXAM:  CHEST SINGLE AP OR PA                            PROCEDURE DATE:  2017            INTERPRETATION:    CLINICAL INDICATION: Shortness of breath    TECHNIQUE: Frontal view of the chest.    COMPARISON: CT chest from 2017    FINDINGS:     Cardiac size is within normal limits.   Right upper lung opacity is consistent with right upper lung nodule seen   on 2017.   No other focal lung consolidations.  Small left basilar linear atelectasis.  There is no acute osseous abnormality.     IMPRESSION:   Unchanged right upper lung nodular opacity. No focal consolidations.                NANETTE COHN M.D., RADIOLOGY RESIDENT  This document has been electronically signed.  TEX COPPOLA M.D., ATTENDING RADIOLOGIST  This document hasbeen electronically signed. 2017  8:11AM                        DANIEL BLANCO M.D., ATTENDING RADIOLOGIST  This document has been electronically signed. 2017  4:49PM

## 2017-06-09 NOTE — DISCHARGE NOTE ADULT - PATIENT PORTAL LINK FT
“You can access the [employer] Patient Portal, offered by [facility], by registering with the following website: [email or web address]”

## 2017-06-09 NOTE — PROGRESS NOTE ADULT - ASSESSMENT
ASSESSMENT    h/o right upper lobe opacity felt to represent infected air space infection (severe COPD/emphysema) given decrease in size with antibiotic therapy and pseudomonas isolation from sputum on more than one occasion.    eosinophilia resolved on steroids    pulmonary embolism in the setting of prolonged immobility - heparin therapy complicated by life threatening UGI bleeding on two occasions - recent EGD as above    ---------------------------------------------------------------------------------------------------------  Stable oxygenation on room air  Continue to follow H/H q12h and hemodynamics closely  PPI infusion  On empiric zosyn for possibility of aspiration pneumonia  Mechanical DVT prophylaxis  NO FURTHER ANTI-COAGULATION - would place an IVC filter although there is no evidence of lower extremity DVT at this time given likelihood of prolonged immobility given recent critical illness (arranged)  CTA chest to reassess for resolution of pulmonary emboli and for improvement in RUL lesion on antibiotics    d/w daughter at bedside    Will follow with you.    Wil Quiroga MD, [facility] - [phone]

## 2017-06-09 NOTE — PROGRESS NOTE ADULT - SUBJECTIVE AND OBJECTIVE BOX
INTERVAL HPI/OVERNIGHT EVENTS:    had headache overnight. had melenic bowel movement this morning  off anticoagulation per family and patient and understand risks and benefits of anticoagulation    MEDICATIONS  (STANDING):  pantoprazole Infusion 8mG/Hr IV Continuous <Continuous>  piperacillin/tazobactam IVPB. 3.375Gram(s) IV Intermittent every 8 hours  insulin lispro (HumaLOG) corrective regimen sliding scale  SubCutaneous at bedtime  insulin lispro (HumaLOG) corrective regimen sliding scale  SubCutaneous three times a day before meals    MEDICATIONS  (PRN):  HYDROmorphone  Injectable 0.5milliGRAM(s) IV Push every 4 hours PRN Severe Pain (7 - 10)  acetaminophen   Tablet 650milliGRAM(s) Oral every 6 hours PRN For Temp greater than 38 C (100.4 F)  temazepam 15milliGRAM(s) Oral at bedtime PRN Insomnia  acetaminophen   Tablet. 650milliGRAM(s) Oral every 6 hours PRN Moderate Pain (4 - 6)      Allergies    No Known Allergies    Intolerances        Vital Signs Last 24 Hrs  T(C): 36.4, Max: 37 (06-08 @ 21:24)  T(F): 97.6, Max: 98.6 (06-08 @ 21:24)  HR: 76 (75 - 89)  BP: 119/71 (113/65 - 150/78)  BP(mean): 86 (82 - 105)  RR: 17 (17 - 91)  SpO2: 96% (96% - 100%)    PHYSICAL EXAM:  General: comfortable in No acute distress  CV: regular rate and rhythm. no murmurs rubs or gallops  abdomen: soft nontender nondistened normal bowel sounds  ext: negative edema  skin: no rashes noted      Hemoglobin: 8.9 g/dL (06-09 @ 08:40)  Hemoglobin: 9.9 g/dL (06-08 @ 12:52)  Hemoglobin: 9.9 g/dL (06-08 @ 03:58)  Hemoglobin: 10.2 g/dL (06-07 @ 20:41)  Hemoglobin: 9.4 g/dL (06-07 @ 12:11)      LABS:                        8.9    9.08  )-----------( 181      ( 09 Jun 2017 08:40 )             27.0     06-09    140  |  111<H>  |  13  ----------------------------<  77  4.1   |  17<L>  |  0.78    Ca    7.4<L>      09 Jun 2017 08:42  Phos  2.6     06-09  Mg     1.6     06-09      PT/INR - ( 08 Jun 2017 03:58 )   PT: 11.6 sec;   INR: 1.07 ratio         PTT - ( 08 Jun 2017 03:58 )  PTT:23.4 sec    LIVER FUNCTIONS - ( 07 Jun 2017 06:27 )  Alb: 2.4 g/dL / Pro: 4.0 g/dL / ALK PHOS: 51 U/L / ALT: 35 U/L RC / AST: 18 U/L / GGT: x             RADIOLOGY & ADDITIONAL TESTS:

## 2017-06-09 NOTE — PROGRESS NOTE ADULT - SUBJECTIVE AND OBJECTIVE BOX
Patient is a 82y old  Female who presents with a chief complaint of gib (09 Jun 2017 09:44)      SUBJECTIVE / OVERNIGHT EVENTS:    patient feeling somewhat better but still very weak this morning  had black bowel movement this morning  VSS and no other complaints  no events since downgrade from ICU    Vital Signs Last 24 Hrs  T(C): 36.4, Max: 36.4 (06-09 @ 04:50)  T(F): 97.6, Max: 97.6 (06-09 @ 04:50)  HR: 78 (76 - 92)  BP: 141/84 (119/71 - 144/77)  BP(mean): --  RR: 18 (17 - 18)  SpO2: 97% (95% - 97%)  I&O's Summary  I & Os for 24h ending 09 Jun 2017 07:00  =============================================  IN: 1465 ml / OUT: 500 ml / NET: 965 ml    I & Os for current day (as of 09 Jun 2017 22:17)  =============================================  IN: 580 ml / OUT: 0 ml / NET: 580 ml      GENERAL: NAD, AAOx3  HEAD:  Atraumatic, Normocephalic  EYES: EOMI, PERRLA, conjunctiva and sclera clear  NECK: Supple, No JVD, No LAD  CHEST/LUNG: Clear to auscultation bilaterally; No wheeze  HEART: Regular rate and rhythm; No murmurs, rubs, or gallops  ABDOMEN: Soft, Nontender, Nondistended; Bowel sounds present  EXTREMITIES:  2+ Peripheral Pulses, No clubbing, cyanosis, or edema  SKIN: No rashes or lesions      LABS:                        9.7    10.8  )-----------( 194      ( 09 Jun 2017 16:55 )             27.8     06-09    140  |  111<H>  |  13  ----------------------------<  77  4.1   |  17<L>  |  0.78    Ca    7.4<L>      09 Jun 2017 08:42  Phos  2.6     06-09  Mg     1.6     06-09      PT/INR - ( 08 Jun 2017 03:58 )   PT: 11.6 sec;   INR: 1.07 ratio         PTT - ( 08 Jun 2017 03:58 )  PTT:23.4 sec  CAPILLARY BLOOD GLUCOSE  79 (09 Jun 2017 18:11)  89 (09 Jun 2017 12:22)  85 (09 Jun 2017 08:13)            RADIOLOGY & ADDITIONAL TESTS:    Imaging Personally Reviewed:  [x] YES  [ ] NO    Consultant(s) Notes Reviewed:  [x] YES  [ ] NO      MEDICATIONS  (STANDING):  pantoprazole Infusion 8mG/Hr IV Continuous <Continuous>  piperacillin/tazobactam IVPB. 3.375Gram(s) IV Intermittent every 8 hours  insulin lispro (HumaLOG) corrective regimen sliding scale  SubCutaneous at bedtime  insulin lispro (HumaLOG) corrective regimen sliding scale  SubCutaneous three times a day before meals    MEDICATIONS  (PRN):  HYDROmorphone  Injectable 0.5milliGRAM(s) IV Push every 4 hours PRN Severe Pain (7 - 10)  acetaminophen   Tablet 650milliGRAM(s) Oral every 6 hours PRN For Temp greater than 38 C (100.4 F)  temazepam 15milliGRAM(s) Oral at bedtime PRN Insomnia  acetaminophen   Tablet. 650milliGRAM(s) Oral every 6 hours PRN Moderate Pain (4 - 6)      Care Discussed with Consultants/Other Providers [x] YES  [ ] NO    HEALTH ISSUES - PROBLEM Dx:  PE (pulmonary thromboembolism): PE (pulmonary thromboembolism)  Pulmonary thromboembolism  Need for prophylactic measure: Need for prophylactic measure  Hyperlipidemia, unspecified hyperlipidemia type: Hyperlipidemia, unspecified hyperlipidemia type  Essential hypertension: Essential hypertension  Chronic obstructive pulmonary disease, unspecified COPD type: Chronic obstructive pulmonary disease, unspecified COPD type  Metabolic acidosis: Metabolic acidosis  Lung mass: Lung mass  Other chronic pulmonary embolism without acute cor pulmonale: 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

## 2017-06-09 NOTE — DISCHARGE NOTE ADULT - PLAN OF CARE
resolved There are 2 common types of GI Bleed, Upper GI Bleed and Lower GI Bleed.  Upper GI Bleed affects the esophagus, stomach, and first part of the small intestine. Lower GI Bleed affects the colon and rectum.  Upper GI Bleed signs and symptoms to notify your Health Care Provider are vomiting blood, or coffee ground vomitus, and bowel movements that look like black tar.  Lower GI Bleed signs and symptoms to notify your health care provider are bright red bloody bowel movements.   Take your medications as prescribed by your Gastroenterologist.  If you have had an Endoscopy or Colonoscopy, follow up with your Gastroenterologist for Pathology results.  Avoid NSAIDs unless your Health Care Provider tells you that it is ok (Aspirin, Ibuprofen, Advil, Motrin, Aleve).  Follow up with your Gastroenterologist within 1-2 weeks of discharge. monitor hgb/hct as directed by PMD  return to the ED for any excessive bleeding, vomiting blood or bloody BM's Call your Health Care provider upon arrival home to make a follow up appointment within one week.  Take all inhalers as prescribed by your Health Care Provider.  Take steroids as prescribed by your Health Care Provider.  If your cough increases infrequency and severity and/or you have shortness of breath or increased shortness of breath call your Health Care Provider.  If you develop fever, chills, night sweats, malaise, and/or change in mental status call your Health care Provider.  Nutrition is very important.  Eat small frequent meals.  Increase your activity as tolerated.  Do not stay in bed all day Follow up with your medical doctor to establish long term blood pressure treatment goals. Take your anticoagulation (lovenox, coumadin) as directed.  Follow up with your health care provider within one week. Call for appointment.  If you develop shortness of breath or if your shortness of breath worsens call your Health Care Provider or go to the Emergency Department.

## 2017-06-09 NOTE — DISCHARGE NOTE ADULT - HOSPITAL COURSE
to be completed by attending 82-yo Female with PMHx severe COPD (c/b recurrent pseudomonas air space infection), CKD Stage III, sleep apnea, HTN, and recent admission 5/20-5/31 for SOB 2/2 newly diagnosed PE c/b hematochezia 2/2 GIB in setting of recent initiation of A/C presenting to [facility] on 6/4/17 for recurrent active UGIB 2/2 multiple gastric/duodenal ulcers.   Hgb 6.6 upon arrival in ED, BP 86/45, .    6/4 admitted to MICU, abx iv zosyn for rt upper lobe opacity on CT chest with internal foci of air, massive xfusion protocol, only got 4 prbc no plt, no ffp, protonix gtt, hold AC, reversed AC, cbc q6h  6/5: EGD showed non-bleeding but crated gastric ulcer, oozing ulcer in duodenum that was cauterized and clipped. C/w CBC q8hrs b/c still oozing. On steroid taper for eosinophilia and COPD. Start CLD today. GI said c/w protonix gtt x72hrs. BCx NGTD.  6/6/17 heparin drip d/c'd, LE venous dopplers negative for DVT  6/7/17 s/p rrt for upper gib 1 unit of prbc ordered. MICU consulted  6/7 Pt transferred to MICU, now with melena. Plan discussed with daughter Mackenzie. Persistent tachycardic, sinus with PACs on EKGs. Receiving 2nd unit pRBCs with aggressive electrolyte repletion. EGD repeated. Repeat EGD showed non-bleeding gastric fundus ulcer with adherent clot treated with bipolar cautery and one very large non-bleeding gastric ulcer in antrum which was also cauterized and a non-bleeding duodenal ulcer that was hemoclipped. c/w PPI and NPO O/N with CBC q8hr trend. Patient currently off Heparin gtt as family is against starting patient on any blood thinners at the moment  6/8: Pt had one melanotic BM O/N but Hb stable. Will switch to CBC daily. 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 pylori ([place] Starry stain).  6/8 	Received sign-put from MICU NP. Hold off a/c 2/2 GIB. Repeat CTA chest in am.  	Zosyn for lung abscess.   6/9	GI: melena may reflect resolving GI bleed. hgb trended down from yesterday, 	repeat hgb this afternoon, if hemoglobin stable can advance diet, cont protonix gtt 	for another 24hrs if hmg stable change to bid tmrw  	Pulm: no further AC, CTA chest to reassess for resolution of pulmonary emboli and 	for improvement in RUL lesion on antibiotics  	CTA: largely unchanged PE/ R lobe nodule  6/11/17 discussed risks and benefits of IVC filter with pt, family and IR.  Because of lack of presence of DVT in LEs, it was felt that pt would not derive significant benefit from IVC filter unless she developed DVT in near future and was still no longer a candidate for coumadin.  6/13/17: Hgb 10.5; pt accepted to [facility].  She finished prednisone taper, antibiotics in hospital.  Losartan 25 mg daily restarted in place of losartan/HCTZ.  pt discharged to [facility]. 82-yo Female with PMHx severe COPD (c/b recurrent pseudomonas air space infection), CKD Stage III, sleep apnea, HTN, and recent admission 5/20-5/31 for SOB 2/2 newly diagnosed PE c/b hematochezia 2/2 GIB in setting of recent initiation of A/C presenting to [facility] on 6/4/17 for recurrent active UGIB 2/2 multiple gastric/duodenal ulcers.   Hgb 6.6 upon arrival in ED, BP 86/45, .    6/4 admitted to tele, abx iv zosyn for rt upper lobe opacity on CT chest with internal foci of air, massive xfusion protocol, only got 4 prbc no plt, no ffp, protonix gtt, hold AC, reversed AC, cbc q6h  6/5: EGD showed non-bleeding but crated gastric ulcer, oozing ulcer in duodenum that was cauterized and clipped. C/w CBC q8hrs b/c still oozing. On steroid taper for eosinophilia and COPD. Start CLD today. GI said c/w protonix gtt x72hrs. BCx NGTD.  6/6/17 heparin drip d/c'd, LE venous dopplers negative for DVT  6/7/17 s/p rrt for upper gib 1 unit of prbc ordered. MICU consulted  6/7 Pt transferred to MICU, now with melena. Plan discussed with daughter Mackenzie. Persistent tachycardic, sinus with PACs on EKGs. Receiving 2nd unit pRBCs with aggressive electrolyte repletion. EGD repeated. Repeat EGD showed non-bleeding gastric fundus ulcer with adherent clot treated with bipolar cautery and one very large non-bleeding gastric ulcer in antrum which was also cauterized and a non-bleeding duodenal ulcer that was hemoclipped. c/w PPI and NPO O/N with CBC q8hr trend. Patient currently off Heparin gtt as family is against starting patient on any blood thinners at the moment  6/8: Pt had one melanotic BM O/N but Hb stable. Will switch to CBC daily. 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 pylori ([place]in Starry stain).  6/8 	Received sign-put from MICU NP. Hold off a/c 2/2 GIB. Repeat CTA chest in am.  	Zosyn for rt upper lobe opacity on CTA chest.   6/9	GI: melena may reflect resolving GI bleed. hgb trended down from yesterday, 	repeat hgb this afternoon, if hemoglobin stable can advance diet, cont protonix gtt 	for another 24hrs if hmg stable change to bid tmrw  	Pulm: no further AC, CTA chest to reassess for resolution of pulmonary emboli and for improvement in RUL lesion on antibiotics  	CTA: largely unchanged PE/ R lobe nodule  6/11/17 discussed risks and benefits of IVC filter with pt, family and IR.  Because of lack of presence of DVT in LEs, it was felt that pt would not derive significant benefit from IVC filter unless she developed DVT in near future and was still no longer a candidate for coumadin.  6/13/17: Hgb 10.5; pt accepted to [facility].  She finished prednisone taper in hospital.  SHe was switched to levaquin upon d/c (last day Makeda 15) Losartan 25 mg daily restarted in place of losartan/HCTZ.  pt discharged to [facility].

## 2017-06-09 NOTE — PROGRESS NOTE ADULT - ATTENDING COMMENTS
s/p ugi bleed and egd x 2 stable hemodynamics - dc to [facility]  tylenol for headache
H/H remains stable today  likely black BM from residual blood in the colon  clinically patient has stabilized  would not give pt any further anticoagulation  plan for IVC filter per pulmonary  c/w IV protonix for 24 hours more  c/w zosyn for pneumonia coverage  discussed with MICU and GI

## 2017-06-09 NOTE — DISCHARGE NOTE ADULT - CARE PROVIDER_API CALL
Efrain Vazquez), Internal Medicine  [address] [place] [address]  Phone: [phone]  Fax: [phone]    Wil Quiroga), Internal Medicine; Pulmonary Disease  [address] [place] [address]  Phone: [phone]  Fax: [phone]

## 2017-06-10 DIAGNOSIS — J69.0 PNEUMONITIS DUE TO INHALATION OF FOOD AND VOMIT: ICD-10-CM

## 2017-06-10 LAB
ANION GAP SERPL CALC-SCNC: 10 MMOL/L — SIGNIFICANT CHANGE UP (ref 5–17)
BUN SERPL-MCNC: 10 MG/DL — SIGNIFICANT CHANGE UP (ref 7–23)
CALCIUM SERPL-MCNC: 8.2 MG/DL — LOW (ref 8.4–10.5)
CHLORIDE SERPL-SCNC: 106 MMOL/L — SIGNIFICANT CHANGE UP (ref 96–108)
CO2 SERPL-SCNC: 17 MMOL/L — LOW (ref 22–31)
CREAT SERPL-MCNC: 0.83 MG/DL — SIGNIFICANT CHANGE UP (ref 0.5–1.3)
GLUCOSE SERPL-MCNC: 74 MG/DL — SIGNIFICANT CHANGE UP (ref 70–99)
H PYLORI AG STL QL: SIGNIFICANT CHANGE UP
HCT VFR BLD CALC: 27.2 % — LOW (ref 34.5–45)
HGB BLD-MCNC: 8.9 G/DL — LOW (ref 11.5–15.5)
MCHC RBC-ENTMCNC: 29.2 PG — SIGNIFICANT CHANGE UP (ref 27–34)
MCHC RBC-ENTMCNC: 32.7 GM/DL — SIGNIFICANT CHANGE UP (ref 32–36)
MCV RBC AUTO: 89.2 FL — SIGNIFICANT CHANGE UP (ref 80–100)
PLATELET # BLD AUTO: 208 K/UL — SIGNIFICANT CHANGE UP (ref 150–400)
POTASSIUM SERPL-MCNC: 3.2 MMOL/L — LOW (ref 3.5–5.3)
POTASSIUM SERPL-SCNC: 3.2 MMOL/L — LOW (ref 3.5–5.3)
RBC # BLD: 3.05 M/UL — LOW (ref 3.8–5.2)
RBC # FLD: 20.2 % — HIGH (ref 10.3–14.5)
SODIUM SERPL-SCNC: 133 MMOL/L — LOW (ref 135–145)
WBC # BLD: 8.53 K/UL — SIGNIFICANT CHANGE UP (ref 3.8–10.5)
WBC # FLD AUTO: 8.53 K/UL — SIGNIFICANT CHANGE UP (ref 3.8–10.5)

## 2017-06-10 RX ORDER — POTASSIUM CHLORIDE 20 MEQ
20 PACKET (EA) ORAL
Qty: 0 | Refills: 0 | Status: COMPLETED | OUTPATIENT
Start: 2017-06-10 | End: 2017-06-10

## 2017-06-10 RX ADMIN — PANTOPRAZOLE SODIUM 10 MG/HR: 20 TABLET, DELAYED RELEASE ORAL at 23:34

## 2017-06-10 RX ADMIN — Medication 650 MILLIGRAM(S): at 12:21

## 2017-06-10 RX ADMIN — Medication 650 MILLIGRAM(S): at 05:53

## 2017-06-10 RX ADMIN — PIPERACILLIN AND TAZOBACTAM 25 GRAM(S): 4; .5 INJECTION, POWDER, LYOPHILIZED, FOR SOLUTION INTRAVENOUS at 14:28

## 2017-06-10 RX ADMIN — Medication 20 MILLIEQUIVALENT(S): at 18:10

## 2017-06-10 RX ADMIN — PIPERACILLIN AND TAZOBACTAM 25 GRAM(S): 4; .5 INJECTION, POWDER, LYOPHILIZED, FOR SOLUTION INTRAVENOUS at 05:53

## 2017-06-10 RX ADMIN — Medication 650 MILLIGRAM(S): at 00:30

## 2017-06-10 RX ADMIN — Medication 20 MILLIEQUIVALENT(S): at 18:15

## 2017-06-10 RX ADMIN — Medication 650 MILLIGRAM(S): at 06:22

## 2017-06-10 RX ADMIN — PANTOPRAZOLE SODIUM 10 MG/HR: 20 TABLET, DELAYED RELEASE ORAL at 08:30

## 2017-06-10 RX ADMIN — Medication 100 MILLIGRAM(S): at 14:28

## 2017-06-10 RX ADMIN — Medication 650 MILLIGRAM(S): at 18:11

## 2017-06-10 RX ADMIN — Medication 100 MILLIGRAM(S): at 18:10

## 2017-06-10 RX ADMIN — Medication 650 MILLIGRAM(S): at 19:00

## 2017-06-10 RX ADMIN — Medication 20 MILLIEQUIVALENT(S): at 18:52

## 2017-06-10 RX ADMIN — Medication 650 MILLIGRAM(S): at 13:00

## 2017-06-10 RX ADMIN — PIPERACILLIN AND TAZOBACTAM 25 GRAM(S): 4; .5 INJECTION, POWDER, LYOPHILIZED, FOR SOLUTION INTRAVENOUS at 23:04

## 2017-06-10 RX ADMIN — Medication 20 MILLIEQUIVALENT(S): at 22:08

## 2017-06-10 NOTE — CHART NOTE - NSCHARTNOTEFT_GEN_A_CORE
Source: Patient [ x]    Family [ ]     other [ x] Previous RD note, NP, H&P    Diet : Consistent Carbohydrate Low Sodium Soft - pt has been on liquid diet 2/2 GIB, diet advanced this morning. Pt reports feeling hungry and is happy about diet advancement.      Patient reports [ ] nausea  [ ] vomiting [ ] diarrhea [ ] constipation  [ ]chewing problems [ ] swallowing issues  [ x] other: Per chart, pt with dark red BM today     PO intake:  < 50% [ ] 50-75% [ ]   % [ x]  other :     Source for PO intake [ x] Patient [ ] family [ ] chart [ ] staff [ ] other     Enteral /Parenteral Nutrition: none      Current Weight: Weight (kg): 72.3 (06-04 @ 21:00); Bedscale wt is 178.3 pounds (6/8) per flowsheet; Wt increase 2/2 fluid retention? scale error?  % Weight Change    Pertinent Medications: MEDICATIONS  (STANDING):  pantoprazole Infusion 8mG/Hr IV Continuous <Continuous>  piperacillin/tazobactam IVPB. 3.375Gram(s) IV Intermittent every 8 hours  insulin lispro (HumaLOG) corrective regimen sliding scale  SubCutaneous at bedtime  insulin lispro (HumaLOG) corrective regimen sliding scale  SubCutaneous three times a day before meals    MEDICATIONS  (PRN):  HYDROmorphone  Injectable 0.5milliGRAM(s) IV Push every 4 hours PRN Severe Pain (7 - 10)  acetaminophen   Tablet 650milliGRAM(s) Oral every 6 hours PRN For Temp greater than 38 C (100.4 F)  temazepam 15milliGRAM(s) Oral at bedtime PRN Insomnia  acetaminophen   Tablet. 650milliGRAM(s) Oral every 6 hours PRN Moderate Pain (4 - 6)  benzonatate 100milliGRAM(s) Oral every 8 hours PRN Cough    Pertinent Labs:  06-10 Na133 mmol/L<L> Glu 74 mg/dL K+ 3.2 mmol/L<L> Cr  0.83 mg/dL BUN 10 mg/dL Phos n/a   Alb n/a   PAB n/a; HgbA1c 7.1% (5/21); Fingersticks (6/8-10)       Skin: +2 generalized and +3 bilateral legs edema, skin intact; Nutrition consult received for Coumadin and HgbA1c >7%. Per chart, pt admitted from rehab facility with GIB. Per H&P, pt was on Coumadin PTA, no plan to restart Coumadin per NP. Pt also denies having diabetes, discussed with NP. Diet education not appropriate at this time.    Estimated Needs:   [x ] no change since previous assessment  [ ] recalculated:       Previous Nutrition Diagnosis:     [ x] Inadequate Energy Intake [ ]Inadequate Oral Intake [ ] Excessive Energy Intake     [ ] Underweight [ ] Increased Nutrient Needs [ ] Overweight/Obesity     [ ] Altered GI Function [ ] Unintended Weight Loss [ ] Food & Nutrition Related Knowledge Deficit [ ] Malnutrition          Nutrition Diagnosis is [ ] ongoing  [x ] resolved [ ] not applicable  - with diet advancement and good po intakes         New Nutrition Diagnosis: [ ] not applicable    [ ] Inadequate Protein Energy Intake [ ]Inadequate Oral Intake [ ] Excessive Energy Intake     [ ] Underweight [ ] Increased Nutrient Needs [ ] Overweight/Obesity     [ ] Altered GI Function [ ] Unintended Weight Loss [ ] Food & Nutrition Related Knowledge Deficit[ ] Limited Adherence to nutrition related recommendations [ ] Malnutrition  [ ] other: Free text       Related to:      As evidenced by:      Interventions:     Recommend    [ ] Change Diet To:    [ ] Nutrition Supplement    [ ] Nutrition Support    [ x] Other: Pt denies having diabetes (HgbA1C 7.1% 5/21) discussed with NP. Diet education not appropriate at this time. RD contact information provided.       Monitoring and Evaluation:     [ x] PO intake [x ] Tolerance to diet prescription [ x] weights [ ] follow up per protocol    [ ] other:

## 2017-06-10 NOTE — PROGRESS NOTE ADULT - SUBJECTIVE AND OBJECTIVE BOX
Follow-up Pulm Progress Note    The patient was seen and examined. Lying flat. No respiratory complaints     No new respiratory events overnight.      Denies: SOB, Chest pain, increased cough, colored phlegm, hemoptysis, N/V/D, neck stiffness, dysuria    She is coughing somewhat. Dry      Vital Signs Last 24 Hrs  T(C): 36.5, Max: 36.5 (06-10 @ 04:00)  T(F): 97.7, Max: 97.7 (06-10 @ 04:00)  HR: 76 (76 - 92)  BP: 133/77 (133/77 - 144/77)  BP(mean): --  RR: 18 (18 - 18)  SpO2: 96% (95% - 97%)      Medications:  MEDICATIONS  (STANDING):  pantoprazole Infusion 8mG/Hr IV Continuous <Continuous>  piperacillin/tazobactam IVPB. 3.375Gram(s) IV Intermittent every 8 hours  insulin lispro (HumaLOG) corrective regimen sliding scale  SubCutaneous at bedtime  insulin lispro (HumaLOG) corrective regimen sliding scale  SubCutaneous three times a day before meals    MEDICATIONS  (PRN):  HYDROmorphone  Injectable 0.5milliGRAM(s) IV Push every 4 hours PRN Severe Pain (7 - 10)  acetaminophen   Tablet 650milliGRAM(s) Oral every 6 hours PRN For Temp greater than 38 C (100.4 F)  temazepam 15milliGRAM(s) Oral at bedtime PRN Insomnia  acetaminophen   Tablet. 650milliGRAM(s) Oral every 6 hours PRN Moderate Pain (4 - 6)  benzonatate 100milliGRAM(s) Oral every 8 hours PRN Cough      Allergies    No Known Allergies      Physical Examination:    Pleasant obese white female. NAD. Lying flat  Neck: no JVD, LAD, accessory muscle use  PULM: Clear to auscultation bilaterally anteriorly, no wheezes, rales, rhonchi  CVS: Regular rate and rhythm, S1S2, no murmurs, rubs, or gallops  Abdomen: soft, NT/ND  Extremities: no CCE  Neuro: non focal. A/O      LABS:                        8.9    8.53  )-----------( 208      ( 10 John 2017 07:47 )             27.2     06-10    133<L>  |  106  |  10  ----------------------------<  74  3.2<L>   |  17<L>  |  0.83    Ca    8.2<L>      10 John 2017 08:20  Phos  2.6     06-09  Mg     1.6     06-09            CAPILLARY BLOOD GLUCOSE  85 (10 John 2017 07:50)              CULTURES:  Culture Results:   No growth at 5 days. (06-04 @ 17:21)  Culture Results:   No growth at 5 days. (06-04 @ 17:21)        RADIOLOGY REVIEWED    CT chest:    XAM:  CT ANGIO CHEST (W)AW IC                            PROCEDURE DATE:  06/09/2017            INTERPRETATION:  CLINICAL INFORMATION: Follow up pulmonary embolism in   right upper lung mass    COMPARISON: CTA of the chest dated May 23, 2017    PROCEDURE:   CT Angiography of the Chest.  90 ml of Omnipaque 350 was injected intravenously. 10 ml were discarded.  Sagittal and coronal reformats were performed as well as 3D (MIP)   reconstructions.      FINDINGS:    CHEST:     LUNGS AND LARGE AIRWAYS: Patent central airway. Emphysema. A well-defined   nodule in the right upper lobe with internal foci of air which measures   2.7 x 2.7 cm. previously 3.2 x 3.3 cm on March 3, 2017  PLEURA: Small bilateral pleural effusions with adjacent subsegmental   atelectasis.  VESSELS: Atherosclerotic calcifications of the thoracic aorta and   coronary arteries. Calcified and noncalcified atheromatous plaque, some   of which is ulcerated throughout the aorta, unchanged. Small right lower   lobe subsegmental pulmonary embolus unchanged. Previously noted right   upper lobe pulmonary embolus not clearly visualized.  HEART: Heart size is normal. No pericardial effusion.  MEDIASTINUM AND NURYS: No lymphadenopathy.  CHEST WALL AND LOWER NECK: Within normal limits.  VISUALIZED UPPER ABDOMEN: Status post cholecystectomy.  BONES: Multilevel spinal degenerative changes.    IMPRESSION:   1.  Unchanged right lower lobe subsegmental pulmonary embolus.  2.  A well-defined 2.7 cm nodule in the right upper lobe with internal   foci of air is stable to minimally decreased in size since 3/3/2017.                    JOHNY MOELLER M.D., RADIOLOGY RESIDENT  This document has been electronically signed.  DENNIS MCELROY M.D., ATTENDING RADIOLOGIST  This document has been electronically signed. John 10 2017  9:02AM        Reviewed by me. Improved cavitary lesion and persistent PE

## 2017-06-10 NOTE — PROGRESS NOTE ADULT - SUBJECTIVE AND OBJECTIVE BOX
No melena today as per patient.  Feels well.  +cough      Vital Signs Last 24 Hrs  T(C): 36.5, Max: 36.5 (06-10 @ 04:00)  T(F): 97.7, Max: 97.7 (06-10 @ 04:00)  HR: 76 (76 - 92)  BP: 133/77 (133/77 - 144/77)  BP(mean): --  RR: 18 (18 - 18)  SpO2: 96% (95% - 97%)    PHYSICAL EXAM:    Constitutional: NAD, well-developed  Cardiovascular: S1 and S2,   Gastrointestinal: BS+, soft, NT/ND  Psychiatric: Normal mood, normal affect  Skin: No rashes    MEDICATIONS  (STANDING):  pantoprazole Infusion 8mG/Hr IV Continuous <Continuous>  piperacillin/tazobactam IVPB. 3.375Gram(s) IV Intermittent every 8 hours  insulin lispro (HumaLOG) corrective regimen sliding scale  SubCutaneous at bedtime  insulin lispro (HumaLOG) corrective regimen sliding scale  SubCutaneous three times a day before meals    MEDICATIONS  (PRN):  HYDROmorphone  Injectable 0.5milliGRAM(s) IV Push every 4 hours PRN Severe Pain (7 - 10)  acetaminophen   Tablet 650milliGRAM(s) Oral every 6 hours PRN For Temp greater than 38 C (100.4 F)  temazepam 15milliGRAM(s) Oral at bedtime PRN Insomnia  acetaminophen   Tablet. 650milliGRAM(s) Oral every 6 hours PRN Moderate Pain (4 - 6)  benzonatate 100milliGRAM(s) Oral every 8 hours PRN Cough      Allergies    No Known Allergies    Intolerances        LABS:                        8.9    8.53  )-----------( 208      ( 10 John 2017 07:47 )             27.2     06-10    133<L>  |  106  |  10  ----------------------------<  74  3.2<L>   |  17<L>  |  0.83    Ca    8.2<L>      10 John 2017 08:20  Phos  2.6     06-09  Mg     1.6     06-09          LIVER FUNCTIONS - ( 07 Jun 2017 06:27 )  Alb: 2.4 g/dL / Pro: 4.0 g/dL / ALK PHOS: 51 U/L / ALT: 35 U/L RC / AST: 18 U/L / GGT: x             RADIOLOGY & ADDITIONAL TESTS:

## 2017-06-10 NOTE — PROGRESS NOTE ADULT - ASSESSMENT
81 yo F w/ hematemesis and melena on heparin gtt. Bleeding due to PUD. Ulcers treated w/ cautery and hemoclips.  No bleeding today.  Please continue PPI gtt for now  Monitor daily H/H  Diet as tolerated  No further A/C as per patient and her daughter

## 2017-06-10 NOTE — PROGRESS NOTE ADULT - ASSESSMENT
ASSESSMENT    h/o right upper lobe opacity felt to represent infected air space infection (severe COPD/emphysema) given decrease in size with antibiotic therapy and pseudomonas isolation from sputum on more than one occasion.    eosinophilia resolved on steroids    pulmonary embolism in the setting of prolonged immobility - heparin therapy complicated by life threatening UGI bleeding on two occasions - recent EGD as above    Stable oxygenation on room air  Continue to follow H/H q12h and hemodynamics closely  PPI infusion  On empiric zosyn for possibility of aspiration pneumonia  Mechanical DVT prophylaxis  NO FURTHER ANTI-COAGULATION -   IVC filter pending    Doyle Roldan MD  Pulmonary Medicine  [phone]

## 2017-06-10 NOTE — PROGRESS NOTE ADULT - ASSESSMENT
Pulm c/s  Potential bronch in near future per bronch  Continue abx [id number] female , hx of PE , admitted with UGI 2' possible duodenal ulcer, ulcerated bleeding diverticulum, now clinically stable

## 2017-06-10 NOTE — PROGRESS NOTE ADULT - SUBJECTIVE AND OBJECTIVE BOX
Patient is a 82y old  Female who presents with a chief complaint of gib (09 Jun 2017 09:44)      SUBJECTIVE / OVERNIGHT EVENTS:    Reporting some dark old redness to her BMs yesterday.  No N/V/abdomenal pain.  No acute SOB or CP        Vital Signs Last 24 Hrs  T(C): 36.5, Max: 36.5 (06-10 @ 04:00)  T(F): 97.7, Max: 97.7 (06-10 @ 04:00)  HR: 76 (76 - 92)  BP: 133/77 (133/77 - 144/77)  BP(mean): --  RR: 18 (18 - 18)  SpO2: 96% (95% - 97%)  I&O's Summary    I & Os for current day (as of 10 John 2017 10:43)  =============================================  IN: 1230 ml / OUT: 0 ml / NET: 1230 ml      GENERAL: NAD, AAOx3  HEAD:  Atraumatic, Normocephalic  EYES: EOMI, PERRLA, conjunctiva and sclera clear  NECK: Supple, No JVD, No LAD  CHEST/LUNG: Clear to auscultation bilaterally; No wheeze  HEART: Regular rate and rhythm; No murmurs, rubs, or gallops  ABDOMEN: Soft, Nontender, Nondistended; Bowel sounds present  EXTREMITIES:  2+ Peripheral Pulses, No clubbing, cyanosis, or edema  SKIN: No rashes or lesions      LABS:                        8.9    8.53  )-----------( 208      ( 10 John 2017 07:47 )             27.2     06-10    133<L>  |  106  |  10  ----------------------------<  74  3.2<L>   |  17<L>  |  0.83    Ca    8.2<L>      10 John 2017 08:20  Phos  2.6     06-09  Mg     1.6     06-09        CAPILLARY BLOOD GLUCOSE  85 (10 John 2017 07:50)  86 (09 Jun 2017 22:22)  79 (09 Jun 2017 18:11)  89 (09 Jun 2017 12:22)            RADIOLOGY & ADDITIONAL TESTS:    Imaging Personally Reviewed:  [x] YES  [ ] NO    Consultant(s) Notes Reviewed:  [x] YES  [ ] NO      MEDICATIONS  (STANDING):  pantoprazole Infusion 8mG/Hr IV Continuous <Continuous>  piperacillin/tazobactam IVPB. 3.375Gram(s) IV Intermittent every 8 hours  insulin lispro (HumaLOG) corrective regimen sliding scale  SubCutaneous at bedtime  insulin lispro (HumaLOG) corrective regimen sliding scale  SubCutaneous three times a day before meals    MEDICATIONS  (PRN):  HYDROmorphone  Injectable 0.5milliGRAM(s) IV Push every 4 hours PRN Severe Pain (7 - 10)  acetaminophen   Tablet 650milliGRAM(s) Oral every 6 hours PRN For Temp greater than 38 C (100.4 F)  temazepam 15milliGRAM(s) Oral at bedtime PRN Insomnia  acetaminophen   Tablet. 650milliGRAM(s) Oral every 6 hours PRN Moderate Pain (4 - 6)  benzonatate 100milliGRAM(s) Oral every 8 hours PRN Cough      Care Discussed with Consultants/Other Providers [x] YES  [ ] NO    HEALTH ISSUES - PROBLEM Dx:  PE (pulmonary thromboembolism): PE (pulmonary thromboembolism)  Pulmonary thromboembolism  Need for prophylactic measure: Need for prophylactic measure  Hyperlipidemia, unspecified hyperlipidemia type: Hyperlipidemia, unspecified hyperlipidemia type  Essential hypertension: Essential hypertension  Chronic obstructive pulmonary disease, unspecified COPD type: Chronic obstructive pulmonary disease, unspecified COPD type  Metabolic acidosis: Metabolic acidosis  Lung mass: Lung mass  Other chronic pulmonary embolism without acute cor pulmonale: 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

## 2017-06-11 LAB
ANION GAP SERPL CALC-SCNC: 10 MMOL/L — SIGNIFICANT CHANGE UP (ref 5–17)
BUN SERPL-MCNC: 8 MG/DL — SIGNIFICANT CHANGE UP (ref 7–23)
CALCIUM SERPL-MCNC: 8.6 MG/DL — SIGNIFICANT CHANGE UP (ref 8.4–10.5)
CHLORIDE SERPL-SCNC: 110 MMOL/L — HIGH (ref 96–108)
CO2 SERPL-SCNC: 16 MMOL/L — LOW (ref 22–31)
CREAT SERPL-MCNC: 0.87 MG/DL — SIGNIFICANT CHANGE UP (ref 0.5–1.3)
GLUCOSE SERPL-MCNC: 76 MG/DL — SIGNIFICANT CHANGE UP (ref 70–99)
HCT VFR BLD CALC: 27.7 % — LOW (ref 34.5–45)
HGB BLD-MCNC: 9.1 G/DL — LOW (ref 11.5–15.5)
MCHC RBC-ENTMCNC: 29.2 PG — SIGNIFICANT CHANGE UP (ref 27–34)
MCHC RBC-ENTMCNC: 32.9 GM/DL — SIGNIFICANT CHANGE UP (ref 32–36)
MCV RBC AUTO: 88.8 FL — SIGNIFICANT CHANGE UP (ref 80–100)
PLATELET # BLD AUTO: 248 K/UL — SIGNIFICANT CHANGE UP (ref 150–400)
POTASSIUM SERPL-MCNC: 3.8 MMOL/L — SIGNIFICANT CHANGE UP (ref 3.5–5.3)
POTASSIUM SERPL-SCNC: 3.8 MMOL/L — SIGNIFICANT CHANGE UP (ref 3.5–5.3)
RBC # BLD: 3.12 M/UL — LOW (ref 3.8–5.2)
RBC # FLD: 21 % — HIGH (ref 10.3–14.5)
SODIUM SERPL-SCNC: 136 MMOL/L — SIGNIFICANT CHANGE UP (ref 135–145)
WBC # BLD: 8.59 K/UL — SIGNIFICANT CHANGE UP (ref 3.8–10.5)
WBC # FLD AUTO: 8.59 K/UL — SIGNIFICANT CHANGE UP (ref 3.8–10.5)

## 2017-06-11 RX ORDER — PANTOPRAZOLE SODIUM 20 MG/1
40 TABLET, DELAYED RELEASE ORAL
Qty: 0 | Refills: 0 | Status: DISCONTINUED | OUTPATIENT
Start: 2017-06-11 | End: 2017-06-13

## 2017-06-11 RX ADMIN — Medication 650 MILLIGRAM(S): at 22:42

## 2017-06-11 RX ADMIN — Medication 650 MILLIGRAM(S): at 16:14

## 2017-06-11 RX ADMIN — Medication 650 MILLIGRAM(S): at 07:13

## 2017-06-11 RX ADMIN — PIPERACILLIN AND TAZOBACTAM 25 GRAM(S): 4; .5 INJECTION, POWDER, LYOPHILIZED, FOR SOLUTION INTRAVENOUS at 22:27

## 2017-06-11 RX ADMIN — Medication 650 MILLIGRAM(S): at 17:00

## 2017-06-11 RX ADMIN — PANTOPRAZOLE SODIUM 40 MILLIGRAM(S): 20 TABLET, DELAYED RELEASE ORAL at 16:17

## 2017-06-11 RX ADMIN — PIPERACILLIN AND TAZOBACTAM 25 GRAM(S): 4; .5 INJECTION, POWDER, LYOPHILIZED, FOR SOLUTION INTRAVENOUS at 14:24

## 2017-06-11 RX ADMIN — PIPERACILLIN AND TAZOBACTAM 25 GRAM(S): 4; .5 INJECTION, POWDER, LYOPHILIZED, FOR SOLUTION INTRAVENOUS at 06:19

## 2017-06-11 RX ADMIN — Medication 650 MILLIGRAM(S): at 08:09

## 2017-06-11 RX ADMIN — Medication 650 MILLIGRAM(S): at 23:24

## 2017-06-11 RX ADMIN — Medication 650 MILLIGRAM(S): at 01:30

## 2017-06-11 RX ADMIN — Medication 650 MILLIGRAM(S): at 00:54

## 2017-06-11 NOTE — PROGRESS NOTE ADULT - ASSESSMENT
83 yo F w/ hematemesis and melena while on heparin gtt for pulmonary embolism. EGD performed. Bleeding due to PUD. Ulcers treated w/ cautery and hemoclips.  No bleeding today.  Protonix 40 BID  Monitor daily H/H  Diet as tolerated  No further A/C as per patient and her daughter

## 2017-06-11 NOTE — PROGRESS NOTE ADULT - ASSESSMENT
Pulm c/s  Potential bronch in near future per bronch  Continue abx [id number] female , hx of PE , admitted with UGI 2' possible duodenal ulcer, ulcerated bleeding diverticulum, now clinically stable

## 2017-06-11 NOTE — PROGRESS NOTE ADULT - SUBJECTIVE AND OBJECTIVE BOX
No melena or hematochezia. Tolerating regular diet.  Brown stool yesterday.    Vital Signs Last 24 Hrs  T(C): 36.4, Max: 36.5 (06-11 @ 04:00)  T(F): 97.5, Max: 97.7 (06-11 @ 04:00)  HR: 96 (72 - 96)  BP: 130/84 (115/74 - 142/70)  BP(mean): --  RR: 18 (18 - 18)  SpO2: 99% (98% - 99%)    PHYSICAL EXAM:    Constitutional: NAD, well-developed  Neck: No LAD, supple  Respiratory: +bs  Cardiovascular: S1 and S2,  Gastrointestinal: BS+, soft, NT/ND, neg HSM,      MEDICATIONS  (STANDING):  piperacillin/tazobactam IVPB. 3.375Gram(s) IV Intermittent every 8 hours  insulin lispro (HumaLOG) corrective regimen sliding scale  SubCutaneous at bedtime  insulin lispro (HumaLOG) corrective regimen sliding scale  SubCutaneous three times a day before meals  pantoprazole    Tablet 40milliGRAM(s) Oral two times a day before meals    MEDICATIONS  (PRN):  HYDROmorphone  Injectable 0.5milliGRAM(s) IV Push every 4 hours PRN Severe Pain (7 - 10)  acetaminophen   Tablet 650milliGRAM(s) Oral every 6 hours PRN For Temp greater than 38 C (100.4 F)  temazepam 15milliGRAM(s) Oral at bedtime PRN Insomnia  acetaminophen   Tablet. 650milliGRAM(s) Oral every 6 hours PRN Moderate Pain (4 - 6)  benzonatate 100milliGRAM(s) Oral every 8 hours PRN Cough      Allergies    No Known Allergies    Intolerances          LABS:                        9.1    8.59  )-----------( 248      ( 11 Jun 2017 08:45 )             27.7                         8.9    8.53  )-----------( 208      ( 10 John 2017 07:47 )             27.2                         9.7    10.8  )-----------( 194      ( 09 Jun 2017 16:55 )             27.8     06-11    136  |  110<H>  |  8   ----------------------------<  76  3.8   |  16<L>  |  0.87    Ca    8.6      11 Jun 2017 08:21            RADIOLOGY & ADDITIONAL TESTS:

## 2017-06-11 NOTE — PROGRESS NOTE ADULT - ASSESSMENT
ASSESSMENT    h/o right upper lobe opacity felt to represent infected air space infection (severe COPD/emphysema) given decrease in size with antibiotic therapy and pseudomonas isolation from sputum on more than one occasion.    eosinophilia resolved on steroids    pulmonary embolism in the setting of prolonged immobility - heparin therapy complicated by life threatening UGI bleeding on two occasions - recent EGD as above    Stable oxygenation on room air  Continue to follow H/H q12h and hemodynamics closely  PPI infusion  On empiric zosyn for possibility of aspiration pneumonia  Mechanical DVT prophylaxis  NO FURTHER ANTI-COAGULATION -   IVC filter pending per prior discussion with Dr. Kimber Roldan MD  Pulmonary Medicine  [phone]

## 2017-06-11 NOTE — PROGRESS NOTE ADULT - SUBJECTIVE AND OBJECTIVE BOX
No abd pain, no BRBPR, no melena    Vital Signs Last 24 Hrs  T(C): 36.5, Max: 36.6 (06-10 @ 12:02)  T(F): 97.7, Max: 97.9 (06-10 @ 12:02)  HR: 72 (72 - 79)  BP: 115/74 (115/74 - 145/78)  BP(mean): --  RR: 18 (18 - 18)  SpO2: 99% (98% - 100%)    GENERAL: NAD, AAOx3  HEAD:  NCAT  EYES: EOMI  NECK: Supple, No JVD, No LAD  CHEST/LUNG: Clear to auscultation bilaterally; No wheeze  HEART: Regular rate and rhythm; No murmurs, rubs, or gallops  ABDOMEN: Soft, Nontender, Nondistended; Bowel sounds present  EXTREMITIES:  2+ Peripheral Pulses, No edema  SKIN: No rashes or lesions                        9.1    8.59  )-----------( 248      ( 11 Jun 2017 08:45 )             27.7     06-10    133<L>  |  106  |  10  ----------------------------<  74  3.2<L>   |  17<L>  |  0.83    Ca    8.2<L>      10 John 2017 08:20        CAPILLARY BLOOD GLUCOSE  81 (11 Jun 2017 07:52)  92 (10 John 2017 22:18)  104 (10 John 2017 17:01)  102 (10 John 2017 12:02)

## 2017-06-11 NOTE — PROGRESS NOTE ADULT - SUBJECTIVE AND OBJECTIVE BOX
Follow-up Pulm Progress Note    The patient was seen and examined. Notes reviewed and discussed with staff/team as applicable      No new respiratory events overnight.      Denies: SOB, Chest pain, increased cough, colored phlegm, hemoptysis, N/V/D, neck stiffness, dysuria  ROS otherwise within normal limits    Vital Signs Last 24 Hrs  T(C): 36.5, Max: 36.6 (06-10 @ 12:02)  T(F): 97.7, Max: 97.9 (06-10 @ 12:02)  HR: 72 (72 - 79)  BP: 115/74 (115/74 - 145/78)  BP(mean): --  RR: 18 (18 - 18)  SpO2: 99% (98% - 100%)      Medications:  MEDICATIONS  (STANDING):  piperacillin/tazobactam IVPB. 3.375Gram(s) IV Intermittent every 8 hours  insulin lispro (HumaLOG) corrective regimen sliding scale  SubCutaneous at bedtime  insulin lispro (HumaLOG) corrective regimen sliding scale  SubCutaneous three times a day before meals  pantoprazole    Tablet 40milliGRAM(s) Oral two times a day before meals    MEDICATIONS  (PRN):  HYDROmorphone  Injectable 0.5milliGRAM(s) IV Push every 4 hours PRN Severe Pain (7 - 10)  acetaminophen   Tablet 650milliGRAM(s) Oral every 6 hours PRN For Temp greater than 38 C (100.4 F)  temazepam 15milliGRAM(s) Oral at bedtime PRN Insomnia  acetaminophen   Tablet. 650milliGRAM(s) Oral every 6 hours PRN Moderate Pain (4 - 6)  benzonatate 100milliGRAM(s) Oral every 8 hours PRN Cough      Allergies    No Known Allergies    Intolerances    Physical Examination:    Pleasant white female, NAD  Neck: no JVD, LAD, accessory muscle use  PULM: Clear to auscultation bilaterally, no wheezes, rales, rhonchi  CVS: Regular rate and rhythm, S1S2, no murmurs, rubs, or gallops  Abdomen: soft, NT/ND  Extremities: no edema  Neuro: nonfocal      LABS:                        9.1    8.59  )-----------( 248      ( 11 Jun 2017 08:45 )             27.7     06-11    136  |  110<H>  |  8   ----------------------------<  76  3.8   |  16<L>  |  0.87    Ca    8.6      11 Jun 2017 08:21            CAPILLARY BLOOD GLUCOSE  81 (11 Jun 2017 07:52)              CULTURES:  Culture Results:   No growth at 5 days. (06-04 @ 17:21)  Culture Results:   No growth at 5 days. (06-04 @ 17:21)

## 2017-06-12 LAB
ANION GAP SERPL CALC-SCNC: 16 MMOL/L — SIGNIFICANT CHANGE UP (ref 5–17)
BUN SERPL-MCNC: 7 MG/DL — SIGNIFICANT CHANGE UP (ref 7–23)
CALCIUM SERPL-MCNC: 8.2 MG/DL — LOW (ref 8.4–10.5)
CHLORIDE SERPL-SCNC: 108 MMOL/L — SIGNIFICANT CHANGE UP (ref 96–108)
CO2 SERPL-SCNC: 16 MMOL/L — LOW (ref 22–31)
CREAT SERPL-MCNC: 0.82 MG/DL — SIGNIFICANT CHANGE UP (ref 0.5–1.3)
GLUCOSE SERPL-MCNC: 76 MG/DL — SIGNIFICANT CHANGE UP (ref 70–99)
HCT VFR BLD CALC: 28.3 % — LOW (ref 34.5–45)
HGB BLD-MCNC: 9.5 G/DL — LOW (ref 11.5–15.5)
MAGNESIUM SERPL-MCNC: 1.2 MG/DL — LOW (ref 1.6–2.6)
MCHC RBC-ENTMCNC: 30 PG — SIGNIFICANT CHANGE UP (ref 27–34)
MCHC RBC-ENTMCNC: 33.6 GM/DL — SIGNIFICANT CHANGE UP (ref 32–36)
MCV RBC AUTO: 89.3 FL — SIGNIFICANT CHANGE UP (ref 80–100)
PLATELET # BLD AUTO: 272 K/UL — SIGNIFICANT CHANGE UP (ref 150–400)
POTASSIUM SERPL-MCNC: 3.7 MMOL/L — SIGNIFICANT CHANGE UP (ref 3.5–5.3)
POTASSIUM SERPL-SCNC: 3.7 MMOL/L — SIGNIFICANT CHANGE UP (ref 3.5–5.3)
RBC # BLD: 3.17 M/UL — LOW (ref 3.8–5.2)
RBC # FLD: 20.9 % — HIGH (ref 10.3–14.5)
SODIUM SERPL-SCNC: 140 MMOL/L — SIGNIFICANT CHANGE UP (ref 135–145)
WBC # BLD: 8.66 K/UL — SIGNIFICANT CHANGE UP (ref 3.8–10.5)
WBC # FLD AUTO: 8.66 K/UL — SIGNIFICANT CHANGE UP (ref 3.8–10.5)

## 2017-06-12 RX ORDER — MAGNESIUM SULFATE 500 MG/ML
1 VIAL (ML) INJECTION ONCE
Qty: 0 | Refills: 0 | Status: COMPLETED | OUTPATIENT
Start: 2017-06-12 | End: 2017-06-12

## 2017-06-12 RX ADMIN — Medication 650 MILLIGRAM(S): at 20:21

## 2017-06-12 RX ADMIN — Medication 650 MILLIGRAM(S): at 04:58

## 2017-06-12 RX ADMIN — Medication 650 MILLIGRAM(S): at 21:18

## 2017-06-12 RX ADMIN — PIPERACILLIN AND TAZOBACTAM 25 GRAM(S): 4; .5 INJECTION, POWDER, LYOPHILIZED, FOR SOLUTION INTRAVENOUS at 05:05

## 2017-06-12 RX ADMIN — PANTOPRAZOLE SODIUM 40 MILLIGRAM(S): 20 TABLET, DELAYED RELEASE ORAL at 06:07

## 2017-06-12 RX ADMIN — PIPERACILLIN AND TAZOBACTAM 25 GRAM(S): 4; .5 INJECTION, POWDER, LYOPHILIZED, FOR SOLUTION INTRAVENOUS at 14:20

## 2017-06-12 RX ADMIN — Medication 100 GRAM(S): at 12:17

## 2017-06-12 RX ADMIN — Medication 650 MILLIGRAM(S): at 05:43

## 2017-06-12 RX ADMIN — TEMAZEPAM 15 MILLIGRAM(S): 15 CAPSULE ORAL at 23:41

## 2017-06-12 RX ADMIN — PANTOPRAZOLE SODIUM 40 MILLIGRAM(S): 20 TABLET, DELAYED RELEASE ORAL at 17:31

## 2017-06-12 RX ADMIN — Medication 650 MILLIGRAM(S): at 12:34

## 2017-06-12 RX ADMIN — PIPERACILLIN AND TAZOBACTAM 25 GRAM(S): 4; .5 INJECTION, POWDER, LYOPHILIZED, FOR SOLUTION INTRAVENOUS at 22:07

## 2017-06-12 RX ADMIN — Medication 650 MILLIGRAM(S): at 12:04

## 2017-06-12 NOTE — PROGRESS NOTE ADULT - SUBJECTIVE AND OBJECTIVE BOX
[facility]     PROGRESS NOTE    CHIEF COMPLAINT: shock    INTERVAL HISTORY:  feels well; no respiratory complaints; eager to return to hospital; no further hematemesis, BRBPR or melena;    REVIEW OF SYSTEMS:  Constitutional: With normal limits  HEENT: Within normal limits  CV: Within normal limits  Resp: As above  GI: Within normal limits   : Within normal limits  Musculoskeletal: Within normal limits  Skin: Within normal limits  Neurological: Within normal limits  Psychiatric: Within normal limits  Endocrine: Within normal limits  Hematologic/Lymphatic: Within normal limits  Allergic/Immunologic: Within normal limits    All other systems negative    [ ] Unable to assess ROS because ________      MEDICATIONS:     Pulmonary "  benzonatate 100milliGRAM(s) Oral every 8 hours PRN      Anti-microbials:  piperacillin/tazobactam IVPB. 3.375Gram(s) IV Intermittent every 8 hours      Cardiovascular:      Other:  insulin lispro (HumaLOG) corrective regimen sliding scale  SubCutaneous at bedtime  acetaminophen   Tablet 650milliGRAM(s) Oral every 6 hours PRN  temazepam 15milliGRAM(s) Oral at bedtime PRN  insulin lispro (HumaLOG) corrective regimen sliding scale  SubCutaneous three times a day before meals  acetaminophen   Tablet. 650milliGRAM(s) Oral every 6 hours PRN  pantoprazole    Tablet 40milliGRAM(s) Oral two times a day before meals        OBJECTIVE:        I&O's Detail    I & Os for current day (as of 12 Jun 2017 10:49)  =============================================  IN:    Oral Fluid: 390 ml    Solution: 300 ml    Total IN: 690 ml  ---------------------------------------------  OUT:    Voided: 650 ml    Total OUT: 650 ml  ---------------------------------------------  Total NET: 40 ml      Daily     Daily     CAPILLARY BLOOD GLUCOSE  96 (12 Jun 2017 07:45)  95 (11 Jun 2017 21:30)  96 (11 Jun 2017 16:30)  92 (11 Jun 2017 11:43)      PHYSICAL EXAM:       ICU Vital Signs Last 24 Hrs  T(C): 36.6, Max: 36.7 (06-11 @ 20:35)  T(F): 97.8, Max: 98 (06-11 @ 20:35)  HR: 75 (74 - 96)  BP: 126/82 (126/82 - 141/63)  BP(mean): --  ABP: --  ABP(mean): --  RR: 18 (18 - 18)  SpO2: 99% (96% - 99%)     General: Awake, alert, cooperative, no distress, appears stated age 	  HEENT:   AT/NC/Anicteric. PERRL/EOMI. Normal oral mucosa,  Neck: Supple, trachea midline; thyroid without enlargement/tenderness/nodules; no carotid bruit; no JVD	  Cardiovascular: RRR, S1 S2 normal. No M/R/G  Respiratory: Respirations unlabored; Clear to A&P  Abdomen: Soft, NT/ND. No organomegaly. No masses. Normal BS  Extremities: Warm to touch. No CCE  Pulses: 2+ peripheral pulses all extremities	  Skin: Normal skin color. No rashes or lesions. No ecchymoses. No cyanosis, warm to touch  Lymph Nodes: Cervical, supraclavicular and axillary nodes normal  Neurological: Motor and sensory examination equal and normal. A and O x 3  Psychiatry: Appropriate mood and affect.      LABS:                        9.5    8.66  )-----------( 272      ( 12 Jun 2017 07:19 )             28.3                         9.1    8.59  )-----------( 248      ( 11 Jun 2017 08:45 )             27.7     06-12    140  |  108  |  7   ----------------------------<  76  3.7   |  16<L>  |  0.82      06-11    136  |  110<H>  |  8   ----------------------------<  76  3.8   |  16<L>  |  0.87    Ca    8.2<L>      12 Jun 2017 07:24  Ca    8.6      11 Jun 2017 08:21  Mg     1.2     06-12            MICROBIOLOGY:     RADIOLOGY:  [ x] Reviewed and interpreted by me      EXAM:  CT ANGIO CHEST (W)AW IC                            PROCEDURE DATE:  06/09/2017            INTERPRETATION:  CLINICAL INFORMATION: Follow up pulmonary embolism in   right upper lung mass    COMPARISON: CTA of the chest dated May 23, 2017    PROCEDURE:   CT Angiography of the Chest.  90 ml of Omnipaque 350 was injected intravenously. 10 ml were discarded.  Sagittal and coronal reformats were performed as well as 3D (MIP)   reconstructions.      FINDINGS:    CHEST:     LUNGS AND LARGE AIRWAYS: Patent central airway. Emphysema. A well-defined   nodule in the right upper lobe with internal foci of air which measures   2.7 x 2.7 cm. previously 3.2 x 3.3 cm on March 3, 2017  PLEURA: Small bilateral pleural effusions with adjacent subsegmental   atelectasis.  VESSELS: Atherosclerotic calcifications of the thoracic aorta and   coronary arteries. Calcified and noncalcified atheromatous plaque, some   of which is ulcerated throughout the aorta, unchanged. Small right lower   lobe subsegmental pulmonary embolus unchanged. Previously noted right   upper lobe pulmonary embolus not clearly visualized.  HEART: Heart size is normal. No pericardial effusion.  MEDIASTINUM AND NURYS: No lymphadenopathy.  CHEST WALL AND LOWER NECK: Within normal limits.  VISUALIZED UPPER ABDOMEN: Status post cholecystectomy.  BONES: Multilevel spinal degenerative changes.    IMPRESSION:   1.  Unchanged right lower lobe subsegmental pulmonary embolus.  2.  A well-defined 2.7 cm nodule in the right upper lobe with internal   foci of air is stable to minimally decreased in size since 3/3/2017.        JOHNY MOELLER M.D., RADIOLOGY RESIDENT  This document has been electronically signed.  DENNIS MCELROY M.D., ATTENDING RADIOLOGIST  This document has been electronically signed. John 10 2017  9:02AM

## 2017-06-12 NOTE — PROGRESS NOTE ADULT - SUBJECTIVE AND OBJECTIVE BOX
No new symptoms; no acute SOB or CP    Vital Signs Last 24 Hrs  T(C): 36.6, Max: 36.7 (06-11 @ 20:35)  T(F): 97.8, Max: 98 (06-11 @ 20:35)  HR: 75 (74 - 96)  BP: 126/82 (126/82 - 141/63)  BP(mean): --  RR: 18 (18 - 18)  SpO2: 99% (96% - 99%)    GENERAL: NAD, AAOx3  HEAD:  NCAT  EYES: EOMI  NECK: Supple, No JVD, No LAD  CHEST/LUNG: Clear to auscultation bilaterally; No wheeze  HEART: Regular rate and rhythm; No murmurs, rubs, or gallops  ABDOMEN: Soft, Nontender, Nondistended; Bowel sounds present  EXTREMITIES:  2+ Peripheral Pulses, No edema  SKIN: No rashes or lesions                          9.5    8.66  )-----------( 272      ( 12 Jun 2017 07:19 )             28.3     06-12    140  |  108  |  7   ----------------------------<  76  3.7   |  16<L>  |  0.82    Ca    8.2<L>      12 Jun 2017 07:24  Mg     1.2     06-12        CAPILLARY BLOOD GLUCOSE  96 (12 Jun 2017 07:45)  95 (11 Jun 2017 21:30)  96 (11 Jun 2017 16:30)  92 (11 Jun 2017 11:43)

## 2017-06-12 NOTE — PROGRESS NOTE ADULT - ASSESSMENT
Pulm c/s  Potential bronch in near future per bronch  Continue abx [id number] female , hx of PE , admitted with UGI 2' possible duodenal ulcer, ulcerated bleeding diverticulum, now clinically stable

## 2017-06-12 NOTE — PROGRESS NOTE ADULT - ASSESSMENT
81 yo F w/ hematemesis and melena while on heparin gtt for pulmonary embolism. EGD performed. Bleeding due to PUD. Ulcers treated w/ cautery and hemoclips.  No further overt bleeding X 3 days.  Protonix 40 BID  H/H stable and rising  Biopsy reviewed: H pylori negative  Diet as tolerated  No further A/C as per patient and her daughter  Would recommend repeat EGD  to document resolution of deep cratered ulcers however patient is high risk for sedation given PE therefore would evaluate clinical condition in 6-8 weeks prior to making decision to repeat EGD>  Will sign off. Pls call with questions.

## 2017-06-12 NOTE — PROGRESS NOTE ADULT - ASSESSMENT
ASSESSMENT    h/o right upper lobe opacity - CTA over weekend with continued decrease in size with thin and round boarders - this lesions is felt felt to represent infected air space (severe COPD/emphysema) given decrease in size with antibiotic therapy and pseudomonas isolation from sputum on more than one occasion - neoplasm seems less likely but is still a concern    eosinophilia resolved on steroids    pulmonary embolism in the setting of prolonged immobility - heparin therapy complicated by life threatening UGI bleeding on two occasions - recent EGD as above    ---------------------------------------------------------------------------------------------------------  Stable oxygenation on room air  Hematocrit and hemodynamically stable  PPI po  On empiric zosyn for possibility of aspiration pneumonia - would discontinue when discarged  Mechanical DVT prophylaxis  NO FURTHER ANTI-COAGULATION - placement of an IVC filter is being deferred given potential side effects and the lack of LE DVT at this time - should the patient develop LE DVTs down the line, an IVC filter should be placed      Will follow with you.    Wil Quiroga MD, [facility] - [phone]

## 2017-06-12 NOTE — PROGRESS NOTE ADULT - SUBJECTIVE AND OBJECTIVE BOX
Feels well. No further melena or hematochezia.      Vital Signs Last 24 Hrs  T(C): 36.6, Max: 36.7 (06-11 @ 20:35)  T(F): 97.8, Max: 98 (06-11 @ 20:35)  HR: 119 (74 - 119)  BP: 126/82 (126/82 - 141/63)  BP(mean): --  RR: 18 (18 - 18)  SpO2: 99% (96% - 99%)    PHYSICAL EXAM:    Constitutional: NAD, well-developed  Neck: No LAD, supple  Respiratory: +bs  Cardiovascular: S1 and S2,   Gastrointestinal: BS+, soft, NT/ND,      MEDICATIONS  (STANDING):  piperacillin/tazobactam IVPB. 3.375Gram(s) IV Intermittent every 8 hours  insulin lispro (HumaLOG) corrective regimen sliding scale  SubCutaneous at bedtime  insulin lispro (HumaLOG) corrective regimen sliding scale  SubCutaneous three times a day before meals  pantoprazole    Tablet 40milliGRAM(s) Oral two times a day before meals    MEDICATIONS  (PRN):  acetaminophen   Tablet 650milliGRAM(s) Oral every 6 hours PRN For Temp greater than 38 C (100.4 F)  temazepam 15milliGRAM(s) Oral at bedtime PRN Insomnia  acetaminophen   Tablet. 650milliGRAM(s) Oral every 6 hours PRN Moderate Pain (4 - 6)  benzonatate 100milliGRAM(s) Oral every 8 hours PRN Cough      Allergies    No Known Allergies    Intolerances          LABS:                        9.5    8.66  )-----------( 272      ( 12 Jun 2017 07:19 )             28.3                         9.1    8.59  )-----------( 248      ( 11 Jun 2017 08:45 )             27.7                         8.9    8.53  )-----------( 208      ( 10 John 2017 07:47 )             27.2     06-12    140  |  108  |  7   ----------------------------<  76  3.7   |  16<L>  |  0.82    Ca    8.2<L>      12 Jun 2017 07:24  Mg     1.2     06-12                RADIOLOGY & ADDITIONAL TESTS:

## 2017-06-13 VITALS
DIASTOLIC BLOOD PRESSURE: 66 MMHG | OXYGEN SATURATION: 98 % | HEART RATE: 81 BPM | TEMPERATURE: 97 F | RESPIRATION RATE: 18 BRPM | SYSTOLIC BLOOD PRESSURE: 107 MMHG

## 2017-06-13 PROBLEM — J85.2 ABSCESS OF LUNG WITHOUT PNEUMONIA: Chronic | Status: ACTIVE | Noted: 2017-06-04

## 2017-06-13 PROBLEM — I26.99 OTHER PULMONARY EMBOLISM WITHOUT ACUTE COR PULMONALE: Chronic | Status: ACTIVE | Noted: 2017-06-04

## 2017-06-13 LAB
ANION GAP SERPL CALC-SCNC: 15 MMOL/L — SIGNIFICANT CHANGE UP (ref 5–17)
BUN SERPL-MCNC: 8 MG/DL — SIGNIFICANT CHANGE UP (ref 7–23)
CALCIUM SERPL-MCNC: 8.1 MG/DL — LOW (ref 8.4–10.5)
CHLORIDE SERPL-SCNC: 105 MMOL/L — SIGNIFICANT CHANGE UP (ref 96–108)
CO2 SERPL-SCNC: 18 MMOL/L — LOW (ref 22–31)
CREAT SERPL-MCNC: 0.83 MG/DL — SIGNIFICANT CHANGE UP (ref 0.5–1.3)
GLUCOSE SERPL-MCNC: 105 MG/DL — HIGH (ref 70–99)
HCT VFR BLD CALC: 31.9 % — LOW (ref 34.5–45)
HGB BLD-MCNC: 10.5 G/DL — LOW (ref 11.5–15.5)
MAGNESIUM SERPL-MCNC: 1.5 MG/DL — LOW (ref 1.6–2.6)
MCHC RBC-ENTMCNC: 31.4 PG — SIGNIFICANT CHANGE UP (ref 27–34)
MCHC RBC-ENTMCNC: 33 GM/DL — SIGNIFICANT CHANGE UP (ref 32–36)
MCV RBC AUTO: 95 FL — SIGNIFICANT CHANGE UP (ref 80–100)
PLATELET # BLD AUTO: 348 K/UL — SIGNIFICANT CHANGE UP (ref 150–400)
POTASSIUM SERPL-MCNC: 3.3 MMOL/L — LOW (ref 3.5–5.3)
POTASSIUM SERPL-SCNC: 3.3 MMOL/L — LOW (ref 3.5–5.3)
RBC # BLD: 3.36 M/UL — LOW (ref 3.8–5.2)
RBC # FLD: 18.6 % — HIGH (ref 10.3–14.5)
SODIUM SERPL-SCNC: 138 MMOL/L — SIGNIFICANT CHANGE UP (ref 135–145)
WBC # BLD: 9.1 K/UL — SIGNIFICANT CHANGE UP (ref 3.8–10.5)
WBC # FLD AUTO: 9.1 K/UL — SIGNIFICANT CHANGE UP (ref 3.8–10.5)

## 2017-06-13 PROCEDURE — 99285 EMERGENCY DEPT VISIT HI MDM: CPT | Mod: 25

## 2017-06-13 PROCEDURE — 80048 BASIC METABOLIC PNL TOTAL CA: CPT

## 2017-06-13 PROCEDURE — 82947 ASSAY GLUCOSE BLOOD QUANT: CPT

## 2017-06-13 PROCEDURE — 96375 TX/PRO/DX INJ NEW DRUG ADDON: CPT

## 2017-06-13 PROCEDURE — 83880 ASSAY OF NATRIURETIC PEPTIDE: CPT

## 2017-06-13 PROCEDURE — 85610 PROTHROMBIN TIME: CPT

## 2017-06-13 PROCEDURE — 84484 ASSAY OF TROPONIN QUANT: CPT

## 2017-06-13 PROCEDURE — 86901 BLOOD TYPING SEROLOGIC RH(D): CPT

## 2017-06-13 PROCEDURE — 97162 PT EVAL MOD COMPLEX 30 MIN: CPT

## 2017-06-13 PROCEDURE — 71275 CT ANGIOGRAPHY CHEST: CPT

## 2017-06-13 PROCEDURE — 93005 ELECTROCARDIOGRAM TRACING: CPT

## 2017-06-13 PROCEDURE — 87338 HPYLORI STOOL AG IA: CPT

## 2017-06-13 PROCEDURE — 84132 ASSAY OF SERUM POTASSIUM: CPT

## 2017-06-13 PROCEDURE — 94640 AIRWAY INHALATION TREATMENT: CPT

## 2017-06-13 PROCEDURE — 86850 RBC ANTIBODY SCREEN: CPT

## 2017-06-13 PROCEDURE — 84295 ASSAY OF SERUM SODIUM: CPT

## 2017-06-13 PROCEDURE — 82435 ASSAY OF BLOOD CHLORIDE: CPT

## 2017-06-13 PROCEDURE — 82803 BLOOD GASES ANY COMBINATION: CPT

## 2017-06-13 PROCEDURE — 84100 ASSAY OF PHOSPHORUS: CPT

## 2017-06-13 PROCEDURE — 97116 GAIT TRAINING THERAPY: CPT

## 2017-06-13 PROCEDURE — 36430 TRANSFUSION BLD/BLD COMPNT: CPT

## 2017-06-13 PROCEDURE — 83735 ASSAY OF MAGNESIUM: CPT

## 2017-06-13 PROCEDURE — 71045 X-RAY EXAM CHEST 1 VIEW: CPT

## 2017-06-13 PROCEDURE — 82272 OCCULT BLD FECES 1-3 TESTS: CPT

## 2017-06-13 PROCEDURE — 86923 COMPATIBILITY TEST ELECTRIC: CPT

## 2017-06-13 PROCEDURE — 87040 BLOOD CULTURE FOR BACTERIA: CPT

## 2017-06-13 PROCEDURE — 82550 ASSAY OF CK (CPK): CPT

## 2017-06-13 PROCEDURE — 96374 THER/PROPH/DIAG INJ IV PUSH: CPT

## 2017-06-13 PROCEDURE — 82565 ASSAY OF CREATININE: CPT

## 2017-06-13 PROCEDURE — 82330 ASSAY OF CALCIUM: CPT

## 2017-06-13 PROCEDURE — 96376 TX/PRO/DX INJ SAME DRUG ADON: CPT

## 2017-06-13 PROCEDURE — 93970 EXTREMITY STUDY: CPT

## 2017-06-13 PROCEDURE — 85014 HEMATOCRIT: CPT

## 2017-06-13 PROCEDURE — P9040: CPT

## 2017-06-13 PROCEDURE — 80053 COMPREHEN METABOLIC PANEL: CPT

## 2017-06-13 PROCEDURE — 86900 BLOOD TYPING SEROLOGIC ABO: CPT

## 2017-06-13 PROCEDURE — 83605 ASSAY OF LACTIC ACID: CPT

## 2017-06-13 PROCEDURE — P9016: CPT

## 2017-06-13 PROCEDURE — 85730 THROMBOPLASTIN TIME PARTIAL: CPT

## 2017-06-13 PROCEDURE — 85027 COMPLETE CBC AUTOMATED: CPT

## 2017-06-13 RX ORDER — ATORVASTATIN CALCIUM 80 MG/1
20 TABLET, FILM COATED ORAL AT BEDTIME
Qty: 0 | Refills: 0 | Status: DISCONTINUED | OUTPATIENT
Start: 2017-06-13 | End: 2017-06-13

## 2017-06-13 RX ORDER — POTASSIUM CHLORIDE 20 MEQ
20 PACKET (EA) ORAL
Qty: 0 | Refills: 0 | Status: COMPLETED | OUTPATIENT
Start: 2017-06-13 | End: 2017-06-13

## 2017-06-13 RX ADMIN — PIPERACILLIN AND TAZOBACTAM 25 GRAM(S): 4; .5 INJECTION, POWDER, LYOPHILIZED, FOR SOLUTION INTRAVENOUS at 05:32

## 2017-06-13 RX ADMIN — PIPERACILLIN AND TAZOBACTAM 25 GRAM(S): 4; .5 INJECTION, POWDER, LYOPHILIZED, FOR SOLUTION INTRAVENOUS at 13:08

## 2017-06-13 RX ADMIN — Medication 650 MILLIGRAM(S): at 03:13

## 2017-06-13 RX ADMIN — PANTOPRAZOLE SODIUM 40 MILLIGRAM(S): 20 TABLET, DELAYED RELEASE ORAL at 06:04

## 2017-06-13 RX ADMIN — Medication 650 MILLIGRAM(S): at 07:34

## 2017-06-13 RX ADMIN — Medication 20 MILLIEQUIVALENT(S): at 15:19

## 2017-06-13 RX ADMIN — Medication 650 MILLIGRAM(S): at 14:46

## 2017-06-13 RX ADMIN — Medication 100 MILLIGRAM(S): at 02:52

## 2017-06-13 RX ADMIN — Medication 20 MILLIEQUIVALENT(S): at 12:39

## 2017-06-13 RX ADMIN — Medication 650 MILLIGRAM(S): at 04:11

## 2017-06-13 NOTE — PROGRESS NOTE ADULT - ASSESSMENT
ASSESSMENT    h/o right upper lobe opacity - CTA over weekend with continued decrease in size - thin and round boarders suggests that this lesions is likely an infected air space (severe COPD/emphysema) - pseudomonas has been cultured from sputum - neoplasm seems less likely but is still a concern    eosinophilia resolved on steroids    pulmonary embolism in the setting of prolonged immobility - heparin therapy complicated by life threatening UGI bleeding on two occasions - recent EGD as above    ---------------------------------------------------------------------------------------------------------  Stable oxygenation on room air  Hematocrit and hemodynamically stable  PPI po - f/u EGD after discharge from rehab to document resolution of PUD  On zosyn - would discontinue when discharged  Mechanical DVT prophylaxis  NO FURTHER ANTI-COAGULATION - placement of an IVC filter is being deferred given potential side effects and the lack of LE DVT at this time - should the patient develop LE DVTs down the line, an IVC filter should be placed  Follow-up chest CT in 4 - 6 weeks  No pulmonary objection to discharge    d/w floor NP and daughter by phone    Will follow with you.    Wil Quiroga MD, [facility] - [phone]        Will follow with you.    Wil Quiroga MD, [facility] [phone]

## 2017-06-13 NOTE — PROGRESS NOTE ADULT - PROBLEM SELECTOR PLAN 5
home meds on hold 2' UGI
home meds on hold 2' UGI
restart losartan 25 mg daily upon d/c to STR; hold HCTZ
home meds on hold 2' UGI

## 2017-06-13 NOTE — PROGRESS NOTE ADULT - SUBJECTIVE AND OBJECTIVE BOX
Denies any BRPBR.  No CP or SOB.  No fevers or chills.  No N/V.    Vital Signs Last 24 Hrs  T(C): 36.6, Max: 36.7 (06-12 @ 14:36)  T(F): 97.9, Max: 98.1 (06-12 @ 14:36)  HR: 95 (82 - 119)  BP: 143/76 (130/65 - 143/76)  BP(mean): --  RR: 18 (18 - 18)  SpO2: 98% (95% - 99%)    GENERAL: NAD, AAOx3  HEAD:  NCAT  EYES: EOMI  NECK: Supple, No JVD  CHEST/LUNG: Clear to auscultation bilaterally; No wheeze  HEART: Regular rate and rhythm; No murmurs, rubs, or gallops  ABDOMEN: Soft, Nontender, Nondistended; Bowel sounds present  EXTREMITIES:  2+ Peripheral Pulses, No edema  SKIN: No rashes or lesions                          9.5    8.66  )-----------( 272      ( 12 Jun 2017 07:19 )             28.3     06-12    140  |  108  |  7   ----------------------------<  76  3.7   |  16<L>  |  0.82    Ca    8.2<L>      12 Jun 2017 07:24  Mg     1.2     06-12        CAPILLARY BLOOD GLUCOSE  86 (13 Jun 2017 07:22)  85 (12 Jun 2017 21:37)  110 (12 Jun 2017 17:29)  107 (12 Jun 2017 11:59)

## 2017-06-13 NOTE — PROGRESS NOTE ADULT - SUBJECTIVE AND OBJECTIVE BOX
[facility]     PROGRESS NOTE    CHIEF COMPLAINT: UGI bleed/shock    INTERVAL HISTORY: no respiratory complaints; no further hematemesis, BRBPR, melena; hematocrit and hemodynamically stable; weak and frail starting to work with physical therapy    REVIEW OF SYSTEMS:  Constitutional: As per HPI  HEENT: Within normal limits  CV: As per HPI  Resp: As per HPI  GI: Within normal limits   : Within normal limits  Musculoskeletal: Within normal limits  Skin: Within normal limits  Neurological: Within normal limits  Psychiatric: Within normal limits  Endocrine: Within normal limits  Hematologic/Lymphatic: Within normal limits  Allergic/Immunologic: Within normal limits      [ ] Unable to assess ROS because ________      MEDICATIONS:     Pulmonary "  benzonatate 100milliGRAM(s) Oral every 8 hours PRN      Anti-microbials:  piperacillin/tazobactam IVPB. 3.375Gram(s) IV Intermittent every 8 hours      Cardiovascular:      Other:  insulin lispro (HumaLOG) corrective regimen sliding scale  SubCutaneous at bedtime  acetaminophen   Tablet 650milliGRAM(s) Oral every 6 hours PRN  temazepam 15milliGRAM(s) Oral at bedtime PRN  insulin lispro (HumaLOG) corrective regimen sliding scale  SubCutaneous three times a day before meals  acetaminophen   Tablet. 650milliGRAM(s) Oral every 6 hours PRN  pantoprazole    Tablet 40milliGRAM(s) Oral two times a day before meals  atorvastatin 20milliGRAM(s) Oral at bedtime        OBJECTIVE:        I&O's Detail    I & Os for current day (as of 13 Jun 2017 09:49)  =============================================  IN:    Oral Fluid: 240 ml    IV PiggyBack: 100 ml    Total IN: 340 ml  ---------------------------------------------  OUT:    Voided: 1650 ml    Total OUT: 1650 ml  ---------------------------------------------  Total NET: -1310 ml      Daily     Daily     CAPILLARY BLOOD GLUCOSE  86 (13 Jun 2017 07:22)  85 (12 Jun 2017 21:37)  110 (12 Jun 2017 17:29)  107 (12 Jun 2017 11:59)      PHYSICAL EXAM:       ICU Vital Signs Last 24 Hrs  T(C): 36.6, Max: 36.7 (06-12 @ 14:36)  T(F): 97.9, Max: 98.1 (06-12 @ 14:36)  HR: 95 (82 - 119)  BP: 143/76 (130/65 - 143/76)  BP(mean): --  ABP: --  ABP(mean): --  RR: 18 (18 - 18)  SpO2: 98% (95% - 99%)     General: Awake, alert, cooperative, no distress, appears stated age 	  HEENT:   AT/NC/Anicteric. PERRL/EOMI. Normal oral mucosa,  Neck: Supple, trachea midline; thyroid without enlargement/tenderness/nodules; no carotid bruit; no JVD	  Cardiovascular: RRR, S1 S2 normal. No M/R/G  Respiratory: Respirations unlabored; Clear to A&P  Abdomen: Soft, NT/ND. No organomegaly. No masses. Normal BS  Extremities: Warm to touch. No CCE  Pulses: 2+ peripheral pulses all extremities	  Skin: Normal skin color. No rashes or lesions. No ecchymoses. No cyanosis, warm to touch  Lymph Nodes: Cervical, supraclavicular and axillary nodes normal  Neurological: Motor and sensory examination equal and normal. A and O x 3  Psychiatry: Appropriate mood and affect.      LABS:                        9.5    8.66  )-----------( 272      ( 12 Jun 2017 07:19 )             28.3     06-12    140  |  108  |  7   ----------------------------<  76  3.7   |  16<L>  |  0.82  06-11    136  |  110<H>  |  8   ----------------------------<  76  3.8   |  16<L>  |  0.87    Ca      8.2<L>      06-12    Ca      8.6      06-11    Phos    2.6     06-09      Mg       1.2     06-12    Mg       1.6     06-09      MICROBIOLOGY:     RADIOLOGY:  [ x] Reviewed and interpreted by me    EXAM:  CT ANGIO CHEST (W)AW IC                            PROCEDURE DATE:  06/09/2017            INTERPRETATION:  CLINICAL INFORMATION: Follow up pulmonary embolism in   right upper lung mass    COMPARISON: CTA of the chest dated May 23, 2017    PROCEDURE:   CT Angiography of the Chest.  90 ml of Omnipaque 350 was injected intravenously. 10 ml were discarded.  Sagittal and coronal reformats were performed as well as 3D (MIP)   reconstructions.      FINDINGS:    CHEST:     LUNGS AND LARGE AIRWAYS: Patent central airway. Emphysema. A well-defined   nodule in the right upper lobe with internal foci of air which measures   2.7 x 2.7 cm. previously 3.2 x 3.3 cm on March 3, 2017  PLEURA: Small bilateral pleural effusions with adjacent subsegmental   atelectasis.  VESSELS: Atherosclerotic calcifications of the thoracic aorta and   coronary arteries. Calcified and noncalcified atheromatous plaque, some   of which is ulcerated throughout the aorta, unchanged. Small right lower   lobe subsegmental pulmonary embolus unchanged. Previously noted right   upper lobe pulmonary embolus not clearly visualized.  HEART: Heart size is normal. No pericardial effusion.  MEDIASTINUM AND NURYS: No lymphadenopathy.  CHEST WALL AND LOWER NECK: Within normal limits.  VISUALIZED UPPER ABDOMEN: Status post cholecystectomy.  BONES: Multilevel spinal degenerative changes.    IMPRESSION:   1.  Unchanged right lower lobe subsegmental pulmonary embolus.  2.  A well-defined 2.7 cm nodule in the right upper lobe with internal   foci of air is stable to minimally decreased in size since 3/3/2017.        JOHNY MOELLER M.D., RADIOLOGY RESIDENT  This document has been electronically signed.  DENNIS MCELROY M.D., ATTENDING RADIOLOGIST  This document has been electronically signed. 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  - Negative for H. pylori (Warthin Starry stain).    Kristi Wang M.D.  (Electronic Signature)  Reported on: 06/08/17    Clinical History  _GI bleed, gastric and duodenal ulcers    Specimen(s) Submitted  1     Antral biopsy    Gross Description  The specimen is received in formalin and the specimen container  is labeled: Antral biopsy.  It consists of a 0.2 cm in greatest  dimension fragment of soft, tan-pink tissue. Special stains are  requested. Entirely submitted.  One cassette.    In addition to other data that may appear onthe specimen  container, the label has been inspected to confirm the presence  of the patient's name and date of birth.   06/07/17 21:11

## 2017-06-13 NOTE — PROGRESS NOTE ADULT - PROBLEM SELECTOR PROBLEM 4
Aspiration pneumonia, unspecified aspiration pneumonia type, unspecified laterality, unspecified part of lung
Essential hypertension

## 2017-06-13 NOTE — PROGRESS NOTE ADULT - PROBLEM SELECTOR PLAN 1
resolved   cont PPI PO bid  appreciate GI
resolved   cont PPI PO bid  appreciate GI  will need CBC 3 days out from discharge
resolved   stopped ppi gtt; now on PPI PO bid  appreciate GI
given report of still darkish red discoloration and Hgb drop to 8.9 will continue protonix gtt for another day.  appreciate GI

## 2017-06-13 NOTE — PROGRESS NOTE ADULT - PROBLEM SELECTOR PLAN 3
no need for prednisone taper upon discharge  appreciate pulmonary
stable, no need for steroids at this point or nebs  appreciate pulmonary
stable, no need for steroids at this point or nebs  appreciate pulmonary
stable, no need for steroids at this point or nebs

## 2017-06-13 NOTE — PROGRESS NOTE ADULT - PROBLEM SELECTOR PROBLEM 6
Hyperlipidemia, unspecified hyperlipidemia type
Lung mass

## 2017-06-13 NOTE — PROGRESS NOTE ADULT - PROBLEM SELECTOR PROBLEM 5
Essential hypertension
Hyperlipidemia, unspecified hyperlipidemia type

## 2017-06-13 NOTE — PROGRESS NOTE ADULT - PROBLEM SELECTOR PLAN 2
off AC due to UGI  CTA showing no changes to PE  spoke to pt about IVC filter, will call daughter Monday to discuss
off AC due to UGI x 3 since May  CTA showing no changes to PE  spoke to pt and daughter about risks > benefits of IVC filter, will defer for now since there was no DVT
off AC due to UGI  CTA showing no changes to PE
off AC due to UGI x 3 since May  CTA showing no changes to PE  spoke to pt and daughter about IVC filter; they are both agreeable; already placed IR consult, awaiting confirmation of date

## 2017-06-13 NOTE — PROGRESS NOTE ADULT - PROVIDER SPECIALTY LIST ADULT
Gastroenterology
Internal Medicine
MICU
MICU
Pulmonology
Gastroenterology
Gastroenterology

## 2017-06-13 NOTE — PROGRESS NOTE ADULT - PROBLEM SELECTOR PLAN 7
stable RUL nodule on repeat CTA

## 2017-06-13 NOTE — PROGRESS NOTE ADULT - PROBLEM SELECTOR PROBLEM 3
Chronic obstructive pulmonary disease, unspecified COPD type

## 2017-06-13 NOTE — PROGRESS NOTE ADULT - PROBLEM SELECTOR PROBLEM 2
PE (pulmonary thromboembolism)

## 2017-06-13 NOTE — PROGRESS NOTE ADULT - ASSESSMENT
Pulm c/s  Potential bronch in near future per bronch  Continue abx [id number] female , hx of PE , admitted with UGI 2' possible duodenal ulcer, ulcerated bleeding diverticulum, now clinically stable

## 2017-06-14 LAB — FUNGITELL: 167 PG/ML — HIGH (ref 0–59)

## 2017-06-26 ENCOUNTER — OUTPATIENT (OUTPATIENT)
Dept: OUTPATIENT SERVICES | Facility: HOSPITAL | Age: 82
LOS: 1 days | End: 2017-06-26
Payer: MEDICARE

## 2017-06-26 DIAGNOSIS — I26.90 SEPTIC PULMONARY EMBOLISM WITHOUT ACUTE COR PULMONALE: ICD-10-CM

## 2017-06-26 PROCEDURE — C1894: CPT

## 2017-06-26 PROCEDURE — 37191 INS ENDOVAS VENA CAVA FILTR: CPT

## 2017-06-26 PROCEDURE — C1769: CPT

## 2017-06-26 PROCEDURE — C1887: CPT

## 2017-06-26 PROCEDURE — C1880: CPT

## 2017-07-05 DIAGNOSIS — K28.9 GASTROJEJUNAL ULCER, UNSPECIFIED AS ACUTE OR CHRONIC, WITHOUT HEMORRHAGE OR PERFORATION: ICD-10-CM

## 2017-07-05 DIAGNOSIS — I26.99 OTHER PULMONARY EMBOLISM WITHOUT ACUTE COR PULMONALE: ICD-10-CM

## 2017-07-09 ENCOUNTER — INPATIENT (INPATIENT)
Facility: HOSPITAL | Age: 82
LOS: 4 days | Discharge: HOME | DRG: 308 | End: 2017-07-14
Attending: INTERNAL MEDICINE | Admitting: HOSPITALIST
Payer: MEDICARE

## 2017-07-09 VITALS
OXYGEN SATURATION: 96 % | RESPIRATION RATE: 20 BRPM | HEART RATE: 90 BPM | SYSTOLIC BLOOD PRESSURE: 121 MMHG | DIASTOLIC BLOOD PRESSURE: 86 MMHG

## 2017-07-09 DIAGNOSIS — I26.99 OTHER PULMONARY EMBOLISM WITHOUT ACUTE COR PULMONALE: ICD-10-CM

## 2017-07-09 LAB
ALBUMIN SERPL ELPH-MCNC: 2.9 G/DL — LOW (ref 3.3–5)
ALP SERPL-CCNC: 185 U/L — HIGH (ref 40–120)
ALT FLD-CCNC: 70 U/L RC — HIGH (ref 10–45)
ANION GAP SERPL CALC-SCNC: 16 MMOL/L — SIGNIFICANT CHANGE UP (ref 5–17)
ANION GAP SERPL CALC-SCNC: 19 MMOL/L — HIGH (ref 5–17)
APPEARANCE UR: CLEAR — SIGNIFICANT CHANGE UP
APTT BLD: 26.1 SEC — LOW (ref 27.5–37.4)
AST SERPL-CCNC: 144 U/L — HIGH (ref 10–40)
BASOPHILS # BLD AUTO: 0.1 K/UL — SIGNIFICANT CHANGE UP (ref 0–0.2)
BASOPHILS NFR BLD AUTO: 0.5 % — SIGNIFICANT CHANGE UP (ref 0–2)
BILIRUB SERPL-MCNC: 0.5 MG/DL — SIGNIFICANT CHANGE UP (ref 0.2–1.2)
BILIRUB UR-MCNC: NEGATIVE — SIGNIFICANT CHANGE UP
BLD GP AB SCN SERPL QL: NEGATIVE — SIGNIFICANT CHANGE UP
BUN SERPL-MCNC: 13 MG/DL — SIGNIFICANT CHANGE UP (ref 7–23)
BUN SERPL-MCNC: 14 MG/DL — SIGNIFICANT CHANGE UP (ref 7–23)
CALCIUM SERPL-MCNC: 8 MG/DL — LOW (ref 8.4–10.5)
CALCIUM SERPL-MCNC: 9 MG/DL — SIGNIFICANT CHANGE UP (ref 8.4–10.5)
CHLORIDE SERPL-SCNC: 105 MMOL/L — SIGNIFICANT CHANGE UP (ref 96–108)
CHLORIDE SERPL-SCNC: 107 MMOL/L — SIGNIFICANT CHANGE UP (ref 96–108)
CK MB BLD-MCNC: 4.4 % — HIGH (ref 0–3.5)
CK MB CFR SERPL CALC: 2.6 NG/ML — SIGNIFICANT CHANGE UP (ref 0–3.8)
CK SERPL-CCNC: 59 U/L — SIGNIFICANT CHANGE UP (ref 25–170)
CO2 SERPL-SCNC: 16 MMOL/L — LOW (ref 22–31)
CO2 SERPL-SCNC: 18 MMOL/L — LOW (ref 22–31)
COLOR SPEC: YELLOW — SIGNIFICANT CHANGE UP
CREAT SERPL-MCNC: 0.84 MG/DL — SIGNIFICANT CHANGE UP (ref 0.5–1.3)
CREAT SERPL-MCNC: 0.98 MG/DL — SIGNIFICANT CHANGE UP (ref 0.5–1.3)
DIFF PNL FLD: NEGATIVE — SIGNIFICANT CHANGE UP
EOSINOPHIL # BLD AUTO: 1 K/UL — HIGH (ref 0–0.5)
EOSINOPHIL NFR BLD AUTO: 7 % — HIGH (ref 0–6)
GAS PNL BLDV: SIGNIFICANT CHANGE UP
GAS PNL BLDV: SIGNIFICANT CHANGE UP
GLUCOSE SERPL-MCNC: 108 MG/DL — HIGH (ref 70–99)
GLUCOSE SERPL-MCNC: 139 MG/DL — HIGH (ref 70–99)
GLUCOSE UR QL: NEGATIVE — SIGNIFICANT CHANGE UP
HCT VFR BLD CALC: 38.2 % — SIGNIFICANT CHANGE UP (ref 34.5–45)
HGB BLD-MCNC: 12.4 G/DL — SIGNIFICANT CHANGE UP (ref 11.5–15.5)
INR BLD: 1.08 RATIO — SIGNIFICANT CHANGE UP (ref 0.88–1.16)
KETONES UR-MCNC: NEGATIVE — SIGNIFICANT CHANGE UP
LEUKOCYTE ESTERASE UR-ACNC: ABNORMAL
LIDOCAIN IGE QN: 14 U/L — SIGNIFICANT CHANGE UP (ref 7–60)
LYMPHOCYTES # BLD AUTO: 0.9 K/UL — LOW (ref 1–3.3)
LYMPHOCYTES # BLD AUTO: 6 % — LOW (ref 13–44)
MCHC RBC-ENTMCNC: 31.8 PG — SIGNIFICANT CHANGE UP (ref 27–34)
MCHC RBC-ENTMCNC: 32.4 GM/DL — SIGNIFICANT CHANGE UP (ref 32–36)
MCV RBC AUTO: 98.2 FL — SIGNIFICANT CHANGE UP (ref 80–100)
MONOCYTES # BLD AUTO: 0.7 K/UL — SIGNIFICANT CHANGE UP (ref 0–0.9)
MONOCYTES NFR BLD AUTO: 6 % — SIGNIFICANT CHANGE UP (ref 2–14)
NEUTROPHILS # BLD AUTO: 11.8 K/UL — HIGH (ref 1.8–7.4)
NEUTROPHILS NFR BLD AUTO: 79 % — HIGH (ref 43–77)
NITRITE UR-MCNC: POSITIVE
NT-PROBNP SERPL-SCNC: 1762 PG/ML — HIGH (ref 0–300)
PH UR: 7 — SIGNIFICANT CHANGE UP (ref 5–8)
PLATELET # BLD AUTO: 512 K/UL — HIGH (ref 150–400)
POTASSIUM SERPL-MCNC: 2.9 MMOL/L — CRITICAL LOW (ref 3.5–5.3)
POTASSIUM SERPL-MCNC: 3.4 MMOL/L — LOW (ref 3.5–5.3)
POTASSIUM SERPL-SCNC: 2.9 MMOL/L — CRITICAL LOW (ref 3.5–5.3)
POTASSIUM SERPL-SCNC: 3.4 MMOL/L — LOW (ref 3.5–5.3)
PROLACTIN SERPL-MCNC: 85.1 NG/ML — HIGH (ref 3.4–24.1)
PROT SERPL-MCNC: 5.8 G/DL — LOW (ref 6–8.3)
PROT UR-MCNC: 30 MG/DL
PROTHROM AB SERPL-ACNC: 11.7 SEC — SIGNIFICANT CHANGE UP (ref 9.8–12.7)
RBC # BLD: 3.9 M/UL — SIGNIFICANT CHANGE UP (ref 3.8–5.2)
RBC # FLD: 18.1 % — HIGH (ref 10.3–14.5)
RH IG SCN BLD-IMP: NEGATIVE — SIGNIFICANT CHANGE UP
SODIUM SERPL-SCNC: 140 MMOL/L — SIGNIFICANT CHANGE UP (ref 135–145)
SODIUM SERPL-SCNC: 141 MMOL/L — SIGNIFICANT CHANGE UP (ref 135–145)
SP GR SPEC: >1.03 — HIGH (ref 1.01–1.02)
TROPONIN T SERPL-MCNC: 0.02 NG/ML — SIGNIFICANT CHANGE UP (ref 0–0.06)
UROBILINOGEN FLD QL: NEGATIVE — SIGNIFICANT CHANGE UP
WBC # BLD: 14.5 K/UL — HIGH (ref 3.8–10.5)
WBC # FLD AUTO: 14.5 K/UL — HIGH (ref 3.8–10.5)

## 2017-07-09 PROCEDURE — 71275 CT ANGIOGRAPHY CHEST: CPT | Mod: 26

## 2017-07-09 PROCEDURE — 99223 1ST HOSP IP/OBS HIGH 75: CPT

## 2017-07-09 PROCEDURE — 71010: CPT | Mod: 26

## 2017-07-09 PROCEDURE — 99292 CRITICAL CARE ADDL 30 MIN: CPT

## 2017-07-09 PROCEDURE — 70450 CT HEAD/BRAIN W/O DYE: CPT | Mod: 26

## 2017-07-09 PROCEDURE — 99291 CRITICAL CARE FIRST HOUR: CPT

## 2017-07-09 PROCEDURE — 74177 CT ABD & PELVIS W/CONTRAST: CPT | Mod: 26

## 2017-07-09 RX ORDER — SODIUM CHLORIDE 9 MG/ML
500 INJECTION INTRAMUSCULAR; INTRAVENOUS; SUBCUTANEOUS ONCE
Qty: 0 | Refills: 0 | Status: COMPLETED | OUTPATIENT
Start: 2017-07-09 | End: 2017-07-09

## 2017-07-09 RX ORDER — FERROUS SULFATE 325(65) MG
1 TABLET ORAL
Qty: 0 | Refills: 0 | COMMUNITY

## 2017-07-09 RX ORDER — POTASSIUM CHLORIDE 20 MEQ
10 PACKET (EA) ORAL
Qty: 0 | Refills: 0 | Status: COMPLETED | OUTPATIENT
Start: 2017-07-09 | End: 2017-07-09

## 2017-07-09 RX ORDER — CEFTRIAXONE 500 MG/1
1 INJECTION, POWDER, FOR SOLUTION INTRAMUSCULAR; INTRAVENOUS ONCE
Qty: 0 | Refills: 0 | Status: COMPLETED | OUTPATIENT
Start: 2017-07-09 | End: 2017-07-09

## 2017-07-09 RX ORDER — FORMOTEROL FUMARATE 12 MCG
20 CAPSULE, WITH INHALATION DEVICE INHALATION
Qty: 0 | Refills: 0 | Status: DISCONTINUED | OUTPATIENT
Start: 2017-07-09 | End: 2017-07-10

## 2017-07-09 RX ORDER — FAMOTIDINE 10 MG/ML
20 INJECTION INTRAVENOUS DAILY
Qty: 0 | Refills: 0 | Status: DISCONTINUED | OUTPATIENT
Start: 2017-07-09 | End: 2017-07-10

## 2017-07-09 RX ORDER — ONDANSETRON 8 MG/1
4 TABLET, FILM COATED ORAL ONCE
Qty: 0 | Refills: 0 | Status: COMPLETED | OUTPATIENT
Start: 2017-07-09 | End: 2017-07-09

## 2017-07-09 RX ORDER — IPRATROPIUM/ALBUTEROL SULFATE 18-103MCG
3 AEROSOL WITH ADAPTER (GRAM) INHALATION EVERY 6 HOURS
Qty: 0 | Refills: 0 | Status: DISCONTINUED | OUTPATIENT
Start: 2017-07-09 | End: 2017-07-14

## 2017-07-09 RX ORDER — ATORVASTATIN CALCIUM 80 MG/1
20 TABLET, FILM COATED ORAL AT BEDTIME
Qty: 0 | Refills: 0 | Status: DISCONTINUED | OUTPATIENT
Start: 2017-07-09 | End: 2017-07-14

## 2017-07-09 RX ORDER — HEPARIN SODIUM 5000 [USP'U]/ML
5500 INJECTION INTRAVENOUS; SUBCUTANEOUS ONCE
Qty: 0 | Refills: 0 | Status: COMPLETED | OUTPATIENT
Start: 2017-07-09 | End: 2017-07-09

## 2017-07-09 RX ORDER — LOSARTAN POTASSIUM 100 MG/1
1 TABLET, FILM COATED ORAL
Qty: 0 | Refills: 0 | COMMUNITY

## 2017-07-09 RX ORDER — MAGNESIUM SULFATE 500 MG/ML
2 VIAL (ML) INJECTION ONCE
Qty: 0 | Refills: 0 | Status: COMPLETED | OUTPATIENT
Start: 2017-07-09 | End: 2017-07-09

## 2017-07-09 RX ORDER — SODIUM CHLORIDE 9 MG/ML
1000 INJECTION, SOLUTION INTRAVENOUS ONCE
Qty: 0 | Refills: 0 | Status: COMPLETED | OUTPATIENT
Start: 2017-07-09 | End: 2017-07-09

## 2017-07-09 RX ORDER — HEPARIN SODIUM 5000 [USP'U]/ML
INJECTION INTRAVENOUS; SUBCUTANEOUS
Qty: 25000 | Refills: 0 | Status: DISCONTINUED | OUTPATIENT
Start: 2017-07-09 | End: 2017-07-10

## 2017-07-09 RX ORDER — LACTOBACILLUS ACIDOPHILUS 100MM CELL
1 CAPSULE ORAL DAILY
Qty: 0 | Refills: 0 | Status: DISCONTINUED | OUTPATIENT
Start: 2017-07-09 | End: 2017-07-14

## 2017-07-09 RX ORDER — ACETAMINOPHEN 500 MG
650 TABLET ORAL EVERY 6 HOURS
Qty: 0 | Refills: 0 | Status: DISCONTINUED | OUTPATIENT
Start: 2017-07-09 | End: 2017-07-14

## 2017-07-09 RX ORDER — IPRATROPIUM/ALBUTEROL SULFATE 18-103MCG
3 AEROSOL WITH ADAPTER (GRAM) INHALATION EVERY 6 HOURS
Qty: 0 | Refills: 0 | Status: DISCONTINUED | OUTPATIENT
Start: 2017-07-09 | End: 2017-07-09

## 2017-07-09 RX ORDER — HEPARIN SODIUM 5000 [USP'U]/ML
5500 INJECTION INTRAVENOUS; SUBCUTANEOUS EVERY 6 HOURS
Qty: 0 | Refills: 0 | Status: DISCONTINUED | OUTPATIENT
Start: 2017-07-09 | End: 2017-07-10

## 2017-07-09 RX ORDER — LACTOBACILLUS ACIDOPHILUS 100MM CELL
1 CAPSULE ORAL
Qty: 0 | Refills: 0 | COMMUNITY

## 2017-07-09 RX ORDER — LOSARTAN POTASSIUM 100 MG/1
50 TABLET, FILM COATED ORAL DAILY
Qty: 0 | Refills: 0 | Status: DISCONTINUED | OUTPATIENT
Start: 2017-07-09 | End: 2017-07-14

## 2017-07-09 RX ORDER — HEPARIN SODIUM 5000 [USP'U]/ML
2500 INJECTION INTRAVENOUS; SUBCUTANEOUS EVERY 6 HOURS
Qty: 0 | Refills: 0 | Status: DISCONTINUED | OUTPATIENT
Start: 2017-07-09 | End: 2017-07-10

## 2017-07-09 RX ORDER — CEFTRIAXONE 500 MG/1
1 INJECTION, POWDER, FOR SOLUTION INTRAMUSCULAR; INTRAVENOUS EVERY 24 HOURS
Qty: 0 | Refills: 0 | Status: DISCONTINUED | OUTPATIENT
Start: 2017-07-10 | End: 2017-07-10

## 2017-07-09 RX ADMIN — HEPARIN SODIUM 1200 UNIT(S)/HR: 5000 INJECTION INTRAVENOUS; SUBCUTANEOUS at 18:24

## 2017-07-09 RX ADMIN — SODIUM CHLORIDE 4000 MILLILITER(S): 9 INJECTION, SOLUTION INTRAVENOUS at 14:26

## 2017-07-09 RX ADMIN — HEPARIN SODIUM 5500 UNIT(S): 5000 INJECTION INTRAVENOUS; SUBCUTANEOUS at 18:24

## 2017-07-09 RX ADMIN — ONDANSETRON 4 MILLIGRAM(S): 8 TABLET, FILM COATED ORAL at 13:35

## 2017-07-09 RX ADMIN — Medication 100 MILLIEQUIVALENT(S): at 16:50

## 2017-07-09 RX ADMIN — SODIUM CHLORIDE 500 MILLILITER(S): 9 INJECTION INTRAMUSCULAR; INTRAVENOUS; SUBCUTANEOUS at 13:00

## 2017-07-09 RX ADMIN — Medication 100 MILLIEQUIVALENT(S): at 14:40

## 2017-07-09 RX ADMIN — Medication 100 MILLIEQUIVALENT(S): at 15:25

## 2017-07-09 RX ADMIN — Medication 50 GRAM(S): at 17:56

## 2017-07-09 RX ADMIN — CEFTRIAXONE 100 GRAM(S): 500 INJECTION, POWDER, FOR SOLUTION INTRAMUSCULAR; INTRAVENOUS at 18:00

## 2017-07-09 NOTE — ED ADULT NURSE NOTE - OBJECTIVE STATEMENT
Pt BIBA from home s/p witnessed syncopal episode.  Pt's daughter states pt was standing in kitchen making tea when she reported feeling "dizzy," sat down and lost consciousness for approximately 30 seconds, eyes rolled back, arms shaking, one episode vomiting and urinary and stool incontinence.  Pt was in seated position in a chair when episode occurred, daughter helped keep her on chair, did not fall or hit head.  Pt has been c/o nausea and non-bloody diarrhea since yesterday.  Pt arrives a&o 3, conversive, does not remember incident, , EKG performed and handed to MD Mora, CCM in place, abd soft/nd/nt, skin wdi, red blanchable area to sacrum, +feels "tired," denies cp, palpitations, sob, ha, vision changes, current nausea, abd pain, pain elsewhere, urinary symptoms.  Pt cleaned and changed, large amounts of light brown stool, no sampson blood noted, guaiac negative.

## 2017-07-09 NOTE — ED PROVIDER NOTE - CRITICAL CARE PROVIDED
additional history taking/interpretation of diagnostic studies/consultation with other physicians/direct patient care (not related to procedure)/conducted a detailed discussion of DNR status/documentation

## 2017-07-09 NOTE — ED ADULT NURSE NOTE - PAIN RATING/NUMBER SCALE (0-10): REST
Patient: Mustapha Ortiz Date: 2017   : 1949    67 year old male      OUTPATIENT WOUND CARE PROGRESS NOTE    Supervising Wound Care / Hyperbaric Medicine Physician: Not Applicable  Consulting Provider:  Mela Woodard MD  Date of Consultation/Last Comprehensive Exam:  3/29/17  Referring  Provider:  Dr. Robin    SUBJECTIVE:    Chief Complaint:  Left third toe diabetic ulcer      Wound/Ulcer Present:    Diabetic lower extremity ulcer:  Lopez grade 1 (superficial diabetic ulcer).    Diabetic foot exam performed?  Yes.     Current Vascular Assessment:  Physical exam.     Current Antibiotic Regimen:  None.     Current Offloading Modality:  None.      Additional Wound Category:  None     Maximum Baseline Ambulatory Status:  Ambulates unassisted    History of Present Illness:  This is a 67 year old obese diabetic male who states he developed an ulcer on the medial aspect of his left third toe in mid to end of 2017. He does not believe there was any trauma or injury, but states that one day he took off his sock and there was blood. He has not had any pain as he is neuropathic. He was initially placed on Keflex by his primary physician.He works as a  so he doesn't walk much at work.    Interval history: The patient reports that he went to [facility] and has inserts in the works. He has not had any increased erythema, swelling, or drainage noted. He has had no fever or chills or GI symptoms and has had a good appetite. He has had no trouble changing his dressings.       Current Treatment Regimen:  Dressing:  Josefina   Frequency:  Three times a week   Changed by:  Patient    Review of Systems:  Pertinent items are noted in HPI (history of present illness).    Past Medical History:   Diagnosis Date   • Arthritis    • DM (diabetes mellitus)    • Gastroesophageal reflux disease    • HTN (hypertension)      Past Surgical History:   Procedure Laterality Date   • CLAVICLE SURGERY Right 6/3/18     ORIF   • HEMORRHOIDECTOMY EXTERNAL SINGLE COLUMN GROUP     • HERNIA REPAIR     • PROSTATE SURGERY  5/2016     Social History     Social History   • Marital status:      Spouse name: N/A   • Number of children: N/A   • Years of education: N/A     Occupational History   • Not on file.     Social History Main Topics   • Smoking status: Former Smoker     Packs/day: 1.00     Years: 25.00     Types: Cigarettes     Quit date: 1/24/1993   • Smokeless tobacco: Never Used   • Alcohol use 0.0 oz/week     0 Standard drinks or equivalent per week      Comment: rare   • Drug use: No   • Sexual activity: Not on file     Other Topics Concern   • Not on file     Social History Narrative     Family History   Problem Relation Age of Onset   • High blood pressure Mother    • High blood pressure Father    • Diabetes Father        Current Outpatient Prescriptions   Medication Sig   • atorvastatin (LIPITOR) 10 MG tablet TAKE 1 TABLET BY MOUTH DAILY.   • metoPROLOL (LOPRESSOR) 25 MG tablet TAKE 0.5 TABLETS BY MOUTH 2 TIMES DAILY.   • glipiZIDE (GLIPIZIDE XL) 10 MG 24 hr tablet Take 1 tablet by mouth 2 times daily.   • DULoxetine (CYMBALTA) 60 MG capsule TAKE 1 CAPSULE BY MOUTH DAILY.   • TRADJENTA 5 MG tablet TAKE 1 TABLET BY MOUTH DAILY. INDICATIONS: TYPE 2 DIABETES   • XIGDUO XR 5-1000 MG extended-release tablet TAKE 2 TABLETS BY MOUTH DAILY.   • XARELTO 20 MG Tab TAKE 1 TABLET BY MOUTH EVERY DAY   • tamsulosin (FLOMAX) 0.4 MG Cap TAKE 2 CAPSULES BY MOUTH DAILY AFTER A MEAL. INDICATIONS: ENLARGED PROSTATE   • Insulin Pen Needle 32G X 6 MM Misc USE DAILY AS DIRECTED WITH VICTOZA, DX: E11.9   • blood glucose test strips (ONE TOUCH TEST STRIPS) Test blood sugar 4 times daily as directed. Diagnosis: E11.9. uncontrolled   • liraglutide (VICTOZA) 18 MG/3ML Solution Pen-injector Inject 1.8 mg into the skin daily. 90d supply   • Blood Glucose Monitoring Suppl (BLOOD GLUCOSE METER) Kit Test blood sugars once daily.  Dx. DM uncontrolled.   Meter per insurance coverage   • LANCETS ULTRA FINE Misc Test blood sugars once daily.  Dx. DM uncontrolled.  per insurance coverage   • omeprazole (PRILOSEC) 20 MG capsule Take 20 mg by mouth 2 times daily.     No current facility-administered medications for this encounter.         ALLERGIES: no known allergies.    OBJECTIVE:  Vital Signs:    There were no vitals taken for this visit.      Physical Exam:  General appearance: Appears stated age, Alert, obese, in no distress and cooperative  Left medial third toe wound was sharply debrided of mild base fibrin and periwound callus There is no tunneling or undermining and no purulence or malodor. Wound is slightly larger but is flush and fully granulated.    Wound Bed Quality:  Granulation tissue      Minda-wound Quality:    As above    Additional Descriptors:  none    Wound Measurements Per Flowsheet:                                                                   PROCEDURE:  Debridement: Selective Non-Excisional Debridement of Non-viable Tissue.  Informed consent obtained.  Time out was completed.  Patient, procedure, and site verified.  Size-  Less than or equal to 20 sq cm  Instrument-  Curette  Non-viable tissue removed-  Fibrin/Slough and Epidermis/dermis  Hemostasis achieved-  Pressure  Patient procedure was-  tolerated well, no complications.  Refer to nursing notes for wound dimensions and assessment.  Patient stable upon completion of procedure.  Wound dimensions unchanged post procedure.    Procedure was Performed by:  Physician     Laboratory assessments reviewed:  No results found for: PAB   Albumin (g/dL)   Date Value   02/01/2017 3.6   11/04/2016 3.8   07/15/2016 3.7      No results available in last 24 hours    Lab Results   Component Value Date    WBC 8.4 05/07/2016    GLUCOSE 206 (H) 02/01/2017    HGBA1C 6.9 (H) 04/03/2017    CREATININE 0.73 02/01/2017    GFRA >90 02/01/2017    GFRNA >90 02/01/2017        Culture results:  Specimen Description (no  units)   Date Value   04/08/2016 URINE, CLEAN CATCH/MIDSTREAM    CULTURE (no units)   Date Value   04/08/2016 >100,000 CFU/mL STAPHYLOCOCCUS EPIDERMIDIS (P)        Diagnostic Assessments Reviewed:  No new update    Nutritional Assessment:  Prealbumin and/or Albumin reviewed    Wound treatment goals are palliative:  No    DIAGNOSES:  Diabetic lower extremity ulcer, Lopez grade 1 (superficial diabetic ulcer) Left medial third toe  Diabetes Under suboptimal control  Obesity    Medical Decision Making:   The wound is slightly larger but is healthy. He has developed more callus than previously, however. He is instructed to continue using Josefina but to obtain a toe spacer to put between the second and third toes to try to reduce the amount of pressure/friction on the area.    Plan of Care:  Advanced Wound Care Recommendations:  As above  Percent Wound Closure from consult: NA  Care plan to augment wound closure:  Not applicable.       Patient stable. All questions were answered.   Return to clinic in 2-3 weeks.    Mela Woodard MD     0

## 2017-07-09 NOTE — ED PROVIDER NOTE - MEDICAL DECISION MAKING DETAILS
Attending MD Mora: 82F with COPD, recent GI bleed, PE s/p IVC filter presenting with episode of LOC and AMS prior to arrival, daughter describes possible tonic clonic activity and post-ictal state concerning for first time seizure, could be syncope alternatively. Plan for CT head, labs, EKG, tele, Marianne and re-eval. PE a consideration as patient is off AC currently due to GIB. Will reassess to see if PE needs to be pursued further

## 2017-07-09 NOTE — ED PROVIDER NOTE - CONSTITUTIONAL, MLM
normal... ill appearing, keeping eyes closed does open eyes to voice and follows commands, pale appearing awake, alert, oriented to person, place, time/situation and in no apparent distress.

## 2017-07-09 NOTE — H&P ADULT - NSHPPHYSICALEXAM_GEN_ALL_CORE
Physical exam with an elderly, chronically ill appearing F with diffuse sarcopenia.  BP  129/74  Afebrile.  97% on RA.  HR  79  RR 14.  HEENT< PERRL, EOMI, neck supple, chest grossly clear, cor s1 s2, declined breast exam.  Abdomen soft, normal bowel sounds, non-tender, non-distended.  Ext with diffuse sarcopenia.  No edema.  Neurologic exam AxOx3.  Speech fluent.  Cognition intact.  UE/LE 5/5.

## 2017-07-09 NOTE — ED PROVIDER NOTE - OBJECTIVE STATEMENT
83 y/o female with PMHx of COPD, CKD, HLD, HTN, PE, Mass on Lung and Rebound HA, arrives to the ED via EMS s/p syncopal episode today when she was making tea felt nauseous sat down in her wheelchair and passed out at home, witnessed by daughter. Per daughter pt was "shaking and leaning back," which lasted approximately 30 seconds. She is urinary and bowel incontinent at baseline. EMS states she vomiting once in the ambulance, bile in nature. Per daughter, pt has melena. Denies SOB and chest pain in the ED. Reports IVC filter placed.

## 2017-07-09 NOTE — H&P ADULT - NSHPLABSRESULTS_GEN_ALL_CORE
WBC 14.5K.  Hgb 12.4.  Platelets of 512K.  2% bands.  INR 1.0.  K+ 3.4.>>supplemented in the ER.  Random glucose of 108.  Cr 0.8.  Alb 2.9.  Troponin nil.  U/A with 30 protein and 50 WBC.  BNP 1762.  Chest radiograph reviewed with no infiltrate or effusion.  Head CTT negative.  EKG tracing reviewed with sinus at 91 with premature atrial contractions and non-specific ST T changes.  QTc 492.

## 2017-07-09 NOTE — H&P ADULT - HISTORY OF PRESENT ILLNESS
NIGHT HOSPITALIST:  Patient UNKNOWN to me previously, assigned to me at this point via the ER and by Dr. 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 Alvarado), with a recent discharge from [place] in June, 2017 for hypotension and upper GI bleed requiring multiple unit transfusion requirements and MICU admission--patient with prior PE in May, 2017 on Coumadin, but AC was discontinued in June, 2017 with IVC filter placed--EGD with nonbleeding gastric ulcer and cauterized DU and hemoclips, with patient transferred to [facility] and was discharged recently to home, with the patient self referring following a syncopal episode with the patient slid down from a chair with daughter noting generalized stiffness then shaking with faecal and urinary incontinence.  Patient herself does not recall events.  No HA, no focal weakness.  No chest pain/pressure.  No dyspnea.  No abdominal pain, no red blood per rectum or melena.  No hematuria, no dysuria but with increased frequency.  Remaining review of systems not contributory.

## 2017-07-09 NOTE — ED PROVIDER NOTE - PROGRESS NOTE DETAILS
Attending MD Mora: CTA with bilateral PEs, discussion had with patient regarding risks and benefits of restarted AC especially given recent history of massive UGIB. Risks of decompensation secondary to PEs outweighs current risk of recurrent GI bleed, patient verbally consents to anticoagulation at this time. Will monitor closely for recurrent bleeding, heparin gtt given ease of reversal if need be

## 2017-07-09 NOTE — ED PROVIDER NOTE - MUSCULOSKELETAL, MLM
extremities warm and well perfused. Spine appears normal, range of motion is not limited, no muscle or joint tenderness

## 2017-07-09 NOTE — H&P ADULT - ASSESSMENT
NIGHT HOSPITALIST:  Presentation of patient with syncopal episode in the setting of COPD, complicated course with past PE but with upper GI bleed with the patient with IVC but taken off full anticoagulation at the time with presentation for syncopal versus ictal episode with non-diagnostic head CTT but with CTT angiogram with bilateral pulmonary emboli and unchanged RIGHT upper cavitary lesion (followed by Dr. Alvarado of pulmonary since March, 2017) with no tissue diagnosis, presumptive lung CA--patient/ daughter are NOT eager for a tissue diagnosis, with patient now on IV heparin for treatment of PE.  Risks/benefits reviewed with patient/ daughter who agree to continue with IV heparin as a potentially life saving measure.  Patient admitted to telemetry and will obtain serial enzymes, echo.  Would consider formal evaluation by neurology for the possibility of an interval ictal episode as primary or epiphenomenon, and pulmonary evaluation by Dr. Correa's office.  Patient has requested to maintain her DNR status--patient has MOLST-- daughter in attendance has deferred to the patient as such.

## 2017-07-09 NOTE — H&P ADULT - ATTENDING COMMENTS
NIGHT HOSPITALIST:  Patient/ daughter in attendance aware of course and agree with plan/care as above.  Prognosis is guarded.  Emotional support provided to patient/ daughter.  Patient has requested DNR status.  Care reviewed with covering NP.  Care assumed by Dr. Peterson.

## 2017-07-09 NOTE — H&P ADULT - PROBLEM SELECTOR PLAN 2
See above.  Versus ictal episode.  Head CTT non-diagnostic.  Would consider formal neurologic evaluation--reviewed with covering NP>

## 2017-07-10 DIAGNOSIS — N30.90 CYSTITIS, UNSPECIFIED WITHOUT HEMATURIA: ICD-10-CM

## 2017-07-10 DIAGNOSIS — I10 ESSENTIAL (PRIMARY) HYPERTENSION: ICD-10-CM

## 2017-07-10 DIAGNOSIS — R55 SYNCOPE AND COLLAPSE: ICD-10-CM

## 2017-07-10 DIAGNOSIS — J44.9 CHRONIC OBSTRUCTIVE PULMONARY DISEASE, UNSPECIFIED: ICD-10-CM

## 2017-07-10 DIAGNOSIS — I26.99 OTHER PULMONARY EMBOLISM WITHOUT ACUTE COR PULMONALE: ICD-10-CM

## 2017-07-10 LAB
ANION GAP SERPL CALC-SCNC: 14 MMOL/L — SIGNIFICANT CHANGE UP (ref 5–17)
ANISOCYTOSIS BLD QL: SIGNIFICANT CHANGE UP
APTT BLD: 67.5 SEC — HIGH (ref 27.5–37.4)
APTT BLD: 72.3 SEC — HIGH (ref 27.5–37.4)
APTT BLD: > 200 SEC (ref 27.5–37.4)
BASOPHILS # BLD AUTO: 0.07 K/UL — SIGNIFICANT CHANGE UP (ref 0–0.2)
BASOPHILS NFR BLD AUTO: 0.6 % — SIGNIFICANT CHANGE UP (ref 0–2)
BUN SERPL-MCNC: 14 MG/DL — SIGNIFICANT CHANGE UP (ref 7–23)
BURR CELLS BLD QL SMEAR: SIGNIFICANT CHANGE UP
CALCIUM SERPL-MCNC: 8.4 MG/DL — SIGNIFICANT CHANGE UP (ref 8.4–10.5)
CHLORIDE SERPL-SCNC: 106 MMOL/L — SIGNIFICANT CHANGE UP (ref 96–108)
CK MB BLD-MCNC: 5.4 % — HIGH (ref 0–3.5)
CK MB BLD-MCNC: 6.5 % — HIGH (ref 0–3.5)
CK MB CFR SERPL CALC: 3.3 NG/ML — SIGNIFICANT CHANGE UP (ref 0–3.8)
CK MB CFR SERPL CALC: 3.5 NG/ML — SIGNIFICANT CHANGE UP (ref 0–3.8)
CK SERPL-CCNC: 54 U/L — SIGNIFICANT CHANGE UP (ref 25–170)
CK SERPL-CCNC: 61 U/L — SIGNIFICANT CHANGE UP (ref 25–170)
CO2 SERPL-SCNC: 18 MMOL/L — LOW (ref 22–31)
CREAT SERPL-MCNC: 0.9 MG/DL — SIGNIFICANT CHANGE UP (ref 0.5–1.3)
ELLIPTOCYTES BLD QL SMEAR: SLIGHT — SIGNIFICANT CHANGE UP
EOSINOPHIL # BLD AUTO: 1.2 K/UL — HIGH (ref 0–0.5)
EOSINOPHIL NFR BLD AUTO: 10.8 % — HIGH (ref 0–6)
GLUCOSE SERPL-MCNC: 72 MG/DL — SIGNIFICANT CHANGE UP (ref 70–99)
HCT VFR BLD CALC: 31.5 % — LOW (ref 34.5–45)
HCT VFR BLD CALC: 34.5 % — SIGNIFICANT CHANGE UP (ref 34.5–45)
HCT VFR BLD CALC: 36.4 % — SIGNIFICANT CHANGE UP (ref 34.5–45)
HGB BLD-MCNC: 10.2 G/DL — LOW (ref 11.5–15.5)
HGB BLD-MCNC: 11.5 G/DL — SIGNIFICANT CHANGE UP (ref 11.5–15.5)
HGB BLD-MCNC: 11.5 G/DL — SIGNIFICANT CHANGE UP (ref 11.5–15.5)
IMM GRANULOCYTES NFR BLD AUTO: 0.3 % — SIGNIFICANT CHANGE UP (ref 0–1.5)
INR BLD: 1.1 RATIO — SIGNIFICANT CHANGE UP (ref 0.88–1.16)
INR BLD: 1.19 RATIO — HIGH (ref 0.88–1.16)
LYMPHOCYTES # BLD AUTO: 1.07 K/UL — SIGNIFICANT CHANGE UP (ref 1–3.3)
LYMPHOCYTES # BLD AUTO: 9.7 % — LOW (ref 13–44)
MANUAL SMEAR VERIFICATION: SIGNIFICANT CHANGE UP
MCHC RBC-ENTMCNC: 29.1 PG — SIGNIFICANT CHANGE UP (ref 27–34)
MCHC RBC-ENTMCNC: 30.7 PG — SIGNIFICANT CHANGE UP (ref 27–34)
MCHC RBC-ENTMCNC: 31.6 GM/DL — LOW (ref 32–36)
MCHC RBC-ENTMCNC: 32.4 GM/DL — SIGNIFICANT CHANGE UP (ref 32–36)
MCHC RBC-ENTMCNC: 32.5 PG — SIGNIFICANT CHANGE UP (ref 27–34)
MCHC RBC-ENTMCNC: 33.4 GM/DL — SIGNIFICANT CHANGE UP (ref 32–36)
MCV RBC AUTO: 90 FL — SIGNIFICANT CHANGE UP (ref 80–100)
MCV RBC AUTO: 97.2 FL — SIGNIFICANT CHANGE UP (ref 80–100)
MCV RBC AUTO: 97.2 FL — SIGNIFICANT CHANGE UP (ref 80–100)
MONOCYTES # BLD AUTO: 1.2 K/UL — HIGH (ref 0–0.9)
MONOCYTES NFR BLD AUTO: 10.8 % — SIGNIFICANT CHANGE UP (ref 2–14)
NEUTROPHILS # BLD AUTO: 7.5 K/UL — HIGH (ref 1.8–7.4)
NEUTROPHILS NFR BLD AUTO: 67.8 % — SIGNIFICANT CHANGE UP (ref 43–77)
NT-PROBNP SERPL-SCNC: 1257 PG/ML — HIGH (ref 0–300)
PLAT MORPH BLD: NORMAL — SIGNIFICANT CHANGE UP
PLATELET # BLD AUTO: 490 K/UL — HIGH (ref 150–400)
PLATELET # BLD AUTO: 491 K/UL — HIGH (ref 150–400)
PLATELET # BLD AUTO: 540 K/UL — HIGH (ref 150–400)
POTASSIUM SERPL-MCNC: 3.4 MMOL/L — LOW (ref 3.5–5.3)
POTASSIUM SERPL-SCNC: 3.4 MMOL/L — LOW (ref 3.5–5.3)
PROTHROM AB SERPL-ACNC: 12 SEC — SIGNIFICANT CHANGE UP (ref 9.8–12.7)
PROTHROM AB SERPL-ACNC: 13.5 SEC — HIGH (ref 10–13.1)
RBC # BLD: 3.5 M/UL — LOW (ref 3.8–5.2)
RBC # BLD: 3.55 M/UL — LOW (ref 3.8–5.2)
RBC # BLD: 3.74 M/UL — LOW (ref 3.8–5.2)
RBC # FLD: 18 % — HIGH (ref 10.3–14.5)
RBC # FLD: 18 % — HIGH (ref 10.3–14.5)
RBC # FLD: 20.4 % — HIGH (ref 10.3–14.5)
RBC BLD AUTO: ABNORMAL
SCHISTOCYTES BLD QL AUTO: SLIGHT — SIGNIFICANT CHANGE UP
SODIUM SERPL-SCNC: 138 MMOL/L — SIGNIFICANT CHANGE UP (ref 135–145)
TROPONIN T SERPL-MCNC: 0.03 NG/ML — SIGNIFICANT CHANGE UP (ref 0–0.06)
TROPONIN T SERPL-MCNC: 0.03 NG/ML — SIGNIFICANT CHANGE UP (ref 0–0.06)
WBC # BLD: 11.07 K/UL — HIGH (ref 3.8–10.5)
WBC # BLD: 12.6 K/UL — HIGH (ref 3.8–10.5)
WBC # BLD: 9.9 K/UL — SIGNIFICANT CHANGE UP (ref 3.8–10.5)
WBC # FLD AUTO: 11.07 K/UL — HIGH (ref 3.8–10.5)
WBC # FLD AUTO: 12.6 K/UL — HIGH (ref 3.8–10.5)
WBC # FLD AUTO: 9.9 K/UL — SIGNIFICANT CHANGE UP (ref 3.8–10.5)

## 2017-07-10 PROCEDURE — 99223 1ST HOSP IP/OBS HIGH 75: CPT

## 2017-07-10 PROCEDURE — 70553 MRI BRAIN STEM W/O & W/DYE: CPT | Mod: 26

## 2017-07-10 RX ORDER — TIOTROPIUM BROMIDE 18 UG/1
1 CAPSULE ORAL; RESPIRATORY (INHALATION) DAILY
Qty: 0 | Refills: 0 | Status: DISCONTINUED | OUTPATIENT
Start: 2017-07-10 | End: 2017-07-14

## 2017-07-10 RX ORDER — HEPARIN SODIUM 5000 [USP'U]/ML
600 INJECTION INTRAVENOUS; SUBCUTANEOUS
Qty: 25000 | Refills: 0 | Status: DISCONTINUED | OUTPATIENT
Start: 2017-07-10 | End: 2017-07-11

## 2017-07-10 RX ORDER — PANTOPRAZOLE SODIUM 20 MG/1
40 TABLET, DELAYED RELEASE ORAL
Qty: 0 | Refills: 0 | Status: DISCONTINUED | OUTPATIENT
Start: 2017-07-10 | End: 2017-07-14

## 2017-07-10 RX ORDER — HEPARIN SODIUM 5000 [USP'U]/ML
5500 INJECTION INTRAVENOUS; SUBCUTANEOUS EVERY 6 HOURS
Qty: 0 | Refills: 0 | Status: DISCONTINUED | OUTPATIENT
Start: 2017-07-10 | End: 2017-07-11

## 2017-07-10 RX ORDER — BUDESONIDE AND FORMOTEROL FUMARATE DIHYDRATE 160; 4.5 UG/1; UG/1
2 AEROSOL RESPIRATORY (INHALATION)
Qty: 0 | Refills: 0 | Status: DISCONTINUED | OUTPATIENT
Start: 2017-07-10 | End: 2017-07-14

## 2017-07-10 RX ORDER — FAMOTIDINE 10 MG/ML
20 INJECTION INTRAVENOUS
Qty: 0 | Refills: 0 | Status: DISCONTINUED | OUTPATIENT
Start: 2017-07-10 | End: 2017-07-14

## 2017-07-10 RX ORDER — CEFTRIAXONE 500 MG/1
1 INJECTION, POWDER, FOR SOLUTION INTRAMUSCULAR; INTRAVENOUS EVERY 24 HOURS
Qty: 0 | Refills: 0 | Status: COMPLETED | OUTPATIENT
Start: 2017-07-10 | End: 2017-07-11

## 2017-07-10 RX ORDER — HEPARIN SODIUM 5000 [USP'U]/ML
2500 INJECTION INTRAVENOUS; SUBCUTANEOUS EVERY 6 HOURS
Qty: 0 | Refills: 0 | Status: DISCONTINUED | OUTPATIENT
Start: 2017-07-10 | End: 2017-07-11

## 2017-07-10 RX ADMIN — CEFTRIAXONE 100 GRAM(S): 500 INJECTION, POWDER, FOR SOLUTION INTRAMUSCULAR; INTRAVENOUS at 17:55

## 2017-07-10 RX ADMIN — LOSARTAN POTASSIUM 50 MILLIGRAM(S): 100 TABLET, FILM COATED ORAL at 05:48

## 2017-07-10 RX ADMIN — HEPARIN SODIUM 1000 UNIT(S)/HR: 5000 INJECTION INTRAVENOUS; SUBCUTANEOUS at 02:10

## 2017-07-10 RX ADMIN — Medication 650 MILLIGRAM(S): at 06:05

## 2017-07-10 RX ADMIN — Medication 650 MILLIGRAM(S): at 23:15

## 2017-07-10 RX ADMIN — HEPARIN SODIUM 0 UNIT(S)/HR: 5000 INJECTION INTRAVENOUS; SUBCUTANEOUS at 09:05

## 2017-07-10 RX ADMIN — PANTOPRAZOLE SODIUM 40 MILLIGRAM(S): 20 TABLET, DELAYED RELEASE ORAL at 17:55

## 2017-07-10 RX ADMIN — Medication 650 MILLIGRAM(S): at 00:19

## 2017-07-10 RX ADMIN — ATORVASTATIN CALCIUM 20 MILLIGRAM(S): 80 TABLET, FILM COATED ORAL at 22:32

## 2017-07-10 RX ADMIN — FAMOTIDINE 20 MILLIGRAM(S): 10 INJECTION INTRAVENOUS at 17:55

## 2017-07-10 RX ADMIN — Medication 650 MILLIGRAM(S): at 22:32

## 2017-07-10 RX ADMIN — HEPARIN SODIUM 0 UNIT(S)/HR: 5000 INJECTION INTRAVENOUS; SUBCUTANEOUS at 01:10

## 2017-07-10 RX ADMIN — Medication 1 TABLET(S): at 17:55

## 2017-07-10 RX ADMIN — HEPARIN SODIUM 600 UNIT(S)/HR: 5000 INJECTION INTRAVENOUS; SUBCUTANEOUS at 12:30

## 2017-07-10 RX ADMIN — Medication 650 MILLIGRAM(S): at 01:38

## 2017-07-10 RX ADMIN — Medication 20 MICROGRAM(S): at 05:48

## 2017-07-10 RX ADMIN — BUDESONIDE AND FORMOTEROL FUMARATE DIHYDRATE 2 PUFF(S): 160; 4.5 AEROSOL RESPIRATORY (INHALATION) at 17:56

## 2017-07-10 RX ADMIN — TIOTROPIUM BROMIDE 1 CAPSULE(S): 18 CAPSULE ORAL; RESPIRATORY (INHALATION) at 17:55

## 2017-07-10 RX ADMIN — Medication 650 MILLIGRAM(S): at 14:43

## 2017-07-10 RX ADMIN — HEPARIN SODIUM 600 UNIT(S)/HR: 5000 INJECTION INTRAVENOUS; SUBCUTANEOUS at 19:11

## 2017-07-10 RX ADMIN — Medication 650 MILLIGRAM(S): at 07:15

## 2017-07-10 RX ADMIN — Medication 1 TABLET(S): at 12:29

## 2017-07-10 NOTE — CONSULT NOTE ADULT - ASSESSMENT
82F with admitted with seizure like episode  found to have mild leukocytosis an urinalysis that is positive for cystitis

## 2017-07-10 NOTE — CONSULT NOTE ADULT - ATTENDING COMMENTS
Patient seen and examined. She has a small pulmonary embolism without any RV strain on CT imaging, unclear if PE is cause of the syncope.  History of significant GI bleeding in the past need to weight risk vs benefit of anticoagulation.  - Hemodynamically stable without tachycardia or hypoxia  - There is no role for catheter directed lysis, mechanical thrombectomy  - She has an IVC filter  - Would risk stratify her:  send stool guiac, trend CBC while on heparin gtt, lower extremity venous duplex.    - Ultimately need to have discussion with patient and family regarding risk vs. benefit of AC - especially after recent bleed with hypotension and multiple transfusions      Thank you,    Jaquan Nevarez  Vascular Medicine and Cardiology   pager [phone]  cell [phone]

## 2017-07-10 NOTE — PROGRESS NOTE ADULT - SUBJECTIVE AND OBJECTIVE BOX
Patient is a 82y old  Female who presents with a chief complaint of S/P syncopal episode with shaking episode and fecal and urinary incontinence (09 Jul 2017 22:40)      SUBJECTIVE / OVERNIGHT EVENTS: i have a HA.no SOB, no diziiness    MEDICATIONS  (STANDING):  losartan 50 milliGRAM(s) Oral daily  atorvastatin 20 milliGRAM(s) Oral at bedtime  lactobacillus acidophilus 1 Tablet(s) Oral daily  multivitamin 1 Tablet(s) Oral daily  famotidine    Tablet 20 milliGRAM(s) Oral two times a day  pantoprazole    Tablet 40 milliGRAM(s) Oral two times a day before meals  tiotropium 18 MICROgram(s) Capsule 1 Capsule(s) Inhalation daily  buDESOnide 160 MICROgram(s)/formoterol 4.5 MICROgram(s) Inhaler 2 Puff(s) Inhalation two times a day  heparin  Infusion. 600 Unit(s)/Hr (6 mL/Hr) IV Continuous <Continuous>  cefTRIAXone   IVPB 1 Gram(s) IV Intermittent every 24 hours    MEDICATIONS  (PRN):  ALBUTerol/ipratropium for Nebulization 3 milliLiter(s) Nebulizer every 6 hours PRN Wheezing  acetaminophen   Tablet. 650 milliGRAM(s) Oral every 6 hours PRN Mild Pain (1 - 3)  heparin  Injectable 5500 Unit(s) IV Push every 6 hours PRN For aPTT less than 40  heparin  Injectable 2500 Unit(s) IV Push every 6 hours PRN For aPTT between 40 - 57      Vital Signs Last 24 Hrs  T(F): 98 (07-10-17 @ 07:42), Max: 98.8 (07-09-17 @ 21:22)  HR: 80 (07-10-17 @ 07:42) (77 - 90)  BP: 143/70 (07-10-17 @ 07:42) (129/74 - 159/77)  RR: 18 (07-10-17 @ 07:42) (18 - 20)  SpO2: 97% (07-10-17 @ 07:42) (95% - 99%)  Telemetry:   CAPILLARY BLOOD GLUCOSE        I&O's Summary      PHYSICAL EXAM:  GENERAL: NAD, well-developed  HEAD:  Atraumatic, Normocephalic  EYES: EOMI, PERRLA, conjunctiva and sclera clear  NECK: Supple, No JVD  CHEST/LUNG: Clear to auscultation bilaterally; No wheeze  HEART: Regular rate and rhythm; No murmurs, rubs, or gallops  ABDOMEN: Soft, Nontender, Nondistended; Bowel sounds present  EXTREMITIES:  2+ Peripheral Pulses, No clubbing, cyanosis, or edema  PSYCH: AAOx3  NEUROLOGY: non-focal  SKIN: No rashes or lesions    LABS:                        10.2   11.07 )-----------( 540      ( 10 Jul 2017 08:21 )             31.5     07-10    138  |  106  |  14  ----------------------------<  72  3.4<L>   |  18<L>  |  0.90    Ca    8.4      10 Jul 2017 08:37  Phos  3.3     07-09  Mg     1.3     07-09    TPro  5.8<L>  /  Alb  2.9<L>  /  TBili  0.5  /  DBili  x   /  AST  144<H>  /  ALT  70<H>  /  AlkPhos  185<H>  07-09    PT/INR - ( 10 Jul 2017 12:17 )   PT: 12.0 sec;   INR: 1.10 ratio         PTT - ( 10 Jul 2017 12:17 )  PTT:67.5 sec  CARDIAC MARKERS ( 10 Jul 2017 01:49 )  x     / 0.03 ng/mL / 61 U/L / x     / 3.3 ng/mL  CARDIAC MARKERS ( 09 Jul 2017 13:22 )  x     / 0.02 ng/mL / 59 U/L / x     / 2.6 ng/mL      Urinalysis Basic - ( 09 Jul 2017 16:57 )    Color: Yellow / Appearance: Clear / SG: >1.030 / pH: x  Gluc: x / Ketone: Negative  / Bili: Negative / Urobili: Negative   Blood: x / Protein: 30 mg/dL / Nitrite: Positive   Leuk Esterase: Small / RBC: 0-2 /HPF / WBC 25-50 /HPF   Sq Epi: x / Non Sq Epi: x / Bacteria: Few /HPF        RADIOLOGY & ADDITIONAL TESTS:    Imaging Personally Reviewed:    Consultant(s) Notes Reviewed:      Care Discussed with Consultants/Other Providers:

## 2017-07-10 NOTE — CONSULT NOTE ADULT - SUBJECTIVE AND OBJECTIVE BOX
[facility], Division of Infectious Diseases  HEIDI Marquez A. Lee    VALENTINA, MANOLO  82y, Female  [id number]    HPI: pt doesnt remember event so some history per chart   83 y/o F--patient  with a history of COPD,lung mass since March, 2017  suspected to be neoplasm but no definitive tissue diagnosis (followed by Dr. Alvarado),.  Presents after syncopal episode with the patient slid down from a chair with daughter noting generalized stiffness then shaking with faecal and urinary incontinence.   No dyspnea.  No abdominal pain, no red blood per rectum or melena.  No hematuria, no dysuria but with increased frequency.  Remaining review of systems not contributory.       Pt recently discharged for hospital on augmentin and then was at [facility] rehab.  She went home recently.  No sick contacts and prior to event, she was in normal state of health.  No fevers, no chills, no nausea, no vomiting, no diarrhea.  Today no diarrhea.        PMH/PSH--  Lung abscess  PE (pulmonary thromboembolism)  Rebound headache  Chronic kidney disease, unspecified stage  Hyperlipidemia, unspecified hyperlipidemia type  COPD (chronic obstructive pulmonary disease)  Hypertension  No significant past surgical history      Allergies-- NKDA      Medications--  Antibiotics: cefTRIAXone   IVPB 1 Gram(s) IV Intermittent every 24 hours    Immunologic:   Other: losartan  atorvastatin  lactobacillus acidophilus  multivitamin  ALBUTerol/ipratropium for Nebulization PRN  acetaminophen   Tablet. PRN  famotidine    Tablet  pantoprazole    Tablet  tiotropium 18 MICROgram(s) Capsule  buDESOnide 160 MICROgram(s)/formoterol 4.5 MICROgram(s) Inhaler  heparin  Infusion.  heparin  Injectable PRN  heparin  Injectable PRN      Social History--  EtOH: denies ***  Tobacco: former  Drug Use: denies ***  lives with daughter and grand daughter  retired    Family/Marital History--  Family history of COPD (chronic obstructive pulmonary disease) (Father)  No pertinent family history in first degree relatives    Remainder not relevant to clincial concern.    Travel/Environmental/Occupational History:  no travel and no sick contacts    Review of Systems:  A >=10-point review of systems was obtained.     Pertinent positives and negatives--  Constitutional: No fevers. No Chills. No Rigors.   Eyes: no blurry vision  ENMT: no sore throat  Cardiovascular: No chest pain. No palpitations.  Respiratory: No shortness of breath. No cough.  Gastrointestinal: No nausea or vomiting. No diarrhea or constipation.   Genitourinary: no dysuria  Musculoskeletal: no myalgia  Skin: no rash  Neurologic: no headache  Psychiatric: Pleasant. Appropriate affect    Review of systems otherwise negative except as previously noted.    Physical Exam--  Vital Signs: T(F): 98 (07-10-17 @ 07:42), Max: 98.8 (07-09-17 @ 21:22)  HR: 80 (07-10-17 @ 07:42)  BP: 143/70 (07-10-17 @ 07:42)  RR: 18 (07-10-17 @ 07:42)  SpO2: 97% (07-10-17 @ 07:42)  Wt(kg): --  General: Nontoxic-appearing Female in no acute distress.  HEENT: AT/NC. PERRL. EOMI. Anicteric. Conjunctiva pink and moist. Oropharynx clear. Dentition fair.  Neck: Not rigid. No sense of mass.  Nodes: None palpable.  Lungs: Clear bilaterally without rales, wheezing or ronchi  Heart: Regular rate and rhythm. No Murmur. No rub. No gallop. No palpable thrill.  Abdomen: Bowel sounds present and normoactive. Soft. Nondistended. Nontender. No sense of mass. No organomegaly.  Back: No spinal tenderness. No costovertebral angle tenderness.   Extremities: No cyanosis or clubbing. No edema.   Skin: Warm. Dry. Good turgor. No rash. No vasculitic stigmata.  Psychiatric: Appropriate affect and mood for situation.         Laboratory & Imaging Data--  CBC                        10.2   11.07 )-----------( 540      ( 10 Jul 2017 08:21 )             31.5       Chemistries  07-10    138  |  106  |  14  ----------------------------<  72  3.4<L>   |  18<L>  |  0.90    Ca    8.4      10 Jul 2017 08:37  Phos  3.3     07-09  Mg     1.3     07-09    TPro  5.8<L>  /  Alb  2.9<L>  /  TBili  0.5  /  DBili  x   /  AST  144<H>  /  ALT  70<H>  /  AlkPhos  185<H>  07-09      Culture Data  Urinalysis (07.09.17 @ 16:57)    Blood, Urine: Negative    Glucose Qualitative, Urine: Negative    pH Urine: 7.0    Color: Yellow    Urine Appearance: Clear    Bilirubin: Negative    Ketone - Urine: Negative    Specific Gravity: >1.030    Protein, Urine: 30 mg/dL    Urobilinogen: Negative    Nitrite: Positive    Leukocyte Esterase Concentration: Small

## 2017-07-10 NOTE — CHART NOTE - NSCHARTNOTEFT_GEN_A_CORE
[id number]  MANOLO MONTGOMERY    Notified by REN Agustin at 0905 patient with critical lab result of PTT greater than 200.     Patient seen and examined.  Has no complaints or complaints of     Plan of Care/ Interventions:  Follow heparin protocol  Re-weigh patient - [id number]  MANOLO MONTGOMERY    Notified by REN Agustin at 0905 patient with critical lab result of PTT greater than 200. Later notified that sample was clotted.     Patient seen and examined.  Has no complaints or concerns.   No signs of active bleeding upon examination.       Plan of Care/ Interventions:  Follow heparin protocol - decrease heparin rate by 2 units pending repeat PTT   Obtain patient weight to ensure proper dosing - 66.5kg  STAT PTT/PT/INR ordered in light of clotted sample    Continue to observe and monitor patient.      YOUNG Messina   [id number]  MANOLO MONTGOMERY    Notified by REN Agustin at 0905 patient with critical lab result of PTT greater than 200. Later notified that sample was clotted.   Patient is s/p IVC filter placement and found to have bilateral PEs on CTA from 7/9/17.    Patient seen and examined.  Has no complaints or concerns.   No signs of active bleeding upon examination.       Plan of Care/ Interventions:  Follow heparin protocol - decrease heparin rate by 2 units pending repeat PTT   Obtain patient weight to ensure proper dosing - 66.5kg    STAT PTT/PT/INR ordered in light of clotted sample - reported as QNS by lab  Heparin drip re-started at lower dose of 6 units while awaiting PTT results.   A third sample was drawn and results from 1217 with PTT of 67.5 reported      Continue to follow protocol     Continue to observe and monitor patient.      ANGEL MessinaS   [id number]  MANOLO MONTGOMERY    Notified by REN Agustin at 0905 patient with critical lab result of PTT greater than 200. Later notified that sample was clotted.   Patient is s/p IVC filter placement and found to have bilateral PEs on CTA from 7/9/17.    Patient seen and examined.  Has no complaints or concerns.   No signs of active bleeding upon examination.       Plan of Care/ Interventions:  Follow heparin protocol - decrease heparin rate by 2 units pending repeat PTT   Obtain patient weight to ensure proper dosing - 66.5kg    STAT PTT/PT/INR ordered in light of clotted sample - reported as QNS by lab  Heparin drip re-started at lower dose of 6 units while awaiting PTT results.   A third sample was drawn and results from 1217 with PTT of 67.5 reported      Continue to follow protocol     Continue to observe and monitor patient.      Ashly Smith PA-C  [id number]

## 2017-07-10 NOTE — CONSULT NOTE ADULT - ASSESSMENT
83 yo F w/ lung mass, pulmonary embolism progressive despite IVC filter, history of hemodynamically significant GI bleed due to bleeding ulcers  No evidence of bleeding currently  H/H drifted down w/o bleeding  Closely monitor for GI bleed  Agree w/ Protonix 40 BID and Pepcid for maximal acid control and ulcer healing  Family now agreeable to anticoagulation which was started.    R/B/A of anticoagulation d/w patient's daughter Mackenzie Herrmann and the patient herself.   Patient does have an elevated risk of bleeding on anticoagulation however must be weighed again risk for cardiac/resp failure from further PEs.  Cautious anticoagulation - carefully maintain PTT within therapeutic range on IV heparin  No GI intervention planned at this time 83 yo F w/ lung mass, pulmonary embolism progressive despite IVC filter, history of hemodynamically significant GI bleed due to bleeding ulcers  No evidence of bleeding currently  H/H drifted down w/o bleeding  Closely monitor for GI bleed  Agree w/ Protonix 40 BID and Pepcid for maximal acid control and ulcer healing  Family now agreeable to anticoagulation which was started.    R/B/A of anticoagulation d/w patient's daughter Mackenzie Herrmann and the patient herself.   Patient does have an elevated risk of bleeding on anticoagulation however must be weighed against resp/cardiac risk in the event of further PEs if this syncopal episode was infact related to PEs.  Cautious anticoagulation - carefully maintain PTT within therapeutic range on IV heparin  No GI intervention planned at this time  Agree w/ neurological eval

## 2017-07-10 NOTE — CONSULT NOTE ADULT - SUBJECTIVE AND OBJECTIVE BOX
[facility] - [facility]  CONSULT NOTE    HPI: 82 year old gentlewoman, former heavy smoker, well known to me from multiple recent hospitalizations. The patient has a history of COPD emphysema with chronic mild dyspnea with exertion requiring assistance for ambulation with a rolling walker. The patient was hospitalized earlier this ear of incidentally discovered eosinophilia of unclear etiology but with resolution with empiric therapy with systemic steroids. The patient was also found to have a right upper lobe masslike consolidation with cavitation felt to represent infection due to thin walls, decreased in size with antibiotics therapy and sputum culture positive for pseudomonas on several occasions. The patient was hospitalized in May with bilateral PEs - A/C was complicated by a self limited but significant GI bleed - endoscopy was not successful and not pursued due to multiple medical comorbidities. Was readmitted 6/4/2017 with shock related to a large UGI bleed presenting with hematemesis while on coumadin and a prednisone taper. The patient required ICU admission, multiple blood transfusions and intervention on gastric and duodenal ulcers. She was discharged on PPI therapy and with plans not to restart A/C. An IVC filter was not place due to the absence of LE DVT but this was placed electively on an outpatient basis while the patient was in rehab. The patient was brought to the emergency room yesterday after losing consciousness shortly after getting out of bed. The patient describes becoming lightheaded and "sick to her stomach" and then falling to the ground. She reports having bowel and bladder incontinence. She vomited in the ambulance en route to the ER. No chest pain/pressure or palpitations. No cough, sputum production, chest congestion or wheeze.     PMHX:  COPD (chronic obstructive pulmonary disease)  SARITA  Lung abscess  PE (pulmonary thromboembolism)  UGI bleed with shock on A/C - PUD  Rebound headache  Chronic kidney disease, unspecified stage  Hyperlipidemia, unspecified hyperlipidemia type  HTN  eosinophilia (resolved)      PSHX:  No significant past surgical history      FAMILY HISTORY:  Family history of COPD (chronic obstructive pulmonary disease) (Father)    SOCIAL HISTORY: former smoker 1ppd x 22 years    Pulmonary Medications:   formoterol for nebulization 20 MICROGram(s) Nebulizer two times a day  ALBUTerol/ipratropium for Nebulization 3 milliLiter(s) Nebulizer every 6 hours PRN      Antimicrobials:  cefTRIAXone   IVPB 1 Gram(s) IV Intermittent every 24 hours      Cardiology:  losartan 50 milliGRAM(s) Oral daily      Other:  heparin  Infusion.  Unit(s)/Hr IV Continuous <Continuous>  heparin  Injectable 5500 Unit(s) IV Push every 6 hours PRN  heparin  Injectable 2500 Unit(s) IV Push every 6 hours PRN  atorvastatin 20 milliGRAM(s) Oral at bedtime  lactobacillus acidophilus 1 Tablet(s) Oral daily  multivitamin 1 Tablet(s) Oral daily  acetaminophen   Tablet. 650 milliGRAM(s) Oral every 6 hours PRN  famotidine    Tablet 20 milliGRAM(s) Oral daily      Allergies    No Known Allergies    Intolerances    HOME MEDICATIONS: see  H & P    REVIEW OF SYSTEMS:  Constitutional: As per HPI  HEENT: Within normal limits  CV: As per HPI  Resp: As per HPI  GI: Within normal limits   : Within normal limits  Musculoskeletal: Within normal limits  Skin: Within normal limits  Neurological: As per HPI  Psychiatric: Within normal limits  Endocrine: Within normal limits  Hematologic/Lymphatic: Within normal limits  Allergic/Immunologic: Within normal limits    [x ] All other systems negative    OBJECTIVE:    Daily Height in cm: 154.94 (10 Jul 2017 09:14)      CAPILLARY BLOOD GLUCOSE  139 (09 Jul 2017 13:00)    PHYSICAL EXAM:  ICU Vital Signs Last 24 Hrs  T(C): 36.7 (10 Jul 2017 07:42), Max: 37.1 (09 Jul 2017 21:22)  T(F): 98 (10 Jul 2017 07:42), Max: 98.8 (09 Jul 2017 21:22)  HR: 80 (10 Jul 2017 07:42) (77 - 116)  BP: 143/70 (10 Jul 2017 07:42) (121/86 - 159/77)  BP(mean): --  ABP: --  ABP(mean): --  RR: 18 (10 Jul 2017 07:42) (18 - 20)  SpO2: 97% (10 Jul 2017 07:42) (95% - 99%)    General: Awake, alert, cooperative, no distress, appears stated age 	  HEENT:   Atraumatic. Normocephalic. Anicteric. Normal oral mucosa, PERRL, EOMI  Neck: Supple, trachea midline; thyroid without enlargement/tenderness/nodules; no carotid bruit; no JVD	  Cardiovascular: Regular rate and rhythm, S1 S2 normal. No murmurs, rubs or gallops  Respiratory: Respirations unlabored; Clear to auscultation and percussion bilaterally  Abdomen: Soft, non-tender, non-distended. No organomegaly. No masses. Normal bowel sounds	  Extremities: Warm to touch. No clubbing or cyanosis. No pedal edema.  Pulses: 2+ peripheral pulses all extremities	  Skin: Normal skin color. No rashes or lesions. No ecchymoses, No cyanosis, warm to touch  Lymph Nodes: Cervical, supraclavicular and axillary nodes normal  Neurological: Motor and sensory examination equal and normal. A and O x 3  Psychiatry: Appropriate mood and affect.      LABS:                          10.2   11.07 )-----------( 540      ( 10 Jul 2017 08:21 )             31.5                         11.5   12.6  )-----------( 491      ( 10 Jul 2017 00:13 )             36.4     07-10    138  |  106  |  14  ----------------------------<  72  3.4<L>   |  18<L>  |  0.90    07-09    141  |  107  |  13  ----------------------------<  108<H>  3.4<L>   |  18<L>  |  0.84    Ca      8.4      07-10    Ca      8.0<L>      07-09    Phos    3.3     07-09      Mg       1.3     07-09    TPro  5.8<L>  /  Alb  2.9<L>  /  TBili  0.5  /  DBili  x   /  AST  144<H>  /  ALT  70<H>  /  AlkPhos  185<H>  07-09    PT/INR - ( 09 Jul 2017 13:22 )   PT: 11.7 sec;   INR: 1.08 ratio         PTT - ( 10 Jul 2017 08:31 )  PTT:clotted sec  Urinalysis Basic - ( 09 Jul 2017 16:57 )    Color: Yellow / Appearance: Clear / SG: >1.030 / pH: x  Gluc: x / Ketone: Negative  / Bili: Negative / Urobili: Negative   Blood: x / Protein: 30 mg/dL / Nitrite: Positive   Leuk Esterase: Small / RBC: 0-2 /HPF / WBC 25-50 /HPF   Sq Epi: x / Non Sq Epi: x / Bacteria: Few /HPF      Venous Blood Gas:  07-09 @ 20:12  7.34/40/29/21/49  VBG Lactate: 1.0  Venous Blood Gas:  07-09 @ 13:22  7.32/39/32/20/52  VBG Lactate: 3.2      Serum Pro-Brain Natriuretic Peptide: 1257 pg/mL (07-10 @ 01:49)  Serum Pro-Brain Natriuretic Peptide: 1762 pg/mL (07-09 @ 13:22)      CARDIAC MARKERS ( 10 Jul 2017 01:49 )  x     / 0.03 ng/mL / 61 U/L / x     / 3.3 ng/mL  CARDIAC MARKERS ( 09 Jul 2017 13:22 )  x     / 0.02 ng/mL / 59 U/L / x     / 2.6 ng/mL      MICROBIOLOGY:     Urine Microscopic-Add On (NC) (07.09.17 @ 16:57)    Bacteria: Few /HPF    Epithelial Cells: Occasional /HPF    Red Blood Cell - Urine: 0-2 /HPF    White Blood Cell - Urine: 25-50 /HPF    Urinalysis (07.09.17 @ 16:57)    Blood, Urine: Negative    Glucose Qualitative, Urine: Negative    pH Urine: 7.0    Color: Yellow    Urine Appearance: Clear    Bilirubin: Negative    Ketone - Urine: Negative    Specific Gravity: >1.030    Protein, Urine: 30 mg/dL    Urobilinogen: Negative    Nitrite: Positive    Leukocyte Esterase Concentration: Small      RADIOLOGY:  [x ] Chest radiographs reviewed and interpreted by me    < from: Xray Chest 1 View AP- PORTABLE-Urgent (07.09.17 @ 14:02) >    EXAM:  CHEST-PORTABLE URGENT                            PROCEDURE DATE:  07/09/2017        INTERPRETATION:  HISTORY: Syncope. Rule out pneumonia.    TECHNIQUE: A single AP view of the chest was obtained.    COMPARISON: 6/4/2017    FINDINGS:  The cardiac silhouette is normal in size. There are no focal   consolidations or pleural effusions. The hilar and mediastinal structures   appear unremarkable. The osseous structures are intact.    IMPRESSION: Clear lungs.      EJ LOPEZ M.D., ATTENDING RADIOLOGIST  This document has been electronically signed. Jul 9 2017  2:10PM          < end of copied text >  ---------------------------------------------------------------------------------------------------------    < from: CT Angio Chest w/ IV Cont (07.09.17 @ 16:04) >    EXAM:  CT ABDOMEN AND PELVIS IC                          EXAM:  CT ANGIO CHEST (W)AW IC                            PROCEDURE DATE:  07/09/2017        INTERPRETATION:  CLINICAL INFORMATION: Syncope. Evaluate for pulmonary   embolus.    COMPARISON: Chest CT 6/9/2017. CT abdomen pelvis March 3, 2017.    PROCEDURE:   CT Angiography of the Chest was performed followed by portal venous phase   imaging of the Abdomen and Pelvis.  90 ml of Omnipaque 350 was injected intravenously. 10 ml were discarded.  Sagittal and coronal reformats were performed as well as 3D (MIP)   reconstructions.    FINDINGS:    CHEST:     LUNGS AND LARGE AIRWAYS: Patent central airways. 2.4 x 2.3 cm cavitary   right upper lobe lesion is without significant change since June 9, 2017   but is mildly improved since March 3, 2017. Emphysema.  PLEURA: No pleural effusion.  VESSELS: Segmental and subsegmental pulmonary emboli involving the right   upper and both lower lobes. Small linear pulmonary embolus also involves   the right common basilar pulmonary artery. Extensive atherosclerotic   plaque.  HEART: Heart size is normal. No pericardial effusion.  MEDIASTINUM AND NURYS: No lymphadenopathy.  CHEST WALL AND LOWER NECK: Within normal limits.    ABDOMEN AND PELVIS:    LIVER: Within normal limits.  BILE DUCTS: Normal caliber.  GALLBLADDER: Cholecystectomy.  SPLEEN: Within normal limits.  PANCREAS: Within normal limits.  ADRENALS: Within normal limits.  KIDNEYS/URETERS: Atrophic left kidney.    BLADDER: Within normal limits.  REPRODUCTIVE ORGANS: The uterus is unremarkable.    BOWEL: No bowel obstruction. Appendix is normal. Colonic diverticulosis.  PERITONEUM: No ascites.  VESSELS:  IVC filter.  RETROPERITONEUM: No lymphadenopathy.    ABDOMINAL WALL: Within normal limits.  BONES: Within normal limits.    IMPRESSION:   1. Bilateral pulmonary emboli.  2. Cavitary right upper lobe lesion unchanged since 6/9/2017 and mildly   improved since 3/3/2017.    Findings were discussed with Dr. Mora 5:20 PM on 7/9/2017.      DENNIS MCELROY M.D., ATTENDING RADIOLOGIST  This document has been electronically signed. Jul 9 2017  5:21PM      < end of copied text >  ---------------------------------------------------------------------------------------------------------    < from: CT Head No Cont (07.09.17 @ 13:44) >    EXAM:  CT BRAIN                            PROCEDURE DATE:  07/09/2017      INTERPRETATION:    CLINICAL INFORMATION: Headaches and syncope, evaluate for intracranial   hemorrhage.    TECHNIQUE: Noncontrast axial CT images were acquired through the head.   Two-dimensional sagittal and coronal reformats were obtained    COMPARISON: None available      FINDINGS:   There is no acute intracranial hemorrhage, extra-axial fluid collection,   hydrocephalus, mass effect or midline shift. The gray-white   differentiation preserved.    Cortical sulci and ventricles are enlarged consistent with   age-appropriate involutional change. There are patchy periventricular   white matter low attenuations likely representing mild chronic   microvascular ischemic changes.    Paranasal sinuses and mastoid air cells are clear. Calvarium is intact.     IMPRESSION:   No acute intracranial hemorrhage, extra-axial fluid collection,   hydrocephalus, mass effect or midline shift.       NANETTE COHN M.D., RADIOLOGY RESIDENT  This document has been electronically signed.  LOLI BRADY M.D., ATTENDING RADIOLOGIST  This document has been electronically signed. Jul 9 2017  2:06PM          < end of copied text >  ---------------------------------------------------------------------------------------------------------  < from: Upper Endoscopy (06.05.17 @ 11:47) >    [facility]  ____________________________________________________________________________________________________  Patient Name: Mackenzie Herrmann                     MRN: [id number]  Account Number: [id number]                     YOB: 1935  Room: Endoscopy Room 1                           Gender: Female  Attending MD: Andree Enamorado,                        Procedure Date No Time: 6/5/2017  ____________________________________________________________________________________________________     Procedure:           Upper GI endoscopy  Indications:         Hematemesis  Providers:           Andree Enamorado MD  Medicines:           Monitored Anesthesia Care  Complications:       No immediate complications.  ____________________________________________________________________________________________________                                                                                                        Impression:          - No gross lesions in esophagus.                       - Z-line regular, 40 cm from the incisors.  - One very large cratered gastric ulcer with no stigmata of bleeding.                       - Friable gastric mucosa. Treated with bipolar cautery.                       - One oozing duodenal ulcer. Treated with bipolar cautery. Clip (MR             conditional) was placed.                       - No specimens collected.  Recommendation:      - Return patient to ICU for ongoing care.                       - Clear liquid diet today                       - Continue PPI gtt         - Will discuss risks vs. benefits anticoagulation with MICU team                                                                                                        Attending Participation:       I personally performed the entire procedure.                                                                                                          ____________  Andree Enamorado,   6/5/2017 12:36:38 PM  Number of Addenda: 0    Note Initiated On: 6/5/2017 11:47 AM    < end of copied text >  ---------------------------------------------------------------------------------------------------------    < from: Upper Endoscopy (06.07.17 @ 15:00) >    [facility]  ____________________________________________________________________________________________________  Patient Name: Mackenzie Herrmann                     MRN: [id number]  Account Number: [id number]                     YOB: 1935  Room: [facility]                                     Gender: Female  Attending MD: Andree Enamorado,                        Procedure Date No Time: 6/7/2017  ____________________________________________________________________________________________________     Procedure:           Upper GI endoscopy  Indications:         Hematemesis  Providers:           Andree Enamorado MD  Medicines:           Monitored Anesthesia Care  Complications:       No immediate complications.  ____________________________________________________________________________________________________                                         Impression:          - No gross lesions in esophagus.                       - Coffee grounds seen in the gastric body and antrum.                       - Non-bleeding gastric fundus ulcer with adherent clot. Treated with bipolar                        cautery.                       - One very large non-bleeding gastric ulcer again noted in the gastric                        antrum. The ulcer bed was carefully examined. A slightly protuberant red spot                  toshia seen at the inferior border of the ulcer bed. This area was cautiously                        cauterized using biopolar cautery at low wattage (10W)                       - One non-bleeding duodenal ulcer again noted in the first portion of the                        duodenum. Hemoclip in place. No stigmata of bleeding noted.                       - Biopsies were taken with a cold forceps for Helicobacter pylori testing.  Recommendation:      - Observe patient in GI recovery unit.                       - NPO today.                       - Continue PPI gtt                       - Monitor H/H q 8 hours today                                                                                                        Attending Participation:       I personally performed the entire procedure.                                                                                                          ____________    < end of copied text > [facility] - [facility]  CONSULT NOTE    HPI: 82 year old gentlewoman, former heavy smoker, well known to me from multiple recent hospitalizations. The patient has a history of COPD/emphysema with chronic mild dyspnea with exertion requiring assistance for ambulation with a rolling walker. The patient was hospitalized earlier this year after incidental discovery of eosinophilia of unclear etiology but with resolution with empiric therapy with systemic steroids. The patient was also found to have a right upper lobe masslike consolidation with cavitation felt to represent infection due to thin walls, decrease in size with antibiotic therapy and sputum culture positive for pseudomonas on several occasions. The patient was hospitalized in May with bilateral PEs - A/C was complicated by a self limited but significant GI bleed - endoscopy was not successful and not pursued due to multiple medical comorbidities. The patient was readmitted 6/4/2017 with shock related to a large UGI bleed presenting with hematemesis while on coumadin and a prednisone taper. The patient required ICU admission, multiple blood transfusions and intervention on gastric and duodenal ulcers. She was discharged on PPI therapy and with plans not to restart A/C. An IVC filter was not placed initially due to the absence of LE DVT but was placed electively on an outpatient basis while the patient was in rehab. The patient was brought to the emergency room yesterday after losing consciousness shortly after getting out of bed. The patient describes becoming lightheaded for several seconds and "sick to her stomach" and then falling to the ground. She reports having bowel and bladder incontinence. She vomited in the ambulance en route to the ER. No chest pain/pressure or palpitations. No cough, sputum production, chest congestion or wheeze.     PMHX:  COPD (chronic obstructive pulmonary disease)  SARITA  Lung abscess  PE (pulmonary thromboembolism)  UGI bleed with shock on A/C - PUD  Rebound headache  Chronic kidney disease, unspecified stage  Hyperlipidemia, unspecified hyperlipidemia type  HTN  eosinophilia (resolved)      PSHX:  IVC filter      FAMILY HISTORY:  Family history of COPD (chronic obstructive pulmonary disease) (Father)    SOCIAL HISTORY: former smoker 1ppd x 22 years    Pulmonary Medications:   formoterol for nebulization 20 MICROGram(s) Nebulizer two times a day  ALBUTerol/ipratropium for Nebulization 3 milliLiter(s) Nebulizer every 6 hours PRN      Antimicrobials:  cefTRIAXone   IVPB 1 Gram(s) IV Intermittent every 24 hours      Cardiology:  losartan 50 milliGRAM(s) Oral daily      Other:  heparin  Infusion.  Unit(s)/Hr IV Continuous <Continuous>  heparin  Injectable 5500 Unit(s) IV Push every 6 hours PRN  heparin  Injectable 2500 Unit(s) IV Push every 6 hours PRN  atorvastatin 20 milliGRAM(s) Oral at bedtime  lactobacillus acidophilus 1 Tablet(s) Oral daily  multivitamin 1 Tablet(s) Oral daily  acetaminophen   Tablet. 650 milliGRAM(s) Oral every 6 hours PRN  famotidine    Tablet 20 milliGRAM(s) Oral daily      Allergies    No Known Allergies    Intolerances    HOME MEDICATIONS: see  H & P    REVIEW OF SYSTEMS:  Constitutional: As per HPI  HEENT: Within normal limits  CV: As per HPI  Resp: As per HPI  GI: Within normal limits   : Within normal limits  Musculoskeletal: Within normal limits  Skin: Within normal limits  Neurological: As per HPI  Psychiatric: Within normal limits  Endocrine: Within normal limits  Hematologic/Lymphatic: Within normal limits  Allergic/Immunologic: Within normal limits    [x ] All other systems negative    OBJECTIVE:    Daily Height in cm: 154.94 (10 Jul 2017 09:14)      CAPILLARY BLOOD GLUCOSE  139 (09 Jul 2017 13:00)    PHYSICAL EXAM:  ICU Vital Signs Last 24 Hrs  T(C): 36.7 (10 Jul 2017 07:42), Max: 37.1 (09 Jul 2017 21:22)  T(F): 98 (10 Jul 2017 07:42), Max: 98.8 (09 Jul 2017 21:22)  HR: 80 (10 Jul 2017 07:42) (77 - 116)  BP: 143/70 (10 Jul 2017 07:42) (121/86 - 159/77)  BP(mean): --  ABP: --  ABP(mean): --  RR: 18 (10 Jul 2017 07:42) (18 - 20)  SpO2: 97% (10 Jul 2017 07:42) (95% - 99%)    General: Awake, alert, cooperative, no distress, appears stated age 	  HEENT:   Atraumatic. Normocephalic. Anicteric. Normal oral mucosa, PERRL, EOMI  Neck: Supple, trachea midline; thyroid without enlargement/tenderness/nodules; no carotid bruit; no JVD	  Cardiovascular: Regular rate and rhythm, S1 S2 normal. No murmurs, rubs or gallops  Respiratory: Respirations unlabored; Clear to auscultation and percussion bilaterally  Abdomen: Soft, non-tender, non-distended. No organomegaly. No masses. Normal bowel sounds	  Extremities: Warm to touch. No clubbing or cyanosis. No pedal edema.  Pulses: 2+ peripheral pulses all extremities	  Skin: Normal skin color. No rashes or lesions. No ecchymoses, No cyanosis, warm to touch  Lymph Nodes: Cervical, supraclavicular and axillary nodes normal  Neurological: Motor and sensory examination equal and normal. A and O x 3  Psychiatry: Appropriate mood and affect.      LABS:                          10.2   11.07 )-----------( 540      ( 10 Jul 2017 08:21 )             31.5                         11.5   12.6  )-----------( 491      ( 10 Jul 2017 00:13 )             36.4     07-10    138  |  106  |  14  ----------------------------<  72  3.4<L>   |  18<L>  |  0.90    07-09    141  |  107  |  13  ----------------------------<  108<H>  3.4<L>   |  18<L>  |  0.84    Ca      8.4      07-10    Ca      8.0<L>      07-09    Phos    3.3     07-09      Mg       1.3     07-09    TPro  5.8<L>  /  Alb  2.9<L>  /  TBili  0.5  /  DBili  x   /  AST  144<H>  /  ALT  70<H>  /  AlkPhos  185<H>  07-09    PT/INR - ( 09 Jul 2017 13:22 )   PT: 11.7 sec;   INR: 1.08 ratio         PTT - ( 10 Jul 2017 08:31 )  PTT:clotted sec  Urinalysis Basic - ( 09 Jul 2017 16:57 )    Color: Yellow / Appearance: Clear / SG: >1.030 / pH: x  Gluc: x / Ketone: Negative  / Bili: Negative / Urobili: Negative   Blood: x / Protein: 30 mg/dL / Nitrite: Positive   Leuk Esterase: Small / RBC: 0-2 /HPF / WBC 25-50 /HPF   Sq Epi: x / Non Sq Epi: x / Bacteria: Few /HPF      Venous Blood Gas:  07-09 @ 20:12  7.34/40/29/21/49  VBG Lactate: 1.0  Venous Blood Gas:  07-09 @ 13:22  7.32/39/32/20/52  VBG Lactate: 3.2      Serum Pro-Brain Natriuretic Peptide: 1257 pg/mL (07-10 @ 01:49)  Serum Pro-Brain Natriuretic Peptide: 1762 pg/mL (07-09 @ 13:22)      CARDIAC MARKERS ( 10 Jul 2017 01:49 )  x     / 0.03 ng/mL / 61 U/L / x     / 3.3 ng/mL  CARDIAC MARKERS ( 09 Jul 2017 13:22 )  x     / 0.02 ng/mL / 59 U/L / x     / 2.6 ng/mL      MICROBIOLOGY:     Urine Microscopic-Add On (NC) (07.09.17 @ 16:57)    Bacteria: Few /HPF    Epithelial Cells: Occasional /HPF    Red Blood Cell - Urine: 0-2 /HPF    White Blood Cell - Urine: 25-50 /HPF    Urinalysis (07.09.17 @ 16:57)    Blood, Urine: Negative    Glucose Qualitative, Urine: Negative    pH Urine: 7.0    Color: Yellow    Urine Appearance: Clear    Bilirubin: Negative    Ketone - Urine: Negative    Specific Gravity: >1.030    Protein, Urine: 30 mg/dL    Urobilinogen: Negative    Nitrite: Positive    Leukocyte Esterase Concentration: Small      RADIOLOGY:  [x ] Chest radiographs reviewed and interpreted by me    < from: Xray Chest 1 View AP- PORTABLE-Urgent (07.09.17 @ 14:02) >    EXAM:  CHEST-PORTABLE URGENT                            PROCEDURE DATE:  07/09/2017        INTERPRETATION:  HISTORY: Syncope. Rule out pneumonia.    TECHNIQUE: A single AP view of the chest was obtained.    COMPARISON: 6/4/2017    FINDINGS:  The cardiac silhouette is normal in size. There are no focal   consolidations or pleural effusions. The hilar and mediastinal structures   appear unremarkable. The osseous structures are intact.    IMPRESSION: Clear lungs.      EJ LOPEZ M.D., ATTENDING RADIOLOGIST  This document has been electronically signed. Jul 9 2017  2:10PM          < end of copied text >  ---------------------------------------------------------------------------------------------------------    < from: CT Angio Chest w/ IV Cont (07.09.17 @ 16:04) >    EXAM:  CT ABDOMEN AND PELVIS IC                          EXAM:  CT ANGIO CHEST (W)AW IC                            PROCEDURE DATE:  07/09/2017        INTERPRETATION:  CLINICAL INFORMATION: Syncope. Evaluate for pulmonary   embolus.    COMPARISON: Chest CT 6/9/2017. CT abdomen pelvis March 3, 2017.    PROCEDURE:   CT Angiography of the Chest was performed followed by portal venous phase   imaging of the Abdomen and Pelvis.  90 ml of Omnipaque 350 was injected intravenously. 10 ml were discarded.  Sagittal and coronal reformats were performed as well as 3D (MIP)   reconstructions.    FINDINGS:    CHEST:     LUNGS AND LARGE AIRWAYS: Patent central airways. 2.4 x 2.3 cm cavitary   right upper lobe lesion is without significant change since June 9, 2017   but is mildly improved since March 3, 2017. Emphysema.  PLEURA: No pleural effusion.  VESSELS: Segmental and subsegmental pulmonary emboli involving the right   upper and both lower lobes. Small linear pulmonary embolus also involves   the right common basilar pulmonary artery. Extensive atherosclerotic   plaque.  HEART: Heart size is normal. No pericardial effusion.  MEDIASTINUM AND NURYS: No lymphadenopathy.  CHEST WALL AND LOWER NECK: Within normal limits.    ABDOMEN AND PELVIS:    LIVER: Within normal limits.  BILE DUCTS: Normal caliber.  GALLBLADDER: Cholecystectomy.  SPLEEN: Within normal limits.  PANCREAS: Within normal limits.  ADRENALS: Within normal limits.  KIDNEYS/URETERS: Atrophic left kidney.    BLADDER: Within normal limits.  REPRODUCTIVE ORGANS: The uterus is unremarkable.    BOWEL: No bowel obstruction. Appendix is normal. Colonic diverticulosis.  PERITONEUM: No ascites.  VESSELS:  IVC filter.  RETROPERITONEUM: No lymphadenopathy.    ABDOMINAL WALL: Within normal limits.  BONES: Within normal limits.    IMPRESSION:   1. Bilateral pulmonary emboli.  2. Cavitary right upper lobe lesion unchanged since 6/9/2017 and mildly   improved since 3/3/2017.    Findings were discussed with Dr. Mora 5:20 PM on 7/9/2017.      DENNIS MCELROY M.D., ATTENDING RADIOLOGIST  This document has been electronically signed. Jul 9 2017  5:21PM      < end of copied text >  ---------------------------------------------------------------------------------------------------------    < from: CT Head No Cont (07.09.17 @ 13:44) >    EXAM:  CT BRAIN                            PROCEDURE DATE:  07/09/2017      INTERPRETATION:    CLINICAL INFORMATION: Headaches and syncope, evaluate for intracranial   hemorrhage.    TECHNIQUE: Noncontrast axial CT images were acquired through the head.   Two-dimensional sagittal and coronal reformats were obtained    COMPARISON: None available      FINDINGS:   There is no acute intracranial hemorrhage, extra-axial fluid collection,   hydrocephalus, mass effect or midline shift. The gray-white   differentiation preserved.    Cortical sulci and ventricles are enlarged consistent with   age-appropriate involutional change. There are patchy periventricular   white matter low attenuations likely representing mild chronic   microvascular ischemic changes.    Paranasal sinuses and mastoid air cells are clear. Calvarium is intact.     IMPRESSION:   No acute intracranial hemorrhage, extra-axial fluid collection,   hydrocephalus, mass effect or midline shift.       NANETTE COHN M.D., RADIOLOGY RESIDENT  This document has been electronically signed.  LOLI BRADY M.D., ATTENDING RADIOLOGIST  This document has been electronically signed. Jul 9 2017  2:06PM          < end of copied text >  ---------------------------------------------------------------------------------------------------------  < from: Upper Endoscopy (06.05.17 @ 11:47) >    [facility]  ____________________________________________________________________________________________________  Patient Name: Mackenzie Herrmann                     MRN: [id number]  Account Number: [id number]                     YOB: 1935  Room: Endoscopy Room 1                           Gender: Female  Attending MD: Andree Enamorado,                        Procedure Date No Time: 6/5/2017  ____________________________________________________________________________________________________     Procedure:           Upper GI endoscopy  Indications:         Hematemesis  Providers:           Andree Enamorado MD  Medicines:           Monitored Anesthesia Care  Complications:       No immediate complications.  ____________________________________________________________________________________________________                                                                                                        Impression:          - No gross lesions in esophagus.                       - Z-line regular, 40 cm from the incisors.  - One very large cratered gastric ulcer with no stigmata of bleeding.                       - Friable gastric mucosa. Treated with bipolar cautery.                       - One oozing duodenal ulcer. Treated with bipolar cautery. Clip (MR             conditional) was placed.                       - No specimens collected.  Recommendation:      - Return patient to ICU for ongoing care.                       - Clear liquid diet today                       - Continue PPI gtt         - Will discuss risks vs. benefits anticoagulation with MICU team                                                                                                        Attending Participation:       I personally performed the entire procedure.                                                                                                          ____________  Andree Enamorado,   6/5/2017 12:36:38 PM  Number of Addenda: 0    Note Initiated On: 6/5/2017 11:47 AM    < end of copied text >  ---------------------------------------------------------------------------------------------------------    < from: Upper Endoscopy (06.07.17 @ 15:00) >    [facility]  ____________________________________________________________________________________________________  Patient Name: Mackenzie Herrmann                     MRN: [id number]  Account Number: [id number]                     YOB: 1935  Room: [facility] 5                                     Gender: Female  Attending MD: Andree Enamorado,                        Procedure Date No Time: 6/7/2017  ____________________________________________________________________________________________________     Procedure:           Upper GI endoscopy  Indications:         Hematemesis  Providers:           Andree Enamorado MD  Medicines:           Monitored Anesthesia Care  Complications:       No immediate complications.  ____________________________________________________________________________________________________                                         Impression:          - No gross lesions in esophagus.                       - Coffee grounds seen in the gastric body and antrum.                       - Non-bleeding gastric fundus ulcer with adherent clot. Treated with bipolar                        cautery.                       - One very large non-bleeding gastric ulcer again noted in the gastric                        antrum. The ulcer bed was carefully examined. A slightly protuberant red spot                  toshia seen at the inferior border of the ulcer bed. This area was cautiously                        cauterized using biopolar cautery at low wattage (10W)                       - One non-bleeding duodenal ulcer again noted in the first portion of the                        duodenum. Hemoclip in place. No stigmata of bleeding noted.                       - Biopsies were taken with a cold forceps for Helicobacter pylori testing.  Recommendation:      - Observe patient in GI recovery unit.                       - NPO today.                       - Continue PPI gtt                       - Monitor H/H q 8 hours today                                                                                                        Attending Participation:       I personally performed the entire procedure.                                                                                                          ____________    < end of copied text >

## 2017-07-10 NOTE — CONSULT NOTE ADULT - PROBLEM SELECTOR RECOMMENDATION 9
no symptoms of cystitis   Given + urinalysis and leukocytosis and noted frequency per admission note  will give 3 days of ceftriaxone

## 2017-07-10 NOTE — CONSULT NOTE ADULT - SUBJECTIVE AND OBJECTIVE BOX
Admitting Diagnosis:  Other pulmonary embolism without acute cor pulmonale (I26.99): OTHER PULMONARY EMBOLISM WITHOUT ACUTE COR PULMONALE      HPI:  This is a 82y year old Female with the below past medical history who presents with the chief complaint of syncope, found to have PE despite IVC filter.  Pt with known lung mass since March, 2017  suspected to be neoplasm but no definitive tissue diagnosis (followed by Dr. Alvarado), with a recent discharge from [facility] in June, 2017 for hypotension and upper GI bleed requiring multiple unit transfusion requirements and MICU admission--patient with prior PE in May, 2017 on Coumadin, but AC was discontinued in June, 2017 with IVC filter placed--EGD with nonbleeding gastric ulcer and cauterized DU and hemoclips, with patient transferred to [facility] and was discharged recently to home, with the patient self referring following a syncopal episode with the patient slid down from a chair with daughter noting generalized stiffness then shaking with bowel and urinary incontinence.  Patient herself does not recall events.  Currently without neuro complaints.      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.2)  PE (pulmonary thromboembolism) (I26.99)  Rebound headache (G44.40)  Chronic kidney disease, unspecified stage (N18.9)  Hyperlipidemia, unspecified hyperlipidemia type (E78.5)  COPD (chronic obstructive pulmonary disease) (J44.9)  Hypertension (I10)      Past Surgical History:  No significant past surgical history ([id number])      Social History:  No toxic habits    Family History:  FAMILY HISTORY:  Family history of COPD (chronic obstructive pulmonary disease) (Father)      Allergies:  No Known Allergies      ROS:  Constitutional: Patient offers no complaints of fevers or significant weight loss  Ears, Nose, Mouth and Throat: The patient presents with no abnormalities of the head, ears, eyes, nose or throat  Skin: Patient offers no concerns of new rashes or lesions  Respiratory: The patient presents with no abnormalities of the respiratory tract  Cardiovascular: The patient presents with no cardiac abnormalities  Gastrointestinal: The patient presents with no abnormalities of the GI system  Genitourinary: The patient presents with no dysuria, hematuria or frequent urination  Neurological: See HPI  Endocrine: Patient offers no complaints of excessive thirst, urination, or heat/cold intolerance    Advanced care planning reviewed and noted in the chart.    Medications:  losartan 50 milliGRAM(s) Oral daily  atorvastatin 20 milliGRAM(s) Oral at bedtime  lactobacillus acidophilus 1 Tablet(s) Oral daily  multivitamin 1 Tablet(s) Oral daily  ALBUTerol/ipratropium for Nebulization 3 milliLiter(s) Nebulizer every 6 hours PRN  acetaminophen   Tablet. 650 milliGRAM(s) Oral every 6 hours PRN  famotidine    Tablet 20 milliGRAM(s) Oral two times a day  pantoprazole    Tablet 40 milliGRAM(s) Oral two times a day before meals  tiotropium 18 MICROgram(s) Capsule 1 Capsule(s) Inhalation daily  buDESOnide 160 MICROgram(s)/formoterol 4.5 MICROgram(s) Inhaler 2 Puff(s) Inhalation two times a day  heparin  Infusion. 600 Unit(s)/Hr IV Continuous <Continuous>  heparin  Injectable 5500 Unit(s) IV Push every 6 hours PRN  heparin  Injectable 2500 Unit(s) IV Push every 6 hours PRN  cefTRIAXone   IVPB 1 Gram(s) IV Intermittent every 24 hours      Labs:  CBC Full  -  ( 10 Jul 2017 08:21 )  WBC Count : 11.07 K/uL  Hemoglobin : 10.2 g/dL  Hematocrit : 31.5 %  Platelet Count - Automated : 540 K/uL  Mean Cell Volume : 90.0 fl  Mean Cell Hemoglobin : 29.1 pg  Mean Cell Hemoglobin Concentration : 32.4 gm/dL  Auto Neutrophil # : 7.50 K/uL  Auto Lymphocyte # : 1.07 K/uL  Auto Monocyte # : 1.20 K/uL  Auto Eosinophil # : 1.20 K/uL  Auto Basophil # : 0.07 K/uL  Auto Neutrophil % : 67.8 %  Auto Lymphocyte % : 9.7 %  Auto Monocyte % : 10.8 %  Auto Eosinophil % : 10.8 %  Auto Basophil % : 0.6 %    07-10    138  |  106  |  14  ----------------------------<  72  3.4<L>   |  18<L>  |  0.90    Ca    8.4      10 Jul 2017 08:37  Phos  3.3     07-09  Mg     1.3     07-09    TPro  5.8<L>  /  Alb  2.9<L>  /  TBili  0.5  /  DBili  x   /  AST  144<H>  /  ALT  70<H>  /  AlkPhos  185<H>  07-09    CAPILLARY BLOOD GLUCOSE        LIVER FUNCTIONS - ( 09 Jul 2017 13:22 )  Alb: 2.9 g/dL / Pro: 5.8 g/dL / ALK PHOS: 185 U/L / ALT: 70 U/L RC / AST: 144 U/L / GGT: x           PT/INR - ( 10 Jul 2017 12:17 )   PT: 12.0 sec;   INR: 1.10 ratio         PTT - ( 10 Jul 2017 12:17 )  PTT:67.5 sec  Urinalysis Basic - ( 09 Jul 2017 16:57 )    Color: Yellow / Appearance: Clear / SG: >1.030 / pH: x  Gluc: x / Ketone: Negative  / Bili: Negative / Urobili: Negative   Blood: x / Protein: 30 mg/dL / Nitrite: Positive   Leuk Esterase: Small / RBC: 0-2 /HPF / WBC 25-50 /HPF   Sq Epi: x / Non Sq Epi: x / Bacteria: Few /HPF        Vitals:  Vital Signs Last 24 Hrs  T(C): 36.7 (10 Jul 2017 07:42), Max: 37.1 (09 Jul 2017 21:22)  T(F): 98 (10 Jul 2017 07:42), Max: 98.8 (09 Jul 2017 21:22)  HR: 80 (10 Jul 2017 07:42) (77 - 90)  BP: 143/70 (10 Jul 2017 07:42) (129/74 - 159/77)  BP(mean): --  RR: 18 (10 Jul 2017 07:42) (18 - 20)  SpO2: 97% (10 Jul 2017 07:42) (95% - 99%)    NEUROLOGICAL EXAM:    Mental status: Awake, alert, and in no apparent distress. Oriented to person, place and time. Language function is normal. Recent memory, digit span and concentration were normal.     Cranial Nerves: Pupils were equal, round, reactive to light. Extraocular movements were intact. Visual field were full. Fundoscopic exam was deferred. Facial sensation was intact to light touch. There was no facial asymmetry. The palate was upgoing symmetrically and tongue was midline. Hearing acuity was intact to finger rub AU. Shoulder shrug was full bilaterally    Motor exam: Bulk and tone were normal. Strength was 5/5 in all four extremities. Fine finger movements were symmetric and normal. There was no pronator drift    Reflexes: 2+ in the bilateral upper extremities. 2+ in the bilateral lower extremities. Toes were downgoing bilaterally.     Sensation: Intact to light touch, temperature, vibration and proprioception.     Coordination: Finger-nose-finger and heel-to-shin was without dysmetria.     Gait: Narrow base and steady. Romberg was negative.     Imaging:      EXAM:  CT BRAIN                            PROCEDURE DATE:  07/09/2017            INTERPRETATION:    CLINICAL INFORMATION: Headaches and syncope, evaluate for intracranial   hemorrhage.    TECHNIQUE: Noncontrast axial CT images were acquired through the head.   Two-dimensional sagittal and coronal reformats were obtained    COMPARISON: None available      FINDINGS:   There is no acute intracranial hemorrhage, extra-axial fluid collection,   hydrocephalus, mass effect or midline shift. The gray-white   differentiation preserved.    Cortical sulci and ventricles are enlarged consistent with   age-appropriate involutional change. There are patchy periventricular   white matter low attenuations likely representing mild chronic   microvascular ischemic changes.    Paranasal sinuses and mastoid air cells are clear. Calvarium is intact.     IMPRESSION:   No acute intracranial hemorrhage, extra-axial fluid collection,   hydrocephalus, mass effect or midline shift.                 NANETTE COHN M.D., RADIOLOGY RESIDENT  This document has been electronically signed.  LOLI BRADY M.D., ATTENDING RADIOLOGIST  This document has been electronically signed. Jul 9 2017  2:06PM

## 2017-07-10 NOTE — PROGRESS NOTE ADULT - ASSESSMENT
patient presents  with syncopal episode in the setting of COPD, complicated course with past PE but with upper GI bleed with the patient with IVC but taken off full anticoagulation at the time with presentation for syncopal versus ictal episode with non-diagnostic head CTT but with CTT angiogram with bilateral pulmonary emboli and unchanged RIGHT upper cavitary lesion (followed by Dr. Alvarado of pulmonary since March, 2017) with no tissue diagnosis, presumptive lung CA--patient/ daughter are NOT eager for a tissue diagnosis, with patient now on IV heparin for treatment of PE.  Risks/benefits reviewed with patient/ daughter who agree to continue with IV heparin as a potentially life saving measure.  Patient admitted to telemetry and will obtain serial enzymes, echo.   neurology consult requested  for the possibility of an interval ictal episode as primary or epiphenomenon, and pulmonary evaluation by Dr. Norwood requested as well office.  Patient has requested to maintain her DNR status--patient has MOLST-- daughter in attendance has deferred to the patient as such. recent h/o GI bleed on AC, will reconsult GI.

## 2017-07-10 NOTE — CONSULT NOTE ADULT - ASSESSMENT
This is a 82y Female, here with syncope, found to have PE.  Neuro exam nonfocal, CT head negative.  Pt not able to describe event though prior description may have to expand differential to include possible seizure, though doubt.  Now on IV heparin despite hx of GI bleed.    Plan:  - no neuro objection for IV heparin  - for MRI brain  - will hold on EEG and sz meds given low suspicion

## 2017-07-11 LAB
-  AMIKACIN: SIGNIFICANT CHANGE UP
-  AMPICILLIN/SULBACTAM: SIGNIFICANT CHANGE UP
-  AMPICILLIN: SIGNIFICANT CHANGE UP
-  AZTREONAM: SIGNIFICANT CHANGE UP
-  CEFAZOLIN: SIGNIFICANT CHANGE UP
-  CEFEPIME: SIGNIFICANT CHANGE UP
-  CEFOXITIN: SIGNIFICANT CHANGE UP
-  CEFTAZIDIME: SIGNIFICANT CHANGE UP
-  CEFTRIAXONE: SIGNIFICANT CHANGE UP
-  CIPROFLOXACIN: SIGNIFICANT CHANGE UP
-  ERTAPENEM: SIGNIFICANT CHANGE UP
-  GENTAMICIN: SIGNIFICANT CHANGE UP
-  IMIPENEM: SIGNIFICANT CHANGE UP
-  LEVOFLOXACIN: SIGNIFICANT CHANGE UP
-  MEROPENEM: SIGNIFICANT CHANGE UP
-  NITROFURANTOIN: SIGNIFICANT CHANGE UP
-  PIPERACILLIN/TAZOBACTAM: SIGNIFICANT CHANGE UP
-  TOBRAMYCIN: SIGNIFICANT CHANGE UP
-  TRIMETHOPRIM/SULFAMETHOXAZOLE: SIGNIFICANT CHANGE UP
ANION GAP SERPL CALC-SCNC: 17 MMOL/L — SIGNIFICANT CHANGE UP (ref 5–17)
APTT BLD: 140.9 SEC — CRITICAL HIGH (ref 27.5–37.4)
APTT BLD: 53.3 SEC — HIGH (ref 27.5–37.4)
APTT BLD: 55.3 SEC — HIGH (ref 27.5–37.4)
BUN SERPL-MCNC: 14 MG/DL — SIGNIFICANT CHANGE UP (ref 7–23)
CALCIUM SERPL-MCNC: 8.2 MG/DL — LOW (ref 8.4–10.5)
CHLORIDE SERPL-SCNC: 104 MMOL/L — SIGNIFICANT CHANGE UP (ref 96–108)
CO2 SERPL-SCNC: 17 MMOL/L — LOW (ref 22–31)
CREAT SERPL-MCNC: 1.03 MG/DL — SIGNIFICANT CHANGE UP (ref 0.5–1.3)
CULTURE RESULTS: SIGNIFICANT CHANGE UP
GLUCOSE SERPL-MCNC: 77 MG/DL — SIGNIFICANT CHANGE UP (ref 70–99)
HCT VFR BLD CALC: 32.6 % — LOW (ref 34.5–45)
HGB BLD-MCNC: 10.3 G/DL — LOW (ref 11.5–15.5)
INR BLD: 0.99 RATIO — SIGNIFICANT CHANGE UP (ref 0.88–1.16)
INR BLD: 0.99 RATIO — SIGNIFICANT CHANGE UP (ref 0.88–1.16)
MAGNESIUM SERPL-MCNC: 1.5 MG/DL — LOW (ref 1.6–2.6)
MCHC RBC-ENTMCNC: 31 PG — SIGNIFICANT CHANGE UP (ref 27–34)
MCHC RBC-ENTMCNC: 31.5 GM/DL — LOW (ref 32–36)
MCV RBC AUTO: 98.4 FL — SIGNIFICANT CHANGE UP (ref 80–100)
METHOD TYPE: SIGNIFICANT CHANGE UP
OB PNL STL: NEGATIVE — SIGNIFICANT CHANGE UP
ORGANISM # SPEC MICROSCOPIC CNT: SIGNIFICANT CHANGE UP
ORGANISM # SPEC MICROSCOPIC CNT: SIGNIFICANT CHANGE UP
PLATELET # BLD AUTO: 439 K/UL — HIGH (ref 150–400)
POTASSIUM SERPL-MCNC: 2.8 MMOL/L — CRITICAL LOW (ref 3.5–5.3)
POTASSIUM SERPL-MCNC: 4.3 MMOL/L — SIGNIFICANT CHANGE UP (ref 3.5–5.3)
POTASSIUM SERPL-SCNC: 2.8 MMOL/L — CRITICAL LOW (ref 3.5–5.3)
POTASSIUM SERPL-SCNC: 4.3 MMOL/L — SIGNIFICANT CHANGE UP (ref 3.5–5.3)
PROTHROM AB SERPL-ACNC: 10.8 SEC — SIGNIFICANT CHANGE UP (ref 9.8–12.7)
PROTHROM AB SERPL-ACNC: 10.8 SEC — SIGNIFICANT CHANGE UP (ref 9.8–12.7)
RBC # BLD: 3.31 M/UL — LOW (ref 3.8–5.2)
RBC # FLD: 18.2 % — HIGH (ref 10.3–14.5)
SODIUM SERPL-SCNC: 138 MMOL/L — SIGNIFICANT CHANGE UP (ref 135–145)
SPECIMEN SOURCE: SIGNIFICANT CHANGE UP
WBC # BLD: 9.9 K/UL — SIGNIFICANT CHANGE UP (ref 3.8–10.5)
WBC # FLD AUTO: 9.9 K/UL — SIGNIFICANT CHANGE UP (ref 3.8–10.5)

## 2017-07-11 PROCEDURE — 93306 TTE W/DOPPLER COMPLETE: CPT | Mod: 26

## 2017-07-11 PROCEDURE — 99233 SBSQ HOSP IP/OBS HIGH 50: CPT

## 2017-07-11 RX ORDER — ACETAMINOPHEN 500 MG
650 TABLET ORAL ONCE
Qty: 0 | Refills: 0 | Status: COMPLETED | OUTPATIENT
Start: 2017-07-11 | End: 2017-07-11

## 2017-07-11 RX ORDER — POTASSIUM CHLORIDE 20 MEQ
10 PACKET (EA) ORAL
Qty: 0 | Refills: 0 | Status: COMPLETED | OUTPATIENT
Start: 2017-07-11 | End: 2017-07-11

## 2017-07-11 RX ORDER — POTASSIUM CHLORIDE 20 MEQ
40 PACKET (EA) ORAL ONCE
Qty: 0 | Refills: 0 | Status: COMPLETED | OUTPATIENT
Start: 2017-07-11 | End: 2017-07-11

## 2017-07-11 RX ORDER — MAGNESIUM SULFATE 500 MG/ML
1 VIAL (ML) INJECTION ONCE
Qty: 0 | Refills: 0 | Status: COMPLETED | OUTPATIENT
Start: 2017-07-11 | End: 2017-07-11

## 2017-07-11 RX ADMIN — ATORVASTATIN CALCIUM 20 MILLIGRAM(S): 80 TABLET, FILM COATED ORAL at 21:08

## 2017-07-11 RX ADMIN — BUDESONIDE AND FORMOTEROL FUMARATE DIHYDRATE 2 PUFF(S): 160; 4.5 AEROSOL RESPIRATORY (INHALATION) at 05:18

## 2017-07-11 RX ADMIN — Medication 1 TABLET(S): at 12:01

## 2017-07-11 RX ADMIN — Medication 650 MILLIGRAM(S): at 19:33

## 2017-07-11 RX ADMIN — FAMOTIDINE 20 MILLIGRAM(S): 10 INJECTION INTRAVENOUS at 18:15

## 2017-07-11 RX ADMIN — Medication 650 MILLIGRAM(S): at 20:03

## 2017-07-11 RX ADMIN — FAMOTIDINE 20 MILLIGRAM(S): 10 INJECTION INTRAVENOUS at 05:18

## 2017-07-11 RX ADMIN — PANTOPRAZOLE SODIUM 40 MILLIGRAM(S): 20 TABLET, DELAYED RELEASE ORAL at 18:15

## 2017-07-11 RX ADMIN — Medication 40 MILLIEQUIVALENT(S): at 09:29

## 2017-07-11 RX ADMIN — Medication 100 MILLIEQUIVALENT(S): at 09:29

## 2017-07-11 RX ADMIN — Medication 650 MILLIGRAM(S): at 08:44

## 2017-07-11 RX ADMIN — HEPARIN SODIUM 2500 UNIT(S): 5000 INJECTION INTRAVENOUS; SUBCUTANEOUS at 07:48

## 2017-07-11 RX ADMIN — HEPARIN SODIUM 0 UNIT(S)/HR: 5000 INJECTION INTRAVENOUS; SUBCUTANEOUS at 16:30

## 2017-07-11 RX ADMIN — CEFTRIAXONE 100 GRAM(S): 500 INJECTION, POWDER, FOR SOLUTION INTRAMUSCULAR; INTRAVENOUS at 18:16

## 2017-07-11 RX ADMIN — PANTOPRAZOLE SODIUM 40 MILLIGRAM(S): 20 TABLET, DELAYED RELEASE ORAL at 05:18

## 2017-07-11 RX ADMIN — Medication 650 MILLIGRAM(S): at 13:12

## 2017-07-11 RX ADMIN — HEPARIN SODIUM 700 UNIT(S)/HR: 5000 INJECTION INTRAVENOUS; SUBCUTANEOUS at 07:44

## 2017-07-11 RX ADMIN — Medication 100 MILLIEQUIVALENT(S): at 11:57

## 2017-07-11 RX ADMIN — BUDESONIDE AND FORMOTEROL FUMARATE DIHYDRATE 2 PUFF(S): 160; 4.5 AEROSOL RESPIRATORY (INHALATION) at 18:15

## 2017-07-11 RX ADMIN — Medication 100 GRAM(S): at 18:16

## 2017-07-11 RX ADMIN — TIOTROPIUM BROMIDE 1 CAPSULE(S): 18 CAPSULE ORAL; RESPIRATORY (INHALATION) at 12:01

## 2017-07-11 RX ADMIN — LOSARTAN POTASSIUM 50 MILLIGRAM(S): 100 TABLET, FILM COATED ORAL at 05:18

## 2017-07-11 RX ADMIN — Medication 650 MILLIGRAM(S): at 08:14

## 2017-07-11 RX ADMIN — Medication 650 MILLIGRAM(S): at 12:42

## 2017-07-11 RX ADMIN — Medication 100 MILLIEQUIVALENT(S): at 10:31

## 2017-07-11 NOTE — PROGRESS NOTE ADULT - SUBJECTIVE AND OBJECTIVE BOX
[facility] - [facility]     PROGRESS NOTE    CHIEF COMPLAINT: PE/syncope    INTERVAL HISTORY: no further lightheadedness, dizziness, pre- syncope or syncope; no chest pain/pressure or palpitations; no cough, sputum production, chest congestion or wheeze; no fevers, chills or sweats; normal bowel movement without BRBPR or melena    REVIEW OF SYSTEMS:  Constitutional: As per interval history  HEENT: Within normal limits  CV: As per interval history  Resp: As per interval history  GI: Within normal limits   : Within normal limits  Musculoskeletal: Within normal limits  Skin: Within normal limits  Neurological: Within normal limits  Psychiatric: Within normal limits  Endocrine: Within normal limits  Hematologic/Lymphatic: Within normal limits  Allergic/Immunologic: Within normal limits      MEDICATIONS:     Pulmonary "  ALBUTerol/ipratropium for Nebulization 3 milliLiter(s) Nebulizer every 6 hours PRN  tiotropium 18 MICROgram(s) Capsule 1 Capsule(s) Inhalation daily  buDESOnide 160 MICROgram(s)/formoterol 4.5 MICROgram(s) Inhaler 2 Puff(s) Inhalation two times a day      Anti-microbials:  cefTRIAXone   IVPB 1 Gram(s) IV Intermittent every 24 hours      Cardiovascular:  losartan 50 milliGRAM(s) Oral daily      Other:  atorvastatin 20 milliGRAM(s) Oral at bedtime  lactobacillus acidophilus 1 Tablet(s) Oral daily  multivitamin 1 Tablet(s) Oral daily  acetaminophen   Tablet. 650 milliGRAM(s) Oral every 6 hours PRN  famotidine    Tablet 20 milliGRAM(s) Oral two times a day  pantoprazole    Tablet 40 milliGRAM(s) Oral two times a day before meals  heparin  Infusion. 600 Unit(s)/Hr IV Continuous <Continuous>  heparin  Injectable 5500 Unit(s) IV Push every 6 hours PRN  heparin  Injectable 2500 Unit(s) IV Push every 6 hours PRN  potassium chloride  10 mEq/100 mL IVPB 10 milliEquivalent(s) IV Intermittent every 1 hour        OBJECTIVE:        I&O's Detail    10 Jul 2017 07:01  -  2017 07:00  --------------------------------------------------------  IN:    heparin  Infusion.: 102 mL    Oral Fluid: 480 mL  Total IN: 582 mL    OUT:  Total OUT: 0 mL    Total NET: 582 mL      2017 07:01  -  2017 10:58  --------------------------------------------------------  IN:    IV PiggyBack: 200 mL  Total IN: 200 mL    OUT:    Voided: 150 mL  Total OUT: 150 mL    Total NET: 50 mL          Daily Weight in k.4 (2017 08:41)    PHYSICAL EXAM:       ICU Vital Signs Last 24 Hrs  T(C): 36.7 (2017 04:08), Max: 36.9 (10 Jul 2017 18:00)  T(F): 98 (2017 04:08), Max: 98.4 (10 Jul 2017 18:00)  HR: 90 (2017 06:38) (67 - 90)  BP: 132/75 (2017 06:38) (130/84 - 156/82)  BP(mean): --  ABP: --  ABP(mean): --  RR: 18 (2017 06:38) (18 - 18)  SpO2: 98% (2017 06:38) (97% - 99%) on room air     General: Awake, alert, cooperative, no distress, appears stated age 	  HEENT:   Atraumatic. Normocephalic. Anicteric. Normal oral mucosa, PERRL, EOMI  Neck: Supple, trachea midline; thyroid without enlargement/tenderness/nodules; no carotid bruit; no JVD	  Cardiovascular: Regular rate and rhythm, S1 S2 normal. No murmurs, rubs or gallops  Respiratory: Respirations unlabored; Clear to auscultation and percussion bilaterally  Abdomen: Soft, non-tender, non-distended. No organomegaly. No masses. Normal bowel sounds	  Extremities: Warm to touch. No clubbing or cyanosis. No pedal edema.  Pulses: 2+ peripheral pulses all extremities	  Skin: Normal skin color. No rashes or lesions. No ecchymoses, No cyanosis, warm to touch  Lymph Nodes: Cervical, supraclavicular and axillary nodes normal  Neurological: Motor and sensory examination equal and normal. A and O x 3  Psychiatry: Appropriate mood and affect.        LABS:                        10.3   9.9   )-----------( 439      ( 2017 06:46 )             32.6                         11.5   9.9   )-----------( 490      ( 10 Jul 2017 18:43 )             34.5         138  |  104  |  14  ----------------------------<  77  2.8<LL>   |  17<L>  |  1.03    07-10    138  |  106  |  14  ----------------------------<  72  3.4<L>   |  18<L>  |  0.90    Ca      8.2<L>          Ca      8.4      07-10    Phos    3.3           Mg       1.3         TPro  5.8<L>  /  Alb  2.9<L>  /  TBili  0.5  /  DBili  x   /  AST  144<H>  /  ALT  70<H>  /  AlkPhos  185<H>      PT/INR - ( 2017 06:46 )   PT: 10.8 sec;   INR: 0.99 ratio         PTT - ( 2017 06:46 )  PTT:53.3 sec  Urinalysis Basic - ( 2017 16:57 )    Color: Yellow / Appearance: Clear / SG: >1.030 / pH: x  Gluc: x / Ketone: Negative  / Bili: Negative / Urobili: Negative   Blood: x / Protein: 30 mg/dL / Nitrite: Positive   Leuk Esterase: Small / RBC: 0-2 /HPF / WBC 25-50 /HPF   Sq Epi: x / Non Sq Epi: x / Bacteria: Few /HPF      Venous Blood Gas:   @ 20:12  7.34/40/29/21/49  VBG Lactate: 1.0  Venous Blood Gas:   @ 13:22  7.32/39/32/20/52  VBG Lactate: 3.2        Serum Pro-Brain Natriuretic Peptide: 1257 pg/mL (07-10 @ 01:49)  Serum Pro-Brain Natriuretic Peptide: 1762 pg/mL ( @ 13:22)    CARDIAC MARKERS ( 10 Jul 2017 14:52 )  x     / 0.03 ng/mL / 54 U/L / x     / 3.5 ng/mL  CARDIAC MARKERS ( 10 Jul 2017 01:49 )  x     / 0.03 ng/mL / 61 U/L / x     / 3.3 ng/mL  CARDIAC MARKERS ( 2017 13:22 )  x     / 0.02 ng/mL / 59 U/L / x     / 2.6 ng/mL        MICROBIOLOGY:     Culture - Urine (17 @ 22:12)    Specimen Source: .Urine Catheterized    Culture Results:   >100,000 CFU/ml Klebsiella pneumoniae    Culture - Blood (17 @ 17:51)    Specimen Source: .Blood Blood    Culture Results:   No growth to date.    Culture - Blood (17 @ 16:20)    Specimen Source: .Blood Blood    Culture Results:   No growth to date.      RADIOLOGY:  [x ] Chest radiographs reviewed and interpreted by me    < from: Xray Chest 1 View AP- PORTABLE-Urgent (17 @ 14:02) >    EXAM:  CHEST-PORTABLE URGENT                            PROCEDURE DATE:  2017        INTERPRETATION:  HISTORY: Syncope. Rule out pneumonia.    TECHNIQUE: A single AP view of the chest was obtained.    COMPARISON: 2017    FINDINGS:  The cardiac silhouette is normal in size. There are no focal   consolidations or pleural effusions. The hilar and mediastinal structures   appear unremarkable. The osseous structures are intact.    IMPRESSION: Clear lungs.      EJ OLPEZ M.D., ATTENDING RADIOLOGIST  This document has been electronically signed. 2017  2:10PM          < end of copied text >  ---------------------------------------------------------------------------------------------------------    < from: CT Angio Chest w/ IV Cont (17 @ 16:04) >    EXAM:  CT ABDOMEN AND PELVIS IC                          EXAM:  CT ANGIO CHEST (W)AW IC                            PROCEDURE DATE:  2017        INTERPRETATION:  CLINICAL INFORMATION: Syncope. Evaluate for pulmonary   embolus.    COMPARISON: Chest CT 2017. CT abdomen pelvis March 3, 2017.    PROCEDURE:   CT Angiography of the Chest was performed followed by portal venous phase   imaging of the Abdomen and Pelvis.  90 ml of Omnipaque 350 was injected intravenously. 10 ml were discarded.  Sagittal and coronal reformats were performed as well as 3D (MIP)   reconstructions.    FINDINGS:    CHEST:     LUNGS AND LARGE AIRWAYS: Patent central airways. 2.4 x 2.3 cm cavitary   right upper lobe lesion is without significant change since 2017   but is mildly improved since March 3, 2017. Emphysema.  PLEURA: No pleural effusion.  VESSELS: Segmental and subsegmental pulmonary emboli involving the right   upper and both lower lobes. Small linear pulmonary embolus also involves   the right common basilar pulmonary artery. Extensive atherosclerotic   plaque.  HEART: Heart size is normal. No pericardial effusion.  MEDIASTINUM AND NURYS: No lymphadenopathy.  CHEST WALL AND LOWER NECK: Within normal limits.    ABDOMEN AND PELVIS:    LIVER: Within normal limits.  BILE DUCTS: Normal caliber.  GALLBLADDER: Cholecystectomy.  SPLEEN: Within normal limits.  PANCREAS: Within normal limits.  ADRENALS: Within normal limits.  KIDNEYS/URETERS: Atrophic left kidney.    BLADDER: Within normal limits.  REPRODUCTIVE ORGANS: The uterus is unremarkable.    BOWEL: No bowel obstruction. Appendix is normal. Colonic diverticulosis.  PERITONEUM: No ascites.  VESSELS:  IVC filter.  RETROPERITONEUM: No lymphadenopathy.    ABDOMINAL WALL: Within normal limits.  BONES: Within normal limits.    IMPRESSION:   1. Bilateral pulmonary emboli.  2. Cavitary right upper lobe lesion unchanged since 2017 and mildly   improved since 3/3/2017.    Findings were discussed with Dr. Mora 5:20 PM on 2017.      DENNIS MCELROY M.D., ATTENDING RADIOLOGIST  This document has been electronically signed. 2017  5:21PM      < end of copied text >  ---------------------------------------------------------------------------------------------------------    < from: CT Head No Cont (07.09.17 @ 13:44) >    EXAM:  CT BRAIN                            PROCEDURE DATE:  2017      INTERPRETATION:    CLINICAL INFORMATION: Headaches and syncope, evaluate for intracranial   hemorrhage.    TECHNIQUE: Noncontrast axial CT images were acquired through the head.   Two-dimensional sagittal and coronal reformats were obtained    COMPARISON: None available      FINDINGS:   There is no acute intracranial hemorrhage, extra-axial fluid collection,   hydrocephalus, mass effect or midline shift. The gray-white   differentiation preserved.    Cortical sulci and ventricles are enlarged consistent with   age-appropriate involutional change. There are patchy periventricular   white matter low attenuations likely representing mild chronic   microvascular ischemic changes.    Paranasal sinuses and mastoid air cells are clear. Calvarium is intact.     IMPRESSION:   No acute intracranial hemorrhage, extra-axial fluid collection,   hydrocephalus, mass effect or midline shift.       NANETTE COHN M.D., RADIOLOGY RESIDENT  This document has been electronically signed.  LOLI BRADY M.D., ATTENDING RADIOLOGIST  This document has been electronically signed. 2017  2:06PM          < end of copied text >  ---------------------------------------------------------------------------------------------------------  < from: Upper Endoscopy (17 @ 11:47) >    [facility]  ____________________________________________________________________________________________________  Patient Name: Mackenzie Herrmann                     MRN: [id number]  Account Number: [id number]                     YOB: 1935  Room: Endoscopy Room 1                           Gender: Female  Attending MD: Andree Enamorado,                        Procedure Date No Time: 2017  ____________________________________________________________________________________________________     Procedure:           Upper GI endoscopy  Indications:         Hematemesis  Providers:           Andree Enamorado MD  Medicines:           Monitored Anesthesia Care  Complications:       No immediate complications.  ____________________________________________________________________________________________________                                                                                                        Impression:          - No gross lesions in esophagus.                       - Z-line regular, 40 cm from the incisors.  - One very large cratered gastric ulcer with no stigmata of bleeding.                       - Friable gastric mucosa. Treated with bipolar cautery.                       - One oozing duodenal ulcer. Treated with bipolar cautery. Clip (MR             conditional) was placed.                       - No specimens collected.  Recommendation:      - Return patient to ICU for ongoing care.                       - Clear liquid diet today                       - Continue PPI gtt         - Will discuss risks vs. benefits anticoagulation with MICU team                                                                                                        Attending Participation:       I personally performed the entire procedure.                                                                                                          ____________  Andree Enamorado,   2017 12:36:38 PM  Number of Addenda: 0    Note Initiated On: 2017 11:47 AM    < end of copied text >  ---------------------------------------------------------------------------------------------------------    < from: Upper Endoscopy (17 @ 15:00) >    [facility]  ____________________________________________________________________________________________________  Patient Name: Mackenzie Herrmann                     MRN: [id number]  Account Number: [id number]                     YOB: 1935  Room: [facility] 5                                     Gender: Female  Attending MD: Andree Enamorado,                        Procedure Date No Time: 2017  ____________________________________________________________________________________________________     Procedure:           Upper GI endoscopy  Indications:         Hematemesis  Providers:           Andree Enamorado MD  Medicines:           Monitored Anesthesia Care  Complications:       No immediate complications.  ____________________________________________________________________________________________________                                         Impression:          - No gross lesions in esophagus.                       - Coffee grounds seen in the gastric body and antrum.                       - Non-bleeding gastric fundus ulcer with adherent clot. Treated with bipolar                        cautery.                       - One very large non-bleeding gastric ulcer again noted in the gastric                        antrum. The ulcer bed was carefully examined. A slightly protuberant red spot                  toshia seen at the inferior border of the ulcer bed. This area was cautiously                        cauterized using biopolar cautery at low wattage (10W)                       - One non-bleeding duodenal ulcer again noted in the first portion of the                        duodenum. Hemoclip in place. No stigmata of bleeding noted.                       - Biopsies were taken with a cold forceps for Helicobacter pylori testing.  Recommendation:      - Observe patient in GI recovery unit.                       - NPO today.                       - Continue PPI gtt                       - Monitor H/H q 8 hours today                                                                                                        Attending Participation:       I personally performed the entire procedure.                                                                                                          ____________    < end of copied text >

## 2017-07-11 NOTE — PROGRESS NOTE ADULT - ASSESSMENT
ASSESSMENT    syncope - while the patient does have new in addition to old pulmonary emboli, they are small in size and without evidence of causing right heart strain or hemodynamic instability - an IVC filter not uncommonly does not prevent smaller pulmonary emboli but has possibly prevented a large PE - i do not think the new PEs are the cause of the patient's syncopal episode and the risks of anticoagulation have to be carefully weighed against the possible benefits isai since RV findings are normal on Echo- the CTA does reveal significant atherosclerotic disease in the aorta and the patient is at high risk of having TIAs as another possible cause of syncope - seizure also should be included in the differential diagnosis    RUL masslike consolidation -as per pulm 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

## 2017-07-11 NOTE — PROVIDER CONTACT NOTE (OTHER) - ASSESSMENT
Patient A&Ox4, VSS. HR 67, /84, RR 18, O2 saturation 97% on room air. Patient denies chest pain, lightheadedness, or shortness of breath. Patient asymptomatic.

## 2017-07-11 NOTE — PROVIDER CONTACT NOTE (CRITICAL VALUE NOTIFICATION) - BACKGROUND
Pt admitted for PE's, hx of Afib, history of GI bleed
Pt admitted w/ b/L PE
history of GI bleed PE, s/p IVC Filter, COPD
Pt admitted w/ b/L PE

## 2017-07-11 NOTE — PROGRESS NOTE ADULT - SUBJECTIVE AND OBJECTIVE BOX
[facility], Division of Infectious Diseases  HEIDI Marquez A. Lee    Name: MANOLO MONTGOMERY  Age: 82y  Gender: Female  MRN: [id number]    Interval History--  Notes reviewed. Feels okay. No specific complaints. Denies fevers, chills, or rigors. No SOB or CP. Denies any urinary symptoms. On heparin gtt.    Past Medical History--  Lung abscess  PE (pulmonary thromboembolism)  Rebound headache  Chronic kidney disease, unspecified stage  Hyperlipidemia, unspecified hyperlipidemia type  COPD (chronic obstructive pulmonary disease)  Hypertension  No significant past surgical history      For details regarding the patient's social history, family history, and other miscellaneous elements, please refer the initial infectious diseases consultation and/or the admitting history and physical examination for this admission.    Allergies    No Known Allergies    Intolerances        Medications--  Antibiotics:  cefTRIAXone   IVPB 1 Gram(s) IV Intermittent every 24 hours    Immunologic:    Other:  losartan  atorvastatin  lactobacillus acidophilus  multivitamin  ALBUTerol/ipratropium for Nebulization PRN  acetaminophen   Tablet. PRN  famotidine    Tablet  pantoprazole    Tablet  tiotropium 18 MICROgram(s) Capsule  buDESOnide 160 MICROgram(s)/formoterol 4.5 MICROgram(s) Inhaler  heparin  Infusion.  heparin  Injectable PRN  heparin  Injectable PRN  magnesium sulfate  IVPB      Review of Systems--  A 10-point review of systems was obtained.     Pertinent positives and negatives--  Constitutional: No fevers. No Chills. No Rigors.   Cardiovascular: No chest pain. No palpitations.  Respiratory: No shortness of breath. No cough.  Gastrointestinal: No nausea or vomiting. No diarrhea or constipation.   Psychiatric: Pleasant. Appropriate affect.    Review of systems otherwise negative except as previously noted.    Physical Examination--  Vital Signs: T(F): 98 (07-11-17 @ 12:02), Max: 98.4 (07-10-17 @ 18:00)  HR: 78 (07-11-17 @ 12:02)  BP: 147/77 (07-11-17 @ 12:02)  RR: 18 (07-11-17 @ 12:02)  SpO2: 98% (07-11-17 @ 12:02)  Wt(kg): --  General: Nontoxic-appearing Female in no acute distress.  HEENT: AT/NC. PERRL. EOMI. Anicteric. Conjunctiva pink and moist. Oropharynx clear. Dentition fair.  Neck: Not rigid. No sense of mass.  Nodes: None palpable.  Lungs: Clear bilaterally without rales, wheezing or ronchi  Heart: Regular rate and rhythm. No Murmur. No rub. No gallop. No palpable thrill.  Abdomen: Bowel sounds present and normoactive. Soft. Nondistended. Nontender. No sense of mass. No organomegaly.  Back: No spinal tenderness. No costovertebral angle tenderness.   Extremities: No cyanosis or clubbing. No edema.   Skin: Warm. Dry. Good turgor. No rash. No vasculitic stigmata.  Psychiatric: Appropriate affect and mood for situation.         Laboratory Studies--  CBC                        10.3   9.9   )-----------( 439      ( 11 Jul 2017 06:46 )             32.6       Chemistries  07-11    x   |  x   |  x   ----------------------------<  x   4.3   |  x   |  x     Ca    8.2<L>      11 Jul 2017 06:46  Mg     1.5     07-11    Urinalysis (07.09.17 @ 16:57)    Blood, Urine: Negative    Glucose Qualitative, Urine: Negative    pH Urine: 7.0    Color: Yellow    Urine Appearance: Clear    Bilirubin: Negative    Ketone - Urine: Negative    Specific Gravity: >1.030    Protein, Urine: 30 mg/dL    Urobilinogen: Negative    Nitrite: Positive    Leukocyte Esterase Concentration: Small    Urine Microscopic-Add On (NC) (07.09.17 @ 16:57)    Bacteria: Few /HPF    White Blood Cell - Urine: 25-50 /HPF    Epithelial Cells: Occasional /HPF    Red Blood Cell - Urine: 0-2 /HPF        Culture Data  Culture - Urine (07.09.17 @ 22:12)    Specimen Source: .Urine Catheterized    Culture Results:   >100,000 CFU/ml Klebsiella pneumoniae

## 2017-07-11 NOTE — PROVIDER CONTACT NOTE (CRITICAL VALUE NOTIFICATION) - ASSESSMENT
Pt admitted w/ b/L PE on a heparin gtt presenting w/ aPTT 140.9; pt w/ no s/s of bleeding at this time.  Pt is stable.
Pt admitted w/ b/L PE presenting w/ potassium 2.8; pt is stable at this time
pt asymptomatic at this time

## 2017-07-11 NOTE — PROVIDER CONTACT NOTE (CRITICAL VALUE NOTIFICATION) - RECOMMENDATIONS
previous results noted. see previous critical note. PA aware.
Continue to monitor pt
Follow heparin nomogram.  Continue to monitor pt.
follow heparin protocol.

## 2017-07-11 NOTE — PROGRESS NOTE ADULT - ASSESSMENT
82 year old woman with recent syncope of unclear etiology  hgb slightly lower although overall stable without overt signs of GI bleeding.   continue pepcid and pantprazole  will monitor for now and will consider need for carafate if hemolobin drifts down  no role for repeat egd at this time unless concern for repeat GI bleed.   monitor cbc twice daily and PTT  continue current diet  call if hemoglobin drifts down or signs of GI bleeding  [phone]  will sign off unless clinical condition changes

## 2017-07-11 NOTE — PROVIDER CONTACT NOTE (CRITICAL VALUE NOTIFICATION) - ACTION/TREATMENT ORDERED:
interventional actions already implemented.
Follow ACS Nomogram, Hold Heparin drip 1 hour, restart at rate less 200
Administer ordered potassium supplements.  Continue to monitor and maintain pt safety
Follow heparin nomogram.   Continue to monitor and maintain pt safety.
follow heparin protocol, get standing weight, and repeat PTT.

## 2017-07-11 NOTE — PROGRESS NOTE ADULT - SUBJECTIVE AND OBJECTIVE BOX
S: No new complaints     Medications:  losartan 50 milliGRAM(s) Oral daily  atorvastatin 20 milliGRAM(s) Oral at bedtime  lactobacillus acidophilus 1 Tablet(s) Oral daily  multivitamin 1 Tablet(s) Oral daily  ALBUTerol/ipratropium for Nebulization 3 milliLiter(s) Nebulizer every 6 hours PRN  acetaminophen   Tablet. 650 milliGRAM(s) Oral every 6 hours PRN  famotidine    Tablet 20 milliGRAM(s) Oral two times a day  pantoprazole    Tablet 40 milliGRAM(s) Oral two times a day before meals  tiotropium 18 MICROgram(s) Capsule 1 Capsule(s) Inhalation daily  buDESOnide 160 MICROgram(s)/formoterol 4.5 MICROgram(s) Inhaler 2 Puff(s) Inhalation two times a day  heparin  Infusion. 600 Unit(s)/Hr IV Continuous <Continuous>  heparin  Injectable 5500 Unit(s) IV Push every 6 hours PRN  heparin  Injectable 2500 Unit(s) IV Push every 6 hours PRN  cefTRIAXone   IVPB 1 Gram(s) IV Intermittent every 24 hours  acetaminophen   Tablet. 650 milliGRAM(s) Oral once      Labs:  CBC Full  -  ( 11 Jul 2017 06:46 )  WBC Count : 9.9 K/uL  Hemoglobin : 10.3 g/dL  Hematocrit : 32.6 %  Platelet Count - Automated : 439 K/uL  Mean Cell Volume : 98.4 fl  Mean Cell Hemoglobin : 31.0 pg  Mean Cell Hemoglobin Concentration : 31.5 gm/dL  Auto Neutrophil # : x  Auto Lymphocyte # : x  Auto Monocyte # : x  Auto Eosinophil # : x  Auto Basophil # : x  Auto Neutrophil % : x  Auto Lymphocyte % : x  Auto Monocyte % : x  Auto Eosinophil % : x  Auto Basophil % : x    07-11    138  |  104  |  14  ----------------------------<  77  2.8<LL>   |  17<L>  |  1.03    Ca    8.2<L>      11 Jul 2017 06:46  Phos  3.3     07-09  Mg     1.3     07-09    TPro  5.8<L>  /  Alb  2.9<L>  /  TBili  0.5  /  DBili  x   /  AST  144<H>  /  ALT  70<H>  /  AlkPhos  185<H>  07-09    PT/INR - ( 11 Jul 2017 06:46 )   PT: 10.8 sec;   INR: 0.99 ratio         PTT - ( 11 Jul 2017 06:46 )  PTT:53.3 sec    Urinalysis Basic - ( 09 Jul 2017 16:57 )    Color: Yellow / Appearance: Clear / SG: >1.030 / pH: x  Gluc: x / Ketone: Negative  / Bili: Negative / Urobili: Negative   Blood: x / Protein: 30 mg/dL / Nitrite: Positive   Leuk Esterase: Small / RBC: 0-2 /HPF / WBC 25-50 /HPF   Sq Epi: x / Non Sq Epi: x / Bacteria: Few /HPF      EXAM:  MRI BRAIN WO W CONTRAST                            PROCEDURE DATE:  07/10/2017        INTERPRETATION:    CLINICAL INDICATION: Headache, syncope      Magnetic resonance imaging of the brain was carried out with transaxial   SPGR, FLAIR, fast spin echo T2 weighted images, axial gradient echo and   SWI series, diffusion weighted series and sagittal T1 weighted series on   a 1.5 Dianne magnet.Post contrast axial, coronal and sagittal T1 weighted   images were obtained. 6.5 cc of Gadavist were intravenously injected, 1   cc were discarded.      Comparison is made with the prior brain CT of 7/9/2017.    The ventricles and sulci are prominent consistent with age appropriate   involutional change. Small vessel white matter ischemic changes are   identified. No acute infarcts are identified on diffusion-weighted   imaging. No old or new hemorrhage is seen. After contrast infusion there   is no abnormal parenchymal or leptomeningeal enhancement. There is normal   vascular enhancement. The sella and parasellar structures are   unremarkable.    Impression: Age-appropriate involutional and ischemic gliotic changes. No   acute infarcts, hemorrhage or mass.      Vitals:  Vital Signs Last 24 Hrs  T(C): 36.7 (11 Jul 2017 04:08), Max: 36.9 (10 Jul 2017 18:00)  T(F): 98 (11 Jul 2017 04:08), Max: 98.4 (10 Jul 2017 18:00)  HR: 79 (11 Jul 2017 11:20) (67 - 90)  BP: 146/79 (11 Jul 2017 11:20) (130/84 - 156/82)  BP(mean): --  RR: 18 (11 Jul 2017 06:38) (18 - 18)  SpO2: 98% (11 Jul 2017 06:38) (97% - 99%)    NEUROLOGICAL EXAM:    Mental status: Awake, alert, and in no apparent distress. Oriented to person, place and time. Language function is normal. Recent memory, digit span and concentration were normal.     Cranial Nerves: Pupils were equal, round, reactive to light. Extraocular movements were intact. Visual field were full. Fundoscopic exam was deferred. Facial sensation was intact to light touch. There was no facial asymmetry. The palate was upgoing symmetrically and tongue was midline. Hearing acuity was intact to finger rub AU. Shoulder shrug was full bilaterally    Motor exam: Bulk and tone were normal. Strength was 5/5 in all four extremities. Fine finger movements were symmetric and normal. There was no pronator drift    Reflexes: 1+ in the bilateral upper extremities. 2+ in the bilateral lower extremities. Toes were downgoing bilaterally.     Sensation: Intact to light touch, temperature, vibration and proprioception.     Coordination: Finger-nose-finger and heel-to-shin was without dysmetria.     Gait: deferred S: No new complaints     Medications:  losartan 50 milliGRAM(s) Oral daily  atorvastatin 20 milliGRAM(s) Oral at bedtime  lactobacillus acidophilus 1 Tablet(s) Oral daily  multivitamin 1 Tablet(s) Oral daily  ALBUTerol/ipratropium for Nebulization 3 milliLiter(s) Nebulizer every 6 hours PRN  acetaminophen   Tablet. 650 milliGRAM(s) Oral every 6 hours PRN  famotidine    Tablet 20 milliGRAM(s) Oral two times a day  pantoprazole    Tablet 40 milliGRAM(s) Oral two times a day before meals  tiotropium 18 MICROgram(s) Capsule 1 Capsule(s) Inhalation daily  buDESOnide 160 MICROgram(s)/formoterol 4.5 MICROgram(s) Inhaler 2 Puff(s) Inhalation two times a day  heparin  Infusion. 600 Unit(s)/Hr IV Continuous <Continuous>  heparin  Injectable 5500 Unit(s) IV Push every 6 hours PRN  heparin  Injectable 2500 Unit(s) IV Push every 6 hours PRN  cefTRIAXone   IVPB 1 Gram(s) IV Intermittent every 24 hours  acetaminophen   Tablet. 650 milliGRAM(s) Oral once      Labs:  CBC Full  -  ( 11 Jul 2017 06:46 )  WBC Count : 9.9 K/uL  Hemoglobin : 10.3 g/dL  Hematocrit : 32.6 %  Platelet Count - Automated : 439 K/uL  Mean Cell Volume : 98.4 fl  Mean Cell Hemoglobin : 31.0 pg  Mean Cell Hemoglobin Concentration : 31.5 gm/dL  Auto Neutrophil # : x  Auto Lymphocyte # : x  Auto Monocyte # : x  Auto Eosinophil # : x  Auto Basophil # : x  Auto Neutrophil % : x  Auto Lymphocyte % : x  Auto Monocyte % : x  Auto Eosinophil % : x  Auto Basophil % : x    07-11    138  |  104  |  14  ----------------------------<  77  2.8<LL>   |  17<L>  |  1.03    Ca    8.2<L>      11 Jul 2017 06:46  Phos  3.3     07-09  Mg     1.3     07-09    TPro  5.8<L>  /  Alb  2.9<L>  /  TBili  0.5  /  DBili  x   /  AST  144<H>  /  ALT  70<H>  /  AlkPhos  185<H>  07-09    PT/INR - ( 11 Jul 2017 06:46 )   PT: 10.8 sec;   INR: 0.99 ratio         PTT - ( 11 Jul 2017 06:46 )  PTT:53.3 sec    Urinalysis Basic - ( 09 Jul 2017 16:57 )    Color: Yellow / Appearance: Clear / SG: >1.030 / pH: x  Gluc: x / Ketone: Negative  / Bili: Negative / Urobili: Negative   Blood: x / Protein: 30 mg/dL / Nitrite: Positive   Leuk Esterase: Small / RBC: 0-2 /HPF / WBC 25-50 /HPF   Sq Epi: x / Non Sq Epi: x / Bacteria: Few /HPF      EXAM:  MRI BRAIN WO W CONTRAST                            PROCEDURE DATE:  07/10/2017        INTERPRETATION:    CLINICAL INDICATION: Headache, syncope      Magnetic resonance imaging of the brain was carried out with transaxial   SPGR, FLAIR, fast spin echo T2 weighted images, axial gradient echo and   SWI series, diffusion weighted series and sagittal T1 weighted series on   a 1.5 Dianne magnet.Post contrast axial, coronal and sagittal T1 weighted   images were obtained. 6.5 cc of Gadavist were intravenously injected, 1   cc were discarded.      Comparison is made with the prior brain CT of 7/9/2017.    The ventricles and sulci are prominent consistent with age appropriate   involutional change. Small vessel white matter ischemic changes are   identified. No acute infarcts are identified on diffusion-weighted   imaging. No old or new hemorrhage is seen. After contrast infusion there   is no abnormal parenchymal or leptomeningeal enhancement. There is normal   vascular enhancement. The sella and parasellar structures are   unremarkable.    Impression: Age-appropriate involutional and ischemic gliotic changes. No   acute infarcts, hemorrhage or mass.      Vitals:  Vital Signs Last 24 Hrs  T(C): 36.7 (11 Jul 2017 04:08), Max: 36.9 (10 Jul 2017 18:00)  T(F): 98 (11 Jul 2017 04:08), Max: 98.4 (10 Jul 2017 18:00)  HR: 79 (11 Jul 2017 11:20) (67 - 90)  BP: 146/79 (11 Jul 2017 11:20) (130/84 - 156/82)  BP(mean): --  RR: 18 (11 Jul 2017 06:38) (18 - 18)  SpO2: 98% (11 Jul 2017 06:38) (97% - 99%)    NEUROLOGICAL EXAM:    Mental status: Awake, alert, and in no apparent distress. Oriented to person, place and time. Language function is normal. Recent memory, digit span and concentration were normal.     Cranial Nerves: Pupils were equal, round, reactive to light. Extraocular movements were intact. Visual field were full. Fundoscopic exam was deferred. Facial sensation was intact to light touch. There was no facial asymmetry. The palate was upgoing symmetrically and tongue was midline. Hearing acuity was intact to finger rub AU. Shoulder shrug was full bilaterally    Motor exam: Bulk and tone were normal. Strength was 5/5 in all four extremities. Fine finger movements were symmetric and normal. There was no pronator drift    Reflexes: 1+ in the bilateral upper extremities. 2+ in the bilateral lower extremities. Toes were downgoing bilaterally.     Sensation: Intact to light touch.    Coordination: Finger-nose-finger and heel-to-shin was without dysmetria.     Gait: deferred

## 2017-07-11 NOTE — PROGRESS NOTE ADULT - SUBJECTIVE AND OBJECTIVE BOX
INTERVAL HPI/OVERNIGHT EVENTS:  overall feels well  no melena  normal BM yesterday  no blood per rectum  no abdominal pain  no nausea or vomiting  tolerating diet  no fevers or chills    MEDICATIONS  (STANDING):  losartan 50 milliGRAM(s) Oral daily  atorvastatin 20 milliGRAM(s) Oral at bedtime  lactobacillus acidophilus 1 Tablet(s) Oral daily  multivitamin 1 Tablet(s) Oral daily  famotidine    Tablet 20 milliGRAM(s) Oral two times a day  pantoprazole    Tablet 40 milliGRAM(s) Oral two times a day before meals  tiotropium 18 MICROgram(s) Capsule 1 Capsule(s) Inhalation daily  buDESOnide 160 MICROgram(s)/formoterol 4.5 MICROgram(s) Inhaler 2 Puff(s) Inhalation two times a day  heparin  Infusion. 600 Unit(s)/Hr (6 mL/Hr) IV Continuous <Continuous>  cefTRIAXone   IVPB 1 Gram(s) IV Intermittent every 24 hours  acetaminophen   Tablet. 650 milliGRAM(s) Oral once    MEDICATIONS  (PRN):  ALBUTerol/ipratropium for Nebulization 3 milliLiter(s) Nebulizer every 6 hours PRN Wheezing  acetaminophen   Tablet. 650 milliGRAM(s) Oral every 6 hours PRN Mild Pain (1 - 3)  heparin  Injectable 5500 Unit(s) IV Push every 6 hours PRN For aPTT less than 40  heparin  Injectable 2500 Unit(s) IV Push every 6 hours PRN For aPTT between 40 - 57      Allergies    No Known Allergies    Intolerances            PHYSICAL EXAM:  General: comfortable in No acute distress  CV: regular rate and rhythm. no murmurs rubs or gallops  Lungs: clear to ascultation bilaterally  abdomen: soft nontender nondistened normal bowel sounds  ext: negative edema  skin: no rashes noted            LABS:                        10.3   9.9   )-----------( 439      ( 11 Jul 2017 06:46 )             32.6     07-11    138  |  104  |  14  ----------------------------<  77  2.8<LL>   |  17<L>  |  1.03    Ca    8.2<L>      11 Jul 2017 06:46  Phos  3.3     07-09  Mg     1.3     07-09    TPro  5.8<L>  /  Alb  2.9<L>  /  TBili  0.5  /  DBili  x   /  AST  144<H>  /  ALT  70<H>  /  AlkPhos  185<H>  07-09    PT/INR - ( 11 Jul 2017 06:46 )   PT: 10.8 sec;   INR: 0.99 ratio         PTT - ( 11 Jul 2017 06:46 )  PTT:53.3 sec  Urinalysis Basic - ( 09 Jul 2017 16:57 )    Color: Yellow / Appearance: Clear / SG: >1.030 / pH: x  Gluc: x / Ketone: Negative  / Bili: Negative / Urobili: Negative   Blood: x / Protein: 30 mg/dL / Nitrite: Positive   Leuk Esterase: Small / RBC: 0-2 /HPF / WBC 25-50 /HPF   Sq Epi: x / Non Sq Epi: x / Bacteria: Few /HPF      LIVER FUNCTIONS - ( 09 Jul 2017 13:22 )  Alb: 2.9 g/dL / Pro: 5.8 g/dL / ALK PHOS: 185 U/L / ALT: 70 U/L RC / AST: 144 U/L / GGT: x           Hemoglobin: 10.3 g/dL (07-11-17 @ 06:46)  Hemoglobin: 11.5 g/dL (07-10-17 @ 18:43)  Hemoglobin: 10.2 g/dL (07-10-17 @ 08:21)  Hemoglobin: 11.5 g/dL (07-10-17 @ 00:13)  Hemoglobin: 12.4 g/dL (07-09-17 @ 13:22)      RADIOLOGY & ADDITIONAL TESTS:  no acute infarct noted: MRI brain

## 2017-07-11 NOTE — PROGRESS NOTE ADULT - ASSESSMENT
ASSESSMENT    syncope - while the patient does have new in addition to old pulmonary emboli, they are small in size and without evidence of causing right heart strain or hemodynamic instability - an IVC filter not uncommonly does not prevent smaller pulmonary emboli but has possibly prevented a large PE - i do not think the new PEs are the cause of the patient's syncopal episode and the risks of anticoagulation have to be carefully weighed against the possible benefits - the CTA does reveal significant atherosclerotic disease in the aorta and the patient is at high risk of having TIAs as another possible cause of syncope - seizure also should be included in the differential diagnosis    RUL masslike consolidation - 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 Ramos and daughter Mackenzie    Will follow with you    Wil Quiroga MD, [facility] - [phone]  Pulmonary Medicine

## 2017-07-11 NOTE — PROGRESS NOTE ADULT - ASSESSMENT
This is a 82y Female, here with syncope, found to have PE.  Neuro exam nonfocal, CT head negative.  Pt not able to describe event though prior description may have to expand differential to include possible seizure, but less likely.  Now on IV heparin despite hx of GI bleed.    Plan:  - no neuro objection for IV heparin  - Telemetry monitoring   - Avoid hypoxia   - O2 support per primary team  - for MRI brain: no acute findings  - will hold on EEG and sz meds given low suspicion  - No further neurological inpatient work up This is a 82y Female, here with syncope, found to have PE.  Neuro exam nonfocal, CT head negative.  Pt not able to describe event though prior description may have to expand differential to include possible seizure, but less likely. Not likely TIA.      Plan:  - no neuro objection for IV heparin  - Telemetry monitoring   - Avoid hypoxia   - O2 support per primary team  - for MRI brain: no acute findings  - will hold on EEG and sz meds given low suspicion  - No further neurological inpatient work up

## 2017-07-11 NOTE — PROVIDER CONTACT NOTE (CRITICAL VALUE NOTIFICATION) - SITUATION
Pt admitted w/ b/L PE on a heparin gtt presenting w/ aPTT 140.9; pt w/ no s/s of bleeding at this time.  Pt is stable.

## 2017-07-11 NOTE — PROGRESS NOTE ADULT - SUBJECTIVE AND OBJECTIVE BOX
Patient is a 82y old  Female who presents with a chief complaint of S/P syncopal episode with shaking episode and fecal and urinary incontinence (09 Jul 2017 22:40)      SUBJECTIVE / OVERNIGHT EVENTS:    MEDICATIONS  (STANDING):  losartan 50 milliGRAM(s) Oral daily  atorvastatin 20 milliGRAM(s) Oral at bedtime  lactobacillus acidophilus 1 Tablet(s) Oral daily  multivitamin 1 Tablet(s) Oral daily  famotidine    Tablet 20 milliGRAM(s) Oral two times a day  pantoprazole    Tablet 40 milliGRAM(s) Oral two times a day before meals  tiotropium 18 MICROgram(s) Capsule 1 Capsule(s) Inhalation daily  buDESOnide 160 MICROgram(s)/formoterol 4.5 MICROgram(s) Inhaler 2 Puff(s) Inhalation two times a day  heparin  Infusion. 600 Unit(s)/Hr (6 mL/Hr) IV Continuous <Continuous>    MEDICATIONS  (PRN):  ALBUTerol/ipratropium for Nebulization 3 milliLiter(s) Nebulizer every 6 hours PRN Wheezing  acetaminophen   Tablet. 650 milliGRAM(s) Oral every 6 hours PRN Mild Pain (1 - 3)  heparin  Injectable 5500 Unit(s) IV Push every 6 hours PRN For aPTT less than 40  heparin  Injectable 2500 Unit(s) IV Push every 6 hours PRN For aPTT between 40 - 57      Vital Signs Last 24 Hrs  T(F): 97.9 (07-11-17 @ 21:26), Max: 98 (07-11-17 @ 04:08)  HR: 83 (07-11-17 @ 21:26) (67 - 90)  BP: 146/75 (07-11-17 @ 21:26) (130/84 - 147/77)  RR: 18 (07-11-17 @ 21:26) (18 - 18)  SpO2: 98% (07-11-17 @ 21:26) (97% - 98%)  Telemetry:   CAPILLARY BLOOD GLUCOSE        I&O's Summary    10 Jul 2017 07:01  -  11 Jul 2017 07:00  --------------------------------------------------------  IN: 582 mL / OUT: 0 mL / NET: 582 mL    11 Jul 2017 07:01  -  11 Jul 2017 21:56  --------------------------------------------------------  IN: 754 mL / OUT: 250 mL / NET: 504 mL        PHYSICAL EXAM:  GENERAL: NAD, well-developed  HEAD:  Atraumatic, Normocephalic  EYES: EOMI, PERRLA, conjunctiva and sclera clear  NECK: Supple, No JVD  CHEST/LUNG: Clear to auscultation bilaterally; No wheeze  HEART: Regular rate and rhythm; No murmurs, rubs, or gallops  ABDOMEN: Soft, Nontender, Nondistended; Bowel sounds present  EXTREMITIES:  2+ Peripheral Pulses, No clubbing, cyanosis, or edema  PSYCH: AAOx3  NEUROLOGY: non-focal  SKIN: No rashes or lesions    LABS:                        10.3   9.9   )-----------( 439      ( 11 Jul 2017 06:46 )             32.6     07-11    x   |  x   |  x   ----------------------------<  x   4.3   |  x   |  x     Ca    8.2<L>      11 Jul 2017 06:46  Mg     1.5     07-11      PT/INR - ( 11 Jul 2017 06:46 )   PT: 10.8 sec;   INR: 0.99 ratio         PTT - ( 11 Jul 2017 14:42 )  PTT:140.9 sec  CARDIAC MARKERS ( 10 Jul 2017 14:52 )  x     / 0.03 ng/mL / 54 U/L / x     / 3.5 ng/mL  CARDIAC MARKERS ( 10 Jul 2017 01:49 )  x     / 0.03 ng/mL / 61 U/L / x     / 3.3 ng/mL          RADIOLOGY & ADDITIONAL TESTS:    Imaging Personally Reviewed:    Consultant(s) Notes Reviewed:      Care Discussed with Consultants/Other Providers: Patient is a 82y old  Female who presents with a chief complaint of S/P syncopal episode with shaking episode and fecal and urinary incontinence (09 Jul 2017 22:40)      SUBJECTIVE / OVERNIGHT EVENTS: ongoing chronic HAs    MEDICATIONS  (STANDING):  losartan 50 milliGRAM(s) Oral daily  atorvastatin 20 milliGRAM(s) Oral at bedtime  lactobacillus acidophilus 1 Tablet(s) Oral daily  multivitamin 1 Tablet(s) Oral daily  famotidine    Tablet 20 milliGRAM(s) Oral two times a day  pantoprazole    Tablet 40 milliGRAM(s) Oral two times a day before meals  tiotropium 18 MICROgram(s) Capsule 1 Capsule(s) Inhalation daily  buDESOnide 160 MICROgram(s)/formoterol 4.5 MICROgram(s) Inhaler 2 Puff(s) Inhalation two times a day  heparin  Infusion. 600 Unit(s)/Hr (6 mL/Hr) IV Continuous <Continuous>    MEDICATIONS  (PRN):  ALBUTerol/ipratropium for Nebulization 3 milliLiter(s) Nebulizer every 6 hours PRN Wheezing  acetaminophen   Tablet. 650 milliGRAM(s) Oral every 6 hours PRN Mild Pain (1 - 3)  heparin  Injectable 5500 Unit(s) IV Push every 6 hours PRN For aPTT less than 40  heparin  Injectable 2500 Unit(s) IV Push every 6 hours PRN For aPTT between 40 - 57      Vital Signs Last 24 Hrs  T(F): 97.9 (07-11-17 @ 21:26), Max: 98 (07-11-17 @ 04:08)  HR: 83 (07-11-17 @ 21:26) (67 - 90)  BP: 146/75 (07-11-17 @ 21:26) (130/84 - 147/77)  RR: 18 (07-11-17 @ 21:26) (18 - 18)  SpO2: 98% (07-11-17 @ 21:26) (97% - 98%)  Telemetry:   CAPILLARY BLOOD GLUCOSE        I&O's Summary    10 Jul 2017 07:01  -  11 Jul 2017 07:00  --------------------------------------------------------  IN: 582 mL / OUT: 0 mL / NET: 582 mL    11 Jul 2017 07:01  -  11 Jul 2017 21:56  --------------------------------------------------------  IN: 754 mL / OUT: 250 mL / NET: 504 mL        PHYSICAL EXAM:  GENERAL: NAD, well-developed  HEAD:  Atraumatic, Normocephalic  EYES: EOMI, PERRLA, conjunctiva and sclera clear  NECK: Supple, No JVD  CHEST/LUNG: Clear to auscultation bilaterally; No wheeze  HEART: Regular rate and rhythm; No murmurs, rubs, or gallops  ABDOMEN: Soft, Nontender, Nondistended; Bowel sounds present  EXTREMITIES:  2+ Peripheral Pulses, No clubbing, cyanosis, or edema  PSYCH: AAOx3  NEUROLOGY: non-focal  SKIN: No rashes or lesions    LABS:                        10.3   9.9   )-----------( 439      ( 11 Jul 2017 06:46 )             32.6     07-11    x   |  x   |  x   ----------------------------<  x   4.3   |  x   |  x     Ca    8.2<L>      11 Jul 2017 06:46  Mg     1.5     07-11      PT/INR - ( 11 Jul 2017 06:46 )   PT: 10.8 sec;   INR: 0.99 ratio         PTT - ( 11 Jul 2017 14:42 )  PTT:140.9 sec  CARDIAC MARKERS ( 10 Jul 2017 14:52 )  x     / 0.03 ng/mL / 54 U/L / x     / 3.5 ng/mL  CARDIAC MARKERS ( 10 Jul 2017 01:49 )  x     / 0.03 ng/mL / 61 U/L / x     / 3.3 ng/mL          RADIOLOGY & ADDITIONAL TESTS:    Imaging Personally Reviewed:    Consultant(s) Notes Reviewed:      Care Discussed with Consultants/Other Providers:

## 2017-07-11 NOTE — PROVIDER CONTACT NOTE (OTHER) - ASSESSMENT
Patient A&Ox4, VSS. HR 90, /75, RR 18, O2 saturation 98% on room air. Patient denies chest pain, lightheadedness, or shortness of breath. Patient asymptomatic.

## 2017-07-11 NOTE — DIETITIAN INITIAL EVALUATION ADULT. - FACTORS AFF FOOD INTAKE
other (specify)/GI issues-pt states she has lung issues and that she was bleeding out and lost bodily functions

## 2017-07-11 NOTE — PROGRESS NOTE ADULT - ASSESSMENT
Not clear to me that patient has UTI  Has no symptoms-- therefore even if urine culture showed resistant Klebsiella would not treat further.    Defer antibiotics after today's dose.  Please recall PRN.    Thank you for the courtesy of this referral.    Ag Horta MD  [phone]

## 2017-07-11 NOTE — PROGRESS NOTE ADULT - SUBJECTIVE AND OBJECTIVE BOX
24H hour events: VASCULAR MEDICINE & CARDIOLOGY PROGRESS NOTE    MEDICATIONS:  losartan 50 milliGRAM(s) Oral daily  heparin  Infusion. 600 Unit(s)/Hr IV Continuous <Continuous>  heparin  Injectable 5500 Unit(s) IV Push every 6 hours PRN  heparin  Injectable 2500 Unit(s) IV Push every 6 hours PRN  cefTRIAXone   IVPB 1 Gram(s) IV Intermittent every 24 hours  ALBUTerol/ipratropium for Nebulization 3 milliLiter(s) Nebulizer every 6 hours PRN  tiotropium 18 MICROgram(s) Capsule 1 Capsule(s) Inhalation daily  buDESOnide 160 MICROgram(s)/formoterol 4.5 MICROgram(s) Inhaler 2 Puff(s) Inhalation two times a day  acetaminophen   Tablet. 650 milliGRAM(s) Oral every 6 hours PRN  famotidine    Tablet 20 milliGRAM(s) Oral two times a day  pantoprazole    Tablet 40 milliGRAM(s) Oral two times a day before meals  atorvastatin 20 milliGRAM(s) Oral at bedtime  multivitamin 1 Tablet(s) Oral daily  potassium chloride  10 mEq/100 mL IVPB 10 milliEquivalent(s) IV Intermittent every 1 hour      REVIEW OF SYSTEMS:  Complete 10point ROS negative except as documented above.    PHYSICAL EXAM:  T(C): 36.7 (07-11-17 @ 04:08), Max: 36.9 (07-10-17 @ 18:00)  HR: 90 (07-11-17 @ 06:38) (67 - 90)  BP: 132/75 (07-11-17 @ 06:38) (130/84 - 156/82)  RR: 18 (07-11-17 @ 06:38) (18 - 18)  SpO2: 98% (07-11-17 @ 06:38) (97% - 99%)  Wt(kg): --  I&O's Summary    10 Jul 2017 07:01  -  11 Jul 2017 07:00  --------------------------------------------------------  IN: 582 mL / OUT: 0 mL / NET: 582 mL    11 Jul 2017 07:01  -  11 Jul 2017 09:44  --------------------------------------------------------  IN: 0 mL / OUT: 150 mL / NET: -150 mL        Appearance: Normal	  HEENT:   Normal oral mucosa, PERRL, EOMI	  Lymphatic: No lymphadenopathy  Cardiovascular: Normal S1 S2, No JVD, No murmurs, No edema  Respiratory: Lungs clear to auscultation	  Psychiatry: A & O x 3, Mood & affect appropriate  Gastrointestinal:  Soft, Non-tender  Skin: No rashes, No ecchymoses, No cyanosis	  Neurologic: Non-focal  Extremities: Normal range of motion, No clubbing, cyanosis or edema  Vascular: Peripheral pulses palpable 2+ bilaterally        LABS:	 	    CBC Full  -  ( 11 Jul 2017 06:46 )  WBC Count : 9.9 K/uL  Hemoglobin : 10.3 g/dL  Hematocrit : 32.6 %  Platelet Count - Automated : 439 K/uL    07-11    138  |  104  |  14  ----------------------------<  77  2.8<LL>   |  17<L>  |  1.03        TELEMETRY: NSR, brief pAT	    	  ASSESSMENT  1. Pulmonary embolus, unprovoked. S/p IVC filter  2. GI bleed, upper - remote  3. Right lung mass  4. COPD  5. HTN    PLAN: 	  83yo female with a h/o lung mass, GI bleeds who is DNR/DNI presents with hemodynamically stable PE's. Patient currently without indication for invasive management of PE's.    -check TTE  -continue heparin gtt  - send stool guaiac trend CBC while on heparin gtt,   -check lower extremity venous duplex.    - Ultimately need to have discussion with patient and family regarding risk vs. benefit of AC - especially after recent bleed with hypotension and multiple transfusions. If agreeable to AC, would discharge on lovenox      Emmanuel Veronica

## 2017-07-11 NOTE — PROGRESS NOTE ADULT - ATTENDING COMMENTS
Appreciate pulmonary and GI followup.  She is on heparin - would monitor CBC cautiously  Need to weigh risk vs benefit of full dose anticoagulation especially with her prior bleed  Await stool guiac and LE duplex  No role for additional invasive measures for PE (mechanical thrombectomy)  If the decision is made for long term therapy would prefer either coumadin or lovonex. Appreciate pulmonary and GI followup.  She is high risk for bleeding with long term anticoagulation  My sense is that the PE is not the cause of the syncope, her RV size and function is normal on echocardiogram.     I am in favor of watching her off of AC-  she has an IVC filter in place for history of severe bleeding while on AC.

## 2017-07-12 LAB
ANION GAP SERPL CALC-SCNC: 15 MMOL/L — SIGNIFICANT CHANGE UP (ref 5–17)
BUN SERPL-MCNC: 11 MG/DL — SIGNIFICANT CHANGE UP (ref 7–23)
CALCIUM SERPL-MCNC: 8.7 MG/DL — SIGNIFICANT CHANGE UP (ref 8.4–10.5)
CHLORIDE SERPL-SCNC: 105 MMOL/L — SIGNIFICANT CHANGE UP (ref 96–108)
CO2 SERPL-SCNC: 16 MMOL/L — LOW (ref 22–31)
CREAT SERPL-MCNC: 1.02 MG/DL — SIGNIFICANT CHANGE UP (ref 0.5–1.3)
GLUCOSE SERPL-MCNC: 88 MG/DL — SIGNIFICANT CHANGE UP (ref 70–99)
HCT VFR BLD CALC: 32.4 % — LOW (ref 34.5–45)
HGB BLD-MCNC: 10.5 G/DL — LOW (ref 11.5–15.5)
MCHC RBC-ENTMCNC: 31.9 PG — SIGNIFICANT CHANGE UP (ref 27–34)
MCHC RBC-ENTMCNC: 32.3 GM/DL — SIGNIFICANT CHANGE UP (ref 32–36)
MCV RBC AUTO: 98.9 FL — SIGNIFICANT CHANGE UP (ref 80–100)
PLATELET # BLD AUTO: 443 K/UL — HIGH (ref 150–400)
POTASSIUM SERPL-MCNC: 3.7 MMOL/L — SIGNIFICANT CHANGE UP (ref 3.5–5.3)
POTASSIUM SERPL-SCNC: 3.7 MMOL/L — SIGNIFICANT CHANGE UP (ref 3.5–5.3)
RBC # BLD: 3.28 M/UL — LOW (ref 3.8–5.2)
RBC # FLD: 18.2 % — HIGH (ref 10.3–14.5)
SODIUM SERPL-SCNC: 136 MMOL/L — SIGNIFICANT CHANGE UP (ref 135–145)
WBC # BLD: 9.7 K/UL — SIGNIFICANT CHANGE UP (ref 3.8–10.5)
WBC # FLD AUTO: 9.7 K/UL — SIGNIFICANT CHANGE UP (ref 3.8–10.5)

## 2017-07-12 PROCEDURE — 99232 SBSQ HOSP IP/OBS MODERATE 35: CPT

## 2017-07-12 RX ORDER — ASPIRIN/CALCIUM CARB/MAGNESIUM 324 MG
81 TABLET ORAL DAILY
Qty: 0 | Refills: 0 | Status: DISCONTINUED | OUTPATIENT
Start: 2017-07-12 | End: 2017-07-14

## 2017-07-12 RX ORDER — HEPARIN SODIUM 5000 [USP'U]/ML
5000 INJECTION INTRAVENOUS; SUBCUTANEOUS EVERY 12 HOURS
Qty: 0 | Refills: 0 | Status: DISCONTINUED | OUTPATIENT
Start: 2017-07-12 | End: 2017-07-14

## 2017-07-12 RX ORDER — OXYCODONE HYDROCHLORIDE 5 MG/1
5 TABLET ORAL ONCE
Qty: 0 | Refills: 0 | Status: DISCONTINUED | OUTPATIENT
Start: 2017-07-12 | End: 2017-07-12

## 2017-07-12 RX ADMIN — BUDESONIDE AND FORMOTEROL FUMARATE DIHYDRATE 2 PUFF(S): 160; 4.5 AEROSOL RESPIRATORY (INHALATION) at 05:51

## 2017-07-12 RX ADMIN — ATORVASTATIN CALCIUM 20 MILLIGRAM(S): 80 TABLET, FILM COATED ORAL at 21:56

## 2017-07-12 RX ADMIN — TIOTROPIUM BROMIDE 1 CAPSULE(S): 18 CAPSULE ORAL; RESPIRATORY (INHALATION) at 12:47

## 2017-07-12 RX ADMIN — Medication 650 MILLIGRAM(S): at 03:45

## 2017-07-12 RX ADMIN — LOSARTAN POTASSIUM 50 MILLIGRAM(S): 100 TABLET, FILM COATED ORAL at 05:51

## 2017-07-12 RX ADMIN — Medication 81 MILLIGRAM(S): at 17:51

## 2017-07-12 RX ADMIN — PANTOPRAZOLE SODIUM 40 MILLIGRAM(S): 20 TABLET, DELAYED RELEASE ORAL at 16:56

## 2017-07-12 RX ADMIN — FAMOTIDINE 20 MILLIGRAM(S): 10 INJECTION INTRAVENOUS at 16:56

## 2017-07-12 RX ADMIN — FAMOTIDINE 20 MILLIGRAM(S): 10 INJECTION INTRAVENOUS at 05:51

## 2017-07-12 RX ADMIN — Medication 650 MILLIGRAM(S): at 05:52

## 2017-07-12 RX ADMIN — Medication 650 MILLIGRAM(S): at 12:45

## 2017-07-12 RX ADMIN — Medication 1 TABLET(S): at 10:42

## 2017-07-12 RX ADMIN — BUDESONIDE AND FORMOTEROL FUMARATE DIHYDRATE 2 PUFF(S): 160; 4.5 AEROSOL RESPIRATORY (INHALATION) at 17:52

## 2017-07-12 RX ADMIN — Medication 650 MILLIGRAM(S): at 06:22

## 2017-07-12 RX ADMIN — PANTOPRAZOLE SODIUM 40 MILLIGRAM(S): 20 TABLET, DELAYED RELEASE ORAL at 05:53

## 2017-07-12 RX ADMIN — Medication 1 TABLET(S): at 10:37

## 2017-07-12 RX ADMIN — Medication 650 MILLIGRAM(S): at 19:14

## 2017-07-12 RX ADMIN — Medication 650 MILLIGRAM(S): at 19:44

## 2017-07-12 NOTE — CHART NOTE - NSCHARTNOTEFT_GEN_A_CORE
Medicine NP  Notified by RN earlier today that patient had an episode of  PAF x 4 minutes on telemetry.  Patient was seen and examined; denied palpitations/ dizziness/chest pain/shortness of breath    VS:     Labs:                        10.5   9.7   )-----------( 443      ( 12 Jul 2017 06:50 )             32.4     07-12    136  |  105  |  11  ----------------------------<  88  3.7   |  16<L>  |  1.02      Physical Exam:  General: NAD  Neurology: A&Ox3,   Respiratory: CTA B/L, breathing nonlabored   CV: + S1S2   Abdominal: NT,softly distended + BS   bilateral LE: no edema     Assessment & Plan:  81 y/o female with Pmhx of lung Ca, GIB, PE s/p IVC filter. Admitted with syncope, acute on chronic PE, COPD exacerbation . Now with episode PAF   on tele asymptomatic;   > self limited/paroxysmal;  returned  to [facility]  > d/w Dr Nevarez; no systemic anticoagulation at this time; can start ASA 81mg daily  > Continue w/  telemety  > Dr Ramos updated  Continue to monitor   [facility]  [id number] Medicine NP  Notified by RN earlier today that patient had an episode of  PAF x 4 minutes on telemetry.  Patient was seen and examined; denied palpitations/ dizziness/chest pain/shortness of breath    VS:  150/67 18 88 98% RA 36.7    Labs:                        10.5   9.7   )-----------( 443      ( 12 Jul 2017 06:50 )             32.4     07-12    136  |  105  |  11  ----------------------------<  88  3.7   |  16<L>  |  1.02      Physical Exam:  General: NAD  Neurology: A&Ox3,   Respiratory: CTA B/L, breathing nonlabored   CV: + S1S2   Abdominal: NT,softly distended + BS   bilateral LE: no edema     Assessment & Plan:  83 y/o female with Pmhx of lung Ca, GIB, PE s/p IVC filter. Admitted with syncope, acute on chronic PE, COPD exacerbation . Now with episode PAF   on tele asymptomatic;   > self limited/paroxysmal;  returned  to [facility]  > d/w Dr Nevarez; no systemic anticoagulation at this time; can start ASA 81mg daily  > Continue w/  telemety  > Dr Ramos updated  Continue to monitor   [facility]  [id number]

## 2017-07-12 NOTE — PHYSICAL THERAPY INITIAL EVALUATION ADULT - PRECAUTIONS/LIMITATIONS, REHAB EVAL
cardiac precautions/..hx cont: patient with prior PE in May, 2017 on Coumadin, but AC was discontinued in June, 2017 with IVC filter placed--EGD with nonbleeding gastric ulcer and cauterized DU and hemoclips, with patient transferred to [facility] and was discharged recently to home. Presents following a syncopal episode at home  with the patient slid down from a chair with daughter noting generalized stiffness then shaking with fecal and urinary incontinence. Radiography neg./fall precautions

## 2017-07-12 NOTE — PROVIDER CONTACT NOTE (OTHER) - ASSESSMENT
pt is a+ox4, pt asymptomatic, pt denies sob, chest pain, palpitations, n and v. pt states she "just walked from the bathroom" at this time. bp 150/67, hr 88, oral temp 98.1, resps 18, pox 98% on ra

## 2017-07-12 NOTE — PROGRESS NOTE ADULT - SUBJECTIVE AND OBJECTIVE BOX
[facility] - [facility]     PROGRESS NOTE    CHIEF COMPLAINT: PE/syncope    INTERVAL HISTORY: no further lightheadedness, dizziness, pre-syncope or syncope; no chest pain/pressure or palpitations; no cough, sputum production, chest congestion or wheeze; no fevers, chills or sweats; normal bowel movement without BRBPR or melena; hemodynamically stable; much stronger, getting out of bed and walking without assistance; off A/C    REVIEW OF SYSTEMS:  Constitutional: As per interval history  HEENT: Within normal limits  CV: As per interval history  Resp: As per interval history  GI: Within normal limits   : Within normal limits  Musculoskeletal: Within normal limits  Skin: Within normal limits  Neurological: Within normal limits  Psychiatric: Within normal limits  Endocrine: Within normal limits  Hematologic/Lymphatic: Within normal limits  Allergic/Immunologic: Within normal limits      MEDICATIONS:     Pulmonary "  ALBUTerol/ipratropium for Nebulization 3 milliLiter(s) Nebulizer every 6 hours PRN  tiotropium 18 MICROgram(s) Capsule 1 Capsule(s) Inhalation daily  buDESOnide 160 MICROgram(s)/formoterol 4.5 MICROgram(s) Inhaler 2 Puff(s) Inhalation two times a day      Anti-microbials:      Cardiovascular:  losartan 50 milliGRAM(s) Oral daily      Other:  atorvastatin 20 milliGRAM(s) Oral at bedtime  lactobacillus acidophilus 1 Tablet(s) Oral daily  multivitamin 1 Tablet(s) Oral daily  acetaminophen   Tablet. 650 milliGRAM(s) Oral every 6 hours PRN  famotidine    Tablet 20 milliGRAM(s) Oral two times a day  pantoprazole    Tablet 40 milliGRAM(s) Oral two times a day before meals        OBJECTIVE:        I&O's Detail    2017 07:01  -  2017 07:00  --------------------------------------------------------  IN:    heparin  Infusion.: 64 mL    IV PiggyBack: 450 mL    Oral Fluid: 500 mL  Total IN: 1014 mL    OUT:    Voided: 250 mL  Total OUT: 250 mL    Total NET: 764 mL       Daily Weight in k.3 (2017 04:22)        PHYSICAL EXAM:       ICU Vital Signs Last 24 Hrs  T(C): 36.4 (2017 04:22), Max: 36.7 (2017 12:02)  T(F): 97.5 (:22), Max: 98 (2017 12:02)  HR: 77 (2017 04:22) (77 - 83)  BP: 135/80 (2017 04:22) (135/80 - 147/77)  BP(mean): --  ABP: --  ABP(mean): --  RR: 18 (:22) (18 - 18)  SpO2: 97% (:22) (97% - 98%) on room air       General: Awake, alert, cooperative, no distress, appears stated age 	  HEENT:   Atraumatic. Normocephalic. Anicteric. Normal oral mucosa, PERRL, EOMI  Neck: Supple, trachea midline; thyroid without enlargement/tenderness/nodules; no carotid bruit; no JVD	  Cardiovascular: Regular rate and rhythm, S1 S2 normal. No murmurs, rubs or gallops  Respiratory: Respirations unlabored; Clear to auscultation and percussion bilaterally  Abdomen: Soft, non-tender, non-distended. No organomegaly. No masses. Normal bowel sounds	  Extremities: Warm to touch. No clubbing or cyanosis. No pedal edema.  Pulses: 2+ peripheral pulses all extremities	  Skin: Normal skin color. No rashes or lesions. No ecchymoses, No cyanosis, warm to touch  Lymph Nodes: Cervical, supraclavicular and axillary nodes normal  Neurological: Motor and sensory examination equal and normal. A and O x 3  Psychiatry: Appropriate mood and affect.        LABS:                        10.5   9.7   )-----------( 443      ( 2017 06:50 )             32.4                         10.3   9.9   )-----------( 439      ( 2017 06:46 )             32.6     07-12    136  |  105  |  11  ----------------------------<  88  3.7   |  16<L>  |  1.02    07-11    x   |  x   |  x   ----------------------------<  x   4.3   |  x   |  x     Ca      8.7          Ca      8.2<L>          Phos    3.3           Mg       1.5         Mg       1.3         TPro  5.8<L>  /  Alb  2.9<L>  /  TBili  0.5  /  DBili  x   /  AST  144<H>  /  ALT  70<H>  /  AlkPhos  185<H>      PT/INR - ( 2017 23:43 )   PT: 10.8 sec;   INR: 0.99 ratio         PTT - ( 2017 23:43 )  PTT:55.3 sec      Serum Pro-Brain Natriuretic Peptide: 1257 pg/mL (07-10 @ 01:49)    Serum Pro-Brain Natriuretic Peptide: 1762 pg/mL ( @ 13:22)    CARDIAC MARKERS ( 10 Jul 2017 14:52 )  x     / 0.03 ng/mL / 54 U/L / x     / 3.5 ng/mL  CARDIAC MARKERS ( 10 Jul 2017 01:49 )  x     / 0.03 ng/mL / 61 U/L / x     / 3.3 ng/mL  CARDIAC MARKERS ( 2017 13:22 )  x     / 0.02 ng/mL / 59 U/L / x     / 2.6 ng/mL    Venous Blood Gas:   @ 20:12  7.34/40/29/21/49  VBG Lactate: 1.0  Venous Blood Gas:   @ 13:22  7.32/39/32/20/52  VBG Lactate: 3.2    MICROBIOLOGY:     Culture - Urine (17 @ 22:12)    Specimen Source: .Urine Catheterized    Culture Results:   >100,000 CFU/ml Klebsiella pneumoniae    Culture - Blood (17 @ 17:51)    Specimen Source: .Blood Blood    Culture Results:   No growth to date.    Culture - Blood (17 @ 16:20)    Specimen Source: .Blood Blood    Culture Results:   No growth to date.    RADIOLOGY:  [ x] Chest radiographs reviewed and interpreted by me    < from: Xray Chest 1 View AP- PORTABLE-Urgent (17 @ 14:02) >    EXAM:  CHEST-PORTABLE URGENT                            PROCEDURE DATE:  2017        INTERPRETATION:  HISTORY: Syncope. Rule out pneumonia.    TECHNIQUE: A single AP view of the chest was obtained.    COMPARISON: 2017    FINDINGS:  The cardiac silhouette is normal in size. There are no focal   consolidations or pleural effusions. The hilar and mediastinal structures   appear unremarkable. The osseous structures are intact.    IMPRESSION: Clear lungs.      EJ LOPEZ M.D., ATTENDING RADIOLOGIST  This document has been electronically signed. 2017  2:10PM          < end of copied text >  ---------------------------------------------------------------------------------------------------------    < from: CT Angio Chest w/ IV Cont (17 @ 16:04) >    EXAM:  CT ABDOMEN AND PELVIS IC                          EXAM:  CT ANGIO CHEST (W)AW IC                            PROCEDURE DATE:  2017        INTERPRETATION:  CLINICAL INFORMATION: Syncope. Evaluate for pulmonary   embolus.    COMPARISON: Chest CT 2017. CT abdomen pelvis March 3, 2017.    PROCEDURE:   CT Angiography of the Chest was performed followed by portal venous phase   imaging of the Abdomen and Pelvis.  90 ml of Omnipaque 350 was injected intravenously. 10 ml were discarded.  Sagittal and coronal reformats were performed as well as 3D (MIP)   reconstructions.    FINDINGS:    CHEST:     LUNGS AND LARGE AIRWAYS: Patent central airways. 2.4 x 2.3 cm cavitary   right upper lobe lesion is without significant change since 2017   but is mildly improved since March 3, 2017. Emphysema.  PLEURA: No pleural effusion.  VESSELS: Segmental and subsegmental pulmonary emboli involving the right   upper and both lower lobes. Small linear pulmonary embolus also involves   the right common basilar pulmonary artery. Extensive atherosclerotic   plaque.  HEART: Heart size is normal. No pericardial effusion.  MEDIASTINUM AND NURYS: No lymphadenopathy.  CHEST WALL AND LOWER NECK: Within normal limits.    ABDOMEN AND PELVIS:    LIVER: Within normal limits.  BILE DUCTS: Normal caliber.  GALLBLADDER: Cholecystectomy.  SPLEEN: Within normal limits.  PANCREAS: Within normal limits.  ADRENALS: Within normal limits.  KIDNEYS/URETERS: Atrophic left kidney.    BLADDER: Within normal limits.  REPRODUCTIVE ORGANS: The uterus is unremarkable.    BOWEL: No bowel obstruction. Appendix is normal. Colonic diverticulosis.  PERITONEUM: No ascites.  VESSELS:  IVC filter.  RETROPERITONEUM: No lymphadenopathy.    ABDOMINAL WALL: Within normal limits.  BONES: Within normal limits.    IMPRESSION:   1. Bilateral pulmonary emboli.  2. Cavitary right upper lobe lesion unchanged since 2017 and mildly   improved since 3/3/2017.    Findings were discussed with Dr. Mora 5:20 PM on 2017.      DENNIS MCELROY M.D., ATTENDING RADIOLOGIST  This document has been electronically signed. 2017  5:21PM      < end of copied text >  ---------------------------------------------------------------------------------------------------------    < from: Upper Endoscopy (17 @ 11:47) >    [facility]  _________________________________________________________________  < from: MRI Head w/wo Cont (07.10.17 @ 22:09) >    EXAM:  MRI BRAIN WO W CONTRAST                            PROCEDURE DATE:  07/10/2017        INTERPRETATION:    CLINICAL INDICATION: Headache, syncope      Magnetic resonance imaging of the brain was carried out with transaxial   SPGR, FLAIR, fast spin echo T2 weighted images, axial gradient echo and   SWI series, diffusion weighted series and sagittal T1 weighted series on   a 1.5 Dianne magnet.Post contrast axial, coronal and sagittal T1 weighted   images were obtained. 6.5 cc of Gadavist were intravenously injected, 1   cc were discarded.      Comparison is made with the prior brain CT of 2017.    The ventricles and sulci are prominent consistent with age appropriate   involutional change. Small vessel white matter ischemic changes are   identified. No acute infarcts are identified on diffusion-weighted   imaging. No old or new hemorrhage is seen. After contrast infusion there   is no abnormal parenchymal or leptomeningeal enhancement. There is normal   vascular enhancement. The sella and parasellar structures are   unremarkable.    Impression: Age-appropriate involutional and ischemic gliotic changes. No   acute infarcts, hemorrhage or mass.    DAVIDA CERVANTES M.D., ATTENDING RADIOLOGIST  This document has been electronically signed. 2017 11:13AM      < end of copied text >  ___________________________________  Patient Name: Manolo Herrmann                     MRN: [id number]  Account Number: [id number]                     YOB: 1935  Room: Endoscopy Room 1                           Gender: Female  Attending MD: Andree Enamorado,                        Procedure Date No Time: 2017  ____________________________________________________________________________________________________     Procedure:           Upper GI endoscopy  Indications:         Hematemesis  Providers:           Andree Enamorado MD  Medicines:           Monitored Anesthesia Care  Complications:       No immediate complications.  ____________________________________________________________________________________________________                                                                                                        Impression:          - No gross lesions in esophagus.                       - Z-line regular, 40 cm from the incisors.  - One very large cratered gastric ulcer with no stigmata of bleeding.                       - Friable gastric mucosa. Treated with bipolar cautery.                       - One oozing duodenal ulcer. Treated with bipolar cautery. Clip (MR             conditional) was placed.                       - No specimens collected.  Recommendation:      - Return patient to ICU for ongoing care.                       - Clear liquid diet today                       - Continue PPI gtt         - Will discuss risks vs. benefits anticoagulation with MICU team                                                                                                        Attending Participation:       I personally performed the entire procedure.                                                                                                          ____________  Andree Enamorado,   2017 12:36:38 PM  Number of Addenda: 0    Note Initiated On: 2017 11:47 AM    < end of copied text >  ---------------------------------------------------------------------------------------------------------    < from: Upper Endoscopy (17 @ 15:00) >    [facility]  ____________________________________________________________________________________________________  Patient Name: Manolo Herrmann                     MRN: [id number]  Account Number: [id number]                     YOB: 1935  Room: [facility] 5                                     Gender: Female  Attending MD: Andree Enamorado,                        Procedure Date No Time: 2017  ____________________________________________________________________________________________________     Procedure:           Upper GI endoscopy  Indications:         Hematemesis  Providers:           Andree Enamorado MD  Medicines:           Monitored Anesthesia Care  Complications:       No immediate complications.  ____________________________________________________________________________________________________                                         Impression:          - No gross lesions in esophagus.                       - Coffee grounds seen in the gastric body and antrum.                       - Non-bleeding gastric fundus ulcer with adherent clot. Treated with bipolar                        cautery.                       - One very large non-bleeding gastric ulcer again noted in the gastric                        antrum. The ulcer bed was carefully examined. A slightly protuberant red spot                  toshia seen at the inferior border of the ulcer bed. This area was cautiously                        cauterized using biopolar cautery at low wattage (10W)                       - One non-bleeding duodenal ulcer again noted in the first portion of the                        duodenum. Hemoclip in place. No stigmata of bleeding noted.                       - Biopsies were taken with a cold forceps for Helicobacter pylori testing.  Recommendation:      - Observe patient in GI recovery unit.                       - NPO today.                       - Continue PPI gtt                       - Monitor H/H q 8 hours today                                                                                                        Attending Participation:       I personally performed the entire procedure.                                                                                                          ____________    < end of copied text >  ---------------------------------------------------------------------------------------------------------  < from: Transthoracic Echocardiogram (17 @ 23:14) >    Patient name: MANOLO HERRMANN  YOB: 1935   Age: 82 (F)   MR#: [id number]  Study Date: 2017  [facility] RDCS  Study quality: Technically difficult  Referring Physician: Rehana Pteerson MD  Blood Pressure: 147/77 mmHg  Height: 155 cm  Weight: 66 kg  BSA: 1.7 m2  ------------------------------------------------------------------------  PROCEDURE: Transthoracic echocardiogram with 2-D, M-Mode  and complete spectral and color flow Doppler.  INDICATION: Syncope and collapse (R55)  ------------------------------------------------------------------------  Dimensions:    Normal Values:  LA:     3.8    2.0 - 4.0 cm  Ao:            2.0 - 3.8 cm  SEPTUM: 0.8    0.6 - 1.2 cm  PWT:    0.7    0.6 - 1.1 cm  LVIDd:  3.9    3.0 - 5.6 cm  LVIDs:  2.3    1.8 - 4.0 cm  Derived variables:  LVMI: 50 g/m2  RWT: 0.35  Fractional short: 41 %  EF (Teicholtz): 73 %  ------------------------------------------------------------------------  Observations:  Mitral Valve: Mitral annular calcification. Mild mitral  regurgitation.  Aortic Valve/Aorta: Calcified trileaflet aortic valve with  normal opening. No aortic valve regurgitation seen.  Aortic root not well seen.  Left Atrium: Normal left atrium.  LA volume index = 24  cc/m2.  Left Ventricle: Endocardium not well visualized; grossly  normal left ventricular systolic function. Normal left  ventricular internal dimensions and wall thicknesses. Mild  diastolic dysfunction (Stage I).  Right Heart: Normal rightatrium. The right ventricle is  not well visualized; grossly normal right ventricular  systolic function. Normal tricuspid valve. Mild tricuspid  regurgitation. Normal pulmonic valve.  Pericardium/Pleura: No pericardial effusion seen.  Hemodynamic: Estimated right atrial pressure is 8 mm Hg.  ------------------------------------------------------------------------  Conclusions:  1. Mitral annular calcification. Mild mitral regurgitation.    2. Calcified trileaflet aortic valve with normal opening.  No aortic valve regurgitation seen.  3. Endocardium not well visualized; grossly normal left  ventricular systolic function.  4. Mild diastolic dysfunction (Stage I).  5. The right ventricle is not well visualized; grossly  normal right ventricularsystolic function.  6. No pericardial effusion seen.  *** Compared with echocardiogram of 2017, findings are  similar.  ------------------------------------------------------------------------  Confirmed on  2017 - 15:34:30 by Monty Hopper M.D.  ------------------------------------------------------------------------    < end of copied text >

## 2017-07-12 NOTE — PHYSICAL THERAPY INITIAL EVALUATION ADULT - PERTINENT HX OF CURRENT PROBLEM, REHAB EVAL
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 Alvarado), with a recent discharge from [place] in 06/2017 for hypotension and UGIB requiring multiple unit transfusion requirements and MICU admission....

## 2017-07-12 NOTE — PROGRESS NOTE ADULT - ASSESSMENT
ASSESSMENT    syncope - while the patient does have new in addition to old pulmonary emboli, they are small in size and without evidence of causing right heart strain or hemodynamic instability - an IVC filter not uncommonly does not prevent smaller pulmonary emboli but has possibly prevented a large PE - i do not think the new PEs are the cause of the patient's syncopal episode and the risks of anticoagulation seem to outweigh the possible benefits in light of two recent UGI life-threatening bleeds - the CTA does reveal significant atherosclerotic disease in the aorta but there is no evidence of TIA/CVA on the MRI - seizure also should be included in the differential diagnosis    RUL masslike consolidation - 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 Ramos and daughter Mackenzie    Will follow with you    Wil Quiroga MD, [facility] - [phone]  Pulmonary Medicine

## 2017-07-12 NOTE — PROGRESS NOTE ADULT - SUBJECTIVE AND OBJECTIVE BOX
Patient is a 82y old  Female who presents with a chief complaint of S/P syncopal episode with shaking episode and fecal and urinary incontinence (09 Jul 2017 22:40)      SUBJECTIVE / OVERNIGHT EVENTS: ptn refusing sc Heparin    MEDICATIONS  (STANDING):  losartan 50 milliGRAM(s) Oral daily  atorvastatin 20 milliGRAM(s) Oral at bedtime  lactobacillus acidophilus 1 Tablet(s) Oral daily  multivitamin 1 Tablet(s) Oral daily  famotidine    Tablet 20 milliGRAM(s) Oral two times a day  pantoprazole    Tablet 40 milliGRAM(s) Oral two times a day before meals  tiotropium 18 MICROgram(s) Capsule 1 Capsule(s) Inhalation daily  buDESOnide 160 MICROgram(s)/formoterol 4.5 MICROgram(s) Inhaler 2 Puff(s) Inhalation two times a day  aspirin enteric coated 81 milliGRAM(s) Oral daily  heparin  Injectable 5000 Unit(s) SubCutaneous every 12 hours    MEDICATIONS  (PRN):  ALBUTerol/ipratropium for Nebulization 3 milliLiter(s) Nebulizer every 6 hours PRN Wheezing  acetaminophen   Tablet. 650 milliGRAM(s) Oral every 6 hours PRN Mild Pain (1 - 3)      Vital Signs Last 24 Hrs  T(F): 97.9 (07-12-17 @ 20:48), Max: 98.1 (07-12-17 @ 16:09)  HR: 77 (07-12-17 @ 20:48) (77 - 88)  BP: 135/78 (07-12-17 @ 20:48) (135/78 - 151/71)  RR: 18 (07-12-17 @ 20:48) (17 - 18)  SpO2: 98% (07-12-17 @ 20:48) (96% - 100%)  Telemetry:   CAPILLARY BLOOD GLUCOSE        I&O's Summary    11 Jul 2017 07:01  -  12 Jul 2017 07:00  --------------------------------------------------------  IN: 1014 mL / OUT: 250 mL / NET: 764 mL    12 Jul 2017 07:01  -  12 Jul 2017 23:07  --------------------------------------------------------  IN: 490 mL / OUT: 0 mL / NET: 490 mL        PHYSICAL EXAM:  GENERAL: NAD, well-developed  HEAD:  Atraumatic, Normocephalic  EYES: EOMI, PERRLA, conjunctiva and sclera clear  NECK: Supple, No JVD  CHEST/LUNG: Clear to auscultation bilaterally; No wheeze  HEART: Regular rate and rhythm; No murmurs, rubs, or gallops  ABDOMEN: Soft, Nontender, Nondistended; Bowel sounds present  EXTREMITIES:  2+ Peripheral Pulses, No clubbing, cyanosis, or edema  PSYCH: AAOx3  NEUROLOGY: non-focal  SKIN: No rashes or lesions    LABS:                        10.5   9.7   )-----------( 443      ( 12 Jul 2017 06:50 )             32.4     07-12    136  |  105  |  11  ----------------------------<  88  3.7   |  16<L>  |  1.02    Ca    8.7      12 Jul 2017 06:51  Mg     1.5     07-11      PT/INR - ( 11 Jul 2017 23:43 )   PT: 10.8 sec;   INR: 0.99 ratio         PTT - ( 11 Jul 2017 23:43 )  PTT:55.3 sec          RADIOLOGY & ADDITIONAL TESTS:    Imaging Personally Reviewed:    Consultant(s) Notes Reviewed:      Care Discussed with Consultants/Other Providers:

## 2017-07-12 NOTE — PROGRESS NOTE ADULT - SUBJECTIVE AND OBJECTIVE BOX
******VASCULAR MEDICINE & CARDIOLOGY PROGRESS NOTE******      24H hour events: FOBT negative. IV heparin currently on hold due to high risk of bleeding. Otherwise feels well, denies any complaints.    MEDICATIONS:  losartan 50 milliGRAM(s) Oral daily  ALBUTerol/ipratropium for Nebulization 3 milliLiter(s) Nebulizer every 6 hours PRN  tiotropium 18 MICROgram(s) Capsule 1 Capsule(s) Inhalation daily  buDESOnide 160 MICROgram(s)/formoterol 4.5 MICROgram(s) Inhaler 2 Puff(s) Inhalation two times a day  acetaminophen   Tablet. 650 milliGRAM(s) Oral every 6 hours PRN  famotidine    Tablet 20 milliGRAM(s) Oral two times a day  pantoprazole    Tablet 40 milliGRAM(s) Oral two times a day before meals  atorvastatin 20 milliGRAM(s) Oral at bedtime  multivitamin 1 Tablet(s) Oral daily      REVIEW OF SYSTEMS:  Complete 10point ROS negative except as documented above.    PHYSICAL EXAM:  T(C): 36.4 (07-12-17 @ 04:22), Max: 36.7 (07-11-17 @ 12:02)  HR: 77 (07-12-17 @ 04:22) (77 - 83)  BP: 135/80 (07-12-17 @ 04:22) (135/80 - 147/77)  RR: 18 (07-12-17 @ 04:22) (18 - 18)  SpO2: 97% (07-12-17 @ 04:22) (97% - 98%)  Wt(kg): --  I&O's Summary    11 Jul 2017 07:01  -  12 Jul 2017 07:00  --------------------------------------------------------  IN: 1014 mL / OUT: 250 mL / NET: 764 mL        Appearance: Normal	  HEENT:   Normal oral mucosa, PERRL, EOMI	  Lymphatic: No lymphadenopathy  Cardiovascular: Normal S1 S2, No JVD, No murmurs, No edema  Respiratory: Lungs clear to auscultation	  Psychiatry: A & O x 3, Mood & affect appropriate  Gastrointestinal:  Soft, Non-tender, + BS	  Skin: No rashes, No ecchymoses, No cyanosis	  Neurologic: Non-focal  Extremities: Normal range of motion, No clubbing, cyanosis or edema  Vascular: Peripheral pulses palpable 2+ bilaterally        LABS:	 	    CBC Full  -  ( 12 Jul 2017 06:50 )  WBC Count : 9.7 K/uL  Hemoglobin : 10.5 g/dL  Hematocrit : 32.4 %  Platelet Count - Automated : 443 K/uL      07-12    136  |  105  |  11  ----------------------------<  88  3.7   |  16<L>  |  1.02        TELEMETRY: 	NSR, pAT        ASSESSMENT  1. Pulmonary embolus, unprovoked. S/p IVC filter  2. GI bleed, upper - remote  3. Right lung mass  4. COPD  5. HTN    PLAN: 	  83yo female with a h/o lung mass, GI bleeds who is DNR/DNI presents with hemodynamically stable PE's. Patient currently without indication for invasive management of PE's.    -TTE with normal LV/RV size and function  -agree with holding AC for now, as she is high risk for bleeding with long term anticoagulation  - FOBT neg, trend CBC while on heparin gtt,   -check lower extremity venous duplex.    - Ultimately need to have discussion with patient and family regarding risk vs. benefit of AC - especially after recent bleed with hypotension and multiple transfusions. If agreeable to AC, would discharge on lovenox      Emmanuel Veronica ******VASCULAR MEDICINE & CARDIOLOGY PROGRESS NOTE******      24H hour events: FOBT negative. IV heparin currently on hold due to high risk of bleeding. Otherwise feels well, denies any complaints.    MEDICATIONS:  losartan 50 milliGRAM(s) Oral daily  ALBUTerol/ipratropium for Nebulization 3 milliLiter(s) Nebulizer every 6 hours PRN  tiotropium 18 MICROgram(s) Capsule 1 Capsule(s) Inhalation daily  buDESOnide 160 MICROgram(s)/formoterol 4.5 MICROgram(s) Inhaler 2 Puff(s) Inhalation two times a day  acetaminophen   Tablet. 650 milliGRAM(s) Oral every 6 hours PRN  famotidine    Tablet 20 milliGRAM(s) Oral two times a day  pantoprazole    Tablet 40 milliGRAM(s) Oral two times a day before meals  atorvastatin 20 milliGRAM(s) Oral at bedtime  multivitamin 1 Tablet(s) Oral daily      REVIEW OF SYSTEMS:  Complete 10point ROS negative except as documented above.    PHYSICAL EXAM:  T(C): 36.4 (07-12-17 @ 04:22), Max: 36.7 (07-11-17 @ 12:02)  HR: 77 (07-12-17 @ 04:22) (77 - 83)  BP: 135/80 (07-12-17 @ 04:22) (135/80 - 147/77)  RR: 18 (07-12-17 @ 04:22) (18 - 18)  SpO2: 97% (07-12-17 @ 04:22) (97% - 98%)  Wt(kg): --  I&O's Summary    11 Jul 2017 07:01  -  12 Jul 2017 07:00  --------------------------------------------------------  IN: 1014 mL / OUT: 250 mL / NET: 764 mL        Appearance: Normal	  HEENT:   Normal oral mucosa, PERRL, EOMI	  Lymphatic: No lymphadenopathy  Cardiovascular: Normal S1 S2, No JVD, No murmurs, No edema  Respiratory: Lungs clear to auscultation	  Psychiatry: A & O x 3, Mood & affect appropriate  Gastrointestinal:  Soft, Non-tender, + BS	  Skin: No rashes, No ecchymoses, No cyanosis	  Neurologic: Non-focal  Extremities: Normal range of motion, No clubbing, cyanosis or edema  Vascular: Peripheral pulses palpable 2+ bilaterally        LABS:	 	    CBC Full  -  ( 12 Jul 2017 06:50 )  WBC Count : 9.7 K/uL  Hemoglobin : 10.5 g/dL  Hematocrit : 32.4 %  Platelet Count - Automated : 443 K/uL      07-12    136  |  105  |  11  ----------------------------<  88  3.7   |  16<L>  |  1.02        TELEMETRY: 	NSR, pAT        ASSESSMENT  1. Pulmonary embolus, unprovoked. S/p IVC filter  2. GI bleed, upper - remote  3. Right lung mass  4. COPD  5. HTN    PLAN: 	  83yo female with a h/o lung mass, GI bleeds who is DNR/DNI presents with hemodynamically stable PE's. Patient currently without indication for invasive management of PE's.

## 2017-07-12 NOTE — PROGRESS NOTE ADULT - ASSESSMENT
ASSESSMENT    syncope - while the patient does have new in addition to old pulmonary emboli, they are small in size and without evidence of causing right heart strain or hemodynamic instability - an IVC filter not uncommonly does not prevent smaller pulmonary emboli but has possibly prevented a large PE - i do not think the new PEs are the cause of the patient's syncopal episode and the risks of anticoagulation have to be carefully weighed against the possible benefits isai since RV findings are normal on Echo- the CTA does reveal significant atherosclerotic disease in the aorta and the patient is at high risk of having TIAs as another possible cause of syncope - seizure also should be included in the differential diagnosis    RUL masslike consolidation -as per pulm 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 placed only on ASA, no AC considering recent severe GI bleed  DC planning home in am

## 2017-07-12 NOTE — PROGRESS NOTE ADULT - ATTENDING COMMENTS
PE with IVC filter  High risk for AC  Agree with Pulmonary to hold AC  OOB and ambulate, PT eval PE with IVC filter  High risk for AC  Agree with Pulmonary to hold AC  OOB and ambulate, PT eval  Start Sub Q DVT PPX

## 2017-07-13 ENCOUNTER — TRANSCRIPTION ENCOUNTER (OUTPATIENT)
Age: 82
End: 2017-07-13

## 2017-07-13 LAB
ALBUMIN SERPL ELPH-MCNC: 2.9 G/DL — LOW (ref 3.3–5)
ALP SERPL-CCNC: 177 U/L — HIGH (ref 40–120)
ALT FLD-CCNC: 39 U/L RC — SIGNIFICANT CHANGE UP (ref 10–45)
ANION GAP SERPL CALC-SCNC: 15 MMOL/L — SIGNIFICANT CHANGE UP (ref 5–17)
AST SERPL-CCNC: 24 U/L — SIGNIFICANT CHANGE UP (ref 10–40)
BILIRUB DIRECT SERPL-MCNC: 0.1 MG/DL — SIGNIFICANT CHANGE UP (ref 0–0.2)
BILIRUB INDIRECT FLD-MCNC: 0.2 MG/DL — SIGNIFICANT CHANGE UP (ref 0.2–1)
BILIRUB SERPL-MCNC: 0.3 MG/DL — SIGNIFICANT CHANGE UP (ref 0.2–1.2)
BUN SERPL-MCNC: 10 MG/DL — SIGNIFICANT CHANGE UP (ref 7–23)
CALCIUM SERPL-MCNC: 9 MG/DL — SIGNIFICANT CHANGE UP (ref 8.4–10.5)
CHLORIDE SERPL-SCNC: 106 MMOL/L — SIGNIFICANT CHANGE UP (ref 96–108)
CO2 SERPL-SCNC: 17 MMOL/L — LOW (ref 22–31)
CREAT SERPL-MCNC: 0.98 MG/DL — SIGNIFICANT CHANGE UP (ref 0.5–1.3)
GLUCOSE SERPL-MCNC: 83 MG/DL — SIGNIFICANT CHANGE UP (ref 70–99)
HCT VFR BLD CALC: 32 % — LOW (ref 34.5–45)
HGB BLD-MCNC: 10.2 G/DL — LOW (ref 11.5–15.5)
MAGNESIUM SERPL-MCNC: 1.5 MG/DL — LOW (ref 1.6–2.6)
MCHC RBC-ENTMCNC: 31.5 PG — SIGNIFICANT CHANGE UP (ref 27–34)
MCHC RBC-ENTMCNC: 31.9 GM/DL — LOW (ref 32–36)
MCV RBC AUTO: 98.9 FL — SIGNIFICANT CHANGE UP (ref 80–100)
PHOSPHATE SERPL-MCNC: 2.1 MG/DL — LOW (ref 2.5–4.5)
PLATELET # BLD AUTO: 484 K/UL — HIGH (ref 150–400)
POTASSIUM SERPL-MCNC: 3.9 MMOL/L — SIGNIFICANT CHANGE UP (ref 3.5–5.3)
POTASSIUM SERPL-SCNC: 3.9 MMOL/L — SIGNIFICANT CHANGE UP (ref 3.5–5.3)
PROT SERPL-MCNC: 5.4 G/DL — LOW (ref 6–8.3)
RBC # BLD: 3.24 M/UL — LOW (ref 3.8–5.2)
RBC # FLD: 17.9 % — HIGH (ref 10.3–14.5)
SODIUM SERPL-SCNC: 138 MMOL/L — SIGNIFICANT CHANGE UP (ref 135–145)
WBC # BLD: 9 K/UL — SIGNIFICANT CHANGE UP (ref 3.8–10.5)
WBC # FLD AUTO: 9 K/UL — SIGNIFICANT CHANGE UP (ref 3.8–10.5)

## 2017-07-13 PROCEDURE — 99233 SBSQ HOSP IP/OBS HIGH 50: CPT

## 2017-07-13 PROCEDURE — 93010 ELECTROCARDIOGRAM REPORT: CPT

## 2017-07-13 RX ORDER — METOPROLOL TARTRATE 50 MG
12.5 TABLET ORAL
Qty: 0 | Refills: 0 | Status: DISCONTINUED | OUTPATIENT
Start: 2017-07-13 | End: 2017-07-14

## 2017-07-13 RX ORDER — MAGNESIUM OXIDE 400 MG ORAL TABLET 241.3 MG
400 TABLET ORAL
Qty: 0 | Refills: 0 | Status: DISCONTINUED | OUTPATIENT
Start: 2017-07-13 | End: 2017-07-14

## 2017-07-13 RX ADMIN — TIOTROPIUM BROMIDE 1 CAPSULE(S): 18 CAPSULE ORAL; RESPIRATORY (INHALATION) at 11:12

## 2017-07-13 RX ADMIN — Medication 12.5 MILLIGRAM(S): at 18:27

## 2017-07-13 RX ADMIN — FAMOTIDINE 20 MILLIGRAM(S): 10 INJECTION INTRAVENOUS at 18:27

## 2017-07-13 RX ADMIN — Medication 12.5 MILLIGRAM(S): at 11:11

## 2017-07-13 RX ADMIN — Medication 650 MILLIGRAM(S): at 21:08

## 2017-07-13 RX ADMIN — Medication 81 MILLIGRAM(S): at 11:11

## 2017-07-13 RX ADMIN — HEPARIN SODIUM 5000 UNIT(S): 5000 INJECTION INTRAVENOUS; SUBCUTANEOUS at 18:27

## 2017-07-13 RX ADMIN — Medication 650 MILLIGRAM(S): at 08:30

## 2017-07-13 RX ADMIN — BUDESONIDE AND FORMOTEROL FUMARATE DIHYDRATE 2 PUFF(S): 160; 4.5 AEROSOL RESPIRATORY (INHALATION) at 21:07

## 2017-07-13 RX ADMIN — Medication 650 MILLIGRAM(S): at 13:45

## 2017-07-13 RX ADMIN — PANTOPRAZOLE SODIUM 40 MILLIGRAM(S): 20 TABLET, DELAYED RELEASE ORAL at 06:01

## 2017-07-13 RX ADMIN — PANTOPRAZOLE SODIUM 40 MILLIGRAM(S): 20 TABLET, DELAYED RELEASE ORAL at 16:41

## 2017-07-13 RX ADMIN — MAGNESIUM OXIDE 400 MG ORAL TABLET 400 MILLIGRAM(S): 241.3 TABLET ORAL at 18:27

## 2017-07-13 RX ADMIN — Medication 650 MILLIGRAM(S): at 01:30

## 2017-07-13 RX ADMIN — LOSARTAN POTASSIUM 50 MILLIGRAM(S): 100 TABLET, FILM COATED ORAL at 06:01

## 2017-07-13 RX ADMIN — ATORVASTATIN CALCIUM 20 MILLIGRAM(S): 80 TABLET, FILM COATED ORAL at 21:07

## 2017-07-13 RX ADMIN — Medication 650 MILLIGRAM(S): at 02:00

## 2017-07-13 RX ADMIN — BUDESONIDE AND FORMOTEROL FUMARATE DIHYDRATE 2 PUFF(S): 160; 4.5 AEROSOL RESPIRATORY (INHALATION) at 06:01

## 2017-07-13 RX ADMIN — Medication 650 MILLIGRAM(S): at 07:59

## 2017-07-13 RX ADMIN — Medication 1 TABLET(S): at 11:12

## 2017-07-13 RX ADMIN — Medication 650 MILLIGRAM(S): at 13:15

## 2017-07-13 RX ADMIN — MAGNESIUM OXIDE 400 MG ORAL TABLET 400 MILLIGRAM(S): 241.3 TABLET ORAL at 11:17

## 2017-07-13 RX ADMIN — FAMOTIDINE 20 MILLIGRAM(S): 10 INJECTION INTRAVENOUS at 06:01

## 2017-07-13 RX ADMIN — Medication 650 MILLIGRAM(S): at 19:18

## 2017-07-13 RX ADMIN — Medication 1 TABLET(S): at 11:11

## 2017-07-13 NOTE — PROGRESS NOTE ADULT - SUBJECTIVE AND OBJECTIVE BOX
Patient is a 82y old  Female who presents with a chief complaint of S/P syncopal episode with shaking episode and fecal and urinary incontinence (13 Jul 2017 12:20)      SUBJECTIVE / OVERNIGHT EVENTS: no new c/o, overnight ptn was agitated baout being prescribed HSQ, had a brief episode of PAT. presently stable    MEDICATIONS  (STANDING):  losartan 50 milliGRAM(s) Oral daily  atorvastatin 20 milliGRAM(s) Oral at bedtime  lactobacillus acidophilus 1 Tablet(s) Oral daily  multivitamin 1 Tablet(s) Oral daily  famotidine    Tablet 20 milliGRAM(s) Oral two times a day  pantoprazole    Tablet 40 milliGRAM(s) Oral two times a day before meals  tiotropium 18 MICROgram(s) Capsule 1 Capsule(s) Inhalation daily  buDESOnide 160 MICROgram(s)/formoterol 4.5 MICROgram(s) Inhaler 2 Puff(s) Inhalation two times a day  aspirin enteric coated 81 milliGRAM(s) Oral daily  heparin  Injectable 5000 Unit(s) SubCutaneous every 12 hours  magnesium oxide 400 milliGRAM(s) Oral three times a day with meals  metoprolol 12.5 milliGRAM(s) Oral two times a day    MEDICATIONS  (PRN):  ALBUTerol/ipratropium for Nebulization 3 milliLiter(s) Nebulizer every 6 hours PRN Wheezing  acetaminophen   Tablet. 650 milliGRAM(s) Oral every 6 hours PRN Mild Pain (1 - 3)      Vital Signs Last 24 Hrs  T(F): 97.5 (07-13-17 @ 12:03), Max: 98 (07-13-17 @ 04:48)  HR: 81 (07-13-17 @ 12:03) (77 - 102)  BP: 113/68 (07-13-17 @ 12:03) (113/68 - 145/78)  RR: 18 (07-13-17 @ 12:03) (18 - 22)  SpO2: 98% (07-13-17 @ 12:03) (97% - 98%)  Telemetry:   CAPILLARY BLOOD GLUCOSE        I&O's Summary    12 Jul 2017 07:01  -  13 Jul 2017 07:00  --------------------------------------------------------  IN: 780 mL / OUT: 0 mL / NET: 780 mL    13 Jul 2017 07:01  -  13 Jul 2017 18:30  --------------------------------------------------------  IN: 660 mL / OUT: 0 mL / NET: 660 mL        PHYSICAL EXAM:  GENERAL: NAD, well-developed  HEAD:  Atraumatic, Normocephalic  EYES: EOMI, PERRLA, conjunctiva and sclera clear  NECK: Supple, No JVD  CHEST/LUNG: Clear to auscultation bilaterally; No wheeze  HEART: Regular rate and rhythm; No murmurs, rubs, or gallops  ABDOMEN: Soft, Nontender, Nondistended; Bowel sounds present  EXTREMITIES:  2+ Peripheral Pulses, No clubbing, cyanosis, or edema  PSYCH: AAOx3  NEUROLOGY: non-focal  SKIN: No rashes or lesions    LABS:                        10.2   9.0   )-----------( 484      ( 13 Jul 2017 06:46 )             32.0     07-13    138  |  106  |  10  ----------------------------<  83  3.9   |  17<L>  |  0.98    Ca    9.0      13 Jul 2017 07:02  Phos  2.1     07-13  Mg     1.5     07-13    TPro  5.4<L>  /  Alb  2.9<L>  /  TBili  0.3  /  DBili  0.1  /  AST  24  /  ALT  39  /  AlkPhos  177<H>  07-13    PT/INR - ( 11 Jul 2017 23:43 )   PT: 10.8 sec;   INR: 0.99 ratio         PTT - ( 11 Jul 2017 23:43 )  PTT:55.3 sec          RADIOLOGY & ADDITIONAL TESTS:    Imaging Personally Reviewed:    Consultant(s) Notes Reviewed:      Care Discussed with Consultants/Other Providers:

## 2017-07-13 NOTE — PROVIDER CONTACT NOTE (OTHER) - REASON
PAT 4 seconds on cardiac monitor
PAT on cardiac monitor for 3.8 seconds up to 's
SVT 
pt was a-fib in the 130's on tele for 4 minutes

## 2017-07-13 NOTE — PROGRESS NOTE ADULT - ASSESSMENT
ASSESSMENT    syncope - while the patient does have new in addition to old pulmonary emboli, they are small in size and without evidence of causing right heart strain on ECHO or hemodynamic instability - an IVC filter not uncommonly does not prevent smaller pulmonary emboli but should prevent large PE "down the road" - i do not think the new PEs are the cause of the patient's syncopal episode and the risks of anticoagulation seem to outweigh the possible benefits in light of two recent UGI life-threatening bleeds - the CTA does reveal significant atherosclerotic disease in the aorta but there is no evidence of TIA/CVA on the MRI - seizure also should be included in the differential diagnosis    RUL masslike consolidation - 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 Manriquez)  Neuro evaluation noted   symbicort/spiriva  GI f/u - protonix and pepcid - no repeat endoscopy planned at this time  cardiology f/u - ASA/cozaar/lopressor/lipitor    d/w Dr. Nevarez and daughter Mackenzie    Discharge planning with home physical therapy and close outpatient follow-up (known to Dr. Alvarado of our practice)    Will follow with you    Wil Quiroga MD, [facility] - [phone]  Pulmonary Medicine

## 2017-07-13 NOTE — DISCHARGE NOTE ADULT - CARE PROVIDER_API CALL
Jaquan Nevarez (), Internal Medicine; Nuclear Cardiology  [address] [place] [address]  Phone: [phone]  Fax: [phone]    Ag Alvarado), Critical Care Medicine; Internal Medicine; Pulmonary Disease  [address]  [place] [address]  Phone: [phone]  Fax: [phone]    Andree Enamorado), Internal Medicine  [address] [place] [address]  Phone: [phone]  Fax: [phone]

## 2017-07-13 NOTE — DISCHARGE NOTE ADULT - CARE PLAN
Principal Discharge DX:	Syncope  Goal:	HD stable  Instructions for follow-up, activity and diet:	Fainting usually is caused by fear, stress, pain, standing too long, over tired, overheated, going to bathroom, or coughing  Blood pressure can drop if you do not drink enough, blood pressure medication, alcohol, bleeding,  Consult with your doctor about driving  Avoid activity or condition that causes your syncope  Lay down with your feet up when you feel like you might faint  Secondary Diagnosis:	Pulmonary emboli  Instructions for follow-up, activity and diet:	No AC due to GI bleed   Has IVC filter  Secondary Diagnosis:	COPD (chronic obstructive pulmonary disease)  Instructions for follow-up, activity and diet:	c/w Inhalers  please follow up with Pulmonologist  Secondary Diagnosis:	Hypertension  Instructions for follow-up, activity and diet:	Follow up with your medical doctor to establish long term blood pressure treatment goals.

## 2017-07-13 NOTE — DISCHARGE NOTE ADULT - MEDICATION SUMMARY - MEDICATIONS TO STOP TAKING
I will STOP taking the medications listed below when I get home from the hospital:  None I will STOP taking the medications listed below when I get home from the hospital:    Perforomist 20 mcg/2 mL inhalation solution  -- 2 milliliter(s) inhaled 2 times a day    DuoNeb 0.5 mg-2.5 mg/3 mL inhalation solution  -- 3 milliliter(s) inhaled , As Needed

## 2017-07-13 NOTE — DISCHARGE NOTE ADULT - CARE PROVIDERS DIRECT ADDRESSES
[email or web address],DirectAddress_Unknown,DirectAddress_Unknown

## 2017-07-13 NOTE — PROGRESS NOTE ADULT - SUBJECTIVE AND OBJECTIVE BOX
********VASCULAR MEDICINE AND CARDIOLOGY PROGRESS NOTE********                            CC: Syncope    INTERVAL HISTORY: On telemetry patient had several seconds of PAT with rates up to 150.  No syncope or light headedness for this.  Overall she feels well and is able to ambulate without any issues.  No chest pain or shortness of breath.  She remains off antibiotics.        HISTORY OF PRESENT ILLNESS:  HPI:  NIGHT HOSPITALIST:  Patient UNKNOWN to me previously, assigned to me at this point via the ER and by Dr. 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 Alvarado), with a recent discharge from [place] in June, 2017 for hypotension and upper GI bleed requiring multiple unit transfusion requirements and MICU admission--patient with prior PE in May, 2017 on Coumadin, but AC was discontinued in June, 2017 with IVC filter placed--EGD with nonbleeding gastric ulcer and cauterized DU and hemoclips, with patient transferred to [facility] and was discharged recently to home, with the patient self referring following a syncopal episode with the patient slid down from a chair with daughter noting generalized stiffness then shaking with faecal and urinary incontinence.  Patient herself does not recall events.  No HA, no focal weakness.  No chest pain/pressure.  No dyspnea.  No abdominal pain, no red blood per rectum or melena.  No hematuria, no dysuria but with increased frequency.  Remaining review of systems not contributory. (09 Jul 2017 22:40)          Allergies    No Known Allergies    Intolerances    	    MEDICATIONS:  losartan 50 milliGRAM(s) Oral daily  aspirin enteric coated 81 milliGRAM(s) Oral daily  heparin  Injectable 5000 Unit(s) SubCutaneous every 12 hours      ALBUTerol/ipratropium for Nebulization 3 milliLiter(s) Nebulizer every 6 hours PRN  tiotropium 18 MICROgram(s) Capsule 1 Capsule(s) Inhalation daily  buDESOnide 160 MICROgram(s)/formoterol 4.5 MICROgram(s) Inhaler 2 Puff(s) Inhalation two times a day    acetaminophen   Tablet. 650 milliGRAM(s) Oral every 6 hours PRN    famotidine    Tablet 20 milliGRAM(s) Oral two times a day  pantoprazole    Tablet 40 milliGRAM(s) Oral two times a day before meals    atorvastatin 20 milliGRAM(s) Oral at bedtime    multivitamin 1 Tablet(s) Oral daily  magnesium oxide 400 milliGRAM(s) Oral three times a day with meals      PAST MEDICAL & SURGICAL HISTORY:  Lung abscess  PE (pulmonary thromboembolism)  Rebound headache  Chronic kidney disease, unspecified stage  Hyperlipidemia, unspecified hyperlipidemia type  COPD (chronic obstructive pulmonary disease)  Hypertension  No significant past surgical history      FAMILY HISTORY:  Family history of COPD (chronic obstructive pulmonary disease) (Father)      SOCIAL HISTORY:  unchanged    REVIEW OF SYSTEMS:  CONSTITUTIONAL: No fever, weight loss, or fatigue  EYES: No eye pain, visual disturbances, or discharge  ENMT:  No difficulty hearing, tinnitus, vertigo; No sinus or throat pain  NECK: No pain or stiffness  RESPIRATORY: No cough, wheezing, chills or hemoptysis; No Shortness of Breath  CARDIOVASCULAR: No chest pain, palpitations, passing out, dizziness, or leg swelling  GASTROINTESTINAL: No abdominal or epigastric pain. No nausea, vomiting, or hematemesis; No diarrhea or constipation. No melena or hematochezia.  GENITOURINARY: No dysuria, frequency, hematuria, or incontinence  NEUROLOGICAL: No headaches, memory loss, loss of strength, numbness, or tremors  SKIN: No itching, burning, rashes, or lesions   LYMPH Nodes: No enlarged glands  ENDOCRINE: No heat or cold intolerance; No hair loss  MUSCULOSKELETAL: No joint pain or swelling; No muscle, back, or extremity pain  PSYCHIATRIC: No depression, anxiety, mood swings, or difficulty sleeping  HEME/LYMPH: No easy bruising, or bleeding gums  ALLERY AND IMMUNOLOGIC: No hives or eczema	    [x ] All others negative	  [ ] Unable to obtain    PHYSICAL EXAM:  T(C): 36.7 (07-13-17 @ 04:48), Max: 36.7 (07-12-17 @ 16:09)  HR: 102 (07-13-17 @ 06:00) (77 - 102)  BP: 130/64 (07-13-17 @ 06:00) (130/64 - 151/71)  RR: 22 (07-13-17 @ 06:00) (17 - 22)  SpO2: 98% (07-13-17 @ 06:00) (96% - 100%)  Wt(kg): --  I&O's Summary    12 Jul 2017 07:01  -  13 Jul 2017 07:00  --------------------------------------------------------  IN: 780 mL / OUT: 0 mL / NET: 780 mL        Appearance: Normal, Sitting in chair.  Alert and oriented	  HEENT:   Normal oral mucosa, PERRL, EOMI	  Lymphatic: No lymphadenopathy  Cardiovascular: Normal S1 S2, No JVD, No murmurs, No edema  Respiratory: Lungs clear to auscultation	  Psychiatry: A & O x 3, Mood & affect appropriate  Gastrointestinal:  Soft, Non-tender, + BS	  Skin: No rashes, No ecchymoses, No cyanosis	  Neurologic: Non-focal  Extremities: Normal range of motion, No clubbing, cyanosis or edema  Vascular: Peripheral pulses palpable 2+ bilaterally      LABS:	 	    CBC Full  -  ( 13 Jul 2017 06:46 )  WBC Count : 9.0 K/uL  Hemoglobin : 10.2 g/dL  Hematocrit : 32.0 %  Platelet Count - Automated : 484 K/uL  Mean Cell Volume : 98.9 fl  Mean Cell Hemoglobin : 31.5 pg  Mean Cell Hemoglobin Concentration : 31.9 gm/dL  Auto Neutrophil # : x  Auto Lymphocyte # : x  Auto Monocyte # : x  Auto Eosinophil # : x  Auto Basophil # : x  Auto Neutrophil % : x  Auto Lymphocyte % : x  Auto Monocyte % : x  Auto Eosinophil % : x  Auto Basophil % : x    07-13    138  |  106  |  10  ----------------------------<  83  3.9   |  17<L>  |  0.98  07-12    136  |  105  |  11  ----------------------------<  88  3.7   |  16<L>  |  1.02    Ca    9.0      13 Jul 2017 07:02  Ca    8.7      12 Jul 2017 06:51  Phos  2.1     07-13  Mg     1.5     07-13  Mg     1.5     07-11    TPro  5.4<L>  /  Alb  2.9<L>  /  TBili  0.3  /  DBili  0.1  /  AST  24  /  ALT  39  /  AlkPhos  177<H>  07-13 ********VASCULAR MEDICINE AND CARDIOLOGY PROGRESS NOTE********                            CC: Syncope    INTERVAL HISTORY: On telemetry patient had several seconds of PAT with rates up to 150.  No syncope or light headedness for this.  Overall she feels well and is able to ambulate without any issues.  No chest pain or shortness of breath.  She remains off anitcoagulants.        HISTORY OF PRESENT ILLNESS:  HPI:  NIGHT HOSPITALIST:  Patient UNKNOWN to me previously, assigned to me at this point via the ER and by Dr. 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 Alvarado), with a recent discharge from [place] in June, 2017 for hypotension and upper GI bleed requiring multiple unit transfusion requirements and MICU admission--patient with prior PE in May, 2017 on Coumadin, but AC was discontinued in June, 2017 with IVC filter placed--EGD with nonbleeding gastric ulcer and cauterized DU and hemoclips, with patient transferred to [facility] and was discharged recently to home, with the patient self referring following a syncopal episode with the patient slid down from a chair with daughter noting generalized stiffness then shaking with faecal and urinary incontinence.  Patient herself does not recall events.  No HA, no focal weakness.  No chest pain/pressure.  No dyspnea.  No abdominal pain, no red blood per rectum or melena.  No hematuria, no dysuria but with increased frequency.  Remaining review of systems not contributory. (09 Jul 2017 22:40)          Allergies    No Known Allergies    Intolerances    	    MEDICATIONS:  losartan 50 milliGRAM(s) Oral daily  aspirin enteric coated 81 milliGRAM(s) Oral daily  heparin  Injectable 5000 Unit(s) SubCutaneous every 12 hours      ALBUTerol/ipratropium for Nebulization 3 milliLiter(s) Nebulizer every 6 hours PRN  tiotropium 18 MICROgram(s) Capsule 1 Capsule(s) Inhalation daily  buDESOnide 160 MICROgram(s)/formoterol 4.5 MICROgram(s) Inhaler 2 Puff(s) Inhalation two times a day    acetaminophen   Tablet. 650 milliGRAM(s) Oral every 6 hours PRN    famotidine    Tablet 20 milliGRAM(s) Oral two times a day  pantoprazole    Tablet 40 milliGRAM(s) Oral two times a day before meals    atorvastatin 20 milliGRAM(s) Oral at bedtime    multivitamin 1 Tablet(s) Oral daily  magnesium oxide 400 milliGRAM(s) Oral three times a day with meals      PAST MEDICAL & SURGICAL HISTORY:  Lung abscess  PE (pulmonary thromboembolism)  Rebound headache  Chronic kidney disease, unspecified stage  Hyperlipidemia, unspecified hyperlipidemia type  COPD (chronic obstructive pulmonary disease)  Hypertension  No significant past surgical history      FAMILY HISTORY:  Family history of COPD (chronic obstructive pulmonary disease) (Father)      SOCIAL HISTORY:  unchanged    REVIEW OF SYSTEMS:  CONSTITUTIONAL: No fever, weight loss, or fatigue  EYES: No eye pain, visual disturbances, or discharge  ENMT:  No difficulty hearing, tinnitus, vertigo; No sinus or throat pain  NECK: No pain or stiffness  RESPIRATORY: No cough, wheezing, chills or hemoptysis; No Shortness of Breath  CARDIOVASCULAR: No chest pain, palpitations, passing out, dizziness, or leg swelling  GASTROINTESTINAL: No abdominal or epigastric pain. No nausea, vomiting, or hematemesis; No diarrhea or constipation. No melena or hematochezia.  GENITOURINARY: No dysuria, frequency, hematuria, or incontinence  NEUROLOGICAL: No headaches, memory loss, loss of strength, numbness, or tremors  SKIN: No itching, burning, rashes, or lesions   LYMPH Nodes: No enlarged glands  ENDOCRINE: No heat or cold intolerance; No hair loss  MUSCULOSKELETAL: No joint pain or swelling; No muscle, back, or extremity pain  PSYCHIATRIC: No depression, anxiety, mood swings, or difficulty sleeping  HEME/LYMPH: No easy bruising, or bleeding gums  ALLERY AND IMMUNOLOGIC: No hives or eczema	    [x ] All others negative	  [ ] Unable to obtain    PHYSICAL EXAM:  T(C): 36.7 (07-13-17 @ 04:48), Max: 36.7 (07-12-17 @ 16:09)  HR: 102 (07-13-17 @ 06:00) (77 - 102)  BP: 130/64 (07-13-17 @ 06:00) (130/64 - 151/71)  RR: 22 (07-13-17 @ 06:00) (17 - 22)  SpO2: 98% (07-13-17 @ 06:00) (96% - 100%)  Wt(kg): --  I&O's Summary    12 Jul 2017 07:01  -  13 Jul 2017 07:00  --------------------------------------------------------  IN: 780 mL / OUT: 0 mL / NET: 780 mL        Appearance: Normal, Sitting in chair.  Alert and oriented	  HEENT:   Normal oral mucosa, PERRL, EOMI	  Lymphatic: No lymphadenopathy  Cardiovascular: Normal S1 S2, No JVD, No murmurs, No edema  Respiratory: Lungs clear to auscultation	  Psychiatry: A & O x 3, Mood & affect appropriate  Gastrointestinal:  Soft, Non-tender, + BS	  Skin: No rashes, No ecchymoses, No cyanosis	  Neurologic: Non-focal  Extremities: Normal range of motion, No clubbing, cyanosis or edema  Vascular: Peripheral pulses palpable 2+ bilaterally      LABS:	 	    CBC Full  -  ( 13 Jul 2017 06:46 )  WBC Count : 9.0 K/uL  Hemoglobin : 10.2 g/dL  Hematocrit : 32.0 %  Platelet Count - Automated : 484 K/uL  Mean Cell Volume : 98.9 fl  Mean Cell Hemoglobin : 31.5 pg  Mean Cell Hemoglobin Concentration : 31.9 gm/dL  Auto Neutrophil # : x  Auto Lymphocyte # : x  Auto Monocyte # : x  Auto Eosinophil # : x  Auto Basophil # : x  Auto Neutrophil % : x  Auto Lymphocyte % : x  Auto Monocyte % : x  Auto Eosinophil % : x  Auto Basophil % : x    07-13    138  |  106  |  10  ----------------------------<  83  3.9   |  17<L>  |  0.98  07-12    136  |  105  |  11  ----------------------------<  88  3.7   |  16<L>  |  1.02    Ca    9.0      13 Jul 2017 07:02  Ca    8.7      12 Jul 2017 06:51  Phos  2.1     07-13  Mg     1.5     07-13  Mg     1.5     07-11    TPro  5.4<L>  /  Alb  2.9<L>  /  TBili  0.3  /  DBili  0.1  /  AST  24  /  ALT  39  /  AlkPhos  177<H>  07-13

## 2017-07-13 NOTE — PROGRESS NOTE ADULT - ASSESSMENT
Assessment:  1.	Paroxysmal Atrial Tachycardia  rates up to 150  short bursts  prior ecgs with sinus rhythm  LA size is normal on 2d echo  2.  Pulmonary embolism  - s/p IVC filter  3.  History of GI bleeding      Plan:  1.  Start metoprolol 12.5mg BID  2.  Continue with aspirin 81mg daily  3.  She remains high risk for GI bleeding with transfusions in the past.  Continue to observe off anticoagulation  4.  OOB and ambulate, PT eval      Thank you,    Jaquan Nevarez  Vascular Medicine and Cardiology Assessment:  1.	Paroxysmal Atrial Tachycardia  rates up to 150  short bursts  prior ecgs with sinus rhythm  LA size is normal on 2d echo  2.  Pulmonary embolism  - s/p IVC filter  3.  History of GI bleeding      Plan:  1.  Start metoprolol 12.5mg BID for short bursts of SVT  2.  Continue with aspirin 81mg daily  3.  She remains high risk for GI bleeding with transfusions in the past.  Continue to observe off anticoagulation  4.  OOB and ambulate, PT eval  5.  Observe on telemetry for another 24 hours while on betablocker.        Thank you,    Jaquan Nevarez  Vascular Medicine and Cardiology

## 2017-07-13 NOTE — CHART NOTE - NSCHARTNOTEFT_GEN_A_CORE
Notified by RN; Pt. had 2 episodes of SVT<2 seconds on telemetry for 202 and 230. Pt. currently has a HR of 102. Pt. seen and examined at bedside. Pt. reports that she was washing herself when she noticed that she had palpitations and felt short of breath. Denies any current new complaints but does endorse a bit of SOB. Denies current CP, palpitations, lightheadedness, dizziness, HA, and syncope. Pt. is alert, attentive, and follows all commands.     Vital Signs Last 24 Hrs  T(C): 36.7 (07-13-17 @ 04:48), Max: 36.7 (07-12-17 @ 16:09)  HR: 102 (07-13-17 @ 06:00) (77 - 102)  BP: 130/64 (07-13-17 @ 06:00) (130/64 - 151/71)  RR: 22 (07-13-17 @ 06:00) (17 - 22)  SpO2: 98% (07-13-17 @ 06:00) (96% - 100%)    PHYSICAL EXAM:  GENERAL: A+O x3; NAD, well-developed  CHEST/LUNG: Clear to auscultation bilaterally; No wheeze  HEART: S1, S2. Regular rate and rhythm; No murmurs, rubs, or gallops  ABDOMEN: Soft, Nontender, Nondistended; Bowel sounds present    LABS:                        10.5   9.7   )-----------( 443      ( 12 Jul 2017 06:50 )             32.4     07-12    136  |  105  |  11  ----------------------------<  88  3.7   |  16<L>  |  1.02    Ca    8.7      12 Jul 2017 06:51  Mg     1.5     07-11      PT/INR - ( 11 Jul 2017 23:43 )   PT: 10.8 sec;   INR: 0.99 ratio         PTT - ( 11 Jul 2017 23:43 )  PTT:55.3 sec    Assessment/Plan:  Patient is a 82y old  Female that orignally presented with a chief complaint of S/P syncopal episode with shaking episode and fecal and urinary incontinence and is now being seen for an episode of SVT on telemetry.  1)SVT<2 seconds between 202/230  -Pt. seen and examined. Pt. is asymptomatic and stable. Vitals WNL.   -Pt. complains of some shortness of breath but refuses any oxygen. Currently O2 sat is 98%  -EKG compared to previous EKG. No new changes.  -BMP, P, Mg sent to lab stat. Will replete electrolytes as necessary.  -Cardiology is currently on Pt.'s case.  -Will continue to monitor pt. on telemetry.  -F/u w/ primary team in AM.    -Doyle Moreno PA-C. #[id number]

## 2017-07-13 NOTE — DISCHARGE NOTE ADULT - PATIENT PORTAL LINK FT
“You can access the [employer] Patient Portal, offered by [facility], by registering with the following website: [email or web address]”

## 2017-07-13 NOTE — PROGRESS NOTE ADULT - SUBJECTIVE AND OBJECTIVE BOX
[facility] - [facility]     PROGRESS NOTE    CHIEF COMPLAINT: syncope/PE    INTERVAL HISTORY: several seconds of PAT with rates up to 150; prior ECGs with NSR; started on metoprolol by cardiology; no indication for A/C for this tachyarrhythmia per cardiology;  no further lightheadedness, dizziness, pre-syncope or syncope; no chest pain/pressure or palpitations; no cough, sputum production, chest congestion or wheeze; no fevers, chills or sweats; normal bowel movement without BRBPR or melena; hemodynamically stable; much stronger, getting out of bed and walking without assistance; does not want A/C under any situation    REVIEW OF SYSTEMS:  Constitutional: As per interval history  HEENT: Within normal limits  CV: As per interval history  Resp: As per interval history  GI: Within normal limits   : Within normal limits  Musculoskeletal: Within normal limits  Skin: Within normal limits  Neurological: Within normal limits  Psychiatric: Within normal limits  Endocrine: Within normal limits  Hematologic/Lymphatic: Within normal limits  Allergic/Immunologic: Within normal limits      MEDICATIONS:     Pulmonary "  ALBUTerol/ipratropium for Nebulization 3 milliLiter(s) Nebulizer every 6 hours PRN  tiotropium 18 MICROgram(s) Capsule 1 Capsule(s) Inhalation daily  buDESOnide 160 MICROgram(s)/formoterol 4.5 MICROgram(s) Inhaler 2 Puff(s) Inhalation two times a day        Cardiovascular:  losartan 50 milliGRAM(s) Oral daily  metoprolol 12.5 milliGRAM(s) Oral two times a day      Other:  atorvastatin 20 milliGRAM(s) Oral at bedtime  lactobacillus acidophilus 1 Tablet(s) Oral daily  multivitamin 1 Tablet(s) Oral daily  acetaminophen   Tablet. 650 milliGRAM(s) Oral every 6 hours PRN  famotidine    Tablet 20 milliGRAM(s) Oral two times a day  pantoprazole    Tablet 40 milliGRAM(s) Oral two times a day before meals  aspirin enteric coated 81 milliGRAM(s) Oral daily  heparin  Injectable 5000 Unit(s) SubCutaneous every 12 hours  magnesium oxide 400 milliGRAM(s) Oral three times a day with meals        OBJECTIVE:        I&O's Detail    2017 07:01  -  2017 07:00  --------------------------------------------------------  IN:    Oral Fluid: 780 mL  Total IN: 780 mL    OUT:  Total OUT: 0 mL    Total NET: 780 mL          Daily     Daily Weight in k.1 (2017 04:48)      PHYSICAL EXAM:       ICU Vital Signs Last 24 Hrs  T(C): 36.7 (2017 04:48), Max: 36.7 (2017 16:09)  T(F): 98 (2017 04:48), Max: 98.1 (2017 16:09)  HR: 102 (2017 06:00) (77 - 102)  BP: 130/64 (2017 06:00) (130/64 - 151/71)  BP(mean): --  ABP: --  ABP(mean): --  RR: 22 (2017 06:00) (17 - 22)  SpO2: 98% (2017 06:00) (96% - 100%) on room air     General: Awake, alert, cooperative, no distress, appears stated age 	  HEENT:   Atraumatic. Normocephalic. Anicteric. Normal oral mucosa, PERRL, EOMI  Neck: Supple, trachea midline; thyroid without enlargement/tenderness/nodules; no carotid bruit; no JVD	  Cardiovascular: Regular rate and rhythm, S1 S2 normal. No murmurs, rubs or gallops  Respiratory: Respirations unlabored; Clear to auscultation and percussion bilaterally  Abdomen: Soft, non-tender, non-distended. No organomegaly. No masses. Normal bowel sounds	  Extremities: Warm to touch. No clubbing or cyanosis. No pedal edema.  Pulses: 2+ peripheral pulses all extremities	  Skin: Normal skin color. No rashes or lesions. No ecchymoses, No cyanosis, warm to touch  Lymph Nodes: Cervical, supraclavicular and axillary nodes normal  Neurological: Motor and sensory examination equal and normal. A and O x 3  Psychiatry: Appropriate mood and affect.      LABS:                        10.2   9.0   )-----------( 484      ( 2017 06:46 )             32.0                         10.5   9.7   )-----------( 443      ( 2017 06:50 )             32.4     07-13    138  |  106  |  10  ----------------------------<  83  3.9   |  17<L>  |  0.98        136  |  105  |  11  ----------------------------<  88  3.7   |  16<L>  |  1.02    Ca                                     8.7          Ca                              9.0          Phos    2.1         Phos    3.3           Mg       1.5         Mg       1.5         TPro  5.4<L>  /  Alb  2.9<L>  /  TBili  0.3  /  DBili  0.1  /  AST  24  /  ALT  39  /  AlkPhos  177<H>      TPro  5.8<L>  /  Alb  2.9<L>  /  TBili  0.5  /  DBili  x   /  AST  144<H>  /  ALT  70<H>  /  AlkPhos  185<H>      PT/INR - ( 2017 23:43 )   PT: 10.8 sec;   INR: 0.99 ratio         PTT - ( 2017 23:43 )  PTT:55.3 sec    Serum Pro-Brain Natriuretic Peptide: 1257 pg/mL (07-10 @ 01:49)  Serum Pro-Brain Natriuretic Peptide: 1762 pg/mL ( @ 13:22)    CARDIAC MARKERS ( 10 Jul 2017 14:52 )  x     / 0.03 ng/mL / 54 U/L / x     / 3.5 ng/mL  CARDIAC MARKERS ( 10 Jul 2017 01:49 )  x     / 0.03 ng/mL / 61 U/L / x     / 3.3 ng/mL  CARDIAC MARKERS ( 2017 13:22 )  x     / 0.02 ng/mL / 59 U/L / x     / 2.6 ng/mL    Venous Blood Gas:   @ 20:12  7.34/40/29/21/49  VBG Lactate: 1.0  Venous Blood Gas:  :22  7.32/39/32/20/52  VBG Lactate: 3.2    MICROBIOLOGY:     Culture - Urine (17 @ 22:12)    Specimen Source: .Urine Catheterized    Culture Results:   >100,000 CFU/ml Klebsiella pneumoniae    Culture - Blood (17 @ 17:51)    Specimen Source: .Blood Blood    Culture Results:   No growth to date.    Culture - Blood (17 @ 16:20)    Specimen Source: .Blood Blood    Culture Results:   No growth to date.    RADIOLOGY:  [ x] Chest radiographs reviewed and interpreted by me    < from: Xray Chest 1 View AP- PORTABLE-Urgent (17 @ 14:02) >    EXAM:  CHEST-PORTABLE URGENT                            PROCEDURE DATE:  2017        INTERPRETATION:  HISTORY: Syncope. Rule out pneumonia.    TECHNIQUE: A single AP view of the chest was obtained.    COMPARISON: 2017    FINDINGS:  The cardiac silhouette is normal in size. There are no focal   consolidations or pleural effusions. The hilar and mediastinal structures   appear unremarkable. The osseous structures are intact.    IMPRESSION: Clear lungs.      EJ LOPEZ M.D., ATTENDING RADIOLOGIST  This document has been electronically signed. 2017  2:10PM          < end of copied text >  ---------------------------------------------------------------------------------------------------------      < from: CT Angio Chest w/ IV Cont (17 @ 16:04) >    EXAM:  CT ABDOMEN AND PELVIS IC                          EXAM:  CT ANGIO CHEST (W)AW IC                            PROCEDURE DATE:  2017        INTERPRETATION:  CLINICAL INFORMATION: Syncope. Evaluate for pulmonary   embolus.    COMPARISON: Chest CT 2017. CT abdomen pelvis March 3, 2017.    PROCEDURE:   CT Angiography of the Chest was performed followed by portal venous phase   imaging of the Abdomen and Pelvis.  90 ml of Omnipaque 350 was injected intravenously. 10 ml were discarded.  Sagittal and coronal reformats were performed as well as 3D (MIP)   reconstructions.    FINDINGS:    CHEST:     LUNGS AND LARGE AIRWAYS: Patent central airways. 2.4 x 2.3 cm cavitary   right upper lobe lesion is without significant change since 2017   but is mildly improved since March 3, 2017. Emphysema.  PLEURA: No pleural effusion.  VESSELS: Segmental and subsegmental pulmonary emboli involving the right   upper and both lower lobes. Small linear pulmonary embolus also involves   the right common basilar pulmonary artery. Extensive atherosclerotic   plaque.  HEART: Heart size is normal. No pericardial effusion.  MEDIASTINUM AND NURYS: No lymphadenopathy.  CHEST WALL AND LOWER NECK: Within normal limits.    ABDOMEN AND PELVIS:    LIVER: Within normal limits.  BILE DUCTS: Normal caliber.  GALLBLADDER: Cholecystectomy.  SPLEEN: Within normal limits.  PANCREAS: Within normal limits.  ADRENALS: Within normal limits.  KIDNEYS/URETERS: Atrophic left kidney.    BLADDER: Within normal limits.  REPRODUCTIVE ORGANS: The uterus is unremarkable.    BOWEL: No bowel obstruction. Appendix is normal. Colonic diverticulosis.  PERITONEUM: No ascites.  VESSELS:  IVC filter.  RETROPERITONEUM: No lymphadenopathy.    ABDOMINAL WALL: Within normal limits.  BONES: Within normal limits.    IMPRESSION:   1. Bilateral pulmonary emboli.  2. Cavitary right upper lobe lesion unchanged since 2017 and mildly   improved since 3/3/2017.    Findings were discussed with Dr. Mora 5:20 PM on 2017.      DENNIS MCELROY M.D., ATTENDING RADIOLOGIST  This document has been electronically signed. 2017  5:21PM      < end of copied text >  ---------------------------------------------------------------------------------------------------------    < from: Upper Endoscopy (17 @ 11:47) >    [facility]  _________________________________________________________________  < from: MRI Head w/wo Cont (07.10.17 @ 22:09) >    EXAM:  MRI BRAIN WO W CONTRAST                            PROCEDURE DATE:  07/10/2017        INTERPRETATION:    CLINICAL INDICATION: Headache, syncope      Magnetic resonance imaging of the brain was carried out with transaxial   SPGR, FLAIR, fast spin echo T2 weighted images, axial gradient echo and   SWI series, diffusion weighted series and sagittal T1 weighted series on   a 1.5 Dianne magnet.Post contrast axial, coronal and sagittal T1 weighted   images were obtained. 6.5 cc of Gadavist were intravenously injected, 1   cc were discarded.      Comparison is made with the prior brain CT of 2017.    The ventricles and sulci are prominent consistent with age appropriate   involutional change. Small vessel white matter ischemic changes are   identified. No acute infarcts are identified on diffusion-weighted   imaging. No old or new hemorrhage is seen. After contrast infusion there   is no abnormal parenchymal or leptomeningeal enhancement. There is normal   vascular enhancement. The sella and parasellar structures are   unremarkable.    Impression: Age-appropriate involutional and ischemic gliotic changes. No   acute infarcts, hemorrhage or mass.    DAVIDA CERVANTES M.D., ATTENDING RADIOLOGIST  This document has been electronically signed. 2017 11:13AM      < end of copied text >  ___________________________________  Patient Name: Manolo Herrmann                     MRN: [id number]  Account Number: [id number]                     YOB: 1935  Room: Endoscopy Room 1                           Gender: Female  Attending MD: Andree Enamorado,                        Procedure Date No Time: 2017  ____________________________________________________________________________________________________     Procedure:           Upper GI endoscopy  Indications:         Hematemesis  Providers:           Andree Enamorado MD  Medicines:           Monitored Anesthesia Care  Complications:       No immediate complications.  ____________________________________________________________________________________________________                                                                                                        Impression:          - No gross lesions in esophagus.                       - Z-line regular, 40 cm from the incisors.  - One very large cratered gastric ulcer with no stigmata of bleeding.                       - Friable gastric mucosa. Treated with bipolar cautery.                       - One oozing duodenal ulcer. Treated with bipolar cautery. Clip (MR             conditional) was placed.                       - No specimens collected.  Recommendation:      - Return patient to ICU for ongoing care.                       - Clear liquid diet today                       - Continue PPI gtt         - Will discuss risks vs. benefits anticoagulation with MICU team                                                                                                        Attending Participation:       I personally performed the entire procedure.                                                                                                          ____________  Andree Enamorado,   2017 12:36:38 PM  Number of Addenda: 0    Note Initiated On: 2017 11:47 AM    < end of copied text >  ---------------------------------------------------------------------------------------------------------    < from: Upper Endoscopy (17 @ 15:00) >    [facility]  ____________________________________________________________________________________________________  Patient Name: Manolo Herrmann                     MRN: [id number]  Account Number: [id number]                     YOB: 1935  Room: endo 5                                     Gender: Female  Attending MD: Andree Enamorado,                        Procedure Date No Time: 2017  ____________________________________________________________________________________________________     Procedure:           Upper GI endoscopy  Indications:         Hematemesis  Providers:           Andree Enamorado MD  Medicines:           Monitored Anesthesia Care  Complications:       No immediate complications.  ____________________________________________________________________________________________________                                         Impression:          - No gross lesions in esophagus.                       - Coffee grounds seen in the gastric body and antrum.                       - Non-bleeding gastric fundus ulcer with adherent clot. Treated with bipolar                        cautery.                       - One very large non-bleeding gastric ulcer again noted in the gastric                        antrum. The ulcer bed was carefully examined. A slightly protuberant red spot                  toshia seen at the inferior border of the ulcer bed. This area was cautiously                        cauterized using biopolar cautery at low wattage (10W)                       - One non-bleeding duodenal ulcer again noted in the first portion of the                        duodenum. Hemoclip in place. No stigmata of bleeding noted.                       - Biopsies were taken with a cold forceps for Helicobacter pylori testing.  Recommendation:      - Observe patient in GI recovery unit.                       - NPO today.                       - Continue PPI gtt                       - Monitor H/H q 8 hours today                                                                                                        Attending Participation:       I personally performed the entire procedure.                                                                                                          ____________    < end of copied text >  ---------------------------------------------------------------------------------------------------------  < from: Transthoracic Echocardiogram (17 @ 23:14) >    Patient name: MANOLO HERRMANN  YOB: 1935   Age: 82 (F)   MR#: [id number]  Study Date: 2017  [facility]  Study quality: Technically difficult  Referring Physician: Rehana Peterson MD  Blood Pressure: 147/77 mmHg  Height: 155 cm  Weight: 66 kg  BSA: 1.7 m2  ------------------------------------------------------------------------  PROCEDURE: Transthoracic echocardiogram with 2-D, M-Mode  and complete spectral and color flow Doppler.  INDICATION: Syncope and collapse (R55)  ------------------------------------------------------------------------  Dimensions:    Normal Values:  LA:     3.8    2.0 - 4.0 cm  Ao:            2.0 - 3.8 cm  SEPTUM: 0.8    0.6 - 1.2 cm  PWT:    0.7    0.6 - 1.1 cm  LVIDd:  3.9    3.0 - 5.6 cm  LVIDs:  2.3    1.8 - 4.0 cm  Derived variables:  LVMI: 50 g/m2  RWT: 0.35  Fractional short: 41 %  EF (Eulalio): 73 %  ------------------------------------------------------------------------  Observations:  Mitral Valve: Mitral annular calcification. Mild mitral  regurgitation.  Aortic Valve/Aorta: Calcified trileaflet aortic valve with  normal opening. No aortic valve regurgitation seen.  Aortic root not well seen.  Left Atrium: Normal left atrium.  LA volume index = 24  cc/m2.  Left Ventricle: Endocardium not well visualized; grossly  normal left ventricular systolic function. Normal left  ventricular internal dimensions and wall thicknesses. Mild  diastolic dysfunction (Stage I).  Right Heart: Normal rightatrium. The right ventricle is  not well visualized; grossly normal right ventricular  systolic function. Normal tricuspid valve. Mild tricuspid  regurgitation. Normal pulmonic valve.  Pericardium/Pleura: No pericardial effusion seen.  Hemodynamic: Estimated right atrial pressure is 8 mm Hg.  ------------------------------------------------------------------------  Conclusions:  1. Mitral annular calcification. Mild mitral regurgitation.    2. Calcified trileaflet aortic valve with normal opening.  No aortic valve regurgitation seen.  3. Endocardium not well visualized; grossly normal left  ventricular systolic function.  4. Mild diastolic dysfunction (Stage I).  5. The right ventricle is not well visualized; grossly  normal right ventricularsystolic function.  6. No pericardial effusion seen.  *** Compared with echocardiogram of 2017, findings are  similar.  ------------------------------------------------------------------------  Confirmed on  2017 - 15:34:30 by Monty Hopper M.D.  ------------------------------------------------------------------------    < end of copied text >

## 2017-07-13 NOTE — DISCHARGE NOTE ADULT - PLAN OF CARE
HD stable Fainting usually is caused by fear, stress, pain, standing too long, over tired, overheated, going to bathroom, or coughing  Blood pressure can drop if you do not drink enough, blood pressure medication, alcohol, bleeding,  Consult with your doctor about driving  Avoid activity or condition that causes your syncope  Lay down with your feet up when you feel like you might faint No AC due to GI bleed   Has IVC filter c/w Inhalers  please follow up with Pulmonologist Follow up with your medical doctor to establish long term blood pressure treatment goals.

## 2017-07-13 NOTE — PROVIDER CONTACT NOTE (OTHER) - ACTION/TREATMENT ORDERED:
NP made aware. No further actions at this time. Continue to monitor patient.
NP made aware. No further actions at this time. Continue to monitor patient.
NP notified and aware, continue to monitor
PA aware. Will assess patient at bedside. PA reviewing cardiac rhythm. New orders noted. Pt education provided. Pt remains on cardiac monitoring. Will continue to closely monitor.

## 2017-07-13 NOTE — PROVIDER CONTACT NOTE (OTHER) - ASSESSMENT
On cardiac monitoring, Pt with episode of  lasting less than 1.5 seconds. Pt walked back to bed with assistance, + dyspnea on exertion. Pt states felt SOB and slight palpitations. /64, , RR 22, SpO2 98% RA, T 98 orally.

## 2017-07-13 NOTE — DISCHARGE NOTE ADULT - MEDICATION SUMMARY - MEDICATIONS TO TAKE
I will START or STAY ON the medications listed below when I get home from the hospital:    aspirin 81 mg oral delayed release tablet  -- 1 tab(s) by mouth once a day  -- Indication: For afib    losartan 50 mg oral tablet  -- 1 tab(s) by mouth once a day (in the morning)  -- Indication: For Htn    atorvastatin 20 mg oral tablet  -- 1 tab(s) by mouth once a day (at bedtime)  -- Indication: For Cad    metoprolol tartrate 25 mg oral tablet  -- 0.5 tab(s) by mouth 2 times a day  -- Indication: For afib    Perforomist 20 mcg/2 mL inhalation solution  -- 2 milliliter(s) inhaled 2 times a day  -- Indication: For COPD (chronic obstructive pulmonary disease)    DuoNeb 0.5 mg-2.5 mg/3 mL inhalation solution  -- 3 milliliter(s) inhaled , As Needed  -- Indication: For COPD (chronic obstructive pulmonary disease)    famotidine 20 mg oral tablet  -- 1 tab(s) by mouth 2 times a day  -- Indication: For Ppi    magnesium oxide 400 mg (241.3 mg elemental magnesium) oral tablet  -- 1 tab(s) by mouth 3 times a day (with meals)  -- Indication: For Supplement    Acidophilus oral capsule  -- 1 cap(s) by mouth once a day  -- Indication: For Supplement    pantoprazole 40 mg oral delayed release tablet  -- 1 tab(s) by mouth 2 times a day (before meals)  -- Indication: For Supplement    Multiple Vitamins oral tablet  -- 1 tab(s) by mouth once a day  -- Indication: For Supplement I will START or STAY ON the medications listed below when I get home from the hospital:    aspirin 81 mg oral delayed release tablet  -- 1 tab(s) by mouth once a day  -- Indication: For afib    losartan 50 mg oral tablet  -- 1 tab(s) by mouth once a day (in the morning)  -- Indication: For Htn    atorvastatin 20 mg oral tablet  -- 1 tab(s) by mouth once a day (at bedtime)  -- Indication: For Cad    metoprolol tartrate 25 mg oral tablet  -- 0.5 tab(s) by mouth 2 times a day  -- Indication: For afib    tiotropium 18 mcg inhalation capsule  -- 1 cap(s) inhaled once a day  -- Indication: For COPD (chronic obstructive pulmonary disease)    budesonide-formoterol 160 mcg-4.5 mcg/inh inhalation aerosol  -- 160 microgram(s) inhaled once a day  -- Indication: For COPD (chronic obstructive pulmonary disease)    famotidine 20 mg oral tablet  -- 1 tab(s) by mouth 2 times a day  -- Indication: For Ppi    magnesium oxide 400 mg (241.3 mg elemental magnesium) oral tablet  -- 1 tab(s) by mouth 3 times a day (with meals)  -- Indication: For Supplement    Acidophilus oral capsule  -- 1 cap(s) by mouth once a day  -- Indication: For Supplement    pantoprazole 40 mg oral delayed release tablet  -- 1 tab(s) by mouth 2 times a day (before meals)  -- Indication: For Supplement    Multiple Vitamins oral tablet  -- 1 tab(s) by mouth once a day  -- Indication: For Supplement

## 2017-07-13 NOTE — PROGRESS NOTE ADULT - ASSESSMENT
ASSESSMENT    syncope - while the patient does have new in addition to old pulmonary emboli, they are small in size and without evidence of causing right heart strain or hemodynamic instability - an IVC filter not uncommonly does not prevent smaller pulmonary emboli but has possibly prevented a large PE - i do not think the new PEs are the cause of the patient's syncopal episode and the risks of anticoagulation have to be carefully weighed against the possible benefits isai since RV findings are normal on Echo- the CTA does reveal significant atherosclerotic disease in the aorta and the patient is at high risk of having TIAs as another possible cause of syncope - seizure also should be included in the differential diagnosis    RUL masslike consolidation -as per pulm 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 placed only on ASA, no AC considering recent severe GI bleed, BB added today  DC planning home in am

## 2017-07-14 VITALS
RESPIRATION RATE: 18 BRPM | TEMPERATURE: 98 F | OXYGEN SATURATION: 97 % | SYSTOLIC BLOOD PRESSURE: 135 MMHG | HEART RATE: 67 BPM | DIASTOLIC BLOOD PRESSURE: 66 MMHG

## 2017-07-14 LAB
ANION GAP SERPL CALC-SCNC: 16 MMOL/L — SIGNIFICANT CHANGE UP (ref 5–17)
BUN SERPL-MCNC: 10 MG/DL — SIGNIFICANT CHANGE UP (ref 7–23)
CALCIUM SERPL-MCNC: 8.7 MG/DL — SIGNIFICANT CHANGE UP (ref 8.4–10.5)
CHLORIDE SERPL-SCNC: 105 MMOL/L — SIGNIFICANT CHANGE UP (ref 96–108)
CO2 SERPL-SCNC: 17 MMOL/L — LOW (ref 22–31)
CREAT SERPL-MCNC: 0.98 MG/DL — SIGNIFICANT CHANGE UP (ref 0.5–1.3)
CULTURE RESULTS: SIGNIFICANT CHANGE UP
CULTURE RESULTS: SIGNIFICANT CHANGE UP
GLUCOSE SERPL-MCNC: 74 MG/DL — SIGNIFICANT CHANGE UP (ref 70–99)
HCT VFR BLD CALC: 31.8 % — LOW (ref 34.5–45)
HGB BLD-MCNC: 10 G/DL — LOW (ref 11.5–15.5)
MAGNESIUM SERPL-MCNC: 1.4 MG/DL — LOW (ref 1.6–2.6)
MCHC RBC-ENTMCNC: 30.8 PG — SIGNIFICANT CHANGE UP (ref 27–34)
MCHC RBC-ENTMCNC: 31.6 GM/DL — LOW (ref 32–36)
MCV RBC AUTO: 97.8 FL — SIGNIFICANT CHANGE UP (ref 80–100)
PLATELET # BLD AUTO: 430 K/UL — HIGH (ref 150–400)
POTASSIUM SERPL-MCNC: 3.4 MMOL/L — LOW (ref 3.5–5.3)
POTASSIUM SERPL-SCNC: 3.4 MMOL/L — LOW (ref 3.5–5.3)
RBC # BLD: 3.25 M/UL — LOW (ref 3.8–5.2)
RBC # FLD: 17.8 % — HIGH (ref 10.3–14.5)
SODIUM SERPL-SCNC: 138 MMOL/L — SIGNIFICANT CHANGE UP (ref 135–145)
SPECIMEN SOURCE: SIGNIFICANT CHANGE UP
SPECIMEN SOURCE: SIGNIFICANT CHANGE UP
WBC # BLD: 9.4 K/UL — SIGNIFICANT CHANGE UP (ref 3.8–10.5)
WBC # FLD AUTO: 9.4 K/UL — SIGNIFICANT CHANGE UP (ref 3.8–10.5)

## 2017-07-14 PROCEDURE — 82803 BLOOD GASES ANY COMBINATION: CPT

## 2017-07-14 PROCEDURE — 86850 RBC ANTIBODY SCREEN: CPT

## 2017-07-14 PROCEDURE — 84484 ASSAY OF TROPONIN QUANT: CPT

## 2017-07-14 PROCEDURE — 83690 ASSAY OF LIPASE: CPT

## 2017-07-14 PROCEDURE — 82553 CREATINE MB FRACTION: CPT

## 2017-07-14 PROCEDURE — 82272 OCCULT BLD FECES 1-3 TESTS: CPT

## 2017-07-14 PROCEDURE — 82550 ASSAY OF CK (CPK): CPT

## 2017-07-14 PROCEDURE — 93005 ELECTROCARDIOGRAM TRACING: CPT

## 2017-07-14 PROCEDURE — 86900 BLOOD TYPING SEROLOGIC ABO: CPT

## 2017-07-14 PROCEDURE — 82962 GLUCOSE BLOOD TEST: CPT

## 2017-07-14 PROCEDURE — 85730 THROMBOPLASTIN TIME PARTIAL: CPT

## 2017-07-14 PROCEDURE — 87086 URINE CULTURE/COLONY COUNT: CPT

## 2017-07-14 PROCEDURE — 85014 HEMATOCRIT: CPT

## 2017-07-14 PROCEDURE — 70450 CT HEAD/BRAIN W/O DYE: CPT

## 2017-07-14 PROCEDURE — 99232 SBSQ HOSP IP/OBS MODERATE 35: CPT

## 2017-07-14 PROCEDURE — 85610 PROTHROMBIN TIME: CPT

## 2017-07-14 PROCEDURE — 80048 BASIC METABOLIC PNL TOTAL CA: CPT

## 2017-07-14 PROCEDURE — 83735 ASSAY OF MAGNESIUM: CPT

## 2017-07-14 PROCEDURE — A9585: CPT

## 2017-07-14 PROCEDURE — 96375 TX/PRO/DX INJ NEW DRUG ADDON: CPT | Mod: XU

## 2017-07-14 PROCEDURE — 82435 ASSAY OF BLOOD CHLORIDE: CPT

## 2017-07-14 PROCEDURE — 85027 COMPLETE CBC AUTOMATED: CPT

## 2017-07-14 PROCEDURE — 80053 COMPREHEN METABOLIC PANEL: CPT

## 2017-07-14 PROCEDURE — 86901 BLOOD TYPING SEROLOGIC RH(D): CPT

## 2017-07-14 PROCEDURE — 83605 ASSAY OF LACTIC ACID: CPT

## 2017-07-14 PROCEDURE — 99292 CRITICAL CARE ADDL 30 MIN: CPT | Mod: 25

## 2017-07-14 PROCEDURE — 87186 SC STD MICRODIL/AGAR DIL: CPT

## 2017-07-14 PROCEDURE — 87040 BLOOD CULTURE FOR BACTERIA: CPT

## 2017-07-14 PROCEDURE — 80076 HEPATIC FUNCTION PANEL: CPT

## 2017-07-14 PROCEDURE — 93306 TTE W/DOPPLER COMPLETE: CPT

## 2017-07-14 PROCEDURE — 71275 CT ANGIOGRAPHY CHEST: CPT

## 2017-07-14 PROCEDURE — 96374 THER/PROPH/DIAG INJ IV PUSH: CPT | Mod: XU

## 2017-07-14 PROCEDURE — 70553 MRI BRAIN STEM W/O & W/DYE: CPT

## 2017-07-14 PROCEDURE — 97161 PT EVAL LOW COMPLEX 20 MIN: CPT

## 2017-07-14 PROCEDURE — 83880 ASSAY OF NATRIURETIC PEPTIDE: CPT

## 2017-07-14 PROCEDURE — 84146 ASSAY OF PROLACTIN: CPT

## 2017-07-14 PROCEDURE — 82947 ASSAY GLUCOSE BLOOD QUANT: CPT

## 2017-07-14 PROCEDURE — 84132 ASSAY OF SERUM POTASSIUM: CPT

## 2017-07-14 PROCEDURE — 99291 CRITICAL CARE FIRST HOUR: CPT | Mod: 25

## 2017-07-14 PROCEDURE — 71045 X-RAY EXAM CHEST 1 VIEW: CPT

## 2017-07-14 PROCEDURE — 81001 URINALYSIS AUTO W/SCOPE: CPT

## 2017-07-14 PROCEDURE — 74177 CT ABD & PELVIS W/CONTRAST: CPT

## 2017-07-14 PROCEDURE — 84295 ASSAY OF SERUM SODIUM: CPT

## 2017-07-14 PROCEDURE — 94640 AIRWAY INHALATION TREATMENT: CPT

## 2017-07-14 PROCEDURE — 82330 ASSAY OF CALCIUM: CPT

## 2017-07-14 PROCEDURE — 96376 TX/PRO/DX INJ SAME DRUG ADON: CPT | Mod: XU

## 2017-07-14 PROCEDURE — 84100 ASSAY OF PHOSPHORUS: CPT

## 2017-07-14 RX ORDER — ATORVASTATIN CALCIUM 80 MG/1
1 TABLET, FILM COATED ORAL
Qty: 0 | Refills: 0 | COMMUNITY
Start: 2017-07-14

## 2017-07-14 RX ORDER — MAGNESIUM OXIDE 400 MG ORAL TABLET 241.3 MG
1 TABLET ORAL
Qty: 15 | Refills: 0 | OUTPATIENT
Start: 2017-07-14 | End: 2017-07-19

## 2017-07-14 RX ORDER — ATORVASTATIN CALCIUM 80 MG/1
1 TABLET, FILM COATED ORAL
Qty: 30 | Refills: 0 | OUTPATIENT

## 2017-07-14 RX ORDER — MAGNESIUM SULFATE 500 MG/ML
2 VIAL (ML) INJECTION ONCE
Qty: 0 | Refills: 0 | Status: COMPLETED | OUTPATIENT
Start: 2017-07-14 | End: 2017-07-14

## 2017-07-14 RX ORDER — TIOTROPIUM BROMIDE 18 UG/1
1 CAPSULE ORAL; RESPIRATORY (INHALATION)
Qty: 1 | Refills: 0 | OUTPATIENT
Start: 2017-07-14 | End: 2017-08-13

## 2017-07-14 RX ORDER — PANTOPRAZOLE SODIUM 20 MG/1
1 TABLET, DELAYED RELEASE ORAL
Qty: 40 | Refills: 0 | OUTPATIENT
Start: 2017-07-14 | End: 2017-08-03

## 2017-07-14 RX ORDER — ASPIRIN/CALCIUM CARB/MAGNESIUM 324 MG
1 TABLET ORAL
Qty: 0 | Refills: 0 | COMMUNITY
Start: 2017-07-14

## 2017-07-14 RX ORDER — FAMOTIDINE 10 MG/ML
1 INJECTION INTRAVENOUS
Qty: 0 | Refills: 0 | COMMUNITY
Start: 2017-07-14

## 2017-07-14 RX ORDER — IPRATROPIUM/ALBUTEROL SULFATE 18-103MCG
3 AEROSOL WITH ADAPTER (GRAM) INHALATION
Qty: 0 | Refills: 0 | COMMUNITY

## 2017-07-14 RX ORDER — POTASSIUM CHLORIDE 20 MEQ
40 PACKET (EA) ORAL ONCE
Qty: 0 | Refills: 0 | Status: COMPLETED | OUTPATIENT
Start: 2017-07-14 | End: 2017-07-14

## 2017-07-14 RX ORDER — BUDESONIDE AND FORMOTEROL FUMARATE DIHYDRATE 160; 4.5 UG/1; UG/1
160 AEROSOL RESPIRATORY (INHALATION)
Qty: 1 | Refills: 0 | OUTPATIENT
Start: 2017-07-14 | End: 2017-08-13

## 2017-07-14 RX ORDER — FORMOTEROL FUMARATE 12 MCG
2 CAPSULE, WITH INHALATION DEVICE INHALATION
Qty: 0 | Refills: 0 | COMMUNITY

## 2017-07-14 RX ORDER — METOPROLOL TARTRATE 50 MG
0.5 TABLET ORAL
Qty: 30 | Refills: 0 | OUTPATIENT
Start: 2017-07-14 | End: 2017-08-13

## 2017-07-14 RX ORDER — BUDESONIDE AND FORMOTEROL FUMARATE DIHYDRATE 160; 4.5 UG/1; UG/1
160 AEROSOL RESPIRATORY (INHALATION)
Qty: 0 | Refills: 0 | COMMUNITY
Start: 2017-07-14

## 2017-07-14 RX ADMIN — Medication 650 MILLIGRAM(S): at 08:27

## 2017-07-14 RX ADMIN — Medication 12.5 MILLIGRAM(S): at 06:28

## 2017-07-14 RX ADMIN — Medication 50 GRAM(S): at 11:33

## 2017-07-14 RX ADMIN — LOSARTAN POTASSIUM 50 MILLIGRAM(S): 100 TABLET, FILM COATED ORAL at 06:28

## 2017-07-14 RX ADMIN — Medication 1 TABLET(S): at 08:28

## 2017-07-14 RX ADMIN — PANTOPRAZOLE SODIUM 40 MILLIGRAM(S): 20 TABLET, DELAYED RELEASE ORAL at 06:28

## 2017-07-14 RX ADMIN — BUDESONIDE AND FORMOTEROL FUMARATE DIHYDRATE 2 PUFF(S): 160; 4.5 AEROSOL RESPIRATORY (INHALATION) at 06:28

## 2017-07-14 RX ADMIN — Medication 650 MILLIGRAM(S): at 01:19

## 2017-07-14 RX ADMIN — Medication 650 MILLIGRAM(S): at 01:49

## 2017-07-14 RX ADMIN — TIOTROPIUM BROMIDE 1 CAPSULE(S): 18 CAPSULE ORAL; RESPIRATORY (INHALATION) at 08:27

## 2017-07-14 RX ADMIN — FAMOTIDINE 20 MILLIGRAM(S): 10 INJECTION INTRAVENOUS at 06:28

## 2017-07-14 RX ADMIN — Medication 40 MILLIEQUIVALENT(S): at 11:33

## 2017-07-14 RX ADMIN — Medication 81 MILLIGRAM(S): at 08:28

## 2017-07-14 RX ADMIN — MAGNESIUM OXIDE 400 MG ORAL TABLET 400 MILLIGRAM(S): 241.3 TABLET ORAL at 08:28

## 2017-07-14 NOTE — PROGRESS NOTE ADULT - ASSESSMENT
Assessment:  1.	Paroxysmal Atrial Tachycardia  rates up to 150  short bursts  prior ecgs with sinus rhythm  LA size is normal on 2d echo  2.  Pulmonary embolism  - s/p IVC filter  3.  History of GI bleeding      Plan:  1.  Continue with metoprolol 12.5mg BID - no further episodes of PAT, remains in Sinus.   2.  Continue with aspirin 81mg daily  3.  She remains high risk for GI bleeding with transfusions in the past.  Continue to observe off anticoagulation  4. D/C planning with outpatient followup.  She should establish care with a cardiologist.        Thank you,    Jaquan Nevarez  Vascular Medicine and Cardiology  Office [phone]

## 2017-07-14 NOTE — PROGRESS NOTE ADULT - ASSESSMENT
syncope - while the patient had new in addition to old pulmonary emboli, they were small in size and without evidence of causing right heart strain on ECHO or hemodynamic instability - an IVC filter not uncommonly does not prevent smaller pulmonary emboli but should prevent large PE "down the road" - i do not think the new PEs are the cause of the patient's syncopal episode and the risks of anticoagulation seem to outweigh the possible benefits in light of two recent UGI life-threatening bleeds - the CTA does reveal significant atherosclerotic disease in the aorta but there is no evidence of TIA/CVA on the MRI - seizure also should be included in the differential diagnosis but is felt to be unlikely by neuro    RUL masslike consolidation - 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 Manriquez)  Neuro evaluation noted   symbicort/spiriva - home on usual pulmonary meds  GI f/u - protonix and pepcid - no repeat endoscopy planned at this time  cardiology f/u - ASA/cozaar/lopressor/lipitor    d/w Dr. Nevarez and daughter Mackenzie by phone    No objection to discharge home with physical therapy and close outpatient follow-up (known to Dr. Alvarado of our practice)      Wil Quiroga MD, [facility] - [phone]  Pulmonary Medicine

## 2017-07-14 NOTE — PROGRESS NOTE ADULT - SUBJECTIVE AND OBJECTIVE BOX
[facility] - [facility]     PROGRESS NOTE    CHIEF COMPLAINT: PE/syncope    INTERVAL HISTORY:   INTERVAL HISTORY: no further episodes of tachyarrhythmias on low dose beta blockers; prior ECGs with NSR; no indication for A/C for this tachyarrhythmia per cardiology;  no further lightheadedness, dizziness, pre-syncope or syncope; no chest pain/pressure or palpitations; no cough, sputum production, chest congestion or wheeze; no fevers, chills or sweats; normal bowel movement without BRBPR or melena; hemodynamically stable; much stronger, getting out of bed and walking without assistance; does not want A/C under any situation; eager to go home      REVIEW OF SYSTEMS:  Constitutional: As per interval history  HEENT: Within normal limits  CV: As per interval history  Resp: As per interval history  GI: Within normal limits   : Within normal limits  Musculoskeletal: Within normal limits  Skin: Within normal limits  Neurological: Within normal limits  Psychiatric: Within normal limits  Endocrine: Within normal limits  Hematologic/Lymphatic: Within normal limits  Allergic/Immunologic: Within normal limits    MEDICATIONS:     Pulmonary "  ALBUTerol/ipratropium for Nebulization 3 milliLiter(s) Nebulizer every 6 hours PRN  tiotropium 18 MICROgram(s) Capsule 1 Capsule(s) Inhalation daily  buDESOnide 160 MICROgram(s)/formoterol 4.5 MICROgram(s) Inhaler 2 Puff(s) Inhalation two times a day      Anti-microbials:      Cardiovascular:  losartan 50 milliGRAM(s) Oral daily  metoprolol 12.5 milliGRAM(s) Oral two times a day      Other:  atorvastatin 20 milliGRAM(s) Oral at bedtime  lactobacillus acidophilus 1 Tablet(s) Oral daily  multivitamin 1 Tablet(s) Oral daily  acetaminophen   Tablet. 650 milliGRAM(s) Oral every 6 hours PRN  famotidine    Tablet 20 milliGRAM(s) Oral two times a day  pantoprazole    Tablet 40 milliGRAM(s) Oral two times a day before meals  aspirin enteric coated 81 milliGRAM(s) Oral daily  heparin  Injectable 5000 Unit(s) SubCutaneous every 12 hours  magnesium oxide 400 milliGRAM(s) Oral three times a day with meals        OBJECTIVE:        I&O's Detail    2017 07:01  -  2017 07:00  --------------------------------------------------------  IN:    Oral Fluid: 1020 mL  Total IN: 1020 mL    OUT:  Total OUT: 0 mL    Total NET: 1020 mL          Daily     Daily Weight in k (2017 04:01)      PHYSICAL EXAM:       ICU Vital Signs Last 24 Hrs  T(C): 36.4 (2017 04:), Max: 36.6 (2017 21:17)  T(F): 97.6 (2017 04:), Max: 97.8 (2017 21:17)  HR: 71 (2017 06:25) (68 - 82)  BP: 170/82 (2017 06:25) (132/82 - 170/82)  BP(mean): --  ABP: --  ABP(mean): --  RR: 18 (2017 04:) (17 - 18)  SpO2: 96% (2017 04:) (96% - 99%) on room air    General: Awake, alert, cooperative, no distress, appears stated age 	  HEENT:   Atraumatic. Normocephalic. Anicteric. Normal oral mucosa, PERRL, EOMI  Neck: Supple, trachea midline; thyroid without enlargement/tenderness/nodules; no carotid bruit; no JVD	  Cardiovascular: Regular rate and rhythm, S1 S2 normal. No murmurs, rubs or gallops  Respiratory: Respirations unlabored; Clear to auscultation and percussion bilaterally  Abdomen: Soft, non-tender, non-distended. No organomegaly. No masses. Normal bowel sounds	  Extremities: Warm to touch. No clubbing or cyanosis. No pedal edema.  Pulses: 2+ peripheral pulses all extremities	  Skin: Normal skin color. No rashes or lesions. No ecchymoses, No cyanosis, warm to touch  Lymph Nodes: Cervical, supraclavicular and axillary nodes normal  Neurological: Motor and sensory examination equal and normal. A and O x 3  Psychiatry: Appropriate mood and affect.      LABS:                        10.0   9.4   )-----------( 430      ( 2017 07:21 )             31.8                         10.2   9.0   )-----------( 484      ( 2017 06:46 )             32.0         138  |  105  |  10  ----------------------------<  74  3.4<L>   |  17<L>  |  0.98        138  |  106  |  10  ----------------------------<  83  3.9   |  17<L>  |  0.98    Ca      8.7          Ca      9.0          Phos    2.1         Phos    3.3           Mg       1.4         Mg       1.5         TPro  5.4<L>  /  Alb  2.9<L>  /  TBili  0.3  /  DBili  0.1  /  AST  24  /  ALT  39  /  AlkPhos  177<H>      TPro  5.8<L>  /  Alb  2.9<L>  /  TBili  0.5  /  DBili  x   /  AST  144<H>  /  ALT  70<H>  /  AlkPhos  185<H>        Serum Pro-Brain Natriuretic Peptide: 1257 pg/mL (07-10 @ 01:49)  Serum Pro-Brain Natriuretic Peptide: 1762 pg/mL ( @ 13:22)    CARDIAC MARKERS ( 10 Jul 2017 14:52 )  x     / 0.03 ng/mL / 54 U/L / x     / 3.5 ng/mL  CARDIAC MARKERS ( 10 Jul 2017 01:49 )  x     / 0.03 ng/mL / 61 U/L / x     / 3.3 ng/mL  CARDIAC MARKERS ( 2017 13:22 )  x     / 0.02 ng/mL / 59 U/L / x     / 2.6 ng/mL    Venous Blood Gas:   20:12  7.34/40/29/21/49  VBG Lactate: 1.0  Venous Blood Gas:  :22  7.32/39/32/20/52  VBG Lactate: 3.2    MICROBIOLOGY:     Culture - Urine (17 @ 22:12)    Specimen Source: .Urine Catheterized    Culture Results:   >100,000 CFU/ml Klebsiella pneumoniae    Culture - Blood (17 @ 17:51)    Specimen Source: .Blood Blood    Culture Results:   No growth to date.    Culture - Blood (17 @ 16:20)    Specimen Source: .Blood Blood    Culture Results:   No growth to date.    RADIOLOGY:  [ x] Chest radiographs reviewed and interpreted by me    < from: Xray Chest 1 View AP- PORTABLE-Urgent (17 @ 14:02) >    EXAM:  CHEST-PORTABLE URGENT                            PROCEDURE DATE:  2017        INTERPRETATION:  HISTORY: Syncope. Rule out pneumonia.    TECHNIQUE: A single AP view of the chest was obtained.    COMPARISON: 2017    FINDINGS:  The cardiac silhouette is normal in size. There are no focal   consolidations or pleural effusions. The hilar and mediastinal structures   appear unremarkable. The osseous structures are intact.    IMPRESSION: Clear lungs.      EJ LOPEZ M.D., ATTENDING RADIOLOGIST  This document has been electronically signed. 2017  2:10PM          < end of copied text >  ---------------------------------------------------------------------------------------------------------      < from: CT Angio Chest w/ IV Cont (17 @ 16:04) >    EXAM:  CT ABDOMEN AND PELVIS IC                          EXAM:  CT ANGIO CHEST (W)AW IC                            PROCEDURE DATE:  2017        INTERPRETATION:  CLINICAL INFORMATION: Syncope. Evaluate for pulmonary   embolus.    COMPARISON: Chest CT 2017. CT abdomen pelvis March 3, 2017.    PROCEDURE:   CT Angiography of the Chest was performed followed by portal venous phase   imaging of the Abdomen and Pelvis.  90 ml of Omnipaque 350 was injected intravenously. 10 ml were discarded.  Sagittal and coronal reformats were performed as well as 3D (MIP)   reconstructions.    FINDINGS:    CHEST:     LUNGS AND LARGE AIRWAYS: Patent central airways. 2.4 x 2.3 cm cavitary   right upper lobe lesion is without significant change since 2017   but is mildly improved since March 3, 2017. Emphysema.  PLEURA: No pleural effusion.  VESSELS: Segmental and subsegmental pulmonary emboli involving the right   upper and both lower lobes. Small linear pulmonary embolus also involves   the right common basilar pulmonary artery. Extensive atherosclerotic   plaque.  HEART: Heart size is normal. No pericardial effusion.  MEDIASTINUM AND NURYS: No lymphadenopathy.  CHEST WALL AND LOWER NECK: Within normal limits.    ABDOMEN AND PELVIS:    LIVER: Within normal limits.  BILE DUCTS: Normal caliber.  GALLBLADDER: Cholecystectomy.  SPLEEN: Within normal limits.  PANCREAS: Within normal limits.  ADRENALS: Within normal limits.  KIDNEYS/URETERS: Atrophic left kidney.    BLADDER: Within normal limits.  REPRODUCTIVE ORGANS: The uterus is unremarkable.    BOWEL: No bowel obstruction. Appendix is normal. Colonic diverticulosis.  PERITONEUM: No ascites.  VESSELS:  IVC filter.  RETROPERITONEUM: No lymphadenopathy.    ABDOMINAL WALL: Within normal limits.  BONES: Within normal limits.    IMPRESSION:   1. Bilateral pulmonary emboli.  2. Cavitary right upper lobe lesion unchanged since 2017 and mildly   improved since 3/3/2017.    Findings were discussed with Dr. Mora 5:20 PM on 2017.      DENNIS MCELROY M.D., ATTENDING RADIOLOGIST  This document has been electronically signed. 2017  5:21PM      < end of copied text >  ---------------------------------------------------------------------------------------------------------    < from: Upper Endoscopy (17 @ 11:47) >    [facility]  _________________________________________________________________  < from: MRI Head w/wo Cont (07.10.17 @ 22:09) >    EXAM:  MRI BRAIN WO W CONTRAST                            PROCEDURE DATE:  07/10/2017        INTERPRETATION:    CLINICAL INDICATION: Headache, syncope      Magnetic resonance imaging of the brain was carried out with transaxial   SPGR, FLAIR, fast spin echo T2 weighted images, axial gradient echo and   SWI series, diffusion weighted series and sagittal T1 weighted series on   a 1.5 Dianne magnet.Post contrast axial, coronal and sagittal T1 weighted   images were obtained. 6.5 cc of Gadavist were intravenously injected, 1   cc were discarded.      Comparison is made with the prior brain CT of 2017.    The ventricles and sulci are prominent consistent with age appropriate   involutional change. Small vessel white matter ischemic changes are   identified. No acute infarcts are identified on diffusion-weighted   imaging. No old or new hemorrhage is seen. After contrast infusion there   is no abnormal parenchymal or leptomeningeal enhancement. There is normal   vascular enhancement. The sella and parasellar structures are   unremarkable.    Impression: Age-appropriate involutional and ischemic gliotic changes. No   acute infarcts, hemorrhage or mass.    DAVIDA CERVANTES M.D., ATTENDING RADIOLOGIST  This document has been electronically signed. 2017 11:13AM      < end of copied text >  ___________________________________  Patient Name: Manolo Herrmann                     MRN: [id number]  Account Number: [id number]                     YOB: 1935  Room: Endoscopy Room 1                           Gender: Female  Attending MD: Andree Enamorado,                        Procedure Date No Time: 2017  ____________________________________________________________________________________________________     Procedure:           Upper GI endoscopy  Indications:         Hematemesis  Providers:           Andree Enamorado MD  Medicines:           Monitored Anesthesia Care  Complications:       No immediate complications.  ____________________________________________________________________________________________________                                                                                                        Impression:          - No gross lesions in esophagus.                       - Z-line regular, 40 cm from the incisors.  - One very large cratered gastric ulcer with no stigmata of bleeding.                       - Friable gastric mucosa. Treated with bipolar cautery.                       - One oozing duodenal ulcer. Treated with bipolar cautery. Clip (MR             conditional) was placed.                       - No specimens collected.  Recommendation:      - Return patient to ICU for ongoing care.                       - Clear liquid diet today                       - Continue PPI gtt         - Will discuss risks vs. benefits anticoagulation with MICU team                                                                                                        Attending Participation:       I personally performed the entire procedure.                                                                                                          ____________  Andree Enamorado,   2017 12:36:38 PM  Number of Addenda: 0    Note Initiated On: 2017 11:47 AM    < end of copied text >  ---------------------------------------------------------------------------------------------------------    < from: Upper Endoscopy (17 @ 15:00) >    [facility]  ____________________________________________________________________________________________________  Patient Name: Manolo Herrmann                     MRN: [id number]  Account Number: [id number]                     YOB: 1935  Room: [facility] 5                                     Gender: Female  Attending MD: Andree Enamorado,                        Procedure Date No Time: 2017  ____________________________________________________________________________________________________     Procedure:           Upper GI endoscopy  Indications:         Hematemesis  Providers:           Andree Enamorado MD  Medicines:           Monitored Anesthesia Care  Complications:       No immediate complications.  ____________________________________________________________________________________________________                                         Impression:          - No gross lesions in esophagus.                       - Coffee grounds seen in the gastric body and antrum.                       - Non-bleeding gastric fundus ulcer with adherent clot. Treated with bipolar                        cautery.                       - One very large non-bleeding gastric ulcer again noted in the gastric                        antrum. The ulcer bed was carefully examined. A slightly protuberant red spot                  toshia seen at the inferior border of the ulcer bed. This area was cautiously                        cauterized using biopolar cautery at low wattage (10W)                       - One non-bleeding duodenal ulcer again noted in the first portion of the                        duodenum. Hemoclip in place. No stigmata of bleeding noted.                       - Biopsies were taken with a cold forceps for Helicobacter pylori testing.  Recommendation:      - Observe patient in GI recovery unit.                       - NPO today.                       - Continue PPI gtt                       - Monitor H/H q 8 hours today                                                                                                        Attending Participation:       I personally performed the entire procedure.                                                                                                          ____________    < end of copied text >  ---------------------------------------------------------------------------------------------------------  < from: Transthoracic Echocardiogram (17 @ 23:14) >    Patient name: MANOLO HERRMANN  YOB: 1935   Age: 82 (F)   MR#: [id number]  Study Date: 2017  [facility]  Study quality: Technically difficult  Referring Physician: Rehana Peterson MD  Blood Pressure: 147/77 mmHg  Height: 155 cm  Weight: 66 kg  BSA: 1.7 m2  ------------------------------------------------------------------------  PROCEDURE: Transthoracic echocardiogram with 2-D, M-Mode  and complete spectral and color flow Doppler.  INDICATION: Syncope and collapse (R55)  ------------------------------------------------------------------------  Dimensions:    Normal Values:  LA:     3.8    2.0 - 4.0 cm  Ao:            2.0 - 3.8 cm  SEPTUM: 0.8    0.6 - 1.2 cm  PWT:    0.7    0.6 - 1.1 cm  LVIDd:  3.9    3.0 - 5.6 cm  LVIDs:  2.3    1.8 - 4.0 cm  Derived variables:  LVMI: 50 g/m2  RWT: 0.35  Fractional short: 41 %  EF (Eulalio): 73 %  ------------------------------------------------------------------------  Observations:  Mitral Valve: Mitral annular calcification. Mild mitral  regurgitation.  Aortic Valve/Aorta: Calcified trileaflet aortic valve with  normal opening. No aortic valve regurgitation seen.  Aortic root not well seen.  Left Atrium: Normal left atrium.  LA volume index = 24  cc/m2.  Left Ventricle: Endocardium not well visualized; grossly  normal left ventricular systolic function. Normal left  ventricular internal dimensions and wall thicknesses. Mild  diastolic dysfunction (Stage I).  Right Heart: Normal rightatrium. The right ventricle is  not well visualized; grossly normal right ventricular  systolic function. Normal tricuspid valve. Mild tricuspid  regurgitation. Normal pulmonic valve.  Pericardium/Pleura: No pericardial effusion seen.  Hemodynamic: Estimated right atrial pressure is 8 mm Hg.  ------------------------------------------------------------------------  Conclusions:  1. Mitral annular calcification. Mild mitral regurgitation.    2. Calcified trileaflet aortic valve with normal opening.  No aortic valve regurgitation seen.  3. Endocardium not well visualized; grossly normal left  ventricular systolic function.  4. Mild diastolic dysfunction (Stage I).  5. The right ventricle is not well visualized; grossly  normal right ventricularsystolic function.  6. No pericardial effusion seen.  *** Compared with echocardiogram of 2017, findings are  similar.  ------------------------------------------------------------------------  Confirmed on  2017 - 15:34:30 by Monty Hopper M.D.  ------------------------------------------------------------------------    < end of copied text >

## 2017-07-14 NOTE — PROGRESS NOTE ADULT - SUBJECTIVE AND OBJECTIVE BOX
Patient is a 82y old  Female who presents with a chief complaint of S/P syncopal episode with shaking episode and fecal and urinary incontinence (13 Jul 2017 12:20)      SUBJECTIVE / OVERNIGHT EVENTS: no new developments    MEDICATIONS  (STANDING):  losartan 50 milliGRAM(s) Oral daily  atorvastatin 20 milliGRAM(s) Oral at bedtime  lactobacillus acidophilus 1 Tablet(s) Oral daily  multivitamin 1 Tablet(s) Oral daily  famotidine    Tablet 20 milliGRAM(s) Oral two times a day  pantoprazole    Tablet 40 milliGRAM(s) Oral two times a day before meals  tiotropium 18 MICROgram(s) Capsule 1 Capsule(s) Inhalation daily  buDESOnide 160 MICROgram(s)/formoterol 4.5 MICROgram(s) Inhaler 2 Puff(s) Inhalation two times a day  aspirin enteric coated 81 milliGRAM(s) Oral daily  heparin  Injectable 5000 Unit(s) SubCutaneous every 12 hours  magnesium oxide 400 milliGRAM(s) Oral three times a day with meals  metoprolol 12.5 milliGRAM(s) Oral two times a day    MEDICATIONS  (PRN):  ALBUTerol/ipratropium for Nebulization 3 milliLiter(s) Nebulizer every 6 hours PRN Wheezing  acetaminophen   Tablet. 650 milliGRAM(s) Oral every 6 hours PRN Mild Pain (1 - 3)      Vital Signs Last 24 Hrs  T(F): 97.5 (07-14-17 @ 12:41), Max: 97.6 (07-14-17 @ 04:01)  HR: 67 (07-14-17 @ 12:41) (67 - 74)  BP: 135/66 (07-14-17 @ 12:41) (135/66 - 170/82)  RR: 18 (07-14-17 @ 12:41) (18 - 18)  SpO2: 97% (07-14-17 @ 12:41) (96% - 97%)  Telemetry:   CAPILLARY BLOOD GLUCOSE        I&O's Summary    13 Jul 2017 07:01  -  14 Jul 2017 07:00  --------------------------------------------------------  IN: 1020 mL / OUT: 0 mL / NET: 1020 mL        PHYSICAL EXAM:  GENERAL: NAD, well-developed  HEAD:  Atraumatic, Normocephalic  EYES: EOMI, PERRLA, conjunctiva and sclera clear  NECK: Supple, No JVD  CHEST/LUNG: Clear to auscultation bilaterally; No wheeze  HEART: Regular rate and rhythm; No murmurs, rubs, or gallops  ABDOMEN: Soft, Nontender, Nondistended; Bowel sounds present  EXTREMITIES:  2+ Peripheral Pulses, No clubbing, cyanosis, or edema  PSYCH: AAOx3  NEUROLOGY: non-focal  SKIN: No rashes or lesions    LABS:                        10.0   9.4   )-----------( 430      ( 14 Jul 2017 07:21 )             31.8     07-14    138  |  105  |  10  ----------------------------<  74  3.4<L>   |  17<L>  |  0.98    Ca    8.7      14 Jul 2017 07:21  Phos  2.1     07-13  Mg     1.4     07-14    TPro  5.4<L>  /  Alb  2.9<L>  /  TBili  0.3  /  DBili  0.1  /  AST  24  /  ALT  39  /  AlkPhos  177<H>  07-13              RADIOLOGY & ADDITIONAL TESTS:    Imaging Personally Reviewed:    Consultant(s) Notes Reviewed:      Care Discussed with Consultants/Other Providers:

## 2017-07-14 NOTE — PROGRESS NOTE ADULT - SUBJECTIVE AND OBJECTIVE BOX
********VASCULAR MEDICINE AND CARDIOLOGY PROGRESS NOTE********                            CC:  Syncope    INTERVAL HISTORY:  Feels well this morning.  She was started on metoprolol 12.5mg BID without any additional episodes of PAT.  She is in sinus.  No CP or shortness of breath.  She wants to go home.  Refusing HSQ.          HISTORY OF PRESENT ILLNESS:  HPI:  NIGHT HOSPITALIST:  Patient UNKNOWN to me previously, assigned to me at this point via the ER and by Dr. 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 Alvarado), with a recent discharge from [place] in June, 2017 for hypotension and upper GI bleed requiring multiple unit transfusion requirements and MICU admission--patient with prior PE in May, 2017 on Coumadin, but AC was discontinued in June, 2017 with IVC filter placed--EGD with nonbleeding gastric ulcer and cauterized DU and hemoclips, with patient transferred to [facility] and was discharged recently to home, with the patient self referring following a syncopal episode with the patient slid down from a chair with daughter noting generalized stiffness then shaking with faecal and urinary incontinence.  Patient herself does not recall events.  No HA, no focal weakness.  No chest pain/pressure.  No dyspnea.  No abdominal pain, no red blood per rectum or melena.  No hematuria, no dysuria but with increased frequency.  Remaining review of systems not contributory. (09 Jul 2017 22:40)          Allergies    No Known Allergies    Intolerances    	    MEDICATIONS:  losartan 50 milliGRAM(s) Oral daily  aspirin enteric coated 81 milliGRAM(s) Oral daily  heparin  Injectable 5000 Unit(s) SubCutaneous every 12 hours  metoprolol 12.5 milliGRAM(s) Oral two times a day      ALBUTerol/ipratropium for Nebulization 3 milliLiter(s) Nebulizer every 6 hours PRN  tiotropium 18 MICROgram(s) Capsule 1 Capsule(s) Inhalation daily  buDESOnide 160 MICROgram(s)/formoterol 4.5 MICROgram(s) Inhaler 2 Puff(s) Inhalation two times a day    acetaminophen   Tablet. 650 milliGRAM(s) Oral every 6 hours PRN    famotidine    Tablet 20 milliGRAM(s) Oral two times a day  pantoprazole    Tablet 40 milliGRAM(s) Oral two times a day before meals    atorvastatin 20 milliGRAM(s) Oral at bedtime    multivitamin 1 Tablet(s) Oral daily  magnesium oxide 400 milliGRAM(s) Oral three times a day with meals  potassium chloride    Tablet ER 40 milliEquivalent(s) Oral once  magnesium sulfate  IVPB 2 Gram(s) IV Intermittent once      PAST MEDICAL & SURGICAL HISTORY:  Lung abscess  PE (pulmonary thromboembolism)  Rebound headache  Chronic kidney disease, unspecified stage  Hyperlipidemia, unspecified hyperlipidemia type  COPD (chronic obstructive pulmonary disease)  Hypertension  No significant past surgical history      FAMILY HISTORY:  Family history of COPD (chronic obstructive pulmonary disease) (Father)      SOCIAL HISTORY:  unchanged    REVIEW OF SYSTEMS:  CONSTITUTIONAL: No fever, weight loss, or fatigue  EYES: No eye pain, visual disturbances, or discharge  ENMT:  No difficulty hearing, tinnitus, vertigo; No sinus or throat pain  NECK: No pain or stiffness  RESPIRATORY: No cough, wheezing, chills or hemoptysis; No Shortness of Breath  CARDIOVASCULAR: No chest pain, palpitations, passing out, dizziness, or leg swelling  GASTROINTESTINAL: No abdominal or epigastric pain. No nausea, vomiting, or hematemesis; No diarrhea or constipation. No melena or hematochezia.  GENITOURINARY: No dysuria, frequency, hematuria, or incontinence  NEUROLOGICAL: No headaches, memory loss, loss of strength, numbness, or tremors  SKIN: No itching, burning, rashes, or lesions   LYMPH Nodes: No enlarged glands  ENDOCRINE: No heat or cold intolerance; No hair loss  MUSCULOSKELETAL: No joint pain or swelling; No muscle, back, or extremity pain  PSYCHIATRIC: No depression, anxiety, mood swings, or difficulty sleeping  HEME/LYMPH: No easy bruising, or bleeding gums  ALLERY AND IMMUNOLOGIC: No hives or eczema	    [ x] All others negative	  [ ] Unable to obtain    PHYSICAL EXAM:  T(C): 36.4 (07-14-17 @ 04:01), Max: 36.6 (07-13-17 @ 21:17)  HR: 71 (07-14-17 @ 06:25) (68 - 82)  BP: 170/82 (07-14-17 @ 06:25) (113/68 - 170/82)  RR: 18 (07-14-17 @ 04:01) (17 - 18)  SpO2: 96% (07-14-17 @ 04:01) (96% - 99%)  Wt(kg): --  I&O's Summary    13 Jul 2017 07:01  -  14 Jul 2017 07:00  --------------------------------------------------------  IN: 1020 mL / OUT: 0 mL / NET: 1020 mL        Appearance: Normal	  HEENT:   Normal oral mucosa, PERRL, EOMI	  Lymphatic: No lymphadenopathy  Cardiovascular: Normal S1 S2, No JVD, No murmurs, No edema  Respiratory: Lungs clear to auscultation	  Psychiatry: A & O x 3, Mood & affect appropriate  Gastrointestinal:  Soft, Non-tender, + BS	  Skin: No rashes, No ecchymoses, No cyanosis	  Neurologic: Non-focal  Extremities: Normal range of motion, No clubbing, cyanosis or edema  Vascular: Peripheral pulses palpable 2+ bilaterally      LABS:	 	    CBC Full  -  ( 14 Jul 2017 07:21 )  WBC Count : 9.4 K/uL  Hemoglobin : 10.0 g/dL  Hematocrit : 31.8 %  Platelet Count - Automated : 430 K/uL  Mean Cell Volume : 97.8 fl  Mean Cell Hemoglobin : 30.8 pg  Mean Cell Hemoglobin Concentration : 31.6 gm/dL  Auto Neutrophil # : x  Auto Lymphocyte # : x  Auto Monocyte # : x  Auto Eosinophil # : x  Auto Basophil # : x  Auto Neutrophil % : x  Auto Lymphocyte % : x  Auto Monocyte % : x  Auto Eosinophil % : x  Auto Basophil % : x    07-14    138  |  105  |  10  ----------------------------<  74  3.4<L>   |  17<L>  |  0.98  07-13    138  |  106  |  10  ----------------------------<  83  3.9   |  17<L>  |  0.98    Ca    8.7      14 Jul 2017 07:21  Ca    9.0      13 Jul 2017 07:02  Phos  2.1     07-13  Mg     1.4     07-14  Mg     1.5     07-13    TPro  5.4<L>  /  Alb  2.9<L>  /  TBili  0.3  /  DBili  0.1  /  AST  24  /  ALT  39  /  AlkPhos  177<H>  07-13

## 2017-07-31 ENCOUNTER — INPATIENT (INPATIENT)
Facility: HOSPITAL | Age: 82
LOS: 3 days | End: 2017-08-04
Attending: INTERNAL MEDICINE | Admitting: INTERNAL MEDICINE
Payer: MEDICARE

## 2017-07-31 VITALS — DIASTOLIC BLOOD PRESSURE: 49 MMHG | RESPIRATION RATE: 30 BRPM | SYSTOLIC BLOOD PRESSURE: 90 MMHG | HEART RATE: 105 BPM

## 2017-07-31 DIAGNOSIS — I26.99 OTHER PULMONARY EMBOLISM WITHOUT ACUTE COR PULMONALE: ICD-10-CM

## 2017-07-31 DIAGNOSIS — I87.1 COMPRESSION OF VEIN: Chronic | ICD-10-CM

## 2017-07-31 DIAGNOSIS — J44.9 CHRONIC OBSTRUCTIVE PULMONARY DISEASE, UNSPECIFIED: ICD-10-CM

## 2017-07-31 DIAGNOSIS — Z71.89 OTHER SPECIFIED COUNSELING: ICD-10-CM

## 2017-07-31 DIAGNOSIS — R06.00 DYSPNEA, UNSPECIFIED: ICD-10-CM

## 2017-07-31 LAB
ALBUMIN SERPL ELPH-MCNC: 2.1 G/DL — LOW (ref 3.3–5)
ALBUMIN SERPL ELPH-MCNC: 2.5 G/DL — LOW (ref 3.3–5)
ALP SERPL-CCNC: 171 U/L — HIGH (ref 40–120)
ALP SERPL-CCNC: 206 U/L — HIGH (ref 40–120)
ALT FLD-CCNC: 16 U/L — SIGNIFICANT CHANGE UP (ref 4–33)
ALT FLD-CCNC: 20 U/L — SIGNIFICANT CHANGE UP (ref 4–33)
APTT BLD: 29.2 SEC — SIGNIFICANT CHANGE UP (ref 27.5–37.4)
AST SERPL-CCNC: 106 U/L — HIGH (ref 4–32)
AST SERPL-CCNC: 37 U/L — HIGH (ref 4–32)
BASE EXCESS BLDV CALC-SCNC: -19 MMOL/L — SIGNIFICANT CHANGE UP
BASE EXCESS BLDV CALC-SCNC: -20.7 MMOL/L — SIGNIFICANT CHANGE UP
BASOPHILS # BLD AUTO: 0.01 K/UL — SIGNIFICANT CHANGE UP (ref 0–0.2)
BASOPHILS NFR BLD AUTO: 0.1 % — SIGNIFICANT CHANGE UP (ref 0–2)
BASOPHILS NFR SPEC: 0 % — SIGNIFICANT CHANGE UP (ref 0–2)
BILIRUB SERPL-MCNC: 0.2 MG/DL — SIGNIFICANT CHANGE UP (ref 0.2–1.2)
BILIRUB SERPL-MCNC: 0.3 MG/DL — SIGNIFICANT CHANGE UP (ref 0.2–1.2)
BLOOD GAS VENOUS - CREATININE: 4.12 MG/DL — HIGH (ref 0.5–1.3)
BLOOD GAS VENOUS - CREATININE: SIGNIFICANT CHANGE UP MG/DL (ref 0.5–1.3)
BUN SERPL-MCNC: 38 MG/DL — HIGH (ref 7–23)
BUN SERPL-MCNC: 43 MG/DL — HIGH (ref 7–23)
BURR CELLS BLD QL SMEAR: PRESENT — SIGNIFICANT CHANGE UP
CALCIUM SERPL-MCNC: 6.3 MG/DL — CRITICAL LOW (ref 8.4–10.5)
CALCIUM SERPL-MCNC: 6.9 MG/DL — LOW (ref 8.4–10.5)
CHLORIDE BLDV-SCNC: 101 MMOL/L — SIGNIFICANT CHANGE UP (ref 96–108)
CHLORIDE BLDV-SCNC: 104 MMOL/L — SIGNIFICANT CHANGE UP (ref 96–108)
CHLORIDE SERPL-SCNC: 95 MMOL/L — LOW (ref 98–107)
CHLORIDE SERPL-SCNC: 99 MMOL/L — SIGNIFICANT CHANGE UP (ref 98–107)
CK MB BLD-MCNC: 6.57 NG/ML — HIGH (ref 1–4.7)
CK SERPL-CCNC: 212 U/L — HIGH (ref 25–170)
CO2 SERPL-SCNC: 7 MMOL/L — CRITICAL LOW (ref 22–31)
CO2 SERPL-SCNC: 8 MMOL/L — CRITICAL LOW (ref 22–31)
CREAT SERPL-MCNC: 3.15 MG/DL — HIGH (ref 0.5–1.3)
CREAT SERPL-MCNC: 3.86 MG/DL — HIGH (ref 0.5–1.3)
EOSINOPHIL # BLD AUTO: 0 K/UL — SIGNIFICANT CHANGE UP (ref 0–0.5)
EOSINOPHIL NFR BLD AUTO: 0 % — SIGNIFICANT CHANGE UP (ref 0–6)
EOSINOPHIL NFR FLD: 0 % — SIGNIFICANT CHANGE UP (ref 0–6)
GAS PNL BLDV: 127 MMOL/L — LOW (ref 136–146)
GAS PNL BLDV: 129 MMOL/L — LOW (ref 136–146)
GIANT PLATELETS BLD QL SMEAR: PRESENT — SIGNIFICANT CHANGE UP
GLUCOSE BLDV-MCNC: 91 — SIGNIFICANT CHANGE UP (ref 70–99)
GLUCOSE BLDV-MCNC: 92 — SIGNIFICANT CHANGE UP (ref 70–99)
GLUCOSE SERPL-MCNC: 85 MG/DL — SIGNIFICANT CHANGE UP (ref 70–99)
GLUCOSE SERPL-MCNC: 88 MG/DL — SIGNIFICANT CHANGE UP (ref 70–99)
HCO3 BLDV-SCNC: 10 MMOL/L — LOW (ref 20–27)
HCO3 BLDV-SCNC: 9 MMOL/L — LOW (ref 20–27)
HCT VFR BLD CALC: 34.4 % — LOW (ref 34.5–45)
HCT VFR BLDV CALC: 32.2 % — LOW (ref 34.5–45)
HCT VFR BLDV CALC: 38.1 % — SIGNIFICANT CHANGE UP (ref 34.5–45)
HGB BLD-MCNC: 11.6 G/DL — SIGNIFICANT CHANGE UP (ref 11.5–15.5)
HGB BLDV-MCNC: 10.4 G/DL — LOW (ref 11.5–15.5)
HGB BLDV-MCNC: 12.4 G/DL — SIGNIFICANT CHANGE UP (ref 11.5–15.5)
IMM GRANULOCYTES # BLD AUTO: 0.25 # — SIGNIFICANT CHANGE UP
IMM GRANULOCYTES NFR BLD AUTO: 1.3 % — SIGNIFICANT CHANGE UP (ref 0–1.5)
INR BLD: 1.01 — SIGNIFICANT CHANGE UP (ref 0.88–1.17)
LACTATE BLDV-MCNC: 3.8 MMOL/L — HIGH (ref 0.5–2)
LACTATE BLDV-MCNC: 6.9 MMOL/L — CRITICAL HIGH (ref 0.5–2)
LYMPHOCYTES # BLD AUTO: 0.86 K/UL — LOW (ref 1–3.3)
LYMPHOCYTES # BLD AUTO: 4.5 % — LOW (ref 13–44)
LYMPHOCYTES NFR SPEC AUTO: 4.3 % — LOW (ref 13–44)
MACROCYTES BLD QL: SLIGHT — SIGNIFICANT CHANGE UP
MCHC RBC-ENTMCNC: 31.1 PG — SIGNIFICANT CHANGE UP (ref 27–34)
MCHC RBC-ENTMCNC: 33.7 % — SIGNIFICANT CHANGE UP (ref 32–36)
MCV RBC AUTO: 92.2 FL — SIGNIFICANT CHANGE UP (ref 80–100)
MONOCYTES # BLD AUTO: 1.99 K/UL — HIGH (ref 0–0.9)
MONOCYTES NFR BLD AUTO: 10.4 % — SIGNIFICANT CHANGE UP (ref 2–14)
MONOCYTES NFR BLD: 6.1 % — SIGNIFICANT CHANGE UP (ref 2–9)
NEUTROPHIL AB SER-ACNC: 83.5 % — HIGH (ref 43–77)
NEUTROPHILS # BLD AUTO: 16.08 K/UL — HIGH (ref 1.8–7.4)
NEUTROPHILS NFR BLD AUTO: 83.7 % — HIGH (ref 43–77)
NEUTS BAND # BLD: 6.1 % — HIGH (ref 0–6)
NRBC # FLD: 0.02 — SIGNIFICANT CHANGE UP
NT-PROBNP SERPL-SCNC: 1642 PG/ML — SIGNIFICANT CHANGE UP
PCO2 BLDV: 26 MMHG — LOW (ref 41–51)
PCO2 BLDV: 31 MMHG — LOW (ref 41–51)
PH BLDV: 7.08 PH — CRITICAL LOW (ref 7.32–7.43)
PH BLDV: 7.09 PH — CRITICAL LOW (ref 7.32–7.43)
PLATELET # BLD AUTO: 295 K/UL — SIGNIFICANT CHANGE UP (ref 150–400)
PLATELET COUNT - ESTIMATE: NORMAL — SIGNIFICANT CHANGE UP
PMV BLD: 10.7 FL — SIGNIFICANT CHANGE UP (ref 7–13)
PO2 BLDV: 34 MMHG — LOW (ref 35–40)
PO2 BLDV: 47 MMHG — HIGH (ref 35–40)
POLYCHROMASIA BLD QL SMEAR: SLIGHT — SIGNIFICANT CHANGE UP
POTASSIUM BLDV-SCNC: 3.2 MMOL/L — LOW (ref 3.4–4.5)
POTASSIUM BLDV-SCNC: 4.8 MMOL/L — HIGH (ref 3.4–4.5)
POTASSIUM SERPL-MCNC: 4 MMOL/L — SIGNIFICANT CHANGE UP (ref 3.5–5.3)
POTASSIUM SERPL-MCNC: SIGNIFICANT CHANGE UP MMOL/L (ref 3.5–5.3)
POTASSIUM SERPL-SCNC: 4 MMOL/L — SIGNIFICANT CHANGE UP (ref 3.5–5.3)
POTASSIUM SERPL-SCNC: SIGNIFICANT CHANGE UP MMOL/L (ref 3.5–5.3)
PROT SERPL-MCNC: 5 G/DL — LOW (ref 6–8.3)
PROT SERPL-MCNC: 5.1 G/DL — LOW (ref 6–8.3)
PROTHROM AB SERPL-ACNC: 11.3 SEC — SIGNIFICANT CHANGE UP (ref 9.8–13.1)
RBC # BLD: 3.73 M/UL — LOW (ref 3.8–5.2)
RBC # FLD: 20.7 % — HIGH (ref 10.3–14.5)
REVIEW TO FOLLOW: YES — SIGNIFICANT CHANGE UP
SAO2 % BLDV: 50.4 % — LOW (ref 60–85)
SAO2 % BLDV: 62.9 % — SIGNIFICANT CHANGE UP (ref 60–85)
SODIUM SERPL-SCNC: 131 MMOL/L — LOW (ref 135–145)
SODIUM SERPL-SCNC: 137 MMOL/L — SIGNIFICANT CHANGE UP (ref 135–145)
TROPONIN T SERPL-MCNC: < 0.06 NG/ML — SIGNIFICANT CHANGE UP (ref 0–0.06)
WBC # BLD: 19.19 K/UL — HIGH (ref 3.8–10.5)
WBC # FLD AUTO: 19.19 K/UL — HIGH (ref 3.8–10.5)

## 2017-07-31 PROCEDURE — 99223 1ST HOSP IP/OBS HIGH 75: CPT | Mod: GC

## 2017-07-31 PROCEDURE — 71010: CPT | Mod: 26

## 2017-07-31 PROCEDURE — 99223 1ST HOSP IP/OBS HIGH 75: CPT

## 2017-07-31 RX ORDER — KETAMINE HYDROCHLORIDE 100 MG/ML
200 INJECTION INTRAMUSCULAR; INTRAVENOUS ONCE
Qty: 0 | Refills: 0 | Status: DISCONTINUED | OUTPATIENT
Start: 2017-07-31 | End: 2017-07-31

## 2017-07-31 RX ORDER — SODIUM CHLORIDE 9 MG/ML
1000 INJECTION INTRAMUSCULAR; INTRAVENOUS; SUBCUTANEOUS ONCE
Qty: 0 | Refills: 0 | Status: COMPLETED | OUTPATIENT
Start: 2017-07-31 | End: 2017-07-31

## 2017-07-31 RX ORDER — HYDROMORPHONE HYDROCHLORIDE 2 MG/ML
0.5 INJECTION INTRAMUSCULAR; INTRAVENOUS; SUBCUTANEOUS
Qty: 0 | Refills: 0 | Status: DISCONTINUED | OUTPATIENT
Start: 2017-07-31 | End: 2017-08-04

## 2017-07-31 RX ORDER — IPRATROPIUM/ALBUTEROL SULFATE 18-103MCG
3 AEROSOL WITH ADAPTER (GRAM) INHALATION ONCE
Qty: 0 | Refills: 0 | Status: COMPLETED | OUTPATIENT
Start: 2017-07-31 | End: 2017-07-31

## 2017-07-31 RX ORDER — VANCOMYCIN HCL 1 G
1000 VIAL (EA) INTRAVENOUS ONCE
Qty: 0 | Refills: 0 | Status: COMPLETED | OUTPATIENT
Start: 2017-07-31 | End: 2017-07-31

## 2017-07-31 RX ORDER — MORPHINE SULFATE 50 MG/1
4 CAPSULE, EXTENDED RELEASE ORAL ONCE
Qty: 0 | Refills: 0 | Status: DISCONTINUED | OUTPATIENT
Start: 2017-07-31 | End: 2017-07-31

## 2017-07-31 RX ORDER — SODIUM CHLORIDE 9 MG/ML
500 INJECTION INTRAMUSCULAR; INTRAVENOUS; SUBCUTANEOUS ONCE
Qty: 0 | Refills: 0 | Status: COMPLETED | OUTPATIENT
Start: 2017-07-31 | End: 2017-07-31

## 2017-07-31 RX ORDER — MAGNESIUM SULFATE 500 MG/ML
2 VIAL (ML) INJECTION ONCE
Qty: 0 | Refills: 0 | Status: COMPLETED | OUTPATIENT
Start: 2017-07-31 | End: 2017-07-31

## 2017-07-31 RX ORDER — PIPERACILLIN AND TAZOBACTAM 4; .5 G/20ML; G/20ML
3.38 INJECTION, POWDER, LYOPHILIZED, FOR SOLUTION INTRAVENOUS ONCE
Qty: 0 | Refills: 0 | Status: COMPLETED | OUTPATIENT
Start: 2017-07-31 | End: 2017-07-31

## 2017-07-31 RX ORDER — SODIUM BICARBONATE 1 MEQ/ML
50 SYRINGE (ML) INTRAVENOUS ONCE
Qty: 0 | Refills: 0 | Status: DISCONTINUED | OUTPATIENT
Start: 2017-07-31 | End: 2017-07-31

## 2017-07-31 RX ORDER — SODIUM CHLORIDE 9 MG/ML
1000 INJECTION, SOLUTION INTRAVENOUS
Qty: 0 | Refills: 0 | Status: DISCONTINUED | OUTPATIENT
Start: 2017-07-31 | End: 2017-08-01

## 2017-07-31 RX ADMIN — SODIUM CHLORIDE 1000 MILLILITER(S): 9 INJECTION INTRAMUSCULAR; INTRAVENOUS; SUBCUTANEOUS at 13:21

## 2017-07-31 RX ADMIN — HYDROMORPHONE HYDROCHLORIDE 0.5 MILLIGRAM(S): 2 INJECTION INTRAMUSCULAR; INTRAVENOUS; SUBCUTANEOUS at 19:20

## 2017-07-31 RX ADMIN — Medication 125 MILLIGRAM(S): at 11:49

## 2017-07-31 RX ADMIN — SODIUM CHLORIDE 3000 MILLILITER(S): 9 INJECTION INTRAMUSCULAR; INTRAVENOUS; SUBCUTANEOUS at 17:39

## 2017-07-31 RX ADMIN — Medication 0.5 MILLIGRAM(S): at 19:20

## 2017-07-31 RX ADMIN — PIPERACILLIN AND TAZOBACTAM 200 GRAM(S): 4; .5 INJECTION, POWDER, LYOPHILIZED, FOR SOLUTION INTRAVENOUS at 16:31

## 2017-07-31 RX ADMIN — Medication 3 MILLILITER(S): at 11:52

## 2017-07-31 RX ADMIN — SODIUM CHLORIDE 500 MILLILITER(S): 9 INJECTION INTRAMUSCULAR; INTRAVENOUS; SUBCUTANEOUS at 14:24

## 2017-07-31 RX ADMIN — HYDROMORPHONE HYDROCHLORIDE 0.5 MILLIGRAM(S): 2 INJECTION INTRAMUSCULAR; INTRAVENOUS; SUBCUTANEOUS at 16:31

## 2017-07-31 RX ADMIN — Medication 0.5 MILLIGRAM(S): at 15:28

## 2017-07-31 RX ADMIN — HYDROMORPHONE HYDROCHLORIDE 0.5 MILLIGRAM(S): 2 INJECTION INTRAMUSCULAR; INTRAVENOUS; SUBCUTANEOUS at 15:28

## 2017-07-31 RX ADMIN — MORPHINE SULFATE 4 MILLIGRAM(S): 50 CAPSULE, EXTENDED RELEASE ORAL at 14:22

## 2017-07-31 RX ADMIN — HYDROMORPHONE HYDROCHLORIDE 0.5 MILLIGRAM(S): 2 INJECTION INTRAMUSCULAR; INTRAVENOUS; SUBCUTANEOUS at 21:58

## 2017-07-31 RX ADMIN — MORPHINE SULFATE 4 MILLIGRAM(S): 50 CAPSULE, EXTENDED RELEASE ORAL at 16:31

## 2017-07-31 RX ADMIN — Medication 50 GRAM(S): at 11:51

## 2017-07-31 RX ADMIN — SODIUM CHLORIDE 100 MILLILITER(S): 9 INJECTION, SOLUTION INTRAVENOUS at 19:20

## 2017-07-31 RX ADMIN — HYDROMORPHONE HYDROCHLORIDE 0.5 MILLIGRAM(S): 2 INJECTION INTRAMUSCULAR; INTRAVENOUS; SUBCUTANEOUS at 21:43

## 2017-07-31 RX ADMIN — Medication 0.5 MILLIGRAM(S): at 23:41

## 2017-07-31 RX ADMIN — Medication 250 MILLIGRAM(S): at 16:31

## 2017-07-31 NOTE — CONSULT NOTE ADULT - PROBLEM SELECTOR RECOMMENDATION 9
LORazepam   Injectable 0.5 milliGRAM(s) IV Push every 2 hours PRN Anxiety  HYDROmorphone  Injectable 0.5 milliGRAM(s) IV Push every 2 hours PRN dyspnea  Avoid morphine due to CKD, plan discussed in detail with both daughters separately who both agree: would like to c/w bipap, low tolerance for a drip should symptoms remain unmanaged

## 2017-07-31 NOTE — H&P ADULT - ASSESSMENT
83 yo PMH COPD, SARITA (non-adherent) to BIPAP, CKD, s/p recent hospitalization requiring MICU admission at [place] for 1 month for GI bleed was brought in by ambulance with granddaughter for worsening shortness of breath found to be in shock with severe metabolic acidosis, poor mental status, hypoxic resp failure now on BIPAP    Shock 81 yo PMH COPD, SARITA (non-adherent) to BIPAP, CKD, s/p recent hospitalization requiring MICU admission at [place] for 1 month for GI bleed was brought in by ambulance with granddaughter for worsening shortness of breath found to be in shock with severe metabolic acidosis, poor mental status, hypoxic resp failure now on BIPAP    Shock likely 2/2 sepsis w/ encephalopathy  MICU evaluated. Pt now DNR/ DNI and comfort care  Will d/c abx   No further labs/ blood draws/ testing    Hypoxic resp failure of unclear etiology  Pt now comfort care  BIPAP as tolerated  No further antibiotics at this time    High anion gap metabolic acidosis  Likely 2/2 shock  Lactate elevated  No need for further trending/treatment at this time 81 yo PMH COPD, SARITA (non-adherent) to BIPAP, CKD, s/p recent hospitalization requiring MICU admission at [place] for 1 month for GI bleed was brought in by ambulance with granddaughter for worsening shortness of breath found to be in shock with severe metabolic acidosis, poor mental status, hypoxic resp failure now on BIPAP    Shock likely 2/2 sepsis w/ encephalopathy  MICU evaluated. Pt now DNR/ DNI and comfort care  Will d/c abx   No further labs/ blood draws/ testing    Hypoxic resp failure of unclear etiology ? worsening PE, pneumonia   Pt now comfort care  BIPAP as tolerated  No further antibiotics at this time    High anion gap metabolic acidosis  Likely 2/2 shock  Lactate elevated  No need for further trending/treatment at this time    TIARRA likely 2/2 septic shock  Improved w/ IVF'  No further mo nitoring    Hx of PE  No AC at this time  Pt also had hx of significant GI bleed 83 yo PMH COPD, SARITA (non-adherent) to BIPAP, CKD, s/p recent hospitalization requiring MICU admission at [place] for 1 month for GI bleed was brought in by ambulance with granddaughter for worsening shortness of breath found to be in shock with severe metabolic acidosis, poor mental status, hypoxic resp failure now on BIPAP    Shock likely 2/2 sepsis w/ encephalopathy, TIARRA, transaminitis  MICU evaluated. Pt now DNR/ DNI and comfort care  Will d/c abx   No further labs/ blood draws/ testing    Hypoxic resp failure of unclear etiology ? worsening PE, pneumonia   Pt now comfort care  BIPAP as tolerated  No further antibiotics at this time    High anion gap metabolic acidosis  Likely 2/2 shock  Lactate elevated, pt also w/ TIARRA and transaminitis all likely related to shock  No need for further trending/treatment at this time    TIARRA likely 2/2 septic shock  Improved w/ IVF'  No further mo nitoring    Hx of PE  No AC at this time  Pt also had hx of significant GI bleed    Pt is DNR/ DNI comfort care  Pall care recs appreciated   C/w current ativan, dilaudid  BIPAP  No further testign/work-up 83 yo PMH COPD, SARITA (non-adherent) to BIPAP, CKD, s/p recent hospitalization requiring MICU admission at [place] for 1 month for GI bleed was brought in by ambulance with granddaughter for worsening shortness of breath found to be in shock with severe metabolic acidosis, poor mental status, hypoxic resp failure now on BIPAP    Shock likely 2/2 sepsis w/ encephalopathy, TIARRA, transaminitis  MICU evaluated. Pt now DNR/ DNI and comfort care  Will d/c abx   No further labs/ blood draws/ testing    Hypoxic resp failure of unclear etiology ? worsening PE, pneumonia   Pt now comfort care  BIPAP as tolerated  No further antibiotics at this time    High anion gap metabolic acidosis  Likely 2/2 shock  Lactate elevated, pt also w/ TIARRA and transaminitis all likely related to shock  No need for further trending/treatment at this time    TIARRA likely 2/2 septic shock  Mild improvement w/ IVF'  No further mo nitoring    Hx of PE  No AC at this time  Pt also had hx of significant GI bleed    Pt is DNR/ DNI comfort care  Pall care recs appreciated   C/w current ativan, dilaudid  BIPAP  No further testing/work-up 83 yo PMH COPD, SARITA (non-adherent) to BIPAP, CKD, s/p recent hospitalization requiring MICU admission at [place] for 1 month for GI bleed was brought in by ambulance with granddaughter for worsening shortness of breath found to be in shock with severe metabolic acidosis, poor mental status, hypoxic resp failure now on BIPAP    Shock likely 2/2 sepsis w/ encephalopathy, TIARRA, transaminitis  MICU evaluated. Pt now DNR/ DNI and comfort care  Will d/c abx   No further labs/ blood draws/ testing    Hypoxic resp failure of unclear etiology ? worsening PE, ? pneumonia CXR shows haziness at R CP angle  Pt now comfort care  BIPAP as tolerated  No further antibiotics at this time    High anion gap metabolic acidosis  Likely 2/2 shock  Lactate elevated, pt also w/ TIARRA and transaminitis all likely related to shock  No need for further trending/treatment at this time    TIARRA likely 2/2 septic shock  Mild improvement w/ IVF'  No further mo nitoring    Hx of PE  No AC at this time  Pt also had hx of significant GI bleed    Pt is DNR/ DNI comfort care  Pall care recs appreciated   C/w current ativan, dilaudid  BIPAP  No further testing/work-up 83 yo PMH COPD, SARITA (non-adherent) to BIPAP, CKD, s/p recent hospitalization requiring MICU admission at [place] for 1 month for GI bleed was brought in by ambulance with granddaughter for worsening shortness of breath found to be in shock with severe metabolic acidosis, poor mental status, hypoxic resp failure now on BIPAP    Shock likely 2/2 sepsis vs obstructive from PE. Pt w/ encephalopathy, TIARRA, transaminitis  MICU evaluated. Pt now DNR/ DNI and comfort care  Will d/c abx   No further labs/ blood draws/ testing    Hypoxic resp failure of unclear etiology ? worsening PE, ? pneumonia CXR shows haziness at R CP angle  Pt now comfort care  BIPAP as tolerated  No further antibiotics at this time    High anion gap metabolic acidosis  Likely 2/2 shock  Lactate elevated, pt also w/ TIARRA and transaminitis all likely related to shock  No need for further trending/treatment at this time    TIARRA likely 2/2 septic shock  Mild improvement w/ IVF'  No further mo nitoring    Hx of PE  No AC at this time  Pt also had hx of significant GI bleed    Pt is DNR/ DNI comfort care  Pall care recs appreciated   C/w current ativan, dilaudid  BIPAP  No further testing/work-up

## 2017-07-31 NOTE — H&P ADULT - HISTORY OF PRESENT ILLNESS
83yo PMH COPD, SARITA (non-adherent) to BIPAP, CKD, s/p recent hospitalization requiring MICU admission at [place] for 1 month for GI bleed was  brought in by ambulance with granddaughter for worsening shortness of breath. As per family, pt has had poor appetite and po and worsening shortness of breath especially today. In the ED, pt was afebrile T 35.9. hypotensive to SBP 78/52, tachycardic to 120, tachypneic with oscillating pulse ox 70-90% on bipap. MICU consulted and family requested DNR/ DNI. Pall care consulted and patient transitioned to comfort care.     Pt was started on morphine drip and on BIPAP. She was not mentating at time of my evaluation. Her daughter and nieces were at bedside. She appeared comfortable. They would like no aggressive measures, labs, further testing. 81 yo PMH COPD, SARITA (non-adherent) to BIPAP, CKD, s/p recent hospitalization requiring MICU admission at [place] for 1 month for GI bleed was  brought in by ambulance with granddaughter for worsening shortness of breath. As per family, pt has had poor appetite and po and worsening shortness of breath especially today. In the ED, pt was afebrile T 35.9. hypotensive to SBP 78/52, tachycardic to 120, tachypneic with oscillating pulse ox 70-90% on bipap. MICU consulted and family requested DNR/ DNI. Pall care consulted and patient transitioned to comfort care.     Pt was started on morphine drip and on BIPAP. She was not mentating at time of my evaluation. Her daughter and nieces were at bedside. She appeared comfortable. They would like no aggressive measures, labs, further testing. 81 yo PMH COPD, SARITA (non-adherent) to BIPAP, CKD, s/p recent hospitalization requiring MICU admission at [place] for 1 month for GI bleed was  brought in by ambulance with granddaughter for worsening shortness of breath. As per family, pt has had poor appetite and po and worsening shortness of breath especially today. In the ED, pt was afebrile T 35.9. hypotensive to SBP 78/52, tachycardic to 120, tachypneic with oscillating pulse ox 70-90% on bipap. Pt received 4L NS IVF, Vanc/zosyn, duonebs. MICU consulted and family requested DNR/ DNI. Pall care consulted and patient transitioned to comfort care.     Pt was started on morphine drip and on BIPAP. She was not mentating at time of my evaluation. Her daughter and nieces were at bedside. She appeared comfortable. They would like no aggressive measures, labs, further testing. 83 yo PMH COPD, SARITA (non-adherent) to BIPAP, CKD, s/p recent hospitalization requiring MICU admission at [place] for 1 month for GI bleed was  brought in by ambulance with granddaughter for worsening shortness of breath. As per family, pt has had poor appetite and po and worsening shortness of breath especially today. In the ED, pt was afebrile T 35.9. hypotensive to SBP 78/52, tachycardic to 120, tachypneic with oscillating pulse ox 70-90% on bipap. Pt received 4L NS IVF, Vanc/zosyn, duonebs. MICU consulted and family requested DNR/ DNI. Pall care consulted and patient transitioned to comfort care.     Pt was started on dilaudid for comfort and air hunger and placed on BIPAP. She was not mentating at time of my evaluation. Her daughter and nieces were at bedside. She appeared comfortable. They would like no aggressive measures, labs, further testing. 81 yo PMH COPD, SARITA (non-adherent) to BIPAP, CKD, s/p recent hospitalization requiring MICU admission at [place] for 1 month for GI bleed was  brought in by ambulance with granddaughter for worsening shortness of breath. As per family, pt has had poor appetite and po and worsening shortness of breath especially today. In the ED, pt was afebrile T 35.9. hypotensive to SBP 78/52, tachycardic to 120, tachypneic with oscillating pulse ox 70-90% on bipap. Pt received 4L NS IVF, Vanc/zosyn, duonebs. MICU consulted and family requested DNR/ DNI. Palliative care consulted and patient transitioned to comfort care.     Pt was started on dilaudid for comfort and air hunger and placed on BIPAP. She was not mentating at time of my evaluation. Her daughter and nieces were at bedside. She appeared comfortable. They would like no aggressive measures, labs, further testing.

## 2017-07-31 NOTE — ED PROVIDER NOTE - OBJECTIVE STATEMENT
Hx obtained from daughter Ruma at bedside (pt's healthy proxy):    81 y/o female with pmhx of COPD, former smoker, sleep apnea on noctural BIPAP, B/L PEs s/p IVC filter, GI bleed requiring 6 units s/p anti-coagulation, lung mass, pneumonia, presents to ED for respiratory distress. Pt has been not eating nor drinking the last few days. Dyspnea worsening since this morning. Pt was recently admitted one month ago and found with pneumonia. No longer on anti-coagulation. Pt is DNR. No fever, chills, cp, cough, palpitations, abdominal pain, weakness, dizziness, syncope.

## 2017-07-31 NOTE — H&P ADULT - NSHPPHYSICALEXAM_GEN_ALL_CORE
GENERAL: Well-developed, well nourished, wearing BIPAP appears comfortable  HEENT:  Atraumatic, Normocephalic, BIPAP in place unable to examine oropharynx  NECK: Supple, no JVD  CHEST/LUNG:   HEART: S1/S2, tachycardic; no  rubs, or gallops  ABDOMEN: Nondistended, NABS, soft,  nontender,   EXTREMITIES:  2+ Peripheral Pulses, no clubbing, cyanosis, or edema  PSYCH: unable to assess  NEUROLOGY: unable to assess due to mental status  SKIN: No rashes or lesions GENERAL: Well-developed, well nourished, wearing BIPAP appears comfortable  HEENT:  Atraumatic, Normocephalic, BIPAP in place unable to examine oropharynx  NECK: Supple, no JVD  CHEST/LUNG: rhonchi b/l  HEART: S1/S2, tachycardic; no  rubs, or gallops  ABDOMEN: Nondistended, NABS, soft,  nontender,   EXTREMITIES:  2+ Peripheral Pulses, no clubbing, cyanosis, or edema  PSYCH: unable to assess  NEUROLOGY: unable to assess due to mental status  SKIN: No rashes or lesions GENERAL: Well-developed, well nourished, wearing BIPAP appears comfortable  HEENT:  Atraumatic, Normocephalic, BIPAP in place unable to examine oropharynx  NECK: Supple, difficult to assess  CHEST/LUNG: clear anteriorly  HEART: S1/S2, tachycardic; no  rubs, or gallops  ABDOMEN: Nondistended, NABS, soft,  nontender,   EXTREMITIES:  2+ Peripheral Pulses, no clubbing, cyanosis, or edema  PSYCH: unable to assess  NEUROLOGY: unable to assess due to mental status  SKIN: No rashes or lesions

## 2017-07-31 NOTE — H&P ADULT - NSHPLABSRESULTS_GEN_ALL_CORE
Labs reviewed    leukocytosis  severe acidosis  AST elevated                        11.6   19.19 )-----------( 295      ( 31 Jul 2017 11:51 )             34.4     07-31    131<L>  |  99  |  38<H>  ----------------------------<  85  x    |  8<LL>  |  3.15<H>    Ca    6.3<LL>      31 Jul 2017 14:49    TPro  5.0<L>  /  Alb  2.1<L>  /  TBili  0.2  /  DBili  x   /  AST  106<H>  /  ALT  16  /  AlkPhos  171<H>  07-31    CARDIAC MARKERS ( 31 Jul 2017 11:51 )  x     / < 0.06 ng/mL / 212 u/L / 6.57 ng/mL / x          PT/INR - ( 31 Jul 2017 11:51 )   PT: 11.3 SEC;   INR: 1.01          PTT - ( 31 Jul 2017 11:51 )  PTT:29.2 SEC Labs reviewed    leukocytosis  severe metabolic acidosis w/ mild resp compensation  AST elevated                        11.6   19.19 )-----------( 295      ( 31 Jul 2017 11:51 )             34.4     07-31    131<L>  |  99  |  38<H>  ----------------------------<  85  x    |  8<LL>  |  3.15<H>    Ca    6.3<LL>      31 Jul 2017 14:49    TPro  5.0<L>  /  Alb  2.1<L>  /  TBili  0.2  /  DBili  x   /  AST  106<H>  /  ALT  16  /  AlkPhos  171<H>  07-31    CARDIAC MARKERS ( 31 Jul 2017 11:51 )  x     / < 0.06 ng/mL / 212 u/L / 6.57 ng/mL / x          PT/INR - ( 31 Jul 2017 11:51 )   PT: 11.3 SEC;   INR: 1.01                             11.6   19.19 )-----------( 295      ( 31 Jul 2017 11:51 )             34.4     07-31    131<L>  |  99  |  38<H>  ----------------------------<  85  x    |  8<LL>  |  3.15<H>    Ca    6.3<LL>      31 Jul 2017 14:49    TPro  5.0<L>  /  Alb  2.1<L>  /  TBili  0.2  /  DBili  x   /  AST  106<H>  /  ALT  16  /  AlkPhos  171<H>  07-31    CARDIAC MARKERS ( 31 Jul 2017 11:51 )  x     / < 0.06 ng/mL / 212 u/L / 6.57 ng/mL / x          PT/INR - ( 31 Jul 2017 11:51 )   PT: 11.3 SEC;   INR: 1.01          PTT - ( 31 Jul 2017 11:51 )  PTT:29.2 SEC Labs reviewed    leukocytosis  severe metabolic acidosis w/ mild resp compensation  AST elevated                        11.6   19.19 )-----------( 295      ( 31 Jul 2017 11:51 )             34.4     07-31    131<L>  |  99  |  38<H>  ----------------------------<  85  x    |  8<LL>  |  3.15<H>    Ca    6.3<LL>      31 Jul 2017 14:49    TPro  5.0<L>  /  Alb  2.1<L>  /  TBili  0.2  /  DBili  x   /  AST  106<H>  /  ALT  16  /  AlkPhos  171<H>  07-31    CARDIAC MARKERS ( 31 Jul 2017 11:51 )  x     / < 0.06 ng/mL / 212 u/L / 6.57 ng/mL / x          PT/INR - ( 31 Jul 2017 11:51 )   PT: 11.3 SEC;   INR: 1.01                             11.6 19.19 )-----------( 295      ( 31 Jul 2017 11:51 )             34.4     07-31    131<L>  |  99  |  38<H>  ----------------------------<  85  x    |  8<LL>  |  3.15<H>    Ca    6.3<LL>      31 Jul 2017 14:49    TPro  5.0<L>  /  Alb  2.1<L>  /  TBili  0.2  /  DBili  x   /  AST  106<H>  /  ALT  16  /  AlkPhos  171<H>  07-31    CARDIAC MARKERS ( 31 Jul 2017 11:51 )  x     / < 0.06 ng/mL / 212 u/L / 6.57 ng/mL / x          PT/INR - ( 31 Jul 2017 11:51 )   PT: 11.3 SEC;   INR: 1.01          PTT - ( 31 Jul 2017 11:51 )  PTT:29.2 SEC      CXR-                       11.6 19.19 )-----------( 295      ( 31 Jul 2017 11:51 )             34.4     07-31    131<L>  |  99  |  38<H>  ----------------------------<  85  x    |  8<LL>  |  3.15<H>    Ca    6.3<LL>      31 Jul 2017 14:49    TPro  5.0<L>  /  Alb  2.1<L>  /  TBili  0.2  /  DBili  x   /  AST  106<H>  /  ALT  16  /  AlkPhos  171<H>  07-31    CARDIAC MARKERS ( 31 Jul 2017 11:51 )  x     / < 0.06 ng/mL / 212 u/L / 6.57 ng/mL / x          PT/INR - ( 31 Jul 2017 11:51 )   PT: 11.3 SEC;   INR: 1.01          PTT - ( 31 Jul 2017 11:51 )  PTT:29.2 SEC    Chest xray - reviewed < from: Xray Chest 1 View AP-PORTABLE IMMEDIATE (07.31.17 @ 12:50) >    "External pacer pads overlies right hemithorax and left hemiabdomen.    Slightly hazy indistinct right CP angle could be due to overlying soft   tissues versus a small right pleural reaction. Sharp left CP angle. Clear   remaining visualized lungs. No pneumothorax.     Cardiac and mediastinal silhouettes within normal limits.    Trachea midline.    Generalized osteopenia and spinal degenerative changes with age   indeterminate mid thoracic level vertebral compression deformities. Right   shoulder rotator cuff arthropathy."    < end of copied text > Labs reviewed    leukocytosis  severe metabolic acidosis w/ mild resp compensation  AST elevated                        11.6   19.19 )-----------( 295      ( 31 Jul 2017 11:51 )             34.4     07-31    131<L>  |  99  |  38<H>  ----------------------------<  85  x    |  8<LL>  |  3.15<H>    Ca    6.3<LL>      31 Jul 2017 14:49    TPro  5.0<L>  /  Alb  2.1<L>  /  TBili  0.2  /  DBili  x   /  AST  106<H>  /  ALT  16  /  AlkPhos  171<H>  07-31    CARDIAC MARKERS ( 31 Jul 2017 11:51 )  x     / < 0.06 ng/mL / 212 u/L / 6.57 ng/mL / x          PT/INR - ( 31 Jul 2017 11:51 )   PT: 11.3 SEC;   INR: 1.01       PTT - ( 31 Jul 2017 11:51 )  PTT:29.2 SEC    Chest xray - reviewed < from: Xray Chest 1 View AP-PORTABLE IMMEDIATE (07.31.17 @ 12:50) >    "External pacer pads overlies right hemithorax and left hemiabdomen.    Slightly hazy indistinct right CP angle could be due to overlying soft   tissues versus a small right pleural reaction. Sharp left CP angle. Clear   remaining visualized lungs. No pneumothorax.     Cardiac and mediastinal silhouettes within normal limits.    Trachea midline.    Generalized osteopenia and spinal degenerative changes with age   indeterminate mid thoracic level vertebral compression deformities. Right   shoulder rotator cuff arthropathy."    < end of copied text >

## 2017-07-31 NOTE — CONSULT NOTE ADULT - ATTENDING COMMENTS
Patient seen with fellow.  Agree with above.  Goals are for comfort.  NO blood draws.  BIPAP as tolerated with understanding it may need to be discontinue.  Low threshold to start dilaudid gtt for dyspnea

## 2017-07-31 NOTE — CONSULT NOTE ADULT - PROBLEM SELECTOR RECOMMENDATION 4
Faith and MD daughter agree patient is DNR/I, no aggressive measures, no blood draws, do c/w bipap and medications to ease suffering, they understand this is the dying process

## 2017-07-31 NOTE — ED PROVIDER NOTE - PROGRESS NOTE DETAILS
BEHZAD Luz - as per daughter Ruma, declining any anti-coagulation. Ruma, patient's health proxy, consented for DNR verbally and on written form placed in chart. PA Gerasimou - pt responsive, alert and aware. pt improved on BIPAP, sating well, not as tachypneic as before. denies any pain, deferring medication. lungs clear b/l. son (Stefano): [phone], cell [phone] would like to be notified with any change in status old MR at [facility] [id number]; registration indicates can not merge chart BEHZAD Luz - pt evaluated by RICU, MICU and palliative care as per Dr. Rodarte. RICU declining possible admission. As per MICU, requesting CT abdomen however no concern for intra-abdominal infection as per Dr. Rodarte, abdomen nontender. As per health proxy daughter Ruma, would like palliative care only going forward. Pt currently stable on BIPAP, sating 100%, RR 16, HR 98. Denies any pain. Spoke with hospitalist Dr. Bharath Marquez and will admit to Medicine. MAR aware.

## 2017-07-31 NOTE — H&P ADULT - NSHPSOCIALHISTORY_GEN_ALL_CORE
Social hx deferred at this time. No hx of drug use. Social Hx not relevant to current admission Further hx deferred and declined.  No hx of drug use. Social Hx not relevant to current admission

## 2017-07-31 NOTE — ED PROVIDER NOTE - MEDICAL DECISION MAKING DETAILS
refer to attending note; refer to attending note;  _______  81 y/o female with pmhx of COPD, former smoker, sleep apnea on noctural BIPAP, B/L PEs s/p IVC filter, GI bleed requiring 6 units s/p anti-coagulation, lung mass, pneumonia, presents to ED for respiratory distress. pt placed on BIPAP, given 3 duonebs, Mg and solu-medrol and pt improved. as per health proxy, pt DNR. plan: palliative care - abx given hypotensive and recent pneumonia, BIPAP, fluids, labs, palliative consult. Will continue to monitor

## 2017-07-31 NOTE — ED PROVIDER NOTE - ATTENDING CONTRIBUTION TO CARE
82 yr old female BIBEMS for respiratory distress with limited hx provided by EMS and family (over the phone).  Hx of COPD. multiple PEs not on AC due to significant bleeding, IVC filter.  Per daughter has not been eating, moving, using restroom for last 2-3 days.  Per daughter she is HCP and pt is DNR/DNI.  Pt responds to name and questions and also affirms she does not want to be placed on ventilator.    PE: in severe respiratory distress on arrival on NRB sat 96; mentating; lethargic; opens eyes to her name; CTAB/L; tachypneic; tachycardic irregularly irregular; abd soft/NT/ND ext: no edema    placed on BIPAP with continuous nebs immediately with some improvement; family notified and are en route; CXR shows no consolidations or overload; is hypotensive and receiving IVF; when asked if she was in pain she shakes her head indication she is not; will be calling palliative care

## 2017-07-31 NOTE — CONSULT NOTE ADULT - PROBLEM SELECTOR RECOMMENDATION 3
history of intolerance to anticoagualtion, with osciallting Vitals and mental status, possible recurrent PE is a differential, still going through initial workup at ED

## 2017-07-31 NOTE — ED PROVIDER NOTE - CRITICAL CARE PROVIDED
documentation/direct patient care (not related to procedure)/telephone consultation with the patient's family/consultation with other physicians/conducted a detailed discussion of DNR status/consult w/ pt's family directly relating to pts condition/interpretation of diagnostic studies

## 2017-07-31 NOTE — ED ADULT NURSE REASSESSMENT NOTE - NS ED NURSE REASSESS COMMENT FT1
Patient is pulling at her IV lines and Bipap.  Patient given Ativan 0.5mg per PRN order for anxiety.  Will continue to monitor patient.
Patient looks uncomfortable and displaying non-verbal indicators of pain with moaning and pulling at Bipap mask.  Family requesting pain meds.  Patient given 0.5mg Dilaudid per PRN MD orders.  Vials taken and will reassess patient.
Received patient report from REN Whitten @2030.  Patient is a 83 y/o female, currently sleeping, on Bipap, brought in by EMS for respiratory distress.  Patient is DNR/DNI and on palliative care.  Patient presents to ED in respiratory distress.  Patient had increased dyspnea for several days that worsen.  Patient has a history of COPD, PE with IVC filter and GI bleed.  Patient is on Bipap and tolerating it well and on zoll at bedside.  Family members at bedside and will continue to monitor patient.
RN Break Coverage; received report on pt. pt presents lethargic, minimally responsive to verbal or painful stimuli. respirations even, mildy labored. bipap remains in place, tolerating. cardiac monitor in place in nsr. pt noted to be hypotensive, RENE Grider MD aware, no further intervention at this time. safety maintained. family at bedside. awaiting room assignment at this time. ivl sites clean, dry, intact, patent. will continue to observe

## 2017-07-31 NOTE — ED ADULT NURSE NOTE - CHIEF COMPLAINT QUOTE
pt BIBA from home, as per EMS, pt was found on the toilet in respiratory distress.  pt has PMH PE.  pt takn directly to TR C

## 2017-07-31 NOTE — CONSULT NOTE ADULT - SUBJECTIVE AND OBJECTIVE BOX
CHIEF COMPLAINT: SOB    HPI: 83 yo F w/ hx COPD in setting of hx of smoking (quit), sleep apnea on BiPAP at night, B/L PEs s/p IVC filter, GI bleeding requiring 6 units of PRBCs in the past, lung mass, pneumonia, presenting with respiratory distress. Family and patient reports not eating well over past few days, failure to thrive; dyspnea worsening this AM; feels more comfortable now on BiPAP. Recently hospitalized approximately 1 month ago for PNA; off A/C. Patient is DNR/DNI. Patient being seen by palliative care at bedside.    PAST MEDICAL & SURGICAL HISTORY:  Lung mass  GI bleed  Sleep apnea  Pneumonia  Pulmonary embolism  COPD (chronic obstructive pulmonary disease)  IVC (inferior vena cava obstruction)    FAMILY HISTORY:  No pertinent family history in first degree relatives      SOCIAL HISTORY:  Smoking: yes  EtOH Use: no  Recent Travel: no  Advance Directives: DNR/DNI    Allergies: NKDA    HOME MEDICATIONS:    REVIEW OF SYSTEMS:  Constitutional: denies any fevers or chills; no pain  Eyes: no vision changes  ENT: no dysarthria or mouth pain  CV: no chest pain  Resp: SOB, but improved w/ BiPAP  GI: No abdominal pain (tenderness on palpation noted on exam, however)  : Negative  MSK: No extremity pain, myalgias  Integumentary: denies pruritus; no rashes  Neurological: Negative  Psychiatric: Negative  Endocrine: Negative  Hematologic/Lymphatic: Negative  Allergic/Immunologic: Negative  [ ] All other systems negative  [ ] Unable to assess ROS because on BiPAP    OBJECTIVE:  ICU Vital Signs Last 24 Hrs  T(C): 35.9 (31 Jul 2017 12:33), Max: 35.9 (31 Jul 2017 12:33)  T(F): 96.6 (31 Jul 2017 12:33), Max: 96.6 (31 Jul 2017 12:33)  HR: 101 (31 Jul 2017 15:00) (94 - 120)  BP: 99/53 (31 Jul 2017 14:21) (78/62 - 115/52)  BP(mean): --  ABP: --  ABP(mean): --  RR: 33 (31 Jul 2017 14:21) (30 - 35)  SpO2: 100% (31 Jul 2017 15:00) (100% - 100%)      CAPILLARY BLOOD GLUCOSE    PHYSICAL EXAM:  General: NAD, on BiPAP, following commands   HEENT: PERRL, EOMI  Lymph Nodes: No lymphadenopathy appreciated  Neck: supple  Respiratory: CTAB  Cardiovascular: RRR  Abdomen: soft, diffuse tenderness, not guarding, NBS  Extremities: cool extremities   Skin: bilateral plantar hyperemia; no skin breakdown appreciated  Neurological: following commands, AOx2  Psychiatry: Calm, cooperative    HOSPITAL MEDICATIONS:  MEDICATIONS  (STANDING):  vancomycin  IVPB 1000 milliGRAM(s) IV Intermittent once  piperacillin/tazobactam IVPB. 3.375 Gram(s) IV Intermittent once    MEDICATIONS  (PRN):  LORazepam   Injectable 0.5 milliGRAM(s) IV Push every 2 hours PRN Anxiety  HYDROmorphone  Injectable 0.5 milliGRAM(s) IV Push every 2 hours PRN dyspnea      LABS:                        11.6   19.19 )-----------( 295      ( 31 Jul 2017 11:51 )             34.4     07-31    137  |  95<L>  |  43<H>  ----------------------------<  88  4.0   |  7<LL>  |  3.86<H>    Ca    6.9<L>      31 Jul 2017 11:51    TPro  5.1<L>  /  Alb  2.5<L>  /  TBili  0.3  /  DBili  x   /  AST  37<H>  /  ALT  20  /  AlkPhos  206<H>  07-31    PT/INR - ( 31 Jul 2017 11:51 )   PT: 11.3 SEC;   INR: 1.01          PTT - ( 31 Jul 2017 11:51 )  PTT:29.2 SEC      Venous Blood Gas:  07-31 @ 14:49  7.09/31/34/10/50.4  VBG Lactate: 3.8  Venous Blood Gas:  07-31 @ 12:11  7.08/26/47/9/62.9  VBG Lactate: 6.9      MICROBIOLOGY:     RADIOLOGY:  [x] Reviewed and interpreted by me: no consolidations appreciated on CXR; official read pending
HPI: 81yo F from home, BIBA with granddaughter for worsening shortness of breath, seen bedside with both Daughters, including HCProxy.  PMH of COPD, obstructive sleep apnea ( noncompliant for years to bipap), CKD (NSAID induced), s/p recent hospitalization at [place] for 1 month for GI bleed and MICU admission.  Patient herself is minimally responsive as is extremely tachypnic with oscillating pulse ox 70-90% on bipap.  Patient seen in ED prior to admission as per request.       PERTINENT PMH REVIEWED:  [x ] YES [ ] NO           SOCIAL HISTORY:  Significant other/partner:  [ ] YES  [x ] NO            Children:  [ x] YES  [ ] NO                   Sikhism/Spirituality: Caodaism  Substance hx:  [ ] YES   [x ] NO           Tobacco hx:  [ x] YES  [ ] NO             Alcohol hx: [ ] YES  [x ] NO        Home Opioid hx:  [ ] YES  [x ] NO   Living Situation: [x ] Home  [ ] Long term care  [ ] Rehab    REFERRALS:   [x ] Chaplaincy - DECLINED at this time  [ ] Hospice  [ ] Child Life  [ ] Social Work  [ ] Case management [ ] Holistic Therapy     FAMILY HISTORY:  No pertinent family history in first degree relatives    [ ] Family history non contributory     BASELINE ADLs (prior to admission):   Independent [ ] moderately [ ] fully   Dependent   [ x] moderately [ ] fully as per /w daughters    ADVANCE DIRECTIVES:  [x ] YES [ ] NO   DNR [x ] YES [ ] NO                      MOLST  [ ] YES [x ] NO    Living Will  [ ] YES [ ] NO    Health Care Proxy [x ] YES  [ ] NO      [ ] Surrogate  [x ] HCP  [ ] Guardian:                       Mackenzie Herrmann Jr.        Phone#: [phone]    Allergies    Allergy Status Unknown    Intolerances        MEDICATIONS  (STANDING):    MEDICATIONS  (PRN):  LORazepam   Injectable 0.5 milliGRAM(s) IV Push every 2 hours PRN Anxiety  HYDROmorphone  Injectable 0.5 milliGRAM(s) IV Push every 2 hours PRN dyspnea      PRESENT SYMPTOMS:  Source: [ ] Patient   [x ] Family   [ ] Team     Pain: [ ] YES [x ] NO  OLDCARTS:     Dyspnea: [ x] YES [ ] NO   Anxiety: [x ] YES [ ] NO  Fatigue: [ ] YES [x ] NO   Nausea: [ ] YES [x ] NO  Loss of appetite: [ ] YES [x ] NO   Constipation: [ ] YES [ x] NO     Other Symptoms:  [ ] All other review of systems negative   [ x] Unable to obtain due to poor mentation     Karnofsky Performance Score/Palliative Performance Status Version 2:   10 %  Protein Calorie Malutrition:  [ ] Mild   [ ] Moderate   [ ] Severe     Vital Signs Last 24 Hrs  T(C): 35.9 (31 Jul 2017 12:33), Max: 35.9 (31 Jul 2017 12:33)  T(F): 96.6 (31 Jul 2017 12:33), Max: 96.6 (31 Jul 2017 12:33)  HR: 114 (31 Jul 2017 15:50) (94 - 120)  BP: 100/86 (31 Jul 2017 15:50) (78/62 - 115/52)  BP(mean): --  RR: 22 (31 Jul 2017 15:50) (22 - 35)  SpO2: 100% (31 Jul 2017 15:50) (100% - 100%)    Physical Exam:    General: [ ] Alert,  A&O x     [ x] lethargic   [ ] Agitated   [ ] Cachexia   HEENT: [ ] Normal   [ ] Dry mouth   [ ] ET Tube    [ ] Trach [x]Bipap  Lungs: [x ] Clear - bilaterally [ ] Rhonchi  [ ] Crackles [ ] Wheezing [ ] Tachypnea  [ ] Audible excessive secretions   Cardiovascular:  [x ] Regular rate and rhythm  [ ] Irregular [ ] Tachycardia   [ ] Bradycardia   Abdomen: [x ] Soft  [ ] Distended  [ ]  [ ] +BS  [ ] Non tender [ ] Tender  [ ]PEG   [ ] NGT   Last BM:     Genitourinary: [x ] Normal [ ] Incontinent   [ ] Oliguria/Anuria   [ ] Chavez  Musculoskeletal:  [x ] Normal   [ ] Generalized weakness  [ ] Bedbound   Neurological: [x ] No focal deficits  [ ] Cognitive impairment     Skin: [x ] Normal   [ ] Pressure ulcers     LABS:                        11.6   19.19 )-----------( 295      ( 31 Jul 2017 11:51 )             34.4     07-31    131<L>  |  99  |  38<H>  ----------------------------<  85  x    |  8<LL>  |  3.15<H>    Ca    6.3<LL>      31 Jul 2017 14:49    TPro  5.0<L>  /  Alb  2.1<L>  /  TBili  0.2  /  DBili  x   /  AST  106<H>  /  ALT  16  /  AlkPhos  171<H>  07-31    PT/INR - ( 31 Jul 2017 11:51 )   PT: 11.3 SEC;   INR: 1.01          PTT - ( 31 Jul 2017 11:51 )  PTT:29.2 SEC

## 2017-07-31 NOTE — ED PROVIDER NOTE - PMH
COPD (chronic obstructive pulmonary disease)    GI bleed    Lung mass    Pneumonia    Pulmonary embolism    Sleep apnea

## 2017-07-31 NOTE — ED ADULT NURSE NOTE - OBJECTIVE STATEMENT
RN Facilitator. Pt received to trauma room C BIBA with reports of 'she was found on the toilet by the granddaughter and the granddaughter noticed she was having trouble breathing'. EMS unable to intubate or obtain IV Access in the field. Pt received awake, responsive to verbal stimuli RN Facilitator. Pt received to trauma room C BIBA with reports of 'she was found on the toilet by the granddaughter and the granddaughter noticed she was having trouble breathing'. EMS unable to intubate or obtain IV Access in the field. Pt received tachypneic, hypotensive. Pt received awake, responsive to verbal stimuli, follows commands, no nonverbal indicators of pain present. Respirations appearing labored, uneven, tachypneic. Pt denies chest pain. Abdomen soft. Skin cool, dry, intact, pale for race. Fingers with poor perfusion. Per family, daughter is HCP and pt is a DNR, pt confirms DNR status with medical team at bedside. Bipap placed by respiratory therapy per orders by MD Rodarte. PIV access obtained by REN Booth, pt medicated per orders. VS documented per flow. Pt reporting improved comfort with bipap in place. Pt repositioned, awaiting family's arrival. Report endorsed to primary REN Booth.

## 2017-07-31 NOTE — CONSULT NOTE ADULT - PROBLEM SELECTOR RECOMMENDATION 2
c/w bipap as per family wishes, Dialudid PRN dyspnea, family states patient's biggest fear is gasping/air hunger

## 2017-08-01 DIAGNOSIS — J96.21 ACUTE AND CHRONIC RESPIRATORY FAILURE WITH HYPOXIA: ICD-10-CM

## 2017-08-01 PROCEDURE — 99233 SBSQ HOSP IP/OBS HIGH 50: CPT

## 2017-08-01 PROCEDURE — 99233 SBSQ HOSP IP/OBS HIGH 50: CPT | Mod: GC

## 2017-08-01 RX ORDER — HYDROMORPHONE HYDROCHLORIDE 2 MG/ML
0.3 INJECTION INTRAMUSCULAR; INTRAVENOUS; SUBCUTANEOUS
Qty: 100 | Refills: 0 | Status: DISCONTINUED | OUTPATIENT
Start: 2017-08-01 | End: 2017-08-03

## 2017-08-01 RX ORDER — HYDROMORPHONE HYDROCHLORIDE 2 MG/ML
0.1 INJECTION INTRAMUSCULAR; INTRAVENOUS; SUBCUTANEOUS
Qty: 100 | Refills: 0 | Status: DISCONTINUED | OUTPATIENT
Start: 2017-08-01 | End: 2017-08-01

## 2017-08-01 RX ADMIN — HYDROMORPHONE HYDROCHLORIDE 0.5 MILLIGRAM(S): 2 INJECTION INTRAMUSCULAR; INTRAVENOUS; SUBCUTANEOUS at 23:40

## 2017-08-01 RX ADMIN — HYDROMORPHONE HYDROCHLORIDE 0.5 MILLIGRAM(S): 2 INJECTION INTRAMUSCULAR; INTRAVENOUS; SUBCUTANEOUS at 03:10

## 2017-08-01 RX ADMIN — Medication 0.5 MILLIGRAM(S): at 03:10

## 2017-08-01 RX ADMIN — HYDROMORPHONE HYDROCHLORIDE 0.5 MILLIGRAM(S): 2 INJECTION INTRAMUSCULAR; INTRAVENOUS; SUBCUTANEOUS at 13:18

## 2017-08-01 RX ADMIN — HYDROMORPHONE HYDROCHLORIDE 0.5 MILLIGRAM(S): 2 INJECTION INTRAMUSCULAR; INTRAVENOUS; SUBCUTANEOUS at 17:04

## 2017-08-01 RX ADMIN — HYDROMORPHONE HYDROCHLORIDE 0.5 MILLIGRAM(S): 2 INJECTION INTRAMUSCULAR; INTRAVENOUS; SUBCUTANEOUS at 07:22

## 2017-08-01 RX ADMIN — HYDROMORPHONE HYDROCHLORIDE 0.5 MILLIGRAM(S): 2 INJECTION INTRAMUSCULAR; INTRAVENOUS; SUBCUTANEOUS at 00:53

## 2017-08-01 RX ADMIN — Medication 0.5 MILLIGRAM(S): at 23:16

## 2017-08-01 RX ADMIN — HYDROMORPHONE HYDROCHLORIDE 0.2 MG/HR: 2 INJECTION INTRAMUSCULAR; INTRAVENOUS; SUBCUTANEOUS at 11:38

## 2017-08-01 RX ADMIN — HYDROMORPHONE HYDROCHLORIDE 0.5 MILLIGRAM(S): 2 INJECTION INTRAMUSCULAR; INTRAVENOUS; SUBCUTANEOUS at 23:25

## 2017-08-01 RX ADMIN — HYDROMORPHONE HYDROCHLORIDE 0.5 MILLIGRAM(S): 2 INJECTION INTRAMUSCULAR; INTRAVENOUS; SUBCUTANEOUS at 10:51

## 2017-08-01 NOTE — PROGRESS NOTE ADULT - ATTENDING COMMENTS
I have seen and examined the patient. I agree with the above history, physical exam, and plan of care except for as detailed below.    82 F w/COPD, b/l PE failed AC due to recurrent GI bleeds s/p IVC filter, CKD, presenting with respiratory distress requiring BiPAP, TIARRA on CKD and severe metabolic acidosis. Family desires patient to be comfort measures only, no blood draws, no BiPAP, DNR/DNI. Currently no inpatient hospice beds available.     - Dilaudid gtt  - Ativan PRN  - Glycopyrrolate if patient develops significant secretions  - Transfer to inpatient hospice if a bed becomes available. Will try to move patient into a private room

## 2017-08-01 NOTE — PROGRESS NOTE ADULT - ASSESSMENT
82F PMH COPD, SARITA, CKD, recent GI bleed, presented in severe hypoxic respiratory failure and metabolic acidosis. Placed on BIPAP. Family / HCP opted for comfort care.

## 2017-08-01 NOTE — PROGRESS NOTE ADULT - SUBJECTIVE AND OBJECTIVE BOX
CHIEF COMPLAINT:    Interval Events:    REVIEW OF SYSTEMS:  Constitutional:   Eyes:  ENT:  CV:  Resp:  GI:  :  MSK:  Integumentary:  Neurological:  Psychiatric:  Endocrine:  Hematologic/Lymphatic:  Allergic/Immunologic:  [ ] All other systems negative  [ ] Unable to assess ROS because ________    OBJECTIVE:  ICU Vital Signs Last 24 Hrs  T(C): 37.1 (01 Aug 2017 07:00), Max: 37.1 (01 Aug 2017 07:00)  T(F): 98.8 (01 Aug 2017 07:00), Max: 98.8 (01 Aug 2017 07:00)  HR: 70 (01 Aug 2017 07:00) (69 - 120)  BP: 120/65 (01 Aug 2017 07:00) (78/62 - 120/65)  BP(mean): --  ABP: --  ABP(mean): --  RR: 16 (01 Aug 2017 07:00) (16 - 35)  SpO2: 100% (01 Aug 2017 07:00) (99% - 100%)        CAPILLARY BLOOD GLUCOSE            HOSPITAL MEDICATIONS:  MEDICATIONS  (STANDING):  dextrose 5% 1000 milliLiter(s) (100 mL/Hr) IV Continuous <Continuous>    MEDICATIONS  (PRN):  LORazepam   Injectable 0.5 milliGRAM(s) IV Push every 2 hours PRN Anxiety  HYDROmorphone  Injectable 0.5 milliGRAM(s) IV Push every 2 hours PRN dyspnea      LABS:                        11.6   19.19 )-----------( 295      ( 31 Jul 2017 11:51 )             34.4     07-31    131<L>  |  99  |  38<H>  ----------------------------<  85  x    |  8<LL>  |  3.15<H>    Ca    6.3<LL>      31 Jul 2017 14:49    TPro  5.0<L>  /  Alb  2.1<L>  /  TBili  0.2  /  DBili  x   /  AST  106<H>  /  ALT  16  /  AlkPhos  171<H>  07-31    PT/INR - ( 31 Jul 2017 11:51 )   PT: 11.3 SEC;   INR: 1.01          PTT - ( 31 Jul 2017 11:51 )  PTT:29.2 SEC      Venous Blood Gas:  07-31 @ 14:49  7.09/31/34/10/50.4  VBG Lactate: 3.8  Venous Blood Gas:  07-31 @ 12:11  7.08/26/47/9/62.9  VBG Lactate: 6.9      MICROBIOLOGY:     RADIOLOGY:  [ ] Reviewed and interpreted by me    PULMONARY FUNCTION TESTS:    EKG: CHIEF COMPLAINT: non-verbal    Interval Events: Comfort care requested by HCP    REVIEW OF SYSTEMS:  [x] Unable to assess ROS because pt is non-verbal    OBJECTIVE:  ICU Vital Signs Last 24 Hrs  T(C): 37.1 (01 Aug 2017 07:00), Max: 37.1 (01 Aug 2017 07:00)  T(F): 98.8 (01 Aug 2017 07:00), Max: 98.8 (01 Aug 2017 07:00)  HR: 70 (01 Aug 2017 07:00) (69 - 120)  BP: 120/65 (01 Aug 2017 07:00) (78/62 - 120/65)  RR: 16 (01 Aug 2017 07:00) (16 - 35)  SpO2: 100% (01 Aug 2017 07:00) (99% - 100%)      HOSPITAL MEDICATIONS:  MEDICATIONS  (STANDING):  dextrose 5% 1000 milliLiter(s) (100 mL/Hr) IV Continuous <Continuous>    MEDICATIONS  (PRN):  LORazepam   Injectable 0.5 milliGRAM(s) IV Push every 2 hours PRN Anxiety  HYDROmorphone  Injectable 0.5 milliGRAM(s) IV Push every 2 hours PRN dyspnea      LABS:                        11.6   19.19 )-----------( 295      ( 31 Jul 2017 11:51 )             34.4     07-31    131<L>  |  99  |  38<H>  ----------------------------<  85  x    |  8<LL>  |  3.15<H>    Ca    6.3<LL>      31 Jul 2017 14:49    TPro  5.0<L>  /  Alb  2.1<L>  /  TBili  0.2  /  DBili  x   /  AST  106<H>  /  ALT  16  /  AlkPhos  171<H>  07-31    PT/INR - ( 31 Jul 2017 11:51 )   PT: 11.3 SEC;   INR: 1.01          PTT - ( 31 Jul 2017 11:51 )  PTT:29.2 SEC      Venous Blood Gas:  07-31 @ 14:49  7.09/31/34/10/50.4  VBG Lactate: 3.8  Venous Blood Gas:  07-31 @ 12:11  7.08/26/47/9/62.9  VBG Lactate: 6.9

## 2017-08-01 NOTE — PROGRESS NOTE ADULT - SUBJECTIVE AND OBJECTIVE BOX
INTERVAL HPI/OVERNIGHT EVENTS:  Pt unresponsive.  Daughter at bedside. Plan is to remove bipap for comfort  Allergies    Allergy Status Unknown    Intolerances        ADVANCE DIRECTIVES:    DNR: [x ] YES [ ] NO           PRESENT SYMPTOMS:   SOURCE:  [ ] Patient   [x ] Family   [x ] Team     Pain:     Dyspnea:  [x ] YES [ ] NO  Anxiety:  [ ] YES [x ] NO  Fatigue: [ x] YES [ ] NO  Nausea: [ ] YES [x ] NO  Loss of Appetite: [ ] YES [x ] NO  Constipation [ ] YES   [x ] No     OTHER SYMPTOMS:  [ ] All other ROS negative     [x ] Unable to obtain due to poor mentation    MEDICATIONS  (STANDING):  HYDROmorphone Infusion 0.2 mG/Hr (0.2 mL/Hr) IV Continuous <Continuous>    MEDICATIONS  (PRN):  LORazepam   Injectable 0.5 milliGRAM(s) IV Push every 2 hours PRN Anxiety  HYDROmorphone  Injectable 0.5 milliGRAM(s) IV Push every 2 hours PRN dyspnea      Karnofsky Performance Score/Palliative Performance Status Version 2:    10     %  Protein Calorie Malnutrition:  [ ] Mild   [ ] Moderate   [ ] Severe     Physical Exam:    General: [ ] Alert,  A&O x     [x ] lethargic   [ ] Agitated   [ ] Cachexia   HEENT: [ ] Normal   [ ] Dry mouth   [ ] ET Tube    [ ] Trach +bipap  Lungs: [x ] Clear [ ] Rhonchi  [ ] Crackles [ ] Wheezing [ ] Tachypnea  [ ] Audible excessive secretions   Cardiovascular:  [x ] Regular rate and rhythm  [ ] Irregular [ ] Tachycardia   [ ] Bradycardia   Abdomen: [x ] Soft  [ ] Distended  [ ]  [ ] +BS  [ ] Non tender [ ] Tender  [ ]PEG   [ ] NGT   Last BM:     Genitourinary:  [ ] Normal [ ] Incontinent   [ ] Oliguria/Anuria   [ x] Chavez  Musculoskeletal:  [ ] Normal   [ ] Generalized weakness  [x ] Bedbound   Neurological: [ ] No focal deficits  [x ] Cognitive impairment     Skin: [x ] Normal   [ ] Pressure ulcers     Vital Signs Last 24 Hrs  T(C): 37.1 (01 Aug 2017 07:00), Max: 37.1 (01 Aug 2017 07:00)  T(F): 98.8 (01 Aug 2017 07:00), Max: 98.8 (01 Aug 2017 07:00)  HR: 76 (01 Aug 2017 07:30) (69 - 114)  BP: 120/65 (01 Aug 2017 07:00) (86/54 - 120/65)  BP(mean): --  RR: 16 (01 Aug 2017 07:00) (16 - 33)  SpO2: 100% (01 Aug 2017 07:30) (99% - 100%)    LABS:                        11.6   19.19 )-----------( 295      ( 31 Jul 2017 11:51 )             34.4     07-31    131<L>  |  99  |  38<H>  ----------------------------<  85  x    |  8<LL>  |  3.15<H>    Ca    6.3<LL>      31 Jul 2017 14:49    TPro  5.0<L>  /  Alb  2.1<L>  /  TBili  0.2  /  DBili  x   /  AST  106<H>  /  ALT  16  /  AlkPhos  171<H>  07-31    PT/INR - ( 31 Jul 2017 11:51 )   PT: 11.3 SEC;   INR: 1.01          PTT - ( 31 Jul 2017 11:51 )  PTT:29.2 SEC    I&O's Summary      RADIOLOGY & ADDITIONAL STUDIES:

## 2017-08-01 NOTE — PROGRESS NOTE ADULT - PROBLEM SELECTOR PLAN 4
goals are for comfort.  no blood draws  no further interventions  pt is dnr/dni  if patient stable after bipap removable, will refer to hospice

## 2017-08-02 PROCEDURE — 99233 SBSQ HOSP IP/OBS HIGH 50: CPT | Mod: GC

## 2017-08-02 PROCEDURE — 99232 SBSQ HOSP IP/OBS MODERATE 35: CPT

## 2017-08-02 RX ADMIN — HYDROMORPHONE HYDROCHLORIDE 0.5 MILLIGRAM(S): 2 INJECTION INTRAMUSCULAR; INTRAVENOUS; SUBCUTANEOUS at 22:05

## 2017-08-02 RX ADMIN — Medication 0.5 MILLIGRAM(S): at 20:29

## 2017-08-02 RX ADMIN — HYDROMORPHONE HYDROCHLORIDE 0.5 MILLIGRAM(S): 2 INJECTION INTRAMUSCULAR; INTRAVENOUS; SUBCUTANEOUS at 07:03

## 2017-08-02 RX ADMIN — HYDROMORPHONE HYDROCHLORIDE 0.5 MILLIGRAM(S): 2 INJECTION INTRAMUSCULAR; INTRAVENOUS; SUBCUTANEOUS at 14:41

## 2017-08-02 RX ADMIN — HYDROMORPHONE HYDROCHLORIDE 0.5 MILLIGRAM(S): 2 INJECTION INTRAMUSCULAR; INTRAVENOUS; SUBCUTANEOUS at 11:28

## 2017-08-02 RX ADMIN — HYDROMORPHONE HYDROCHLORIDE 0.5 MILLIGRAM(S): 2 INJECTION INTRAMUSCULAR; INTRAVENOUS; SUBCUTANEOUS at 22:20

## 2017-08-02 RX ADMIN — HYDROMORPHONE HYDROCHLORIDE 0.5 MILLIGRAM(S): 2 INJECTION INTRAMUSCULAR; INTRAVENOUS; SUBCUTANEOUS at 06:33

## 2017-08-02 RX ADMIN — HYDROMORPHONE HYDROCHLORIDE 0.5 MILLIGRAM(S): 2 INJECTION INTRAMUSCULAR; INTRAVENOUS; SUBCUTANEOUS at 17:39

## 2017-08-02 RX ADMIN — HYDROMORPHONE HYDROCHLORIDE 0.3 MG/HR: 2 INJECTION INTRAMUSCULAR; INTRAVENOUS; SUBCUTANEOUS at 17:29

## 2017-08-02 NOTE — PROGRESS NOTE ADULT - SUBJECTIVE AND OBJECTIVE BOX
INTERVAL HPI/OVERNIGHT EVENTS:  Patient unresponsive- appears comfortable   daughter at bedside  Allergies    Allergy Status Unknown    Intolerances        ADVANCE DIRECTIVES:    DNR: [x ] YES [ ] NO           PRESENT SYMPTOMS:   SOURCE:  [ ] Patient   [x ] Family   [x ] Team     Pain: none    Dyspnea:  [ ] YES [x ] NO  Anxiety:  [ ] YES [ x] NO  Fatigue: [ x] YES [ ] NO  Nausea: [ ] YES [x ] NO  Loss of Appetite: [x ] YES [ ] NO  Constipation [ ] YES   [ x] No     OTHER SYMPTOMS:  [ ] All other ROS negative     [x ] Unable to obtain due to poor mentation    MEDICATIONS  (STANDING):  HYDROmorphone Infusion 0.2 mG/Hr (0.2 mL/Hr) IV Continuous <Continuous>    MEDICATIONS  (PRN):  LORazepam   Injectable 0.5 milliGRAM(s) IV Push every 2 hours PRN Anxiety  HYDROmorphone  Injectable 0.5 milliGRAM(s) IV Push every 2 hours PRN dyspnea      Karnofsky Performance Score/Palliative Performance Status Version 2:      10   %  Protein Calorie Malnutrition:  [ ] Mild   [ ] Moderate   [ ] Severe     Physical Exam:    General: [ ] Alert,  A&O x     [ x] lethargic   [ ] Agitated   [ ] Cachexia   HEENT: [ x] Normal   [ ] Dry mouth   [ ] ET Tube    [ ] Trach   Lungs: [x ] Clear [ ] Rhonchi  [ ] Crackles [ ] Wheezing [ ] Tachypnea  [ ] Audible excessive secretions   Cardiovascular:  [ x] Regular rate and rhythm  [ ] Irregular [ ] Tachycardia   [ ] Bradycardia   Abdomen: [x ] Soft  [ ] Distended  [ ]  [ ] +BS  [x ] Non tender [ ] Tender  [ ]PEG   [ ] NGT   Last BM:     Genitourinary:  [ ] Normal [ ] Incontinent   [ ] Oliguria/Anuria   [x ] Chavez  Musculoskeletal:  [ ] Normal   [ ] Generalized weakness  [x ] Bedbound   Neurological: [ ] No focal deficits  [ ] Cognitive impairment +encephalopathy    Skin: [ ] Normal   [ ] Pressure ulcers   x  Vital Signs Last 24 Hrs  T(C): 36.4 (02 Aug 2017 06:37), Max: 36.8 (01 Aug 2017 17:00)  T(F): 97.6 (02 Aug 2017 06:37), Max: 98.3 (01 Aug 2017 17:00)  HR: 80 (02 Aug 2017 06:37) (70 - 87)  BP: 102/47 (02 Aug 2017 06:37) (100/65 - 102/67)  BP(mean): --  RR: 16 (02 Aug 2017 06:37) (11 - 16)  SpO2: 98% (02 Aug 2017 06:37) (96% - 98%)    LABS:    07-31    131<L>  |  99  |  38<H>  ----------------------------<  85  x    |  8<LL>  |  3.15<H>    Ca    6.3<LL>      31 Jul 2017 14:49    TPro  5.0<L>  /  Alb  2.1<L>  /  TBili  0.2  /  DBili  x   /  AST  106<H>  /  ALT  16  /  AlkPhos  171<H>  07-31        I&O's Summary      RADIOLOGY & ADDITIONAL STUDIES:

## 2017-08-02 NOTE — PROGRESS NOTE ADULT - SUBJECTIVE AND OBJECTIVE BOX
CHIEF COMPLAINT:    Interval Events:    REVIEW OF SYSTEMS:  Constitutional:   Eyes:  ENT:  CV:  Resp:  GI:  :  MSK:  Integumentary:  Neurological:  Psychiatric:  Endocrine:  Hematologic/Lymphatic:  Allergic/Immunologic:  [ ] All other systems negative  [ ] Unable to assess ROS because ________    OBJECTIVE:  ICU Vital Signs Last 24 Hrs  T(C): 36.4 (02 Aug 2017 06:37), Max: 36.8 (01 Aug 2017 17:00)  T(F): 97.6 (02 Aug 2017 06:37), Max: 98.3 (01 Aug 2017 17:00)  HR: 80 (02 Aug 2017 06:37) (70 - 87)  BP: 102/47 (02 Aug 2017 06:37) (100/65 - 102/67)  BP(mean): --  ABP: --  ABP(mean): --  RR: 16 (02 Aug 2017 06:37) (11 - 16)  SpO2: 98% (02 Aug 2017 06:37) (96% - 98%)        CAPILLARY BLOOD GLUCOSE            HOSPITAL MEDICATIONS:  MEDICATIONS  (STANDING):  HYDROmorphone Infusion 0.2 mG/Hr (0.2 mL/Hr) IV Continuous <Continuous>    MEDICATIONS  (PRN):  LORazepam   Injectable 0.5 milliGRAM(s) IV Push every 2 hours PRN Anxiety  HYDROmorphone  Injectable 0.5 milliGRAM(s) IV Push every 2 hours PRN dyspnea      LABS:                    Venous Blood Gas:  07-31 @ 14:49  7.09/31/34/10/50.4  VBG Lactate: 3.8  Venous Blood Gas:  07-31 @ 12:11  7.08/26/47/9/62.9  VBG Lactate: 6.9      MICROBIOLOGY:     RADIOLOGY:  [ ] Reviewed and interpreted by me    PULMONARY FUNCTION TESTS:    EKG: CHIEF COMPLAINT: pt non-verbal    Interval Events: condition remains grave    REVIEW OF SYSTEMS:  [x] Unable to assess ROS because pt is non-verbal    OBJECTIVE:  ICU Vital Signs Last 24 Hrs  T(C): 36.4 (02 Aug 2017 06:37), Max: 36.8 (01 Aug 2017 17:00)  T(F): 97.6 (02 Aug 2017 06:37), Max: 98.3 (01 Aug 2017 17:00)  HR: 80 (02 Aug 2017 06:37) (70 - 87)  BP: 102/47 (02 Aug 2017 06:37) (100/65 - 102/67)  RR: 16 (02 Aug 2017 06:37) (11 - 16)  SpO2: 98% (02 Aug 2017 06:37) (96% - 98%)      HOSPITAL MEDICATIONS:  MEDICATIONS  (STANDING):  HYDROmorphone Infusion 0.2 mG/Hr (0.2 mL/Hr) IV Continuous <Continuous>    MEDICATIONS  (PRN):  LORazepam   Injectable 0.5 milliGRAM(s) IV Push every 2 hours PRN Anxiety  HYDROmorphone  Injectable 0.5 milliGRAM(s) IV Push every 2 hours PRN dyspnea      LABS:                    Venous Blood Gas:  07-31 @ 14:49  7.09/31/34/10/50.4  VBG Lactate: 3.8  Venous Blood Gas:  07-31 @ 12:11  7.08/26/47/9/62.9  VBG Lactate: 6.9

## 2017-08-02 NOTE — PROGRESS NOTE ADULT - PROBLEM SELECTOR PLAN 4
patient is dnr/dni  family feels patient is comfortable and recognizes her time is limited  offerred hospice servces- family declined  emotional and spiritual support provided

## 2017-08-02 NOTE — PROGRESS NOTE ADULT - ATTENDING COMMENTS
I have seen and examined the patient. I agree with the above history, physical exam, and plan of care except for as detailed below.    82 F w/COPD, b/l PE failed AC due to recurrent GI bleeds s/p IVC filter, CKD, presenting with respiratory distress requiring BiPAP, TIARRA on CKD and severe metabolic acidosis. Family desires patient to be comfort measures only, no blood draws, no BiPAP, DNR/DNI. Currently no inpatient hospice beds available. Continue dilaudid gtt, benzos PRN, glycopyrrolate if needed. I have seen and examined the patient. I agree with the above history, physical exam, and plan of care except for as detailed below.    82 F w/COPD, b/l PE failed AC due to recurrent GI bleeds s/p IVC filter, CKD, presenting with respiratory distress requiring BiPAP, TIARRA on CKD, severe metabolic acidosis, and possible septic shock. Family desires patient to be comfort measures only, no blood draws, no BiPAP, DNR/DNI. Currently no inpatient hospice beds available. Continue dilaudid gtt, benzos PRN, glycopyrrolate if needed.

## 2017-08-03 PROCEDURE — 99232 SBSQ HOSP IP/OBS MODERATE 35: CPT

## 2017-08-03 RX ORDER — HYDROMORPHONE HYDROCHLORIDE 2 MG/ML
1 INJECTION INTRAMUSCULAR; INTRAVENOUS; SUBCUTANEOUS
Qty: 100 | Refills: 0 | Status: DISCONTINUED | OUTPATIENT
Start: 2017-08-03 | End: 2017-08-04

## 2017-08-03 RX ORDER — ROBINUL 0.2 MG/ML
0.2 INJECTION INTRAMUSCULAR; INTRAVENOUS EVERY 8 HOURS
Qty: 0 | Refills: 0 | Status: DISCONTINUED | OUTPATIENT
Start: 2017-08-03 | End: 2017-08-04

## 2017-08-03 RX ADMIN — HYDROMORPHONE HYDROCHLORIDE 0.5 MILLIGRAM(S): 2 INJECTION INTRAMUSCULAR; INTRAVENOUS; SUBCUTANEOUS at 08:36

## 2017-08-03 RX ADMIN — Medication 0.5 MILLIGRAM(S): at 16:15

## 2017-08-03 RX ADMIN — Medication 0.5 MILLIGRAM(S): at 20:27

## 2017-08-03 RX ADMIN — ROBINUL 0.2 MILLIGRAM(S): 0.2 INJECTION INTRAMUSCULAR; INTRAVENOUS at 13:51

## 2017-08-03 RX ADMIN — HYDROMORPHONE HYDROCHLORIDE 1 MG/HR: 2 INJECTION INTRAMUSCULAR; INTRAVENOUS; SUBCUTANEOUS at 18:08

## 2017-08-03 RX ADMIN — HYDROMORPHONE HYDROCHLORIDE 0.5 MILLIGRAM(S): 2 INJECTION INTRAMUSCULAR; INTRAVENOUS; SUBCUTANEOUS at 11:11

## 2017-08-03 RX ADMIN — HYDROMORPHONE HYDROCHLORIDE 1 MG/HR: 2 INJECTION INTRAMUSCULAR; INTRAVENOUS; SUBCUTANEOUS at 19:31

## 2017-08-03 RX ADMIN — HYDROMORPHONE HYDROCHLORIDE 0.5 MILLIGRAM(S): 2 INJECTION INTRAMUSCULAR; INTRAVENOUS; SUBCUTANEOUS at 04:33

## 2017-08-03 RX ADMIN — HYDROMORPHONE HYDROCHLORIDE 0.5 MILLIGRAM(S): 2 INJECTION INTRAMUSCULAR; INTRAVENOUS; SUBCUTANEOUS at 04:18

## 2017-08-03 RX ADMIN — ROBINUL 0.2 MILLIGRAM(S): 0.2 INJECTION INTRAMUSCULAR; INTRAVENOUS at 23:19

## 2017-08-03 RX ADMIN — Medication 0.5 MILLIGRAM(S): at 13:51

## 2017-08-03 NOTE — PROGRESS NOTE ADULT - ATTENDING COMMENTS
I have seen and examined the patient. I agree with the above history, physical exam, and plan of care except for as detailed below.    82 F w/COPD, b/l PE failed AC due to recurrent GI bleeds s/p IVC filter, CKD, presenting with respiratory distress requiring BiPAP, TIARRA on CKD, severe metabolic acidosis, and possible septic shock. Family desires patient to be comfort measures only, no blood draws, no BiPAP, DNR/DNI. Currently no inpatient hospice beds available. Continue dilaudid gtt, benzos PRN. Add glycopyrrolate.

## 2017-08-03 NOTE — CHART NOTE - NSCHARTNOTEFT_GEN_A_CORE
Nutrition assessment initiated for LOS. Chart reviewed , noted pt. on  comfort care , maintained NPO , family @ bedside , per medical notes pt. is actively dying . Nutrition intervention not appropriate .

## 2017-08-03 NOTE — PROGRESS NOTE ADULT - SUBJECTIVE AND OBJECTIVE BOX
CHIEF COMPLAINT:    Interval Events:    REVIEW OF SYSTEMS:  Constitutional:   Eyes:  ENT:  CV:  Resp:  GI:  :  MSK:  Integumentary:  Neurological:  Psychiatric:  Endocrine:  Hematologic/Lymphatic:  Allergic/Immunologic:  [ ] All other systems negative  [ ] Unable to assess ROS because ________    OBJECTIVE:  ICU Vital Signs Last 24 Hrs  T(C): 36.8 (02 Aug 2017 22:10), Max: 36.8 (02 Aug 2017 22:10)  T(F): 98.2 (02 Aug 2017 22:10), Max: 98.2 (02 Aug 2017 22:10)  HR: 80 (02 Aug 2017 22:10) (80 - 100)  BP: 160/87 (02 Aug 2017 22:10) (146/109 - 160/87)  BP(mean): --  ABP: --  ABP(mean): --  RR: 16 (02 Aug 2017 22:10) (16 - 17)  SpO2: 99% (02 Aug 2017 22:10) (99% - 100%)        CAPILLARY BLOOD GLUCOSE          PHYSICAL EXAM:  General:   HEENT:   Lymph Nodes:  Neck:   Respiratory:   Cardiovascular:   Abdomen:   Extremities:   Skin:   Neurological:  Psychiatry:    HOSPITAL MEDICATIONS:  MEDICATIONS  (STANDING):  hydroxychloroquine 200 milliGRAM(s) Oral daily  chlorhexidine 4% Liquid 1 Application(s) Topical daily  calcium gluconate IVPB 2 Gram(s) IV Intermittent once  diphenhydrAMINE   Injectable 50 milliGRAM(s) IV Push once  albumin human  5% IVPB 1500 milliLiter(s) IV Intermittent once  sodium chloride 0.9%. 150 milliLiter(s) (250 mL/Hr) IV Continuous <Continuous>  sodium chloride 0.9%. 1000 milliLiter(s) (50 mL/Hr) IV Continuous <Continuous>  doxycycline hyclate Capsule 100 milliGRAM(s) Oral every 12 hours  losartan 50 milliGRAM(s) Oral daily  predniSONE   Tablet 30 milliGRAM(s) Oral two times a day    MEDICATIONS  (PRN):  ondansetron    Tablet 4 milliGRAM(s) Oral every 4 hours PRN Nausea and/or Vomiting  acetaminophen   Tablet. 650 milliGRAM(s) Oral every 6 hours PRN mild to moderate pain  lidocaine 2% Gel 1 Application(s) Topical every 8 hours PRN severe labial pain and swelling  witch hazel Pads 1 Application(s) Topical every 3 hours PRN swelling, discomfort  HYDROmorphone  Injectable 0.5 milliGRAM(s) IV Push every 4 hours PRN Severe Pain (7 - 10)  melatonin 3 milliGRAM(s) Oral at bedtime PRN Insomnia      LABS:                        7.6    6.56  )-----------( 66       ( 02 Aug 2017 07:00 )             22.6     08-02    141  |  107  |  31<H>  ----------------------------<  91  4.1   |  21<L>  |  0.91    Ca    7.9<L>      02 Aug 2017 07:00  Phos  4.2     08-02  Mg     1.6     08-02    TPro  3.8<L>  /  Alb  2.1<L>  /  TBili  0.7  /  DBili  x   /  AST  16  /  ALT  11  /  AlkPhos  39<L>  08-02              MICROBIOLOGY:     RADIOLOGY:  [ ] Reviewed and interpreted by me    PULMONARY FUNCTION TESTS:    EKG:pu CHIEF COMPLAINT:    Interval Events:    REVIEW OF SYSTEMS:  Constitutional:   Eyes:  ENT:  CV:  Resp:  GI:  :  MSK:  Integumentary:  Neurological:  Psychiatric:  Endocrine:  Hematologic/Lymphatic:  Allergic/Immunologic:  [ ] All other systems negative  [X] Unable to assess ROS because _unresponsive    OBJECTIVE:  ICU Vital Signs Last 24 Hrs  T(C): 36.8 (02 Aug 2017 22:10), Max: 36.8 (02 Aug 2017 22:10)  T(F): 98.2 (02 Aug 2017 22:10), Max: 98.2 (02 Aug 2017 22:10)  HR: 80 (02 Aug 2017 22:10) (80 - 100)  BP: 160/87 (02 Aug 2017 22:10) (146/109 - 160/87)  BP(mean): --  ABP: --  ABP(mean): --  RR: 16 (02 Aug 2017 22:10) (16 - 17)  SpO2: 99% (02 Aug 2017 22:10) (99% - 100%)        CAPILLARY BLOOD GLUCOSE          PHYSICAL EXAM:  General: Patient is actively dying, on comfort care with family at the bedside.  HEENT:   Lymph Nodes:  Neck:   Respiratory: Room air.   Cardiovascular: VSS. S1,S2  Abdomen: NPO  Extremities:   Skin:   Neurological: Unresponsive. On dilaudid gtt. Appears comfortable  Psychiatry:    HOSPITAL MEDICATIONS:  MEDICATIONS  (STANDING):  hydroxychloroquine 200 milliGRAM(s) Oral daily  chlorhexidine 4% Liquid 1 Application(s) Topical daily  calcium gluconate IVPB 2 Gram(s) IV Intermittent once  diphenhydrAMINE   Injectable 50 milliGRAM(s) IV Push once  albumin human  5% IVPB 1500 milliLiter(s) IV Intermittent once  sodium chloride 0.9%. 150 milliLiter(s) (250 mL/Hr) IV Continuous <Continuous>  sodium chloride 0.9%. 1000 milliLiter(s) (50 mL/Hr) IV Continuous <Continuous>  doxycycline hyclate Capsule 100 milliGRAM(s) Oral every 12 hours  losartan 50 milliGRAM(s) Oral daily  predniSONE   Tablet 30 milliGRAM(s) Oral two times a day    MEDICATIONS  (PRN):  ondansetron    Tablet 4 milliGRAM(s) Oral every 4 hours PRN Nausea and/or Vomiting  acetaminophen   Tablet. 650 milliGRAM(s) Oral every 6 hours PRN mild to moderate pain  lidocaine 2% Gel 1 Application(s) Topical every 8 hours PRN severe labial pain and swelling  witch hazel Pads 1 Application(s) Topical every 3 hours PRN swelling, discomfort  HYDROmorphone  Injectable 0.5 milliGRAM(s) IV Push every 4 hours PRN Severe Pain (7 - 10)  melatonin 3 milliGRAM(s) Oral at bedtime PRN Insomnia      LABS:                        7.6    6.56  )-----------( 66       ( 02 Aug 2017 07:00 )             22.6     08-02    141  |  107  |  31<H>  ----------------------------<  91  4.1   |  21<L>  |  0.91    Ca    7.9<L>      02 Aug 2017 07:00  Phos  4.2     08-02  Mg     1.6     08-02    TPro  3.8<L>  /  Alb  2.1<L>  /  TBili  0.7  /  DBili  x   /  AST  16  /  ALT  11  /  AlkPhos  39<L>  08-02              MICROBIOLOGY:     RADIOLOGY:  [ ] Reviewed and interpreted by me    PULMONARY FUNCTION TESTS:    EKG:pu CHIEF COMPLAINT: Patient is a 82y old  Female who presents with a chief complaint of shortness of breath (31 Jul 2017 17:24)    Interval Events: No change overnight    REVIEW OF SYSTEMS:  Constitutional:   Eyes:  ENT:  CV:  Resp:  GI:  :  MSK:  Integumentary:  Neurological:  Psychiatric:  Endocrine:  Hematologic/Lymphatic:  Allergic/Immunologic:  [ ] All other systems negative  [X] Unable to assess ROS because _unresponsive    OBJECTIVE:  ICU Vital Signs Last 24 Hrs  T(C): 36.8 (02 Aug 2017 22:10), Max: 36.8 (02 Aug 2017 22:10)  T(F): 98.2 (02 Aug 2017 22:10), Max: 98.2 (02 Aug 2017 22:10)  HR: 80 (02 Aug 2017 22:10) (80 - 100)  BP: 160/87 (02 Aug 2017 22:10) (146/109 - 160/87)  BP(mean): --  ABP: --  ABP(mean): --  RR: 16 (02 Aug 2017 22:10) (16 - 17)  SpO2: 99% (02 Aug 2017 22:10) (99% - 100%)        CAPILLARY BLOOD GLUCOSE          PHYSICAL EXAM:  General: Patient is actively dying, on comfort care with family at the bedside.  HEENT:   Lymph Nodes:  Neck:   Respiratory: Room air.   Cardiovascular: VSS. S1,S2  Abdomen: NPO  Extremities:   Skin:   Neurological: Unresponsive. On dilaudid gtt. Appears comfortable  Psychiatry:    HOSPITAL MEDICATIONS:  MEDICATIONS  (STANDING):  hydroxychloroquine 200 milliGRAM(s) Oral daily  chlorhexidine 4% Liquid 1 Application(s) Topical daily  calcium gluconate IVPB 2 Gram(s) IV Intermittent once  diphenhydrAMINE   Injectable 50 milliGRAM(s) IV Push once  albumin human  5% IVPB 1500 milliLiter(s) IV Intermittent once  sodium chloride 0.9%. 150 milliLiter(s) (250 mL/Hr) IV Continuous <Continuous>  sodium chloride 0.9%. 1000 milliLiter(s) (50 mL/Hr) IV Continuous <Continuous>  doxycycline hyclate Capsule 100 milliGRAM(s) Oral every 12 hours  losartan 50 milliGRAM(s) Oral daily  predniSONE   Tablet 30 milliGRAM(s) Oral two times a day    MEDICATIONS  (PRN):  ondansetron    Tablet 4 milliGRAM(s) Oral every 4 hours PRN Nausea and/or Vomiting  acetaminophen   Tablet. 650 milliGRAM(s) Oral every 6 hours PRN mild to moderate pain  lidocaine 2% Gel 1 Application(s) Topical every 8 hours PRN severe labial pain and swelling  witch hazel Pads 1 Application(s) Topical every 3 hours PRN swelling, discomfort  HYDROmorphone  Injectable 0.5 milliGRAM(s) IV Push every 4 hours PRN Severe Pain (7 - 10)  melatonin 3 milliGRAM(s) Oral at bedtime PRN Insomnia      LABS:                        7.6    6.56  )-----------( 66       ( 02 Aug 2017 07:00 )             22.6     08-02    141  |  107  |  31<H>  ----------------------------<  91  4.1   |  21<L>  |  0.91    Ca    7.9<L>      02 Aug 2017 07:00  Phos  4.2     08-02  Mg     1.6     08-02    TPro  3.8<L>  /  Alb  2.1<L>  /  TBili  0.7  /  DBili  x   /  AST  16  /  ALT  11  /  AlkPhos  39<L>  08-02              MICROBIOLOGY:     RADIOLOGY:  [ ] Reviewed and interpreted by me    PULMONARY FUNCTION TESTS:    EKG:pu

## 2017-08-04 VITALS
OXYGEN SATURATION: 90 % | SYSTOLIC BLOOD PRESSURE: 75 MMHG | HEART RATE: 80 BPM | RESPIRATION RATE: 12 BRPM | DIASTOLIC BLOOD PRESSURE: 30 MMHG

## 2017-08-04 DIAGNOSIS — Z51.5 ENCOUNTER FOR PALLIATIVE CARE: ICD-10-CM

## 2017-08-04 DIAGNOSIS — K11.7 DISTURBANCES OF SALIVARY SECRETION: ICD-10-CM

## 2017-08-04 PROCEDURE — 99232 SBSQ HOSP IP/OBS MODERATE 35: CPT

## 2017-08-04 PROCEDURE — 99233 SBSQ HOSP IP/OBS HIGH 50: CPT

## 2017-08-04 RX ORDER — ROBINUL 0.2 MG/ML
0.2 INJECTION INTRAMUSCULAR; INTRAVENOUS EVERY 6 HOURS
Qty: 0 | Refills: 0 | Status: DISCONTINUED | OUTPATIENT
Start: 2017-08-04 | End: 2017-08-04

## 2017-08-04 RX ORDER — HYDROMORPHONE HYDROCHLORIDE 2 MG/ML
1 INJECTION INTRAMUSCULAR; INTRAVENOUS; SUBCUTANEOUS
Qty: 0 | Refills: 0 | Status: DISCONTINUED | OUTPATIENT
Start: 2017-08-04 | End: 2017-08-04

## 2017-08-04 RX ORDER — SODIUM CHLORIDE 9 MG/ML
1000 INJECTION INTRAMUSCULAR; INTRAVENOUS; SUBCUTANEOUS
Qty: 0 | Refills: 0 | Status: DISCONTINUED | OUTPATIENT
Start: 2017-08-04 | End: 2017-08-04

## 2017-08-04 RX ORDER — HYDROMORPHONE HYDROCHLORIDE 2 MG/ML
0.5 INJECTION INTRAMUSCULAR; INTRAVENOUS; SUBCUTANEOUS ONCE
Qty: 0 | Refills: 0 | Status: DISCONTINUED | OUTPATIENT
Start: 2017-08-04 | End: 2017-08-04

## 2017-08-04 RX ORDER — HYDROMORPHONE HYDROCHLORIDE 2 MG/ML
1.5 INJECTION INTRAMUSCULAR; INTRAVENOUS; SUBCUTANEOUS
Qty: 100 | Refills: 0 | Status: DISCONTINUED | OUTPATIENT
Start: 2017-08-04 | End: 2017-08-04

## 2017-08-04 RX ORDER — HYDROMORPHONE HYDROCHLORIDE 2 MG/ML
2 INJECTION INTRAMUSCULAR; INTRAVENOUS; SUBCUTANEOUS
Qty: 100 | Refills: 0 | Status: DISCONTINUED | OUTPATIENT
Start: 2017-08-04 | End: 2017-08-04

## 2017-08-04 RX ADMIN — HYDROMORPHONE HYDROCHLORIDE 1 MG/HR: 2 INJECTION INTRAMUSCULAR; INTRAVENOUS; SUBCUTANEOUS at 09:53

## 2017-08-04 RX ADMIN — HYDROMORPHONE HYDROCHLORIDE 0.5 MILLIGRAM(S): 2 INJECTION INTRAMUSCULAR; INTRAVENOUS; SUBCUTANEOUS at 09:53

## 2017-08-04 RX ADMIN — ROBINUL 0.2 MILLIGRAM(S): 0.2 INJECTION INTRAMUSCULAR; INTRAVENOUS at 06:14

## 2017-08-04 NOTE — PROGRESS NOTE ADULT - ATTENDING COMMENTS
Patient in respiratory distress.  Agree with above plan.  Chaplaincy on board.  Daughter at bedside and goal is full comfort.  Understands that the end is very close.  Support provided.

## 2017-08-04 NOTE — PROGRESS NOTE ADULT - PROBLEM SELECTOR PLAN 3
Currently on Robinol 0.2mg q8h, change frequeny to q6h. Currently on Robinol 0.2mg q8h, change frequency to q6h. Currently on Robinul 0.2mg q8h, change frequency to q6h.

## 2017-08-04 NOTE — PROGRESS NOTE ADULT - PROBLEM SELECTOR PLAN 4
Spoke to daughter at bedside. Family is prepared for death of there mother. Emotional support provided. Spoke to daughter at bedside. Family is prepared for death of their mother. Emotional support provided.

## 2017-08-04 NOTE — PROGRESS NOTE ADULT - SUBJECTIVE AND OBJECTIVE BOX
INTERVAL HPI/OVERNIGHT EVENTS:    Allergies    Allergy Status Unknown    Intolerances        ADVANCE DIRECTIVES:    DNR: [ ] YES [ ] NO           PRESENT SYMPTOMS:   SOURCE:  [ ] Patient   [ ] Family   [ ] Team     Pain:     Dyspnea:  [ ] YES [ ] NO  Anxiety:  [ ] YES [ ] NO  Fatigue: [ ] YES [ ] NO  Nausea: [ ] YES [ ] NO  Loss of Appetite: [ ] YES [ ] NO  Constipation [ ] YES   [ ] No     OTHER SYMPTOMS:  [ ] All other ROS negative     [ ] Unable to obtain due to poor mentation    MEDICATIONS  (STANDING):  glycopyrrolate Injectable 0.2 milliGRAM(s) IV Push every 8 hours  HYDROmorphone Infusion 1.5 mG/Hr (1.5 mL/Hr) IV Continuous <Continuous>    MEDICATIONS  (PRN):  LORazepam   Injectable 0.5 milliGRAM(s) IV Push every 2 hours PRN Anxiety  HYDROmorphone  Injectable 0.5 milliGRAM(s) IV Push every 2 hours PRN dyspnea      Karnofsky Performance Score/Palliative Performance Status Version 2:         %  Protein Calorie Malnutrition:  [ ] Mild   [ ] Moderate   [ ] Severe     Physical Exam:    General: [ ] Alert,  A&O x     [ ] lethargic   [ ] Agitated   [ ] Cachexia   HEENT: [ ] Normal   [ ] Dry mouth   [ ] ET Tube    [ ] Trach   Lungs: [ ] Clear [ ] Rhonchi  [ ] Crackles [ ] Wheezing [ ] Tachypnea  [ ] Audible excessive secretions   Cardiovascular:  [ ] Regular rate and rhythm  [ ] Irregular [ ] Tachycardia   [ ] Bradycardia   Abdomen: [ ] Soft  [ ] Distended  [ ]  [ ] +BS  [ ] Non tender [ ] Tender  [ ]PEG   [ ] NGT   Last BM:     Genitourinary:  [ ] Normal [ ] Incontinent   [ ] Oliguria/Anuria   [ ] Chavez  Musculoskeletal:  [ ] Normal   [ ] Generalized weakness  [ ] Bedbound   Neurological: [ ] No focal deficits  [ ] Cognitive impairment     Skin: [ ] Normal   [ ] Pressure ulcers     Vital Signs Last 24 Hrs  T(C): 36.9 (03 Aug 2017 23:28), Max: 36.9 (03 Aug 2017 23:28)  T(F): 98.4 (03 Aug 2017 23:28), Max: 98.4 (03 Aug 2017 23:28)  HR: 80 (04 Aug 2017 09:54) (80 - 80)  BP: 75/30 (04 Aug 2017 09:54) (75/30 - 103/49)  BP(mean): --  RR: 12 (04 Aug 2017 09:54) (12 - 12)  SpO2: 90% (04 Aug 2017 09:54) (90% - 95%)    LABS:              I&O's Summary      RADIOLOGY & ADDITIONAL STUDIES: INTERVAL HPI/OVERNIGHT EVENTS:    patient continues to be dyspneic, non resposnive. Daughter at bedside.      Allergies    Allergy Status Unknown    Intolerances        ADVANCE DIRECTIVES:    DNR: [x ] YES [ ] NO           PRESENT SYMPTOMS:   SOURCE:  [ ] Patient   [x ] Family   [ ] Team     Pain:     Dyspnea:  [x ] YES [ ] NO  Anxiety:  [ ] YES [x ] NO  Fatigue: [ x] YES [ ] NO  Nausea: [ ] YES [x ] NO  Loss of Appetite: [ ] YES [x ] NO  Constipation [ ] YES   [x] No     OTHER SYMPTOMS:  [ ] All other ROS negative     [x ] Unable to obtain due to poor mentation    MEDICATIONS  (STANDING):  glycopyrrolate Injectable 0.2 milliGRAM(s) IV Push every 8 hours  HYDROmorphone Infusion 1.5 mG/Hr (1.5 mL/Hr) IV Continuous <Continuous>    MEDICATIONS  (PRN):  LORazepam   Injectable 0.5 milliGRAM(s) IV Push every 2 hours PRN Anxiety  HYDROmorphone  Injectable 0.5 milliGRAM(s) IV Push every 2 hours PRN dyspnea      Karnofsky Performance Score/Palliative Performance Status Version 2:         10%  Protein Calorie Malnutrition:  [ ] Mild   [ ] Moderate   [ ] Severe     Physical Exam:    General: [ ] Alert,  A&O x     [x ] lethargic   [ ] Agitated   [ ] Cachexia   HEENT: [ ] Normal   [x ] Dry mouth   [ ] ET Tube    [ ] Trach   Lungs: [ ] Clear [ ] Rhonchi  [ ] Crackles [ ] Wheezing [ ] Tachypnea  [x ] Audible excessive secretions   Cardiovascular:  [ x] Regular rate and rhythm  [ ] Irregular [ ] Tachycardia   [ ] Bradycardia   Abdomen: [x ] Soft  [ ] Distended  [ ]  [ ] +BS  [ ] Non tender [ ] Tender  [ ]PEG   [ ] NGT   Last BM:     Genitourinary:  [ ] Normal [x ] Incontinent   [ ] Oliguria/Anuria   [ ] Chavez  Musculoskeletal:  [ ] Normal   [ ] Generalized weakness  [x ] Bedbound   Neurological: [ ] No focal deficits  [ x] Cognitive impairment     Skin: [x ] Normal   [ ] Pressure ulcers     Vital Signs Last 24 Hrs  T(C): 36.9 (03 Aug 2017 23:28), Max: 36.9 (03 Aug 2017 23:28)  T(F): 98.4 (03 Aug 2017 23:28), Max: 98.4 (03 Aug 2017 23:28)  HR: 80 (04 Aug 2017 09:54) (80 - 80)  BP: 75/30 (04 Aug 2017 09:54) (75/30 - 103/49)  BP(mean): --  RR: 12 (04 Aug 2017 09:54) (12 - 12)  SpO2: 90% (04 Aug 2017 09:54) (90% - 95%)    LABS:              I&O's Summary      RADIOLOGY & ADDITIONAL STUDIES: INTERVAL HPI/OVERNIGHT EVENTS:    patient continues to be dyspneic, non resposnive. Daughter at bedside.      Allergies    Allergy Status Unknown    Intolerances        ADVANCE DIRECTIVES:    DNR: [x ] YES [ ] NO           PRESENT SYMPTOMS:   SOURCE:  [ ] Patient   [x ] Family   [ ] Team     Pain:     Dyspnea:  [x ] YES [ ] NO  Anxiety:  [ ] YES [x ] NO  Fatigue: [ x] YES [ ] NO  Nausea: [ ] YES [x ] NO  Loss of Appetite: [ ] YES [x ] NO  Constipation [ ] YES   [x] No     OTHER SYMPTOMS:  [ ] All other ROS negative     [x ] Unable to obtain due to poor mentation    MEDICATIONS  (STANDING):  glycopyrrolate Injectable 0.2 milliGRAM(s) IV Push every 8 hours  HYDROmorphone Infusion 1.5 mG/Hr (1.5 mL/Hr) IV Continuous <Continuous>    MEDICATIONS  (PRN):  LORazepam   Injectable 0.5 milliGRAM(s) IV Push every 2 hours PRN Anxiety  HYDROmorphone  Injectable 0.5 milliGRAM(s) IV Push every 2 hours PRN dyspnea      Karnofsky Performance Score/Palliative Performance Status Version 2:         10%  Protein Calorie Malnutrition:  [ ] Mild   [x ] Moderate   [ ] Severe     Physical Exam:    General: [ ] Alert,  A&O x     [x ] lethargic   [ ] Agitated   [ ] Cachexia   HEENT: [ ] Normal   [x ] Dry mouth   [ ] ET Tube    [ ] Trach   Lungs: [ ] Clear [ ] Rhonchi  [ ] Crackles [ ] Wheezing [ ] Tachypnea  [x ] Audible excessive secretions   Cardiovascular:  [ x] Regular rate and rhythm  [ ] Irregular [ ] Tachycardia   [ ] Bradycardia   Abdomen: [x ] Soft  [ ] Distended  [ ]  [ ] +BS  [ ] Non tender [ ] Tender  [ ]PEG   [ ] NGT   Last BM:     Genitourinary:  [ ] Normal [x ] Incontinent   [ ] Oliguria/Anuria   [ ] Chavez  Musculoskeletal:  [ ] Normal   [ ] Generalized weakness  [x ] Bedbound   Neurological: [ ] No focal deficits  [ x] Cognitive impairment     Skin: [x ] Normal   [ ] Pressure ulcers     Vital Signs Last 24 Hrs  T(C): 36.9 (03 Aug 2017 23:28), Max: 36.9 (03 Aug 2017 23:28)  T(F): 98.4 (03 Aug 2017 23:28), Max: 98.4 (03 Aug 2017 23:28)  HR: 80 (04 Aug 2017 09:54) (80 - 80)  BP: 75/30 (04 Aug 2017 09:54) (75/30 - 103/49)  BP(mean): --  RR: 12 (04 Aug 2017 09:54) (12 - 12)  SpO2: 90% (04 Aug 2017 09:54) (90% - 95%)    LABS:              I&O's Summary      RADIOLOGY & ADDITIONAL STUDIES:

## 2017-08-04 NOTE — PROGRESS NOTE ADULT - PROBLEM SELECTOR PLAN 2
Patient Dilaudid gtt increased recently to 1.5mg/hr, remains dyspnic. give dilaudid 0.5mg IV push once. Patient Dilaudid gtt increased recently to 1.5mg/hr, remains dyspnic. Increase Dilaudid gtt 2mg/hr Patient Dilaudid gtt increased recently to 1.5mg/hr, remains dyspnic. Increase Dilaudid gtt 2mg/hr if still in distress.

## 2017-08-04 NOTE — PROGRESS NOTE ADULT - PROBLEM SELECTOR PROBLEM 1
Acute on chronic respiratory failure with hypoxia
Acute on chronic respiratory failure with hypoxia
Chronic obstructive pulmonary disease, unspecified COPD type
Respiratory distress
Acute on chronic respiratory failure with hypoxia

## 2017-08-04 NOTE — DISCHARGE NOTE FOR THE EXPIRED PATIENT - HOSPITAL COURSE
83 yo PMH COPD, SARITA (non-adherent) to BIPAP, CKD, s/p recent hospitalization requiring MICU admission at [place] for 1 month for GI bleed was brought in by ambulance with granddaughter for worsening shortness of breath found to be in shock with severe metabolic acidosis, poor mental status, hypoxic resp treated with BIPAP.  Shock likely 2/2 sepsis w/ encephalopathy, TIARRA, transaminitis. Evaluated by MICU, and consulted by Palliative care. Family decided on DNR, and comfort care. Patient placed on dilaudid drip and robinul.

## 2017-08-04 NOTE — PROGRESS NOTE ADULT - SUBJECTIVE AND OBJECTIVE BOX
CHIEF COMPLAINT:    Interval Events:    REVIEW OF SYSTEMS:  Constitutional:   Eyes:  ENT:  CV:  Resp:  GI:  :  MSK:  Integumentary:  Neurological:  Psychiatric:  Endocrine:  Hematologic/Lymphatic:  Allergic/Immunologic:  [ ] All other systems negative  [ ] Unable to assess ROS because ________    OBJECTIVE:  ICU Vital Signs Last 24 Hrs  T(C): 36.9 (03 Aug 2017 23:28), Max: 37.1 (03 Aug 2017 10:30)  T(F): 98.4 (03 Aug 2017 23:28), Max: 98.7 (03 Aug 2017 10:30)  HR: 80 (03 Aug 2017 23:28) (65 - 80)  BP: 103/49 (03 Aug 2017 23:28) (103/49 - 112/40)  BP(mean): --  ABP: --  ABP(mean): --  RR: 12 (03 Aug 2017 23:28) (12 - 20)  SpO2: 95% (03 Aug 2017 23:28) (94% - 95%)        CAPILLARY BLOOD GLUCOSE          PHYSICAL EXAM:  General:   HEENT:   Lymph Nodes:  Neck:   Respiratory:   Cardiovascular:   Abdomen:   Extremities:   Skin:   Neurological:  Psychiatry:    HOSPITAL MEDICATIONS:  MEDICATIONS  (STANDING):  glycopyrrolate Injectable 0.2 milliGRAM(s) IV Push every 8 hours  HYDROmorphone Infusion 1 mG/Hr (1 mL/Hr) IV Continuous <Continuous>    MEDICATIONS  (PRN):  LORazepam   Injectable 0.5 milliGRAM(s) IV Push every 2 hours PRN Anxiety  HYDROmorphone  Injectable 0.5 milliGRAM(s) IV Push every 2 hours PRN dyspnea      LABS:                    MICROBIOLOGY:     RADIOLOGY:  [ ] Reviewed and interpreted by me    PULMONARY FUNCTION TESTS:    EKG: CHIEF COMPLAINT: Patient is a 82y old  Female who presents with a chief complaint of shortness of breath (04 Aug 2017 11:33)      Interval Events: Patient actively dying    REVIEW OF SYSTEMS:  Constitutional:   Eyes:  ENT:  CV:  Resp:  GI:  :  MSK:  Integumentary:  Neurological:  Psychiatric:  Endocrine:  Hematologic/Lymphatic:  Allergic/Immunologic:  [ ] All other systems negative  [x ] Unable to assess ROS because __patient actively dying. Unresponsive    OBJECTIVE:  ICU Vital Signs Last 24 Hrs  T(C): 36.9 (03 Aug 2017 23:28), Max: 37.1 (03 Aug 2017 10:30)  T(F): 98.4 (03 Aug 2017 23:28), Max: 98.7 (03 Aug 2017 10:30)  HR: 80 (03 Aug 2017 23:28) (65 - 80)  BP: 103/49 (03 Aug 2017 23:28) (103/49 - 112/40)  BP(mean): --  ABP: --  ABP(mean): --  RR: 12 (03 Aug 2017 23:28) (12 - 20)  SpO2: 95% (03 Aug 2017 23:28) (94% - 95%)        CAPILLARY BLOOD GLUCOSE          PHYSICAL EXAM:  General:   HEENT:   Lymph Nodes:  Neck:   Respiratory: Agonal breathing. Gurgling sounds in the throat  Cardiovascular:   Abdomen:   Extremities:   Skin:   Neurological:  Psychiatry:    HOSPITAL MEDICATIONS:  MEDICATIONS  (STANDING):  glycopyrrolate Injectable 0.2 milliGRAM(s) IV Push every 8 hours  HYDROmorphone Infusion 1 mG/Hr (1 mL/Hr) IV Continuous <Continuous>    MEDICATIONS  (PRN):  LORazepam   Injectable 0.5 milliGRAM(s) IV Push every 2 hours PRN Anxiety  HYDROmorphone  Injectable 0.5 milliGRAM(s) IV Push every 2 hours PRN dyspnea      LABS:                    MICROBIOLOGY:     RADIOLOGY:  [ ] Reviewed and interpreted by me    PULMONARY FUNCTION TESTS:    EKG: CHIEF COMPLAINT: Patient is a 82y old  Female who presents with a chief complaint of shortness of breath (04 Aug 2017 11:33)      Interval Events: Patient actively dying    REVIEW OF SYSTEMS:  Constitutional:   Eyes:  ENT:  CV:  Resp:  GI:  :  MSK:  Integumentary:  Neurological:  Psychiatric:  Endocrine:  Hematologic/Lymphatic:  Allergic/Immunologic:  [ ] All other systems negative  [x ] Unable to assess ROS because __patient actively dying. Unresponsive    OBJECTIVE:  ICU Vital Signs Last 24 Hrs  T(C): 36.9 (03 Aug 2017 23:28), Max: 37.1 (03 Aug 2017 10:30)  T(F): 98.4 (03 Aug 2017 23:28), Max: 98.7 (03 Aug 2017 10:30)  HR: 80 (03 Aug 2017 23:28) (65 - 80)  BP: 103/49 (03 Aug 2017 23:28) (103/49 - 112/40)  BP(mean): --  ABP: --  ABP(mean): --  RR: 12 (03 Aug 2017 23:28) (12 - 20)  SpO2: 95% (03 Aug 2017 23:28) (94% - 95%)        CAPILLARY BLOOD GLUCOSE          PHYSICAL EXAM:  General:   HEENT:   Lymph Nodes:  Neck:   Respiratory: Agonal breathing. Gurgling sounds in the throat  Cardiovascular: Hypotension  Abdomen:   Extremities:   Skin: Pale  Neurological:  Psychiatry:    HOSPITAL MEDICATIONS:  MEDICATIONS  (STANDING):  glycopyrrolate Injectable 0.2 milliGRAM(s) IV Push every 8 hours  HYDROmorphone Infusion 1 mG/Hr (1 mL/Hr) IV Continuous <Continuous>    MEDICATIONS  (PRN):  LORazepam   Injectable 0.5 milliGRAM(s) IV Push every 2 hours PRN Anxiety  HYDROmorphone  Injectable 0.5 milliGRAM(s) IV Push every 2 hours PRN dyspnea      LABS:                    MICROBIOLOGY:     RADIOLOGY:  [ ] Reviewed and interpreted by me    PULMONARY FUNCTION TESTS:    EKG:

## 2017-08-04 NOTE — PROGRESS NOTE ADULT - PROBLEM SELECTOR PLAN 1
End stage. Patient actively dying on dilaudid gtt.
Family agree to remove bipap  goal is comfort  patient used many breakthrough dilaudid for dyspnea- start low dose dilaudid gtt at 0.2mg/hr
o2 support  continue dilaudid gtt for dyspnea
Palliative team following  No hospice beds available  Family / HCP opted for comfort care  Continues on Dilaudid drip  Add Robinul
Palliative team following  No hospice beds available  Family / HCP opted for comfort care  Continues on Dilaudid drip. Dose increased for comfort  Robinul frequency increased
Palliative team consulted  Family / HCP opted for comfort care  Annette antonio initiated
Palliative team following-- family declined transfer to hospice  Family / HCP opted for comfort care  Continues on Dilaudid drip

## 2017-08-04 NOTE — PROGRESS NOTE ADULT - ASSESSMENT
83 yo with end stage COPD currently on comfort care, actively dying. 82 year old woman with end stage COPD currently on comfort care, actively dying.

## 2017-08-04 NOTE — PROGRESS NOTE ADULT - ATTENDING COMMENTS
I have seen and examined the patient. I agree with the above history, physical exam, and plan of care except for as detailed below.    82 F w/COPD, b/l PE failed AC due to recurrent GI bleeds s/p IVC filter, CKD, presenting with respiratory distress requiring BiPAP, TIARRA on CKD, severe metabolic acidosis, and possible septic shock. Family desires patient to be comfort measures only, no blood draws, no BiPAP, DNR/DNI. Currently no inpatient hospice beds available. Continue dilaudid gtt, glycopyrrolate standing, benzos PRN. Supportive care to family as needed.

## 2017-08-21 NOTE — CONSULT NOTE ADULT - SUBJECTIVE AND OBJECTIVE BOX
Mrs. Herrmann is a 83 yo F who is known to me from her prior hospitalization.  She developed a hemodynamically significant upper GI bleed in the setting of anticoagulation for a pulmonary embolus.  She required transfer to the MICU and transfusion with multiple units of blood followed by EGD X 2. Upper endoscopy showed bleeding from multiple gastric and a duodenal ulcer.  Ultimately, the patient and her daughter refused anticoagulation and she was discharged to rehab.  She also has a known lung mass since March, 2017  suspected to be neoplasm but no definitive tissue diagnosis.  As per the daughter, the patient may have had some dark stools in rehab but recently had normal brown stools. An IVC filter was placed several weeks ago.    The patient was in [facility] until two days ago when she felt dizzy during the day.  Yesterday she had an episode of syncope when she being unresponsive and her body became "stiff" and also had generalized "shaking" followed by bilious emesis and urinary/fecal incontinence. Patient herself does not recall events.  No HA, no focal weakness.  No chest pain/pressure.  No dyspnea.  No abdominal pain, no red blood per rectum or melena.  No hematuria, no dysuria but with increased frequency.    In the ED a CT scan was performed which showed bilateral pulmonary embolisms. After discussion w/ primary team/ED staff the patient and her daughter decided to proceed w/ anticoagulation with IV heparin.    She is currently on IV heparin. No complaints. No n/v/abd pain/ blood in stool / melena / hematochezia.        PAST MEDICAL & SURGICAL HISTORY:  Lung abscess  PE (pulmonary thromboembolism)  Rebound headache  Chronic kidney disease, unspecified stage  Hyperlipidemia, unspecified hyperlipidemia type  COPD (chronic obstructive pulmonary disease)  Hypertension  No significant past surgical history      MEDICATIONS  (STANDING):  losartan 50 milliGRAM(s) Oral daily  atorvastatin 20 milliGRAM(s) Oral at bedtime  lactobacillus acidophilus 1 Tablet(s) Oral daily  multivitamin 1 Tablet(s) Oral daily  famotidine    Tablet 20 milliGRAM(s) Oral two times a day  pantoprazole    Tablet 40 milliGRAM(s) Oral two times a day before meals  tiotropium 18 MICROgram(s) Capsule 1 Capsule(s) Inhalation daily  buDESOnide 160 MICROgram(s)/formoterol 4.5 MICROgram(s) Inhaler 2 Puff(s) Inhalation two times a day  heparin  Infusion. 600 Unit(s)/Hr (6 mL/Hr) IV Continuous <Continuous>  cefTRIAXone   IVPB 1 Gram(s) IV Intermittent every 24 hours    MEDICATIONS  (PRN):  ALBUTerol/ipratropium for Nebulization 3 milliLiter(s) Nebulizer every 6 hours PRN Wheezing  acetaminophen   Tablet. 650 milliGRAM(s) Oral every 6 hours PRN Mild Pain (1 - 3)  heparin  Injectable 5500 Unit(s) IV Push every 6 hours PRN For aPTT less than 40  heparin  Injectable 2500 Unit(s) IV Push every 6 hours PRN For aPTT between 40 - 57      Allergies    No Known Allergies    Intolerances        Review of Systems:    General:  No wt loss, fevers, chills, night sweats, +fatigue,   CV:  No pain, palpitations, hypo/hypertension  Resp:  +dyspnea yesterday, no cough, tachypnea, wheezing  GI:  No pain, No nausea, No vomiting, No diarrhea, No constipatiion, No weight loss, No fever, No pruritis, No rectal bleeding, No tarry stools, No dysphagia,  :  No pain, bleeding,+ incontinence yesterday, nocturia  Muscle:  No pain, weakness  Psych:  + fatigue, no insomnia, mood problems, depression  Endocrine:  No polyuria, polydypsia, cold/heat intolerance  Heme:  No petechiae, ecchymosis, easy bruisability  Skin:  No rash edema      Vital Signs Last 24 Hrs  T(C): 36.7 (10 Jul 2017 07:42), Max: 37.1 (09 Jul 2017 21:22)  T(F): 98 (10 Jul 2017 07:42), Max: 98.8 (09 Jul 2017 21:22)  HR: 80 (10 Jul 2017 07:42) (77 - 90)  BP: 143/70 (10 Jul 2017 07:42) (129/74 - 159/77)  BP(mean): --  RR: 18 (10 Jul 2017 07:42) (18 - 20)  SpO2: 97% (10 Jul 2017 07:42) (95% - 99%)    PHYSICAL EXAM:    Constitutional: NAD, well-developed  Neck: No LAD  Respiratory: CTA ant  Cardiovascular: S1 and S2  Gastrointestinal: BS+, soft, NT/ND,   Extremities: No peripheral edema  Vascular: + peripheral pulses  Neurological: A/O x 3, no focal deficits  Psychiatric: Normal mood, normal affect  Skin: No rashes      LABS:                        10.2   11.07 )-----------( 540      ( 10 Jul 2017 08:21 )             31.5                         11.5   12.6  )-----------( 491      ( 10 Jul 2017 00:13 )             36.4                         12.4   14.5  )-----------( 512      ( 09 Jul 2017 13:22 )             38.2     07-10    138  |  106  |  14  ----------------------------<  72  3.4<L>   |  18<L>  |  0.90    Ca    8.4      10 Jul 2017 08:37  Phos  3.3     07-09  Mg     1.3     07-09    TPro  5.8<L>  /  Alb  2.9<L>  /  TBili  0.5  /  DBili  x   /  AST  144<H>  /  ALT  70<H>  /  AlkPhos  185<H>  07-09    PT/INR - ( 10 Jul 2017 12:17 )   PT: 12.0 sec;   INR: 1.10 ratio         PTT - ( 10 Jul 2017 12:17 )  PTT:67.5 sec  Urinalysis Basic - ( 09 Jul 2017 16:57 )    Color: Yellow / Appearance: Clear / SG: >1.030 / pH: x  Gluc: x / Ketone: Negative  / Bili: Negative / Urobili: Negative   Blood: x / Protein: 30 mg/dL / Nitrite: Positive   Leuk Esterase: Small / RBC: 0-2 /HPF / WBC 25-50 /HPF   Sq Epi: x / Non Sq Epi: x / Bacteria: Few /HPF      LIVER FUNCTIONS - ( 09 Jul 2017 13:22 )  Alb: 2.9 g/dL / Pro: 5.8 g/dL / ALK PHOS: 185 U/L / ALT: 70 U/L RC / AST: 144 U/L / GGT: x           Lipase, Serum: 14 U/L (07-09-17 @ 13:22)        RADIOLOGY & ADDITIONAL TESTS: Mrs. Herrmann is a 81 yo F who is known to me from her prior hospitalization.  She developed a hemodynamically significant upper GI bleed in the setting of anticoagulation for a pulmonary embolus.  She required transfer to the MICU and transfusion with multiple units of blood followed by EGD X 2. Upper endoscopy showed bleeding from multiple gastric and a duodenal ulcer.  Ultimately, the patient and her daughter refused anticoagulation and she was discharged to rehab.  She also has a known lung mass which is likely infectious per pulmonary however no tissue diagnosis is available.  As per the daughter, the patient may have had some dark stools in rehab but recently had normal brown stools. An IVC filter was placed several weeks ago electively as an outpatient.    The patient was in [facility] until two days ago when she felt dizzy during the day.  Yesterday she had an episode of syncope when she being unresponsive and her body became "stiff" and also had generalized "shaking" followed by bilious emesis and urinary/fecal incontinence. Patient herself does not recall events.  No HA, no focal weakness.  No chest pain/pressure.  No dyspnea.  No abdominal pain, no red blood per rectum or melena.  No hematuria, no dysuria but with increased frequency.    In the ED a CT scan was performed which showed bilateral pulmonary embolisms. After discussion w/ primary team/ED staff the patient and her daughter decided to proceed w/ anticoagulation with IV heparin.    She is currently on IV heparin. No complaints. No n/v/abd pain/ blood in stool / melena / hematochezia.        PAST MEDICAL & SURGICAL HISTORY:  Lung abscess  PE (pulmonary thromboembolism)  Rebound headache  Chronic kidney disease, unspecified stage  Hyperlipidemia, unspecified hyperlipidemia type  COPD (chronic obstructive pulmonary disease)  Hypertension  No significant past surgical history      MEDICATIONS  (STANDING):  losartan 50 milliGRAM(s) Oral daily  atorvastatin 20 milliGRAM(s) Oral at bedtime  lactobacillus acidophilus 1 Tablet(s) Oral daily  multivitamin 1 Tablet(s) Oral daily  famotidine    Tablet 20 milliGRAM(s) Oral two times a day  pantoprazole    Tablet 40 milliGRAM(s) Oral two times a day before meals  tiotropium 18 MICROgram(s) Capsule 1 Capsule(s) Inhalation daily  buDESOnide 160 MICROgram(s)/formoterol 4.5 MICROgram(s) Inhaler 2 Puff(s) Inhalation two times a day  heparin  Infusion. 600 Unit(s)/Hr (6 mL/Hr) IV Continuous <Continuous>  cefTRIAXone   IVPB 1 Gram(s) IV Intermittent every 24 hours    MEDICATIONS  (PRN):  ALBUTerol/ipratropium for Nebulization 3 milliLiter(s) Nebulizer every 6 hours PRN Wheezing  acetaminophen   Tablet. 650 milliGRAM(s) Oral every 6 hours PRN Mild Pain (1 - 3)  heparin  Injectable 5500 Unit(s) IV Push every 6 hours PRN For aPTT less than 40  heparin  Injectable 2500 Unit(s) IV Push every 6 hours PRN For aPTT between 40 - 57      Allergies    No Known Allergies    Intolerances        Review of Systems:    General:  No wt loss, fevers, chills, night sweats, +fatigue,   CV:  No pain, palpitations, hypo/hypertension  Resp:  +dyspnea yesterday, no cough, tachypnea, wheezing  GI:  No pain, No nausea, No vomiting, No diarrhea, No constipatiion, No weight loss, No fever, No pruritis, No rectal bleeding, No tarry stools, No dysphagia,  :  No pain, bleeding,+ incontinence yesterday, nocturia  Muscle:  No pain, weakness  Psych:  + fatigue, no insomnia, mood problems, depression  Endocrine:  No polyuria, polydypsia, cold/heat intolerance  Heme:  No petechiae, ecchymosis, easy bruisability  Skin:  No rash edema      Vital Signs Last 24 Hrs  T(C): 36.7 (10 Jul 2017 07:42), Max: 37.1 (09 Jul 2017 21:22)  T(F): 98 (10 Jul 2017 07:42), Max: 98.8 (09 Jul 2017 21:22)  HR: 80 (10 Jul 2017 07:42) (77 - 90)  BP: 143/70 (10 Jul 2017 07:42) (129/74 - 159/77)  BP(mean): --  RR: 18 (10 Jul 2017 07:42) (18 - 20)  SpO2: 97% (10 Jul 2017 07:42) (95% - 99%)    PHYSICAL EXAM:    Constitutional: NAD, well-developed  Neck: No LAD  Respiratory: CTA ant  Cardiovascular: S1 and S2  Gastrointestinal: BS+, soft, NT/ND,   Extremities: No peripheral edema  Vascular: + peripheral pulses  Neurological: A/O x 3, no focal deficits  Psychiatric: Normal mood, normal affect  Skin: No rashes      LABS:                        10.2   11.07 )-----------( 540      ( 10 Jul 2017 08:21 )             31.5                         11.5   12.6  )-----------( 491      ( 10 Jul 2017 00:13 )             36.4                         12.4   14.5  )-----------( 512      ( 09 Jul 2017 13:22 )             38.2     07-10    138  |  106  |  14  ----------------------------<  72  3.4<L>   |  18<L>  |  0.90    Ca    8.4      10 Jul 2017 08:37  Phos  3.3     07-09  Mg     1.3     07-09    TPro  5.8<L>  /  Alb  2.9<L>  /  TBili  0.5  /  DBili  x   /  AST  144<H>  /  ALT  70<H>  /  AlkPhos  185<H>  07-09    PT/INR - ( 10 Jul 2017 12:17 )   PT: 12.0 sec;   INR: 1.10 ratio         PTT - ( 10 Jul 2017 12:17 )  PTT:67.5 sec  Urinalysis Basic - ( 09 Jul 2017 16:57 )    Color: Yellow / Appearance: Clear / SG: >1.030 / pH: x  Gluc: x / Ketone: Negative  / Bili: Negative / Urobili: Negative   Blood: x / Protein: 30 mg/dL / Nitrite: Positive   Leuk Esterase: Small / RBC: 0-2 /HPF / WBC 25-50 /HPF   Sq Epi: x / Non Sq Epi: x / Bacteria: Few /HPF      LIVER FUNCTIONS - ( 09 Jul 2017 13:22 )  Alb: 2.9 g/dL / Pro: 5.8 g/dL / ALK PHOS: 185 U/L / ALT: 70 U/L RC / AST: 144 U/L / GGT: x           Lipase, Serum: 14 U/L (07-09-17 @ 13:22)        RADIOLOGY & ADDITIONAL TESTS: - on therapeutic Lovenox - on prophylactic enoxaparin 40mg qday DNR/DNI but wants all other aggressive medical therapies  - overall prognosis guarded

## 2019-04-09 NOTE — PATIENT PROFILE ADULT. - PRO ANTICIPATED DISCH DISP
[facility] PAIN PROGRAM FOLLOW UP VISIT  Date of Initial Visit: 7/24/17    PRIMARY CARE PHYSICIAN:  KEO Woo    Chief Complaint   Patient presents with   • Pain     all over body pain     INTERVAL HISTORY:  Patient is a 34 year old female, who was last seen on 9/27/18 for fibromyalgia and back pain.     She was under the (mistaken) impression that marijuana was legal in [place] and returned to the clinic, at the advice of her PCP, to obtain a doctor's letter to use this. She is already using CBD oil and this does provide some benefit.    Overall she continues to have a substantial amount of pain. She has tried many treatments.        Brief Pain Inventory  Average Pain  8/10   Previous  9/10  Functional components  80/90   Previous  83/90    PHQ-9=22 on 10/13/17. Letter sent to PCP.      PREVIOUS TREATMENTS/RESPONSE:  Physical Therapy: Worsened pain  Chiropractor: never tried  Acupuncture: never tried  Massage: Worsened pain  Epidurals/Injections: Multiple ANASTACIO as an RFAs with advanced pain management in the past; not helpful  Trigger Point Injections: Failed    TENS: Failed  Baclofen; allergy  Flexeril; failed  Zanaflex; failed  Naproxen; caused hives  Diclofenac; failed  Lyrica: Failed  Gabapentin-ineffective for pain  Effexor: caused shortness of breath  Topamax: tongue swelling  Lamictal: tongue swelling  Amitriptyline: Failed, tongue swelling  Mirapex: Failed  Candida Diet and antifungal treatment: did not improve pain from baseline  Savella, failed    CURRENT MEDICATIONS:  Per Epic Record.    Pain Management prescribes the following medications:  None    Gabapentin 300mg TID (Rx by psych)  Zonegran 200mg qhs (Rx'd by psych)  Diclofenac (Rx'd by PCP)  Cyclobenzaprine (Rx'd by PCP)  ALLERGIES:   Allergen Reactions   • Abilify SHORTNESS OF BREATH   • Bee ANAPHYLAXIS   • Effexor [Venlafaxine Hydrochloride] SHORTNESS OF BREATH   • Geodon [Ziprasidone Hydrochloride] SHORTNESS OF BREATH   •  Seroquel [Quetiapine Fumerate] SHORTNESS OF BREATH   • Baclofen    • Benadryl Allergy SWELLING     Tongue swelling    • Lamictal Other (See Comments)     Tongue swelling   • Latex   (Environmental) HIVES and Dermatitis   • Latuda [Lurasidone Hcl] SWELLING     Tongue swelling     • Naproxen HIVES and Dermatitis   • Topamax Other (See Comments)     Tongue swelling     PHYSICAL EXAM:  Vitals:    04/11/19 0844   BP: 104/70   Pulse: 98   SpO2: 99%   Weight: 66.7 kg   Height: 5' 3\" (1.6 m)   PainSc: 7-8       GENERAL: Patient is a 34 year old female. Alert and answers questions appropriately. Does not appear somnolent nor intoxicated. In no apparent distress. AAO x 3.  EYES: Pupils equal and conjunctivae clear.  RESPIRATORY:  Normal respiration unlabored.   SKIN: Skin is warm and dry. Skin and fingernails are without trophic changes. No visible erythema, lesions, or wounds.  MUSCULOSKELETAL: Patient is able to rise independently to a standing position and ambulates with a non-antalgic gait.     PERTINENT IMAGING AND DIAGNOSTIC STUDIES:  2/22/18 lumbar x-ray:  LUMBAR SPINE:   1.  Five lumbar-type vertebral bodies.  2.  Slight levoconvex curvature of the lumbar spine centered at L3.  3.  Preserved intervertebral disc spaces    CONTROLLED SUBSTANCE RISK ASSESSMENT:  Level at previous visit: MODERATE  SOAPP= 5  Evidence of past/present substance abuse:  None identified  New aberrant opiate behaviors: None apparent.   Current risk assessment: MODERATE    Last UDS:  NA, we do not prescribe opiates to this patient.    [place] Drug Rx Monitoring Report Reviewed?: yes, no abberancies noted.    Is the patient a potentially fertile female? No, hx of tubal ligation 7/5/17    Has patient been prescribed naloxone for use at home if necessary?  NA, we do not intend to prescribe opiates to this patient.    ASSESSMENT:  1. Fibromyalgia   2. Chronic low back pain  3. Patient is considered at moderate risk of prescription opiate abuse      PLAN:    1. Naltrexone, in very small doses, acts on glia to make them stop producing and releasing activating chemicals that make nerves more irritable. Ultimately, fibromyalgia patients feel less pain and misery. I will start by prescribing 3 mg daily for one month and then increase to 4.5 mg daily if this is not effective.  She is to contact the clinic next month and let us know if we should prescribe the same dose or increase it  2. If ineffective consider ketamine protocol  3. Follow-up in 2 months   unsure

## 2019-04-25 NOTE — CONSULT NOTE ADULT - ASSESSMENT
ASSESSMENT    syncope - while the patient does have new in addition to old pulmonary emboli, they are small in size, without evidence of right heart strain and without hemodynamic instability - an IVC filter not uncommonly does not prevent smaller pulmonary emboli but has possibly prevented a large PE - i do not think the new PEs is the cause of patient's syncopal episode and the risks of anticoagulation have to be carefully weighed against the possible benefits - the CTA does reveal significant atherosclerotic disease in the aorta and the patient is at high risk of having TIAs as another possible cause of syncope    RUL masslike consolidation - 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 Peterson    Thank you for the courtesy of this referral    Wil Quiroga MD, [facility] - [phone]  Pulmonary Medicine ASSESSMENT    syncope - while the patient does have new in addition to old pulmonary emboli, they are small in size and without evidence of causing right heart strain or hemodynamic instability - an IVC filter not uncommonly does not prevent smaller pulmonary emboli but has possibly prevented a large PE - i do not think the new PEs are the cause of the patient's syncopal episode and the risks of anticoagulation have to be carefully weighed against the possible benefits - the CTA does reveal significant atherosclerotic disease in the aorta and the patient is at high risk of having TIAs as another possible cause of syncope - seizure also should be included in the differential diagnosis    RUL masslike consolidation - 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 Peterson    Thank you for the courtesy of this referral    Wil Quiroga MD, [facility] - [phone]  Pulmonary Medicine normal... Well appearing, well nourished, awake, alert, oriented to person, not place, time/situation and in no apparent distress. follows commands

## 2019-12-20 NOTE — PATIENT PROFILE ADULT. - NS PRO AD ANY ON CHART
Problem: Self Care Deficits Care Plan (Adult)  Goal: *Acute Goals and Plan of Care (Insert Text)  Description    FUNCTIONAL STATUS PRIOR TO ADMISSION: Patient was supervision for basic and instrumental ADLs. HOME SUPPORT: The patient lived alone with caregiver intermittently to provide assistance. Occupational Therapy Goals  Initiated 12/19/2019  1. Patient will perform lower body dressing with supervision/set-up within 7 day(s). 2.  Patient will perform grooming, standing at sink, with supervision/set-up within 7 day(s). 3.  Patient will perform toilet transfers with supervision/set-up within 7 day(s). 4.  Patient will perform all aspects of toileting with supervision/set-up within 7 day(s). 5.  Patient will participate in upper extremity therapeutic exercise/activities with supervision/set-up for 5 minutes within 7 day(s). Outcome: Progressing Towards Goal   OCCUPATIONAL THERAPY TREATMENT  Patient: Vidhya [place] (89 y.o. female)  Date: 12/20/2019  Diagnosis: Weak [R53.1]  'light-for-dates' infant with signs of fetal malnutrition [P05.00]  Weakness [R53.1]   Weak       Precautions: Fall, WBAT  Chart, occupational therapy assessment, plan of care, and goals were reviewed. ASSESSMENT  Patient continues with skilled OT services and is progressing towards goals. Patient received in bedside chair. [facility] Min assist to stand but mod assist to ambulate to bathroom. Patient requires min assist for bladder hygiene, and unable to remain standing at sink for grooming. Patient returned to chair and OT provided washcloth for patient to wash face. Patient performs with only set up for task. Patient following commands with loud verbal instruction. She requires mod assist for transfer and not safe to return home alone at [facility] time.      Current Level of Function Impacting Discharge (ADLs): mod assist x 1    Other factors to consider for discharge: lives alone PLAN :  Patient continues to benefit from skilled intervention to address the above impairments. Continue treatment per established plan of care. to address goals. Recommend with staff: OOB to chair    Recommend next OT session: progression of goals    Recommendation for discharge: (in order for the patient to meet his/her long term goals)  Therapy up to 5 days/week in SNF setting    This discharge recommendation:  Has been made in collaboration with the attending provider and/or case management    IF patient discharges home will need the following DME: to be determined (TBD)       SUBJECTIVE:   Patient stated I can go.     OBJECTIVE DATA SUMMARY:   Cognitive/Behavioral Status:  Neurologic State: Alert  Orientation Level: Oriented to person  Cognition: Decreased command following  Perception: (severely [place])          Functional Mobility and Transfers for ADLs:  Bed Mobility:       Transfers:  Sit to Stand: Minimum assistance  Functional Transfers  Bathroom Mobility: Moderate assistance(difficutly with turning)  Toilet Transfer : Moderate assistance       Balance:  Sitting: Impaired  Standing: Impaired; With support  Standing - Static: Constant support  Standing - Dynamic : Constant support    ADL Intervention:       Grooming  Grooming Assistance: Stand-by assistance  Position Performed: Seated in chair  Washing Face: Stand-by assistance  Cues: Verbal cues provided                        Toileting  Toileting Assistance: Minimum assistance  Bladder Hygiene: Minimum assistance  Clothing Management: Minimum assistance  Cues: Verbal cues provided(physical assistance)             Pain:      Activity Tolerance:   Fair  Please refer to the flowsheet for vital signs taken during this treatment.     After treatment patient left in no apparent distress:   Sitting in chair, Call bell within reach, and Bed / chair alarm activated    COMMUNICATION/COLLABORATION:   The patients plan of care was discussed with: Andre Nurse    Shirlean Babinski, OTR/L  Time Calculation: 24 mins Yes

## 2019-12-26 NOTE — PROVIDER CONTACT NOTE (CRITICAL VALUE NOTIFICATION) - NAME OF MD/NP/PA/DO NOTIFIED:
I reviewed the H&P, I examined the patient, and there are no changes in the patient's condition.    
Ashly Smith
BEHZAD Smith
CATE Lora
NP Ionie Chambers
CATE Lora

## 2020-02-17 NOTE — H&P ADULT - NSHPPOADEEPVENOUSTHROMB_GEN_A_CORE
Nikia was happy to learn that the Ct scan she had done today was negative for PE. No questions at this time.  
no

## 2021-08-09 NOTE — CONSULT NOTE ADULT - ASSESSMENT
"Nutrition Services: Consult received for tube feeding regarding  \" patient is becoming very hyperglycemic on current formula\". RD addressed this yesterday, reviewed RD recommendations with pt's , MD and RN(see dietary progress note on 8/8). RN reports TF temporarily on hold until blood sugar comes down to 200, RN says it is trending down and hopeful that increase in long acting insulin will help with blood sugar control. Met with pt's ,  asking when pt can take more by mouth. Discussed with RN who states he has discussed with pt's  that SLP will see pt once her mentation improves, possibly tomorrow depending on pt's status. RD also reiterated that SLP to be consulted for possible diet advancement as MD feels pt is ready.       Plan/Rec:   1) Resume TF with Impact Peptide 1.5 @ 45 ml/hr  when okay per MD.   2) Provide SLP consult as appropriate with pt's mentation for possible diet advancement.    RD following.   "
81 yo F w/ hx COPD, former smoker, hx of PEs (now off A/C), lung mass, recent PNA, presenting now w/ worsening respiratory distress, improved with BiPAP; in setting of hypotension, tachycardia, and lactic acidosis w/ leukocytosis, suspect possible sepsis. Per discussion with palliative care and with family, patient's goal is for full comfort care; at this time, would not offer ICU admission since against patient's goals of care.    #Neuro: hold off sedating medications; continue w/ care per palliative recs  #Pulm: no signs of consolidation; suspect tachypnea possibly 2/2 lactic acidosis found on admission, no improving; continue to monitor; continue w/ BiPAP, albuterol PRN if wheezing in setting of COPD  #Cards: Hypotension w/ tachycardia; guarded prognosis; IV fluids as cardiac function tolerates; monitor for improvement while on abx  #GI: given diffuse abdominal tenderness, consider CT scan to r/o intraabdominal infection if consistent w/ goals of care  #ID: f/u cultures; continue w/ abx  #Renal: poor renal function, possibly 2/2 multiorgan disfunction in setting of sepsis; monitor for improvement while on abx  #Dispo: DNR/DNI, holding off on MICU admission; appreciate palliative recs
81yo F from home, BIBA with granddaughter for worsening shortness of breath, seen bedside as urgent consult for end of life symptom management and goals of care. Both daughters present as well as two grandchildren, and including [employer] Daughter: MANOLO .   Patient herself is minimally responsive as is extremely tachypnic with use of accessory muscles on bipap.

## 2021-08-27 NOTE — CONSULT NOTE ADULT - SUBJECTIVE AND OBJECTIVE BOX
CHIEF COMPLAINT:  Seizure  HISTORY OF PRESENT ILLNESS:  81yo female with a h/o COPD, CKD, HTN, lung mass (since 3/2017-no definitive diagnosis), previous PE's with GI bleed on anticoagulation s/p IVC filter presents with seizures from home. Routine CT chest revealed bilateral PE's with no RV strain. Patient is not hypoxic or tachycardic. She is hemodynamically stable. Moreover, she is DNR/DNI and does not wish for invasive treatments.    Allergies    No Known Allergies    Intolerances    	    MEDICATIONS:  heparin  Infusion.  Unit(s)/Hr IV Continuous <Continuous>  heparin  Injectable 5500 Unit(s) IV Push every 6 hours PRN  heparin  Injectable 2500 Unit(s) IV Push every 6 hours PRN  losartan 50 milliGRAM(s) Oral daily    cefTRIAXone   IVPB 1 Gram(s) IV Intermittent every 24 hours    formoterol for nebulization 20 MICROGram(s) Nebulizer two times a day  ALBUTerol/ipratropium for Nebulization 3 milliLiter(s) Nebulizer every 6 hours PRN    acetaminophen   Tablet. 650 milliGRAM(s) Oral every 6 hours PRN    famotidine    Tablet 20 milliGRAM(s) Oral daily    atorvastatin 20 milliGRAM(s) Oral at bedtime    multivitamin 1 Tablet(s) Oral daily      PAST MEDICAL & SURGICAL HISTORY:  Lung abscess  PE (pulmonary thromboembolism)  Rebound headache  Chronic kidney disease, unspecified stage  Hyperlipidemia, unspecified hyperlipidemia type  COPD (chronic obstructive pulmonary disease)  Hypertension  No significant past surgical history      FAMILY HISTORY:  Family history of COPD (chronic obstructive pulmonary disease) (Father)      SOCIAL HISTORY:    Previous smoker      REVIEW OF SYSTEMS:  See HPI. Otherwise, 10 point ROS done and otherwise negative.    PHYSICAL EXAM:  T(C): 37.1 (07-09-17 @ 21:22), Max: 37.1 (07-09-17 @ 21:22)  HR: 79 (07-09-17 @ 21:22) (79 - 116)  BP: 129/74 (07-09-17 @ 21:22) (121/86 - 159/77)  RR: 18 (07-09-17 @ 21:22) (18 - 20)  SpO2: 97% (07-09-17 @ 21:22) (95% - 98%)  Wt(kg): --  I&O's Summary      Appearance: Normal	  HEENT:   Normal oral mucosa, PERRL, EOMI	  Lymphatic: No lymphadenopathy  Cardiovascular: Normal S1 S2, No JVD, No murmurs, No edema  Respiratory: Lungs clear to auscultation	  Psychiatry: A & O x 3, Mood & affect appropriate  Gastrointestinal:  Soft, Non-tender, + BS	  Skin: No rashes, No ecchymoses, No cyanosis	  Neurologic: Non-focal  Extremities: Normal range of motion, No clubbing, cyanosis or edema  Vascular: Peripheral pulses palpable 2+ bilaterally        LABS:	 	    CBC Full  -  ( 10 Jul 2017 00:13 )  WBC Count : 12.6 K/uL  Hemoglobin : 11.5 g/dL  Hematocrit : 36.4 %  Platelet Count - Automated : 491 K/uL  Mean Cell Volume : 97.2 fl  Mean Cell Hemoglobin : 30.7 pg  Mean Cell Hemoglobin Concentration : 31.6 gm/dL  Auto Neutrophil # : x  Auto Lymphocyte # : x  Auto Monocyte # : x  Auto Eosinophil # : x  Auto Basophil # : x  Auto Neutrophil % : x  Auto Lymphocyte % : x  Auto Monocyte % : x  Auto Eosinophil % : x  Auto Basophil % : x    07-09    141  |  107  |  13  ----------------------------<  108<H>  3.4<L>   |  18<L>  |  0.84  07-09    140  |  105  |  14  ----------------------------<  139<H>  2.9<LL>   |  16<L>  |  0.98    Ca    8.0<L>      09 Jul 2017 20:12  Ca    9.0      09 Jul 2017 13:22  Phos  3.3     07-09  Mg     1.3     07-09    TPro  5.8<L>  /  Alb  2.9<L>  /  TBili  0.5  /  DBili  x   /  AST  144<H>  /  ALT  70<H>  /  AlkPhos  185<H>  07-09      proBNP: Serum Pro-Brain Natriuretic Peptide: 1762 pg/mL (07-09 @ 13:22)    Lipid Profile:   HgA1c:   TSH:       CARDIAC MARKERS:  Pending              	  RADIOLOGY:  CT chest:   IMPRESSION:   1. Bilateral pulmonary emboli.  2. Cavitary right upper lobe lesion unchanged since 6/9/2017 and mildly   improved since 3/3/2017.    	    ASSESSMENT/PLAN: 	    81yo female with a h/o lung mass, GI bleeds who is DNR/DNI presents with hemodynamically stable PE's. Patient is not appropriate for invasive management of PE's. Moreover, patient is refusing invasive management of PE's.   -check TTE  -check troponin, pBNP  -continue heparin gtt Stable

## 2022-04-18 NOTE — H&P ADULT. - VENOUS THROMBOEMBOLISM SCORE
9

## 2022-05-17 NOTE — CONSULT NOTE ADULT - CONSULT REASON
Hx of UGI bleed in the setting of anticoagulation
PERT
cystitis
pulmonary emboli/syncope
syncope
16-May-2022 21:45

## 2022-07-28 NOTE — PHYSICAL THERAPY INITIAL EVALUATION ADULT - ADDITIONAL COMMENTS
RT called   PTA after rehab, pt lived home with daughter and grandchildren, used RW to ambulate independently, required minimal assist with ADLs, has RW and raised toilet seat, daughter looking into ramp since there are 3 steps to enter back door and pt is having difficulty with them.

## 2022-08-26 NOTE — PHYSICAL THERAPY INITIAL EVALUATION ADULT - THERAPY FREQUENCY, PT EVAL
[facility]    REASON FOR VISIT:    Chief Complaint:    Chief Complaint   Patient presents with    Weight Management     Weigh In         OBJECTIVE:  Physical Exam   Ht 5' 6\" (1.676 m)   Wt (!) 377 lb 9.6 oz (171.3 kg)   BMI 60.95 kg/m²                Patient lost 1.8lbs
2-3x/week

## 2023-01-03 NOTE — PATIENT PROFILE ADULT. - PRO SERVICES AT [facility]
----- Message from [place] sent at 1/3/2023  2:04 PM CST -----  Regarding: lab/ urine specimen  Caller is requesting to schedule their Lab appointment prior to annual appointment.  Order is not listed in EPIC.  Please enter order and contact patient to schedule.    Name of Caller:pt    Preferred Date and Time of Labs:now    Date of EPP Appointment:    Where would they like the lab performed?urine specimen    Would the patient rather a call back or a response via [facility]? C/b    Best Call Back Number:[phone]    Additional Information:lynda SÁNCHEZ tel # [phone] pt would like pcp to contact lynda sánchez to  urine specimen order is in the system   Thank you     unsure

## 2023-07-07 NOTE — H&P ADULT. - PRO TOBACCO TYPE
----- Message from Karo Cook MD sent at 7/6/2023  6:42 PM CDT -----  Bone density is normal.  Please mail results to patient   cigarettes

## 2023-11-27 NOTE — PHYSICAL THERAPY INITIAL EVALUATION ADULT - GAIT DISTANCE, PT EVAL
The following changes were made to your medications today:   Continue all your current medications.    The following lifestyle modifications are encouraged:  Continue regular exercise at least 30 minutes daily.  Lowfat/Low Cholesterol diet advised.   Low sodium diet <2000mgs/day and fluid restrictions of <2liters/day (640z/day or 6-8 cups 8oz liquids per day).  Daily weight checks and call if you notice weight gain of 3lbs in a day or 5lbs in a week.    The following tests have been ordered:  Fasting labs- CMP and Lipid panel.    Follow up in CHF clinic in 12 months.  Follow up with primary cardiologist,   in August.    Additional Educational Resources:  For additional resources regarding your symptoms, diagnosis, or further health information, please visit the Health Resources section on [email or web address] or the Online Health Resources section in [employer].      
50 feet

## 2023-12-08 NOTE — ED ADULT NURSE NOTE - NS ED NURSE DISCH DISPOSITION
RECORDS RECEIVED FROM: Internal    REASON FOR VISIT: Meningioma   Date of Appt: 12/20/23 @ 10:30 am    NOTES (FOR ALL VISITS) STATUS DETAILS   OFFICE NOTE from referring provider Internal Hosp Referral    DISCHARGE SUMMARY from hospital Internal 11/27/23-11/28/23 Gabriel Joe MD @[facility]     EEG Internal [facility]  11/27/23 EEG Video 2-12 Hrs Unmonitored  11/27/23 EEG Video 2-12 Hrs Unmonitored     MEDICATION LIST Internal    IMAGING  (FOR ALL VISITS)     ULTRASOUND (CAROTID BILAT) *VASCULAR* Internal [facility]  11/28/23 US Carotid Bilat     MRI (HEAD, NECK, SPINE) Internal [facility]  11/27/23 MR Brain         Admitted
